# Patient Record
Sex: FEMALE | Race: WHITE | NOT HISPANIC OR LATINO | Employment: OTHER | URBAN - METROPOLITAN AREA
[De-identification: names, ages, dates, MRNs, and addresses within clinical notes are randomized per-mention and may not be internally consistent; named-entity substitution may affect disease eponyms.]

---

## 2017-02-15 ENCOUNTER — OFFICE VISIT (OUTPATIENT)
Dept: PHYSICAL MEDICINE AND REHAB | Facility: CLINIC | Age: 52
End: 2017-02-15

## 2017-02-15 VITALS
HEART RATE: 78 BPM | SYSTOLIC BLOOD PRESSURE: 105 MMHG | BODY MASS INDEX: 24.65 KG/M2 | DIASTOLIC BLOOD PRESSURE: 76 MMHG | HEIGHT: 66 IN | WEIGHT: 153.4 LBS

## 2017-02-15 DIAGNOSIS — G24.1 DYSTONIA, TORSION, FRAGMENTS OF: Primary | ICD-10-CM

## 2017-02-15 ASSESSMENT — PAIN SCALES - GENERAL: PAINLEVEL: NO PAIN (0)

## 2017-02-15 NOTE — LETTER
"2/15/2017       RE: Deepali Jara  [address] [facility] [address]     Dear Colleague,    Thank you for referring your patient, Deepali Jara, to the [facility] PHYSICAL MEDICINE AND REHABILITATION at [facility]. Please see a copy of my visit note below.    BOTULINUM TOXIN PROCEDURE NOTE    Chief Complaint   Patient presents with     RECHECK     UMP RETURN BOTOX       /76 (BP Location: Left arm, Patient Position: Chair, Cuff Size: Adult Regular)  Pulse 78  Ht 1.676 m (5' 6\")  Wt 69.6 kg (153 lb 6.4 oz)  BMI 24.76 kg/m2      Current Outpatient Prescriptions:      OnabotulinumtoxinA (BOTOX IJ), Inject 100 Units into the muscle Lot # /[id number]  Exp: 8/2019, Disp: , Rfl:      CLONAZEPAM PO, Take 0.5 mg by mouth, Disp: , Rfl:      OnabotulinumtoxinA (BOTOX IJ), Inject 50 Units as directed once Lot#[id number]  Ex: June 2019, Disp: , Rfl:      botulinum toxin type A (BOTOX) 100 UNITS injection, Inject 300 Units into the muscle every 3 months, Disp: 300 Units, Rfl: 3     carbidopa-levodopa (SINEMET)  MG per tablet, Increase Sinemet 25/100 mg -- take 1.5 tablets 3 x per day for 1 week & if tolerated increase to 2 tablets 3 times a day. (Patient taking differently: Increase Sinemet 25/100 mg -- take 1 tablets 3 x per day for 1 week), Disp: 180 tablet, Rfl: 11     No Known Allergies     PHYSICAL EXAM:    SHOULDER PATTERN:   Right - Tremor:   Observed  Right - Flexion:   Present  Right - Adduction:   Observed  ELBOW AND FOREARM PATTERN:   Right - abduction:   Observed  WRIST AND HAND PATTERN:   Right - Wrist Extension:   Observed  Right - Finger Extension:   Observed  Right - Thumb Extension:   Observed  Right - Finger Abduction:   Observed    HPI:    Patient reports the following new medical problems since last visit: none    We reviewed the recommended safety guidelines for  Botox from any vaccine injection, such as the seasonal flu vaccine, by a " minimum of 10-14 days with Deepali Jara. She acknowledged understanding.    RESPONSE TO PREVIOUS TREATMENT:    Deepali Jara received 100 units of Botox on 16.    Problems following the previous series of neurotoxin injections included:  No problems reported    BENEFITS BY PATIENT REPORT:    Pain Improvement: Yes.  Percent Improvement: 100 %    Duration of Benefit:  6 weeks and followed by a rapid reduction in benefit.    Dystonia, hand cramping, and fatigue improvement: Yes.  Percent Improvement: 50 %   Duration of Benefit:  6 weeks and followed by a rapid reduction in benefit.      BOTULINUM NEUROTOXIN INJECTION PROCEDURES:    VERIFICATION OF PATIENT IDENTIFICATION AND PROCEDURE     Initials   Patient Name [facility]   Patient  [facility]   Procedure Verified by: [facility]     Prior to the start of the procedure and with procedural staff participation, I verbally confirmed the patient s identity using two indicators, relevant allergies, that the procedure was appropriate and matched the consent or emergent situation, and that the correct equipment/implants were available. Immediately prior to starting the procedure I conducted the Time Out with the procedural staff and re-confirmed the patient s name, procedure, and site/side. (The Joint Commission universal protocol was followed.)  Yes    Sedation (Moderate or Deep): None      Above assessments performed by:  Resident/Fellow         Evita Walton DO          The attending provider was present for the entire procedure documented below.      Oskar Lan MD      INDICATION/S FOR PROCEDURE/S:  Deepali Jara is a 51 year old patient with dystonia affecting the  right upper extremity secondary to a diagnosis of focal dystonia with associated  pain, tremor and loss of volitional motor control.     Her baseline symptoms have been recalcitrant to oral medications and conservative therapy.  She is here today for an injection of Botox.      GOAL OF  PROCEDURE:  The goal of this procedure is to improve volitional motor control, decrease pain, and enhance functional independence associated with dystonic movements.    TOTAL DOSE ADMINISTERED:  Dose Administered:  100 units Botox    Diluent Used:  Preservative Free Normal Saline  Total Volume of Diluent Used: 1 ml  Lot # /C3 with Expiration Date:  09/2019  NDC #: Botox 100u ([address]-38)    Medication guide was offered to patient and was declined.    CONSENT:  The risks, benefits, and treatment options were discussed with Deepali Jara and she agreed to proceed.      Written consent was obtained by [facility].     EQUIPMENT USED:  Needle-25mm stimulating/recording  EMG/NCS Machine    SKIN PREPARATION:  Skin preparation was performed using an alcohol wipe.    GUIDANCE DESCRIPTION:  Electro-myographic guidance was necessary throughout the procedure to accurately identify all areas of dystonic muscles while avoiding injection of non-dystonic muscles, neighboring nerves and nearby vascular structures.     AREA/MUSCLE INJECTED:  100 units of Botox  1. Right upper limb     Right Pect Major - 20 units of Botox at 1 site      Right Biceps- 25 units of Botox at 1 site      Right Brach rad - 10 units of Botox at 1 site      Right Flex Carp Uln - 7.5 units of Botox at 1 site      Right Carp Uln - 7.5 units of Botox at 1 site      Right Abductor Poll - 5 units of Botox at 1 site      Right Abductor Digiti Min - 5 units of Botox at 1 site     Right Dorsal Interossei - 15 units of Botox at 4 sites     Right Ext Poll Long - 5 units of Botox at 1 site    RESPONSE TO PROCEDURE:  Deepali Jara tolerated the procedure well and there were no immediate complications.  She was allowed to recover for an appropriate period of time and was discharged home in stable condition.    FOLLOW UP:  Deepali Jara was asked to follow up by phone in 7-14 days with Bettie Rhodes PT, Care Coordinator, to report her  response to this series of injections.  Based on the patient's previous response to this therapy, Deepaliryan Jara was rescheduled for the next series of injections in 7-9 weeks.    PLAN (Medication Changes, Therapy Orders, Work or Disability Issues, etc.):     Again, thank you for allowing me to participate in the care of your patient.      Sincerely,    Oskar Lan MD

## 2017-02-15 NOTE — PROGRESS NOTES
"BOTULINUM TOXIN PROCEDURE NOTE    Chief Complaint   Patient presents with     RECHECK     UMP RETURN BOTOX       /76 (BP Location: Left arm, Patient Position: Chair, Cuff Size: Adult Regular)  Pulse 78  Ht 1.676 m (5' 6\")  Wt 69.6 kg (153 lb 6.4 oz)  BMI 24.76 kg/m2      Current Outpatient Prescriptions:      OnabotulinumtoxinA (BOTOX IJ), Inject 100 Units into the muscle Lot # /C3  Exp: 8/2019, Disp: , Rfl:      CLONAZEPAM PO, Take 0.5 mg by mouth, Disp: , Rfl:      OnabotulinumtoxinA (BOTOX IJ), Inject 50 Units as directed once Lot#[id number]  Ex: June 2019, Disp: , Rfl:      botulinum toxin type A (BOTOX) 100 UNITS injection, Inject 300 Units into the muscle every 3 months, Disp: 300 Units, Rfl: 3     carbidopa-levodopa (SINEMET)  MG per tablet, Increase Sinemet 25/100 mg -- take 1.5 tablets 3 x per day for 1 week & if tolerated increase to 2 tablets 3 times a day. (Patient taking differently: Increase Sinemet 25/100 mg -- take 1 tablets 3 x per day for 1 week), Disp: 180 tablet, Rfl: 11     No Known Allergies     PHYSICAL EXAM:    SHOULDER PATTERN:   Right - Tremor:   Observed  Right - Flexion:   Present  Right - Adduction:   Observed  ELBOW AND FOREARM PATTERN:   Right - abduction:   Observed  WRIST AND HAND PATTERN:   Right - Wrist Extension:   Observed  Right - Finger Extension:   Observed  Right - Thumb Extension:   Observed  Right - Finger Abduction:   Observed    HPI:    Patient reports the following new medical problems since last visit: none    We reviewed the recommended safety guidelines for  Botox from any vaccine injection, such as the seasonal flu vaccine, by a minimum of 10-14 days with Deepali Jara. She acknowledged understanding.    RESPONSE TO PREVIOUS TREATMENT:    Deepali MAKENNA Oliverhuma Jara received 100 units of Botox on 12/21/16.    Problems following the previous series of neurotoxin injections included:  No problems reported    BENEFITS BY PATIENT " REPORT:    Pain Improvement: Yes.  Percent Improvement: 100 %    Duration of Benefit:  6 weeks and followed by a rapid reduction in benefit.    Dystonia, hand cramping, and fatigue improvement: Yes.  Percent Improvement: 50 %   Duration of Benefit:  6 weeks and followed by a rapid reduction in benefit.      BOTULINUM NEUROTOXIN INJECTION PROCEDURES:    VERIFICATION OF PATIENT IDENTIFICATION AND PROCEDURE     Initials   Patient Name [facility]   Patient  [facility]   Procedure Verified by: [facility]     Prior to the start of the procedure and with procedural staff participation, I verbally confirmed the patient s identity using two indicators, relevant allergies, that the procedure was appropriate and matched the consent or emergent situation, and that the correct equipment/implants were available. Immediately prior to starting the procedure I conducted the Time Out with the procedural staff and re-confirmed the patient s name, procedure, and site/side. (The Joint Commission universal protocol was followed.)  Yes    Sedation (Moderate or Deep): None      Above assessments performed by:  Resident/Fellow         Evita Walton DO          The attending provider was present for the entire procedure documented below.      Oskar Lan MD      INDICATION/S FOR PROCEDURE/S:  Deepali Jara is a 51 year old patient with dystonia affecting the  right upper extremity secondary to a diagnosis of focal dystonia with associated  pain, tremor and loss of volitional motor control.     Her baseline symptoms have been recalcitrant to oral medications and conservative therapy.  She is here today for an injection of Botox.      GOAL OF PROCEDURE:  The goal of this procedure is to improve volitional motor control, decrease pain, and enhance functional independence associated with dystonic movements.    TOTAL DOSE ADMINISTERED:  Dose Administered:  100 units Botox    Diluent Used:  Preservative Free Normal Saline  Total Volume of Diluent Used:  1 ml  Lot # /C3 with Expiration Date:  09/2019  NDC #: Botox 100u ([address]-39)    Medication guide was offered to patient and was declined.    CONSENT:  The risks, benefits, and treatment options were discussed with Deepali Jara and she agreed to proceed.      Written consent was obtained by [facility].     EQUIPMENT USED:  Needle-25mm stimulating/recording  EMG/NCS Machine    SKIN PREPARATION:  Skin preparation was performed using an alcohol wipe.    GUIDANCE DESCRIPTION:  Electro-myographic guidance was necessary throughout the procedure to accurately identify all areas of dystonic muscles while avoiding injection of non-dystonic muscles, neighboring nerves and nearby vascular structures.     AREA/MUSCLE INJECTED:  100 units of Botox  1. Right upper limb     Right Pect Major - 20 units of Botox at 1 site      Right Biceps- 25 units of Botox at 1 site      Right Brach rad - 10 units of Botox at 1 site      Right Flex Carp Uln - 7.5 units of Botox at 1 site      Right Carp Uln - 7.5 units of Botox at 1 site      Right Abductor Poll - 5 units of Botox at 1 site      Right Abductor Digiti Min - 5 units of Botox at 1 site     Right Dorsal Interossei - 15 units of Botox at 4 sites     Right Ext Poll Long - 5 units of Botox at 1 site    RESPONSE TO PROCEDURE:  Deepali Jara tolerated the procedure well and there were no immediate complications.  She was allowed to recover for an appropriate period of time and was discharged home in stable condition.    FOLLOW UP:  Deepali Jara was asked to follow up by phone in 7-14 days with Bettie Rhodes PT, Care Coordinator, to report her response to this series of injections.  Based on the patient's previous response to this therapy, Deepali Jara was rescheduled for the next series of injections in 7-9 weeks.    PLAN (Medication Changes, Therapy Orders, Work or Disability Issues, etc.):

## 2017-02-15 NOTE — MR AVS SNAPSHOT
After Visit Summary   2/15/2017    Deepali Jara    MRN: [id number]           Patient Information     Date Of Birth          1965        Visit Information        Provider Department      2/15/2017 2:20 PM Oskar Lan MD [facility] Physical Medicine and Rehabilitation         Follow-ups after your visit        Follow-up notes from your care team     Return in about 8 weeks (around 4/12/2017), or Stalin-dystonia.      Your next 10 appointments already scheduled     Apr 04, 2017  1:20 PM CDT   (Arrive by 1:05 PM)   Return Botox with Oskar Lan MD   [facility] Physical Medicine and Rehabilitation ([facility])    [address] [place] [id number] [phone]            Jun 07, 2017  1:00 PM CDT   (Arrive by 12:45 PM)   Return Botox with Oskar Lan MD   [facility] Physical Medicine and Rehabilitation ([facility])    [address] [place] [id number] [phone]              Who to contact     Please call your clinic at [phone] to:    Ask questions about your health    Make or cancel appointments    Discuss your medicines    Learn about your test results    Speak to your doctor   If you have compliments or concerns about an experience at your clinic, or if you wish to file a complaint, please contact [facility] Patient Relations at [phone] or email us at [email or web address]         Additional Information About Your Visit        MyChart Information     [employer] is an electronic gateway that provides easy, online access to your medical records. With [employer], you can request a clinic appointment, read your test results, renew a prescription or communicate with your care team.     To sign up for [employer] visit the website at [email or web address]   You will be asked to enter the access code listed below, as well as some  "personal information. Please follow the directions to create your username and password.     Your access code is: [email or web address]  Expires: 2017  6:30 AM     Your access code will  in 90 days. If you need help or a new code, please contact your [facility] or call [phone] for assistance.        Care EveryWhere ID     This is your Care EveryWhere ID. This could be used by other organizations to access your [facility] medical records  [id number]        Your Vitals Were     Pulse Height BMI (Body Mass Index)             78 1.676 m (5' 6\") 24.76 kg/m2          Blood Pressure from Last 3 Encounters:   02/15/17 105/76   16 116/61   10/12/16 127/65    Weight from Last 3 Encounters:   02/15/17 69.6 kg (153 lb 6.4 oz)   16 70.1 kg (154 lb 8 oz)   06/15/15 64.9 kg (143 lb)              Today, you had the following     No orders found for display         Today's Medication Changes          These changes are accurate as of: 2/15/17  2:50 PM.  If you have any questions, ask your nurse or doctor.               These medicines have changed or have updated prescriptions.        Dose/Directions    carbidopa-levodopa  MG per tablet   Commonly known as:  SINEMET   This may have changed:  additional instructions   Used for:  Tremor        Increase Sinemet 25/100 mg -- take 1.5 tablets 3 x per day for 1 week & if tolerated increase to 2 tablets 3 times a day.   Quantity:  180 tablet   Refills:  11                Primary Care Provider Office Phone # Fax #    Ester Barnes [phone]       [address] [address]        Thank you!     Thank you for choosing [facility] PHYSICAL MEDICINE AND REHABILITATION  for your care. Our goal is always to provide you with excellent care. Hearing back from our patients is one way we can continue to improve our services. Please take a few minutes to complete the written survey that you may receive in " the mail after your visit with us. Thank you!             Your Updated Medication List - Protect others around you: Learn how to safely use, store and throw away your medicines at www.disposemymeds.org.          This list is accurate as of: 2/15/17  2:50 PM.  Always use your most recent med list.                   Brand Name Dispense Instructions for use    * BOTOX IJ      Inject 50 Units as directed once Lot#[id number]  Ex: June 2019       * botulinum toxin type A 100 UNITS injection    BOTOX    300 Units    Inject 300 Units into the muscle every 3 months       * BOTOX IJ      Inject 100 Units into the muscle Lot # /C3  Exp: 8/2019       * BOTOX IJ      Inject 100 Units into the muscle once Lot# /C3, Exp 09/2019       carbidopa-levodopa  MG per tablet    SINEMET    180 tablet    Increase Sinemet 25/100 mg -- take 1.5 tablets 3 x per day for 1 week & if tolerated increase to 2 tablets 3 times a day.       CLONAZEPAM PO      Take 0.5 mg by mouth       * Notice:  This list has 4 medication(s) that are the same as other medications prescribed for you. Read the directions carefully, and ask your doctor or other care provider to review them with you.

## 2017-04-04 ENCOUNTER — OFFICE VISIT (OUTPATIENT)
Dept: PHYSICAL MEDICINE AND REHAB | Facility: CLINIC | Age: 52
End: 2017-04-04

## 2017-04-04 VITALS
HEART RATE: 73 BPM | WEIGHT: 147.7 LBS | BODY MASS INDEX: 23.74 KG/M2 | HEIGHT: 66 IN | TEMPERATURE: 98.3 F | SYSTOLIC BLOOD PRESSURE: 118 MMHG | DIASTOLIC BLOOD PRESSURE: 67 MMHG

## 2017-04-04 DIAGNOSIS — G24.1 DYSTONIA, TORSION, FRAGMENTS OF: Primary | ICD-10-CM

## 2017-04-04 ASSESSMENT — PAIN SCALES - GENERAL: PAINLEVEL: NO PAIN (0)

## 2017-04-04 NOTE — PROGRESS NOTES
"BOTULINUM TOXIN PROCEDURE NOTE    Chief Complaint   Patient presents with     Dystonia     UMP RETURN BOTOX Torsion Dystonia       /67 (BP Location: Left arm, Patient Position: Chair, Cuff Size: Adult Regular)  Pulse 73  Temp 98.3  F (36.8  C)  Ht 1.676 m (5' 6\")  Wt 67 kg (147 lb 11.2 oz)  BMI 23.84 kg/m2      Current Outpatient Prescriptions:      OnabotulinumtoxinA (BOTOX IJ), Inject 100 Units into the muscle once Lot# /C3, Exp 09/2019, Disp: , Rfl:      OnabotulinumtoxinA (BOTOX IJ), Inject 100 Units into the muscle Lot # /C3  Exp: 8/2019, Disp: , Rfl:      CLONAZEPAM PO, Take 0.5 mg by mouth, Disp: , Rfl:      OnabotulinumtoxinA (BOTOX IJ), Inject 50 Units as directed once Lot#[id number]  Ex: June 2019, Disp: , Rfl:      botulinum toxin type A (BOTOX) 100 UNITS injection, Inject 300 Units into the muscle every 3 months, Disp: 300 Units, Rfl: 3     carbidopa-levodopa (SINEMET)  MG per tablet, Increase Sinemet 25/100 mg -- take 1.5 tablets 3 x per day for 1 week & if tolerated increase to 2 tablets 3 times a day. (Patient taking differently: Increase Sinemet 25/100 mg -- take 1 tablets 3 x per day for 1 week), Disp: 180 tablet, Rfl: 11     No Known Allergies     PHYSICAL EXAM:  SHOULDER PATTERN: Right - Tremor: Observed Difficulty to maintain supination  Right - Flexion: Present  Right - Adduction: Observed  ELBOW AND FOREARM PATTERN: Right - abduction: Observed  WRIST AND HAND PATTERN: Right - Wrist Extension: Observed  Right - Finger Extension: Observed  Right - Thumb Extension: Observed  Right - Finger Abduction: Observed      HPI:    Patient denies new medical diagnoses, illnesses, hospitalizations, emergency room visits, and injuries since the previous injection with botulinum neurotoxin.    We reviewed the recommended safety guidelines for  Botox from any vaccine injection, such as the seasonal flu vaccine, by a minimum of 10-14 days with Deepali Jara. She " acknowledged understanding.    RESPONSE TO PREVIOUS TREATMENT:    Deepali Jara received 100 units of Botox on 2/15/2017.    Problems following the previous series of neurotoxin injections included:  No problems reported    BENEFITS BY PATIENT REPORT:    Pain Improvement: Yes.  Percent Improvement: 100 %    Duration of Benefit:  6 weeks and followed by a rapid reduction in benefit.    Dystonia Improvement: Yes.  Percent Improvement: 50 %    Duration of Benefit:  few weeks and followed by a rapid reduction in benefit.  Reports no less shaking but better control with little finger after injections.  Difficulty to maintain right arm supination.    BOTULINUM NEUROTOXIN INJECTION PROCEDURES:    VERIFICATION OF PATIENT IDENTIFICATION AND PROCEDURE     Initials   Patient Name lmd   Patient  lmd   Procedure Verified by: lmd     Prior to the start of the procedure and with procedural staff participation, I verbally confirmed the patient s identity using two indicators, relevant allergies, that the procedure was appropriate and matched the consent or emergent situation, and that the correct equipment/implants were available. Immediately prior to starting the procedure I conducted the Time Out with the procedural staff and re-confirmed the patient s name, procedure, and site/side. (The Joint Commission universal protocol was followed.)  Yes    Sedation (Moderate or Deep): None      Above assessments performed by:  Melani Mahan RN Care Coordinator    Oskar Lan MD      INDICATION/S FOR PROCEDURE/S:  Deepali Jara is a 51 year old patient with dystonia affecting the right upper extremity secondary to a diagnosis of focal dystonia with associated pain, tremor and loss of volitional motor control.      Her baseline symptoms have been recalcitrant to oral medications and conservative therapy. She is here today for an injection of Botox.      GOAL OF PROCEDURE:  The goal of this procedure is to  improve volitional motor control, decrease pain, and enhance functional independence associated with dystonic movements.    TOTAL DOSE ADMINISTERED:  Dose Administered:  125 units Botox    Diluent Used:  Preservative Free Normal Saline  Total Volume of Diluent Used:  1.0 ml  Lot # /C3 with Expiration Date:  9/2019  NDC #: Botox 100u ([address]-91)    Medication guide was offered to patient and was declined.    CONSENT:  The risks, benefits, and treatment options were discussed with Deepali Jara and she agreed to proceed.      Written consent was obtained by lmd.     EQUIPMENT USED:  Needle-25mm stimulating/recording  EMG/NCS Machine     SKIN PREPARATION:  Skin preparation was performed using an alcohol wipe.     GUIDANCE DESCRIPTION:  Electro-myographic guidance was necessary throughout the procedure to accurately identify all areas of dystonic muscles while avoiding injection of non-dystonic muscles, neighboring nerves and nearby vascular structures.     AREA/MUSCLE INJECTED:  125 Units    1. Upper Extremity:  125 units Botox = Total Dose, 1:1 Dilution     Right Pect Major - 30 units of Botox at 1 site      Right Biceps- 25 units of Botox at 1 site      Right Brach rad - 15 units of Botox at 1 site      Right Flex Carp Uln - 15 units of Botox at 1 site      Right Carp Uln - 15 units of Botox at 1 site    Right Pro Teres - 10 units of Botox at 1 site      Right Abductor Poll - 5 units of Botox at 1 site      Right Abductor Digiti Min - 5 units of Botox at 1 site      Right Ext Poll Long - 5 units of Botox at 1 site    RESPONSE TO PROCEDURE:  Deepali Jara tolerated the procedure well and there were no immediate complications.  She was allowed to recover for an appropriate period of time and was discharged home in stable condition.    FOLLOW UP:  Deepali Jara was asked to follow up by phone in 7-14 days with Bettie Rhodes PT, Care Coordinator or Melani Rowley RN, Care  Coordinator, to report her response to this series of injections.  Based on the patient's previous response to this therapy, Deepali Jara was rescheduled for the next series of injections in 9 weeks.    PLAN (Medication Changes, Therapy Orders, Work or Disability Issues, etc.): Will monitor response to today's injections and report.

## 2017-04-04 NOTE — MR AVS SNAPSHOT
After Visit Summary   4/4/2017    Deepali Jara    MRN: [id number]           Patient Information     Date Of Birth          1965        Visit Information        Provider Department      4/4/2017 1:20 PM Oskar Lan MD [facility] Physical Medicine and Rehabilitation         Follow-ups after your visit        Follow-up notes from your care team     Return in about 9 weeks (around 6/6/2017) for Dystonia.      Your next 10 appointments already scheduled     Jun 07, 2017  1:00 PM CDT   (Arrive by 12:45 PM)   Return Botox with Oskar Lan MD   [facility] Physical Medicine and Rehabilitation ([facility])    [address] [place] [address] [phone]            Aug 07, 2017  3:40 PM CDT   (Arrive by 3:25 PM)   Return Botox with Oskar Lan MD   [facility] Physical Medicine and Rehabilitation ([facility])    [address] [place] [address] [phone]              Who to contact     Please call your clinic at [phone] to:    Ask questions about your health    Make or cancel appointments    Discuss your medicines    Learn about your test results    Speak to your doctor   If you have compliments or concerns about an experience at your clinic, or if you wish to file a complaint, please contact [facility] Patient Relations at [phone] or email us at [email or web address]         Additional Information About Your Visit        MyChart Information     Shopogoliq is an electronic gateway that provides easy, online access to your medical records. With Shopogoliq, you can request a clinic appointment, read your test results, renew a prescription or communicate with your care team.     To sign up for Sapheneiat visit the website at [email or web address]   You will be asked to enter the access code listed below, as well as some  "personal information. Please follow the directions to create your username and password.     Your access code is: [email or web address]  Expires: 2017  7:30 AM     Your access code will  in 90 days. If you need help or a new code, please contact your [facility] or call [phone] for assistance.        Care EveryWhere ID     This is your Care EveryWhere ID. This could be used by other organizations to access your [place] medical records  [id number]        Your Vitals Were     Pulse Temperature Height BMI (Body Mass Index)          73 98.3  F (36.8  C) 1.676 m (5' 6\") 23.84 kg/m2         Blood Pressure from Last 3 Encounters:   17 118/67   02/15/17 105/76   16 116/61    Weight from Last 3 Encounters:   17 67 kg (147 lb 11.2 oz)   02/15/17 69.6 kg (153 lb 6.4 oz)   16 70.1 kg (154 lb 8 oz)              Today, you had the following     No orders found for display         Today's Medication Changes          These changes are accurate as of: 17  2:09 PM.  If you have any questions, ask your nurse or doctor.               These medicines have changed or have updated prescriptions.        Dose/Directions    carbidopa-levodopa  MG per tablet   Commonly known as:  SINEMET   This may have changed:  additional instructions   Used for:  Tremor        Increase Sinemet 25/100 mg -- take 1.5 tablets 3 x per day for 1 week & if tolerated increase to 2 tablets 3 times a day.   Quantity:  180 tablet   Refills:  11                Primary Care Provider Office Phone # Fax #    Ester Barnes [phone]       [address] [address]        Thank you!     Thank you for choosing [facility] PHYSICAL MEDICINE AND REHABILITATION  for your care. Our goal is always to provide you with excellent care. Hearing back from our patients is one way we can continue to improve our services. Please take a few minutes to complete the written " survey that you may receive in the mail after your visit with us. Thank you!             Your Updated Medication List - Protect others around you: Learn how to safely use, store and throw away your medicines at www.disposemymeds.org.          This list is accurate as of: 4/4/17  2:09 PM.  Always use your most recent med list.                   Brand Name Dispense Instructions for use    * BOTOX IJ      Inject 50 Units as directed once Lot#[id number]  Ex: June 2019       * botulinum toxin type A 100 UNITS injection    BOTOX    300 Units    Inject 300 Units into the muscle every 3 months       * BOTOX IJ      Inject 100 Units into the muscle Lot # /C3  Exp: 8/2019       * BOTOX IJ      Inject 100 Units into the muscle once Lot# /C3, Exp 09/2019       * BOTOX IJ      Inject 100 Units as directed Lot # /C3  Exp: 9/2019       carbidopa-levodopa  MG per tablet    SINEMET    180 tablet    Increase Sinemet 25/100 mg -- take 1.5 tablets 3 x per day for 1 week & if tolerated increase to 2 tablets 3 times a day.       CLONAZEPAM PO      Take 0.5 mg by mouth       * Notice:  This list has 5 medication(s) that are the same as other medications prescribed for you. Read the directions carefully, and ask your doctor or other care provider to review them with you.

## 2017-04-04 NOTE — LETTER
"4/4/2017       RE: Deepali Jara  [address] [facility] [address]     Dear Colleague,    Thank you for referring your patient, Deepali Jara, to the [facility] PHYSICAL MEDICINE AND REHABILITATION at [facility]. Please see a copy of my visit note below.    BOTULINUM TOXIN PROCEDURE NOTE    Chief Complaint   Patient presents with     Dystonia     UMP RETURN BOTOX Torsion Dystonia       /67 (BP Location: Left arm, Patient Position: Chair, Cuff Size: Adult Regular)  Pulse 73  Temp 98.3  F (36.8  C)  Ht 1.676 m (5' 6\")  Wt 67 kg (147 lb 11.2 oz)  BMI 23.84 kg/m2      Current Outpatient Prescriptions:      OnabotulinumtoxinA (BOTOX IJ), Inject 100 Units into the muscle once Lot# /C3, Exp 09/2019, Disp: , Rfl:      OnabotulinumtoxinA (BOTOX IJ), Inject 100 Units into the muscle Lot # /C3  Exp: 8/2019, Disp: , Rfl:      CLONAZEPAM PO, Take 0.5 mg by mouth, Disp: , Rfl:      OnabotulinumtoxinA (BOTOX IJ), Inject 50 Units as directed once Lot#[id number]  Ex: June 2019, Disp: , Rfl:      botulinum toxin type A (BOTOX) 100 UNITS injection, Inject 300 Units into the muscle every 3 months, Disp: 300 Units, Rfl: 3     carbidopa-levodopa (SINEMET)  MG per tablet, Increase Sinemet 25/100 mg -- take 1.5 tablets 3 x per day for 1 week & if tolerated increase to 2 tablets 3 times a day. (Patient taking differently: Increase Sinemet 25/100 mg -- take 1 tablets 3 x per day for 1 week), Disp: 180 tablet, Rfl: 11     No Known Allergies     PHYSICAL EXAM:  SHOULDER PATTERN: Right - Tremor: Observed Difficulty to maintain supination  Right - Flexion: Present  Right - Adduction: Observed  ELBOW AND FOREARM PATTERN: Right - abduction: Observed  WRIST AND HAND PATTERN: Right - Wrist Extension: Observed  Right - Finger Extension: Observed  Right - Thumb Extension: Observed  Right - Finger Abduction: Observed      HPI:    Patient denies new medical diagnoses, " illnesses, hospitalizations, emergency room visits, and injuries since the previous injection with botulinum neurotoxin.    We reviewed the recommended safety guidelines for  Botox from any vaccine injection, such as the seasonal flu vaccine, by a minimum of 10-14 days with Deepali Jara. She acknowledged understanding.    RESPONSE TO PREVIOUS TREATMENT:    Deepali Jara received 100 units of Botox on 2/15/2017.    Problems following the previous series of neurotoxin injections included:  No problems reported    BENEFITS BY PATIENT REPORT:    Pain Improvement: Yes.  Percent Improvement: 100 %    Duration of Benefit:  6 weeks and followed by a rapid reduction in benefit.    Dystonia Improvement: Yes.  Percent Improvement: 50 %    Duration of Benefit:  few weeks and followed by a rapid reduction in benefit.  Reports no less shaking but better control with little finger after injections.  Difficulty to maintain right arm supination.    BOTULINUM NEUROTOXIN INJECTION PROCEDURES:    VERIFICATION OF PATIENT IDENTIFICATION AND PROCEDURE     Initials   Patient Name lmd   Patient  lmd   Procedure Verified by: lmd     Prior to the start of the procedure and with procedural staff participation, I verbally confirmed the patient s identity using two indicators, relevant allergies, that the procedure was appropriate and matched the consent or emergent situation, and that the correct equipment/implants were available. Immediately prior to starting the procedure I conducted the Time Out with the procedural staff and re-confirmed the patient s name, procedure, and site/side. (The Joint Commission universal protocol was followed.)  Yes    Sedation (Moderate or Deep): None      Above assessments performed by:  Melani Mahan RN Care Coordinator    Oskar Lan MD      INDICATION/S FOR PROCEDURE/S:  Deepali Jara is a 51 year old patient with dystonia affecting the right upper extremity  secondary to a diagnosis of focal dystonia with associated pain, tremor and loss of volitional motor control.      Her baseline symptoms have been recalcitrant to oral medications and conservative therapy. She is here today for an injection of Botox.      GOAL OF PROCEDURE:  The goal of this procedure is to improve volitional motor control, decrease pain, and enhance functional independence associated with dystonic movements.    TOTAL DOSE ADMINISTERED:  Dose Administered:  125 units Botox    Diluent Used:  Preservative Free Normal Saline  Total Volume of Diluent Used:  1.0 ml  Lot # /C3 with Expiration Date:  9/2019  NDC #: Botox 100u ([address]-31)    Medication guide was offered to patient and was declined.    CONSENT:  The risks, benefits, and treatment options were discussed with Deepali Jara and she agreed to proceed.      Written consent was obtained by lmd.     EQUIPMENT USED:  Needle-25mm stimulating/recording  EMG/NCS Machine     SKIN PREPARATION:  Skin preparation was performed using an alcohol wipe.     GUIDANCE DESCRIPTION:  Electro-myographic guidance was necessary throughout the procedure to accurately identify all areas of dystonic muscles while avoiding injection of non-dystonic muscles, neighboring nerves and nearby vascular structures.     AREA/MUSCLE INJECTED:  125 Units    1. Upper Extremity:  125 units Botox = Total Dose, 1:1 Dilution     Right Pect Major - 30 units of Botox at 1 site      Right Biceps- 25 units of Botox at 1 site      Right Brach rad - 15 units of Botox at 1 site      Right Flex Carp Uln - 15 units of Botox at 1 site      Right Carp Uln - 15 units of Botox at 1 site    Right Pro Teres - 10 units of Botox at 1 site      Right Abductor Poll - 5 units of Botox at 1 site      Right Abductor Digiti Min - 5 units of Botox at 1 site      Right Ext Poll Long - 5 units of Botox at 1 site    RESPONSE TO PROCEDURE:  Deepali Jara tolerated the procedure well  and there were no immediate complications.  She was allowed to recover for an appropriate period of time and was discharged home in stable condition.    FOLLOW UP:  Deepali Jara was asked to follow up by phone in 7-14 days with Bettie Rhodes PT, Care Coordinator or Melani Rowley RN, Care Coordinator, to report her response to this series of injections.  Based on the patient's previous response to this therapy, Deepali Jara was rescheduled for the next series of injections in 9 weeks.    PLAN (Medication Changes, Therapy Orders, Work or Disability Issues, etc.): Will monitor response to today's injections and report.          Again, thank you for allowing me to participate in the care of your patient.      Sincerely,    Oskar Lan MD

## 2017-06-07 ENCOUNTER — OFFICE VISIT (OUTPATIENT)
Dept: PHYSICAL MEDICINE AND REHAB | Facility: CLINIC | Age: 52
End: 2017-06-07

## 2017-06-07 VITALS
WEIGHT: 151.7 LBS | DIASTOLIC BLOOD PRESSURE: 71 MMHG | SYSTOLIC BLOOD PRESSURE: 128 MMHG | HEIGHT: 66 IN | HEART RATE: 65 BPM | TEMPERATURE: 98.4 F | BODY MASS INDEX: 24.38 KG/M2

## 2017-06-07 DIAGNOSIS — G24.1 DYSTONIA, TORSION, FRAGMENTS OF: Primary | ICD-10-CM

## 2017-06-07 ASSESSMENT — PAIN SCALES - GENERAL: PAINLEVEL: MILD PAIN (3)

## 2017-06-07 NOTE — NURSING NOTE
Chief Complaint   Patient presents with     RECHECK     [facility] RETURN - BOTOX     Alise Aguilera MA

## 2017-06-07 NOTE — MR AVS SNAPSHOT
After Visit Summary   6/7/2017    Deepali Jara    MRN: [id number]           Patient Information     Date Of Birth          1965        Visit Information        Provider Department      6/7/2017 1:00 PM Oskar Lan MD [facility] Physical Medicine and Rehabilitation         Follow-ups after your visit        Follow-up notes from your care team     Return in about 9 weeks (around 8/9/2017) for Stalin-Dystonia.      Your next 10 appointments already scheduled     Aug 07, 2017  3:40 PM CDT   (Arrive by 3:25 PM)   Return Botox with Oskar Lan MD   [facility] Physical Medicine and Rehabilitation ([facility])    [address] [place] [address] [phone]            Oct 11, 2017  3:40 PM CDT   (Arrive by 3:25 PM)   Return Botox with Oskar Lan MD   [facility] Physical Medicine and Rehabilitation ([facility])    [address] [place] [address] [phone]              Who to contact     Please call your clinic at [phone] to:    Ask questions about your health    Make or cancel appointments    Discuss your medicines    Learn about your test results    Speak to your doctor   If you have compliments or concerns about an experience at your clinic, or if you wish to file a complaint, please contact [facility] Patient Relations at [phone] or email us at [email or web address]         Additional Information About Your Visit        MyChart Information     MojoPages is an electronic gateway that provides easy, online access to your medical records. With MojoPages, you can request a clinic appointment, read your test results, renew a prescription or communicate with your care team.     To sign up for SHARKMARXt visit the website at [email or web address]   You will be asked to enter the access code listed below, as well as some  "personal information. Please follow the directions to create your username and password.     Your access code is: [email or web address]  Expires: 2017  6:30 AM     Your access code will  in 90 days. If you need help or a new code, please contact your [facility] or call [phone] for assistance.        Care EveryWhere ID     This is your Care EveryWhere ID. This could be used by other organizations to access your [place] medical records  [id number]        Your Vitals Were     Pulse Temperature Height BMI (Body Mass Index)          65 98.4  F (36.9  C) (Oral) 1.676 m (5' 6\") 24.49 kg/m2         Blood Pressure from Last 3 Encounters:   17 128/71   17 118/67   02/15/17 105/76    Weight from Last 3 Encounters:   17 68.8 kg (151 lb 11.2 oz)   17 67 kg (147 lb 11.2 oz)   02/15/17 69.6 kg (153 lb 6.4 oz)              Today, you had the following     No orders found for display         Today's Medication Changes          These changes are accurate as of: 17  1:46 PM.  If you have any questions, ask your nurse or doctor.               These medicines have changed or have updated prescriptions.        Dose/Directions    carbidopa-levodopa  MG per tablet   Commonly known as:  SINEMET   This may have changed:  additional instructions   Used for:  Tremor        Increase Sinemet 25/100 mg -- take 1.5 tablets 3 x per day for 1 week & if tolerated increase to 2 tablets 3 times a day.   Quantity:  180 tablet   Refills:  11                Primary Care Provider Office Phone # Fax #    Ester Barnes [phone]       [address] [address]        Thank you!     Thank you for choosing [facility] PHYSICAL MEDICINE AND REHABILITATION  for your care. Our goal is always to provide you with excellent care. Hearing back from our patients is one way we can continue to improve our services. Please take a few minutes to complete " the written survey that you may receive in the mail after your visit with us. Thank you!             Your Updated Medication List - Protect others around you: Learn how to safely use, store and throw away your medicines at www.disposemymeds.org.          This list is accurate as of: 6/7/17  1:46 PM.  Always use your most recent med list.                   Brand Name Dispense Instructions for use    * BOTOX IJ      Inject 50 Units as directed once Lot#[id number]  Ex: June 2019       * botulinum toxin type A 100 UNITS injection    BOTOX    300 Units    Inject 300 Units into the muscle every 3 months       * BOTOX IJ      Inject 100 Units into the muscle Lot # /C3  Exp: 8/2019       * BOTOX IJ      Inject 100 Units into the muscle once Lot# /C3, Exp 09/2019       * BOTOX IJ      Inject 125 Units as directed Lot # /C3  Exp: 9/2019       carbidopa-levodopa  MG per tablet    SINEMET    180 tablet    Increase Sinemet 25/100 mg -- take 1.5 tablets 3 x per day for 1 week & if tolerated increase to 2 tablets 3 times a day.       CLONAZEPAM PO      Take 0.5 mg by mouth       * Notice:  This list has 5 medication(s) that are the same as other medications prescribed for you. Read the directions carefully, and ask your doctor or other care provider to review them with you.

## 2017-06-07 NOTE — LETTER
"6/7/2017       RE: Deepali Jara  [address] [facility] [address]     Dear Colleague,    Thank you for referring your patient, Deepali Jara, to the [facility] PHYSICAL MEDICINE AND REHABILITATION at [facility]. Please see a copy of my visit note below.    BOTULINUM TOXIN PROCEDURE NOTE    Chief Complaint   Patient presents with     RECHECK     UMP RETURN - BOTOX       /71 (BP Location: Left arm, Patient Position: Sitting, Cuff Size: Adult Regular)  Pulse 65  Temp 98.4  F (36.9  C) (Oral)  Ht 1.676 m (5' 6\")  Wt 68.8 kg (151 lb 11.2 oz)  BMI 24.49 kg/m2      Current Outpatient Prescriptions:      OnabotulinumtoxinA (BOTOX IJ), Inject 125 Units as directed Lot # /C3  Exp: 9/2019, Disp: , Rfl:      OnabotulinumtoxinA (BOTOX IJ), Inject 100 Units into the muscle once Lot# /C3, Exp 09/2019, Disp: , Rfl:      OnabotulinumtoxinA (BOTOX IJ), Inject 100 Units into the muscle Lot # /C3  Exp: 8/2019, Disp: , Rfl:      CLONAZEPAM PO, Take 0.5 mg by mouth, Disp: , Rfl:      OnabotulinumtoxinA (BOTOX IJ), Inject 50 Units as directed once Lot#[id number]  Ex: June 2019, Disp: , Rfl:      botulinum toxin type A (BOTOX) 100 UNITS injection, Inject 300 Units into the muscle every 3 months, Disp: 300 Units, Rfl: 3     carbidopa-levodopa (SINEMET)  MG per tablet, Increase Sinemet 25/100 mg -- take 1.5 tablets 3 x per day for 1 week & if tolerated increase to 2 tablets 3 times a day. (Patient taking differently: Increase Sinemet 25/100 mg -- take 1 tablets 3 x per day for 1 week), Disp: 180 tablet, Rfl: 11     No Known Allergies     PHYSICAL EXAM:  SHOULDER PATTERN: Right - Tremor: Observed Difficulty to maintain supination  Right - Flexion: Present  Right - Adduction: Observed  ELBOW AND FOREARM PATTERN: Right - abduction: Observed  WRIST AND HAND PATTERN: Right - Wrist Extension: Observed  Right - Finger Extension: Observed  Right - Thumb " Extension: Observed  Right - Finger Abduction: Observed      HPI:  Patient denies new medical diagnoses, illnesses, hospitalizations, emergency room visits, and injuries since the previous injection with botulinum neurotoxin.    We reviewed the recommended safety guidelines for  Botox from any vaccine injection, such as the seasonal flu vaccine, by a minimum of 10-14 days with Deepali Jara. She acknowledged understanding.     Pt has intense fatigue due to jerking movements that leads to a constant achy feeling.     RESPONSE TO PREVIOUS TREATMENT:    Deepali Jara received 100 units of Botox on 17.    Problems following the previous series of neurotoxin injections included:  Soreness and/or Pain:  Rated as 'Moderate' severity.  Duration: 2 weeks then gradual improvement.     BENEFITS BY PATIENT REPORT:    Dystonia Improvement: Yes.  Percent Improvement: 50%    Duration of Benefit:  few weeks and followed by a rapid reduction in benefit.    Reports no less shaking but better control with little finger after injections.  Difficulty to maintain right arm supination.    BOTULINUM NEUROTOXIN INJECTION PROCEDURES:    VERIFICATION OF PATIENT IDENTIFICATION AND PROCEDURE     Initials   Patient Name [facility]   Patient  [facility]   Procedure Verified by: [facility]     Prior to the start of the procedure and with procedural staff participation, I verbally confirmed the patient s identity using two indicators, relevant allergies, that the procedure was appropriate and matched the consent or emergent situation, and that the correct equipment/implants were available. Immediately prior to starting the procedure I conducted the Time Out with the procedural staff and re-confirmed the patient s name, procedure, and site/side. (The Joint Commission universal protocol was followed.)  Yes    Sedation (Moderate or Deep): None      Above assessments performed by:  Resident/Fellow         Evita Walton DO          The  attending provider was present for the entire procedure documented below.      Oskar Lan MD      INDICATION/S FOR PROCEDURE/S:  Deepali Jara is a 51 year old patient with dystonia affecting the right upper extremity secondary to a diagnosis of focal dystonia with associated pain, tremor and loss of volitional motor control.      Her baseline symptoms have been recalcitrant to oral medications and conservative therapy. She is here today for an injection of Botox.      GOAL OF PROCEDURE:  The goal of this procedure is to improve volitional motor control, decrease pain, and enhance functional independence associated with dystonic movements.    TOTAL DOSE ADMINISTERED:  Dose Administered:  25 units Botox    Diluent Used:  Preservative Free Normal Saline  Total Volume of Diluent Used:  .50 ml  Lot # /C3 with Expiration Date:  01/2020  NDC #: Botox 100u ([address]-20)    Medication guide was offered to patient and was declined.    CONSENT:  The risks, benefits, and treatment options were discussed with Deepali Jara and she agreed to proceed.      Written consent was obtained by [facility].     EQUIPMENT USED:  Needle-25mm stimulating/recording  EMG/NCS Machine     SKIN PREPARATION:  Skin preparation was performed using an alcohol wipe.     GUIDANCE DESCRIPTION:  Electro-myographic guidance was necessary throughout the procedure to accurately identify all areas of dystonic muscles while avoiding injection of non-dystonic muscles, neighboring nerves and nearby vascular structures.     AREA/MUSCLE INJECTED:  25 Units    1. Upper Extremity:  25 units Botox = Total Dose, 2:1 Dilution      Right Wrist Extensor Complex - 17.5 units of Botox at 1 site      Right Abductor Digiti Min - 7.5 units of Botox at 1 site      RESPONSE TO PROCEDURE:  Deepali Jara tolerated the procedure well and there were no immediate complications.  She was allowed to recover for an appropriate period of time and was  discharged home in stable condition.    FOLLOW UP:  Deepali Jara was asked to follow up by phone in 7-14 days with Bettie Rhodes PT, Care Coordinator or Melani Rowley RN, Care Coordinator, to report her response to this series of injections.  Based on the patient's previous response to this therapy, Deepali Jara was rescheduled for the next series of injections in 9 weeks.     PLAN (Medication Changes, Therapy Orders, Work or Disability Issues, etc.):   -Will monitor response to today's injections and report  -Will discuss patient with Dr Verma in [facility] given poor response to our Botox injections so far.    There were 25 units of Botox as unavoidable waste for this patient.    Again, thank you for allowing me to participate in the care of your patient.      Sincerely,    Oskar Lan MD

## 2017-06-07 NOTE — PROGRESS NOTES
"BOTULINUM TOXIN PROCEDURE NOTE    Chief Complaint   Patient presents with     RECHECK     [facility] RETURN - BOTOX       /71 (BP Location: Left arm, Patient Position: Sitting, Cuff Size: Adult Regular)  Pulse 65  Temp 98.4  F (36.9  C) (Oral)  Ht 1.676 m (5' 6\")  Wt 68.8 kg (151 lb 11.2 oz)  BMI 24.49 kg/m2      Current Outpatient Prescriptions:      OnabotulinumtoxinA (BOTOX IJ), Inject 125 Units as directed Lot # /C3  Exp: 9/2019, Disp: , Rfl:      OnabotulinumtoxinA (BOTOX IJ), Inject 100 Units into the muscle once Lot# /C3, Exp 09/2019, Disp: , Rfl:      OnabotulinumtoxinA (BOTOX IJ), Inject 100 Units into the muscle Lot # /C3  Exp: 8/2019, Disp: , Rfl:      CLONAZEPAM PO, Take 0.5 mg by mouth, Disp: , Rfl:      OnabotulinumtoxinA (BOTOX IJ), Inject 50 Units as directed once Lot#[id number]  Ex: June 2019, Disp: , Rfl:      botulinum toxin type A (BOTOX) 100 UNITS injection, Inject 300 Units into the muscle every 3 months, Disp: 300 Units, Rfl: 3     carbidopa-levodopa (SINEMET)  MG per tablet, Increase Sinemet 25/100 mg -- take 1.5 tablets 3 x per day for 1 week & if tolerated increase to 2 tablets 3 times a day. (Patient taking differently: Increase Sinemet 25/100 mg -- take 1 tablets 3 x per day for 1 week), Disp: 180 tablet, Rfl: 11     No Known Allergies     PHYSICAL EXAM:  SHOULDER PATTERN: Right - Tremor: Observed Difficulty to maintain supination  Right - Flexion: Present  Right - Adduction: Observed  ELBOW AND FOREARM PATTERN: Right - abduction: Observed  WRIST AND HAND PATTERN: Right - Wrist Extension: Observed  Right - Finger Extension: Observed  Right - Thumb Extension: Observed  Right - Finger Abduction: Observed      HPI:  Patient denies new medical diagnoses, illnesses, hospitalizations, emergency room visits, and injuries since the previous injection with botulinum neurotoxin.    We reviewed the recommended safety guidelines for  Botox from any vaccine injection, " such as the seasonal flu vaccine, by a minimum of 10-14 days with Deepali Jara. She acknowledged understanding.     Pt has intense fatigue due to jerking movements that leads to a constant achy feeling.     RESPONSE TO PREVIOUS TREATMENT:    Deepali Jara received 100 units of Botox on 17.    Problems following the previous series of neurotoxin injections included:  Soreness and/or Pain:  Rated as 'Moderate' severity.  Duration: 2 weeks then gradual improvement.     BENEFITS BY PATIENT REPORT:    Dystonia Improvement: Yes.  Percent Improvement: 50%    Duration of Benefit:  few weeks and followed by a rapid reduction in benefit.    Reports no less shaking but better control with little finger after injections.  Difficulty to maintain right arm supination.    BOTULINUM NEUROTOXIN INJECTION PROCEDURES:    VERIFICATION OF PATIENT IDENTIFICATION AND PROCEDURE     Initials   Patient Name [facility]   Patient  [facility]   Procedure Verified by: [facility]     Prior to the start of the procedure and with procedural staff participation, I verbally confirmed the patient s identity using two indicators, relevant allergies, that the procedure was appropriate and matched the consent or emergent situation, and that the correct equipment/implants were available. Immediately prior to starting the procedure I conducted the Time Out with the procedural staff and re-confirmed the patient s name, procedure, and site/side. (The Joint Commission universal protocol was followed.)  Yes    Sedation (Moderate or Deep): None      Above assessments performed by:  Resident/Fellow         Evita Walton DO          The attending provider was present for the entire procedure documented below.      Oskar Lan MD      INDICATION/S FOR PROCEDURE/S:  Deepali Jara is a 51 year old patient with dystonia affecting the right upper extremity secondary to a diagnosis of focal dystonia with associated pain, tremor and loss of  volitional motor control.      Her baseline symptoms have been recalcitrant to oral medications and conservative therapy. She is here today for an injection of Botox.      GOAL OF PROCEDURE:  The goal of this procedure is to improve volitional motor control, decrease pain, and enhance functional independence associated with dystonic movements.    TOTAL DOSE ADMINISTERED:  Dose Administered:  25 units Botox    Diluent Used:  Preservative Free Normal Saline  Total Volume of Diluent Used:  .50 ml  Lot # /C3 with Expiration Date:  01/2020  NDC #: Botox 100u ([address]-60)    Medication guide was offered to patient and was declined.    CONSENT:  The risks, benefits, and treatment options were discussed with Deepali Jara and she agreed to proceed.      Written consent was obtained by [facility].     EQUIPMENT USED:  Needle-25mm stimulating/recording  EMG/NCS Machine     SKIN PREPARATION:  Skin preparation was performed using an alcohol wipe.     GUIDANCE DESCRIPTION:  Electro-myographic guidance was necessary throughout the procedure to accurately identify all areas of dystonic muscles while avoiding injection of non-dystonic muscles, neighboring nerves and nearby vascular structures.     AREA/MUSCLE INJECTED:  25 Units    1. Upper Extremity:  25 units Botox = Total Dose, 2:1 Dilution      Right Wrist Extensor Complex - 17.5 units of Botox at 1 site      Right Abductor Digiti Min - 7.5 units of Botox at 1 site      RESPONSE TO PROCEDURE:  Deepali Jara tolerated the procedure well and there were no immediate complications.  She was allowed to recover for an appropriate period of time and was discharged home in stable condition.    FOLLOW UP:  Deepali Jara was asked to follow up by phone in 7-14 days with Bettie Rhodes PT, Care Coordinator or Melani Rowley RN, Care Coordinator, to report her response to this series of injections.  Based on the patient's previous response to this  therapy, Deepali Oliverhuma Jara was rescheduled for the next series of injections in 9 weeks.     PLAN (Medication Changes, Therapy Orders, Work or Disability Issues, etc.):   -Will monitor response to today's injections and report  -Will discuss patient with Dr Verma in [facility] given poor response to our Botox injections so far.    There were 25 units of Botox as unavoidable waste for this patient.

## 2017-06-16 ENCOUNTER — PRE VISIT (OUTPATIENT)
Dept: NEUROLOGY | Facility: CLINIC | Age: 52
End: 2017-06-16

## 2017-06-16 NOTE — TELEPHONE ENCOUNTER
1.  Date/reason for appt:6/22/17, Botox  2.  Referring provider: STEPHAN MAYS  3.  Call to patient (Yes / No - short description): No, referred   4.  [facility]   5.  Other:

## 2017-06-22 ENCOUNTER — OFFICE VISIT (OUTPATIENT)
Dept: NEUROLOGY | Facility: CLINIC | Age: 52
End: 2017-06-22

## 2017-06-22 VITALS — HEIGHT: 66 IN | SYSTOLIC BLOOD PRESSURE: 147 MMHG | HEART RATE: 80 BPM | DIASTOLIC BLOOD PRESSURE: 78 MMHG

## 2017-06-22 DIAGNOSIS — G24.8 ACQUIRED TORSION DYSTONIA: ICD-10-CM

## 2017-06-22 DIAGNOSIS — G25.3 MYOCLONUS: Primary | ICD-10-CM

## 2017-06-22 DIAGNOSIS — G31.85 CORTICOBASAL SYNDROME (H): ICD-10-CM

## 2017-06-22 RX ORDER — LEVETIRACETAM 250 MG/1
TABLET ORAL
Qty: 130 TABLET | Refills: 3 | Status: SHIPPED | OUTPATIENT
Start: 2017-06-22 | End: 2017-08-02 | Stop reason: DRUGHIGH

## 2017-06-22 ASSESSMENT — PAIN SCALES - GENERAL: PAINLEVEL: NO PAIN (0)

## 2017-06-22 NOTE — LETTER
2017       RE: Deepali Jara  [address]  [place] [facility] [address]     Dear Colleague,    Thank you for referring your patient, Deepali Jara, to the [facility] NEUROLOGY at [facility]. Please see a copy of my visit note below.    [facility]  Initial Evaluation and Botulinum Clinic Note    Patient: Deepali Jara  MRN:   [id number]  :  1965  Date of Visit:  17      Dear Dr. Lan, Thank you for your referral of Deepali Jara for consultation for a second opinion for botulinum toxin injections for evaluation and treatment of acquired torsion dystonia and involuntary movements.    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 She initially developed aching pain in her right hand about 5 years ago. Her  had noticed that she was typing and using her phone more slowly with the right hand. She had an episode where she crushed a cup of coffee when she was holding it.     As of 1-2 years ago, she stopped being able to feed herself or write. She does try to do weights with that hand. She has jerky movements frequently in the right hand and is worst in the AM. These jerking movements in fact may be more disabling that the dystonia. She takes 3 tab Sinemet 25/100mg at 7am, 2 tab at noon, and 2 tab at 6 pm Sinemet has been helpful for reduce the jerking to some degree, but the effect is not immediate (it is noticed only after she has skipped the medications for a couple of days).    The arm can ache but is not painful per se. No left sided symptoms.     She reports her right leg has also been involved more recently and it will ache and get fatigued.     She was seen at  the [facility] by Dr. Paula in Jan 2015 and was seen at the [facility] in August 2015 by Dr. Fernando Forrester. Workup there was significant for KARINA scan with decreased uptake in the left basal ganglia (09/09/15) and PET scan with hypometabolism most prominent in left parietal and occipital lobes (10/02/2015).     She currently follows with Dr. Braden.     For her right arm dystonia she has been treated with botulinum toxin injections by Dr. Lan, but he has not been satisfied by how much benefit they have been able to achieve. She does feel that she has some reduction in the aching and help with her right hand posturing, but with her last set of injections she has continued to have issues with right index finger involuntary extension.      She has been received up to 125 units of Botox as follows:  Upper Extremity:  125 units Botox = Total Dose, 1:1 Dilution      Right Pect Major - 30 units of Botox at 1 site      Right Biceps- 25 units of Botox at 1 site      Right Brach rad - 15 units of Botox at 1 site      Right Flex Carp Uln - 15 units of Botox at 1 site      Right Carp Uln - 15 units of Botox at 1 site     Right Pro Teres - 10 units of Botox at 1 site      Right Abductor Poll - 5 units of Botox at 1 site      Right Abductor Digiti Min - 5 units of Botox at 1 site       Right Ext Poll Long - 5 units of Botox at 1 site     She most recently received 25 units of Botox on 6/07 (7.5 units in the wrist extensor complex and 7.5 units in the right abductor digiti minimi)       She reports the botox has not done anything to reduce the jerky movements. Her 5th digit extending has improved but not much improvement in the wrist flexion. She has started to notice increased stiffness in her right forearm and her 2nd digit wants to extend now.     Past Surgical History:   Procedure Laterality Date     APPENDECTOMY       Past Medical History:   Diagnosis Date     Action induced myoclonus 12/1/2015     Corticobasal syndrome (H)  12/1/2015     CTS (carpal tunnel syndrome) 1/12/2015     Disturbance of skin sensation 1/12/2015     Family history of breast cancer 1/12/2015     Family history of colon cancer 1/12/2015     Family history of Parkinson's disease 12/21/2014    Paternal aunt     History of EMG 12/21/2014    A right upper extremity EMG and nerve conduction study was done on 06/18/2014. It demonstrated some mild changes of right carpal tunnel syndrome but was otherwise normal.       History of MRI of brain and brain stem 12/21/2014    A brain MRI scan done on 06/27/2014 revealed no abnormalities.      History of MRI of cervical spine 12/21/2014    A cervical MRI done on 06/10/2014 revealed some mild to moderate degenerative changes but no significant central canal or foraminal stenosis, and no abnormalities of the spinal cord were noted.       Movement disorder 12/21/2014    I saw your patient, Deepali Contreras on 12/15/2014. She is a 49-year-old right-handed female here for evaluation of right upper extremity dysfunction and right hand tremor.  She has noted this problem for the last couple of years. She reports she is losing dexterity in her right hand. She has appreciated a tremor of the right hand with actions such as writing or postural maintenance. She has n     Current Outpatient Prescriptions   Medication Sig Dispense Refill     OnabotulinumtoxinA (BOTOX IJ) Inject 25 Units as directed once Lot#: /C3  Exp: 01/2020       OnabotulinumtoxinA (BOTOX IJ) Inject 125 Units as directed Lot # /C3  Exp: 9/2019       OnabotulinumtoxinA (BOTOX IJ) Inject 100 Units into the muscle once Lot# /C3, Exp 09/2019       OnabotulinumtoxinA (BOTOX IJ) Inject 100 Units into the muscle Lot # /C3  Exp: 8/2019       CLONAZEPAM PO Take 0.5 mg by mouth       OnabotulinumtoxinA (BOTOX IJ) Inject 50 Units as directed once Lot#[id number]   Ex: June 2019       botulinum toxin type A (BOTOX) 100 UNITS injection Inject 300 Units into the  muscle every 3 months 300 Units 3     carbidopa-levodopa (SINEMET)  MG per tablet Increase Sinemet 25/100 mg -- take 1.5 tablets 3 x per day for 1 week & if tolerated increase to 2 tablets 3 times a day. (Patient taking differently: Increase Sinemet 25/100 mg -- take 1 tablets 3 x per day for 1 week) 180 tablet 11      No Known Allergies  Social History     Social History     Marital status:      Spouse name: N/A     Number of children: N/A     Years of education: N/A     Occupational History     Not on file.     Social History Main Topics     Smoking status: Never Smoker     Smokeless tobacco: Never Used     Alcohol use Yes      Comment: social alc     Drug use: Not on file     Sexual activity: Not on file     Other Topics Concern     Not on file     Social History Narrative        Izaiah Jara    Lives in [facility]      She does not smoke.  She has a couple of beers to drink on the weekend but otherwise is not a heavy user of alcohol    3 kids: son peña 21; 94, 96 and 98    2 sons    1 daughter    2 are in college and daughter is a sophomore.            FAMILY HISTORY:  Notable for a paternal aunt who is .  She had Parkinson's disease.  Otherwise, there is no other family history of neurologic problems.  There is no family history of dystonia.          90% Tu[place]    Some norweigian                .       Family History   Problem Relation Age of Onset     Breast Cancer Mother      Cancer - colorectal Mother      Dementia Father      Neurologic Disorder Paternal Aunt      Parkinson's Disease     CEREBROVASCULAR DISEASE Brother      stroke at age 25 possibly from pfos     14 Review of systems  are negative except for   Patient Active Problem List   Diagnosis     Tremor     History of EMG     History of MRI of cervical spine     History of MRI of brain and brain stem     Movement disorder     Family history of Parkinson's disease     CTS (carpal tunnel syndrome)      "Family history of breast cancer     Family history of colon cancer     Disturbance of skin sensation     Corticobasal syndrome (HCC)     Abnormal PET scan of head     Abnormal brain scan     Action induced myoclonus       Neurological Examination    0 lbs 0 oz  Blood pressure 147/78, pulse 80, height 1.676 m (5' 6\")., There is no height or weight on file to calculate BMI.    General examination: no apparent distress   Carotid: No bruits.   CV: Heart regular rate and rhythm  Pulm: clear to ascultation bilaterally     Neuro exam:   Mental status:  Alert, oriented x3.  Speech fluent with normal naming, repetition, comprehension.     Cranial nerves:  Pupils are equal, round, reactive to light. Extraocular movements intact.  Facial sensation intact, facial strength intact.  Hearing intact to finger rub bi. Palate moves symmetrically.  Tongue midline.  Sternocleidomastoid and trapezius strength intact.    She has moderate rigidity in the right arm with posturing of the right hand, holding her 3rd-5th digits in a moderately flexed position. Her index finger is held in extension.    Tremor/involuntary movements: She has moderate but only intermittent myoclonus of the right arm at rest. With posture and action she has prominent myoclonus involving the right arm and noted on EMG prior to injections.      Procedure:  Botulinum toxin injection for treatment of acquired torsion dystonia (focal right limb secondary to possible corticobasilar degeneration.   After the patient's identity was verified and appropriate records were reviewed , the risks and benefits of botulinum toxin injection for the patient's condition were reviewed with the patient. Any questions were answered and the patient opted to proceed with injections. Informed signed consent was obtained.    Botox / Generic = OnabotulinumtoxinA / CPT Code =   NDC: Botox 100u  [address]-67  Lot: [id number]  Exp Date: Jan 2020    Botox (OnabotulinumtoxinA) was " reconstituted in a 1:1 dilution with normal saline (0.9%) and the following injections were performed:    1. Right biceps - 40 units divided in 2 injections  2. Right extensor indicis - 9 units divided in 2 injections  3. R deltoid - 50 units divided in 5 injections  4. Right flexor digitorum superficialis - 12 units divided in 2 injections  5. Right pronator teres - 20 units    Units Injected: 131 units  Unavoidable waste: 69 units  Total Units billed: 200 units    EMG guidance was necessary given the complex pattern of muscle involvement in order to clearly verify muscles with active dystonic spasms.    The patient tolerated the injections without difficulty.      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 She also has significant disability related to prominent right arm postural and action myoclonus. Sinemet has provided some although limited help for the myoclonus.    Today we repeated botulinum toxin injections for right arm dystonia with an adjusted injection pattern and dose.  In addition, we discussed treatment options for her right arm myoclonus, and after discussion opted for a trial of levetiracetam for myoclonus, as detailed in the plan below.    Plan(as provided to the patient):    1. Today we did repeat injections focusing on the arm to help ease the aching, the index finger extension, and the finger flexion.    2. To help your jerking, I recommend a trial of a medication levitiracetam as follows:    Week 1:     Start levetiracetam 250mg 1 pill each morning and 1 pill each evening    Week 2:     Increase to levetiracetam 250 mg 1 pill each morning and 2 pills each evening    Week 3:    Increase to 2 pills  each morning and 2 pills each evening    Week 4:     If you still have jerking and have not had side effects, you can continue to increase to     levetiracetam 250mg 2 pills each morning and 3 pills each evening.    Week 5:    If you still have jerking and have not had side effects, you can continue to increase to     levetiracetam 250mg 3 pills each morning and 3 pills each evening.    Stop at the lowest effective dose.    Possible side effects include irritability, sleepiness.    3. Continue Sinemet at current dose since this has been helpful for myoclonus to some degree.    4. Follow-up in 4-6 weeks for a brief visit to assess the results of these injections (on a late Wed afternoon or early evening on a Wednesday    Time spent with patient: Greater than 50% of this 110 minute visit was spent in counseling and coordination of care related to diagnosis, including CBD, dystonia, myoclonus, potential treatment options, including potential medication side effects.    Prolonged Services: face-to-face 110 min.  Start time: 11:25AM. Stop time: 1:15PM.    Tiffany Hopkins MD    CC: Angeline Devlin

## 2017-06-22 NOTE — MR AVS SNAPSHOT
After Visit Summary   6/22/2017    Deepali Jara    MRN: [id number]           Patient Information     Date Of Birth          1965        Visit Information        Provider Department      6/22/2017 11:00 AM Tiffany Hopkins MD [facility] Neurology        Today's Diagnoses     Myoclonus    -  1      Care Instructions    1. Today we did repeat injections focusing on the arm to help ease the aching, the index finger extension, and the finger flexion.    2. To help your jerking, I recommend a trial of a medication levitiracetam as follows:    Week 1:     Start levetiracetam 250mg 1 pill each morning and 1 pill each evening    Week 2:     Increase to levetiracetam 250 mg 1 pill each morning and 2 pills each evening    Week 3:    Increase to 2 pills each morning and 2 pills each evening    Week 4:     If you still have jerking and have not had side effects, you can continue to increase to     levetiracetam 250mg 2 pills each morning and 3 pills each evening.    Week 5:    If you still have jerking and have not had side effects, you can continue to increase to     levetiracetam 250mg 3 pills each morning and 3 pills each evening.    Stop at the lowest effective dose.    Possible side effects include irritability, sleepiness.    3. Follow-up in 4-6 weeks for a brief visit to assess the results of these injections (on a late Wed afternoon or early evening on a Wednesday              Follow-ups after your visit        Follow-up notes from your care team     Return in about 4 weeks (around 7/20/2017), or RV 4-6 weeks on Wed late afternoon/evening.      Your next 10 appointments already scheduled     Aug 02, 2017  6:00 PM CDT   (Arrive by 5:45 PM)   Return Movement Disorder with Tiffany Hopkins MD   [facility] Neurology ([facility])    [address] [place] [id number] [phone]            Aug 07, 2017  3:40 PM CDT   (Arrive by  3:25 PM)   Return Botox with Oskar Lan MD   [facility] ([facility])    [address] [place] [id number]   [phone]            Aug 17, 2017  9:00 AM CDT   (Arrive by 8:45 AM)   Return Botox with Tiffany Hopkins MD   [facility] ([facility])    [address] [place] [id number]   [phone]            Oct 11, 2017  3:40 PM CDT   (Arrive by 3:25 PM)   Return Botox with Oskar Lan MD   [facility] ([facility])    [address] [place] [id number]   [phone]              Who to contact     Please call your clinic at [phone] to:    Ask questions about your health    Make or cancel appointments    Discuss your medicines    Learn about your test results    Speak to your doctor   If you have compliments or concerns about an experience at your clinic, or if you wish to file a complaint, please contact [facility] Patient Relations at [phone] or email us at [email or web address]         Additional Information About Your Visit        [employer] Information     [employer] is an electronic gateway that provides easy, online access to your medical records. With [employer], you can request a clinic appointment, read your test results, renew a prescription or communicate with your care team.     To sign up for [employer] visit the website at [email or web address]   You will be asked to enter the access code listed below, as well as some personal information. Please follow the directions to create your username and password.     Your access code is: [email or web address]  Expires: 2017  6:30 AM     Your access code will  in 90 days. If you need help or a new code, please contact your [facility] or  "call [phone] for assistance.        Care EveryWhere ID     This is your Care EveryWhere ID. This could be used by other organizations to access your [facility] medical records  [id number]        Your Vitals Were     Pulse Height                80 1.676 m (5' 6\")           Blood Pressure from Last 3 Encounters:   06/22/17 147/78   06/07/17 128/71   04/04/17 118/67    Weight from Last 3 Encounters:   06/07/17 68.8 kg (151 lb 11.2 oz)   04/04/17 67 kg (147 lb 11.2 oz)   02/15/17 69.6 kg (153 lb 6.4 oz)              Today, you had the following     No orders found for display         Today's Medication Changes          These changes are accurate as of: 6/22/17  1:22 PM.  If you have any questions, ask your nurse or doctor.               Start taking these medicines.        Dose/Directions    levETIRAcetam 250 MG tablet   Commonly known as:  KEPPRA   Used for:  Myoclonus   Started by:  Tiffany Hopkins MD        Start 1 tab po bid and increase by 1 pill as directed per week to maximum dose 3 tabs po bid   Quantity:  130 tablet   Refills:  3         These medicines have changed or have updated prescriptions.        Dose/Directions    carbidopa-levodopa  MG per tablet   Commonly known as:  SINEMET   This may have changed:  additional instructions   Used for:  Tremor        Increase Sinemet 25/100 mg -- take 1.5 tablets 3 x per day for 1 week & if tolerated increase to 2 tablets 3 times a day.   Quantity:  180 tablet   Refills:  11            Where to get your medicines      These medications were sent to [id number] - [place], [place] - [address] [place] [id number]     Phone:  [phone]     levETIRAcetam 250 MG tablet                Primary Care Provider Office Phone # Fax #    Ester Molly [phone]       [address] [address]        Equal Access to Services     MANDEEP PIERCE AH: Ladi Fitzgerald, " xinda kristiemarquiseha, silkeybta kaaaron pina, senia rosasaan ah. So [facility] [phone].    ATENCIÓN: Si lidia gomes, tiene a schmitt disposición servicios gratuitos de asistencia lingüística. Mita al [phone].    We comply with applicable federal civil rights laws and [place] laws. We do not discriminate on the basis of race, color, national origin, age, disability sex, sexual orientation or gender identity.            Thank you!     Thank you for choosing [facility]  for your care. Our goal is always to provide you with excellent care. Hearing back from our patients is one way we can continue to improve our services. Please take a few minutes to complete the written survey that you may receive in the mail after your visit with us. Thank you!             Your Updated Medication List - Protect others around you: Learn how to safely use, store and throw away your medicines at www.disposemymeds.org.          This list is accurate as of: 6/22/17  1:22 PM.  Always use your most recent med list.                   Brand Name Dispense Instructions for use Diagnosis    * BOTOX IJ      Inject 50 Units as directed once Lot#[id number]  Ex: June 2019        * botulinum toxin type A 100 UNITS injection    BOTOX    300 Units    Inject 300 Units into the muscle every 3 months    Idiopathic torsion dystonia       * BOTOX IJ      Inject 100 Units into the muscle Lot # /C3  Exp: 8/2019        * BOTOX IJ      Inject 100 Units into the muscle once Lot# /C3, Exp 09/2019        * BOTOX IJ      Inject 125 Units as directed Lot # /C3  Exp: 9/2019        * BOTOX IJ      Inject 25 Units as directed once Lot#: /C3 Exp: 01/2020        carbidopa-levodopa  MG per tablet    SINEMET    180 tablet    Increase Sinemet 25/100 mg -- take 1.5 tablets 3 x per day for 1 week & if tolerated increase to 2 tablets 3 times a day.    Tremor       CLONAZEPAM PO      Take 0.5 mg by mouth         levETIRAcetam 250 MG tablet    KEPPRA    130 tablet    Start 1 tab po bid and increase by 1 pill as directed per week to maximum dose 3 tabs po bid    Myoclonus       * Notice:  This list has 6 medication(s) that are the same as other medications prescribed for you. Read the directions carefully, and ask your doctor or other care provider to review them with you.

## 2017-06-22 NOTE — PROGRESS NOTES
Movement Disorders Clinic  Initial Evaluation and Botulinum Clinic Note    Patient: Deepali Jara  MRN:   [id number]  :  1965  Date of Visit:  17      Dear Dr. Lan, Thank you for your referral of Deepali Jara for consultation for a second opinion for botulinum toxin injections for evaluation and treatment of acquired torsion dystonia and involuntary movements.    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 She initially developed aching pain in her right hand about 5 years ago. Her  had noticed that she was typing and using her phone more slowly with the right hand. She had an episode where she crushed a cup of coffee when she was holding it.     As of 1-2 years ago, she stopped being able to feed herself or write. She does try to do weights with that hand. She has jerky movements frequently in the right hand and is worst in the AM. These jerking movements in fact may be more disabling that the dystonia. She takes 3 tab Sinemet 25/100mg at 7am, 2 tab at noon, and 2 tab at 6 pm Sinemet has been helpful for reduce the jerking to some degree, but the effect is not immediate (it is noticed only after she has skipped the medications for a couple of days).    The arm can ache but is not painful per se. No left sided symptoms.     She reports her right leg has also been involved more recently and it will ache and get fatigued.     She was seen at the [facility] by Dr. Paula in 2015 and was seen at the [facility] in 2015 by Dr. Fernando Forrester. Workup there was significant for KARINA scan with decreased uptake in the left basal ganglia (09/09/15) and PET scan with hypometabolism most prominent in left parietal and occipital lobes  (10/02/2015).     She currently follows with Dr. Braden.     For her right arm dystonia she has been treated with botulinum toxin injections by Dr. Lan, but he has not been satisfied by how much benefit they have been able to achieve. She does feel that she has some reduction in the aching and help with her right hand posturing, but with her last set of injections she has continued to have issues with right index finger involuntary extension.      She has been received up to 125 units of Botox as follows:  Upper Extremity:  125 units Botox = Total Dose, 1:1 Dilution      Right Pect Major - 30 units of Botox at 1 site      Right Biceps- 25 units of Botox at 1 site      Right Brach rad - 15 units of Botox at 1 site      Right Flex Carp Uln - 15 units of Botox at 1 site      Right Carp Uln - 15 units of Botox at 1 site     Right Pro Teres - 10 units of Botox at 1 site      Right Abductor Poll - 5 units of Botox at 1 site      Right Abductor Digiti Min - 5 units of Botox at 1 site       Right Ext Poll Long - 5 units of Botox at 1 site     She most recently received 25 units of Botox on 6/07 (7.5 units in the wrist extensor complex and 7.5 units in the right abductor digiti minimi)       She reports the botox has not done anything to reduce the jerky movements. Her 5th digit extending has improved but not much improvement in the wrist flexion. She has started to notice increased stiffness in her right forearm and her 2nd digit wants to extend now.     Past Surgical History:   Procedure Laterality Date     APPENDECTOMY       Past Medical History:   Diagnosis Date     Action induced myoclonus 12/1/2015     Corticobasal syndrome (H) 12/1/2015     CTS (carpal tunnel syndrome) 1/12/2015     Disturbance of skin sensation 1/12/2015     Family history of breast cancer 1/12/2015     Family history of colon cancer 1/12/2015     Family history of Parkinson's disease 12/21/2014    Paternal aunt     History of EMG 12/21/2014    A  right upper extremity EMG and nerve conduction study was done on 06/18/2014. It demonstrated some mild changes of right carpal tunnel syndrome but was otherwise normal.       History of MRI of brain and brain stem 12/21/2014    A brain MRI scan done on 06/27/2014 revealed no abnormalities.      History of MRI of cervical spine 12/21/2014    A cervical MRI done on 06/10/2014 revealed some mild to moderate degenerative changes but no significant central canal or foraminal stenosis, and no abnormalities of the spinal cord were noted.       Movement disorder 12/21/2014    I saw your patient, Deepali Contreras on 12/15/2014. She is a 49-year-old right-handed female here for evaluation of right upper extremity dysfunction and right hand tremor.  She has noted this problem for the last couple of years. She reports she is losing dexterity in her right hand. She has appreciated a tremor of the right hand with actions such as writing or postural maintenance. She has n     Current Outpatient Prescriptions   Medication Sig Dispense Refill     OnabotulinumtoxinA (BOTOX IJ) Inject 25 Units as directed once Lot#: /C3  Exp: 01/2020       OnabotulinumtoxinA (BOTOX IJ) Inject 125 Units as directed Lot # /C3  Exp: 9/2019       OnabotulinumtoxinA (BOTOX IJ) Inject 100 Units into the muscle once Lot# /C3, Exp 09/2019       OnabotulinumtoxinA (BOTOX IJ) Inject 100 Units into the muscle Lot # /C3  Exp: 8/2019       CLONAZEPAM PO Take 0.5 mg by mouth       OnabotulinumtoxinA (BOTOX IJ) Inject 50 Units as directed once Lot#[id number]   Ex: June 2019       botulinum toxin type A (BOTOX) 100 UNITS injection Inject 300 Units into the muscle every 3 months 300 Units 3     carbidopa-levodopa (SINEMET)  MG per tablet Increase Sinemet 25/100 mg -- take 1.5 tablets 3 x per day for 1 week & if tolerated increase to 2 tablets 3 times a day. (Patient taking differently: Increase Sinemet 25/100 mg -- take 1 tablets 3 x per day  for 1 week) 180 tablet 11      No Known Allergies  Social History     Social History     Marital status:      Spouse name: N/A     Number of children: N/A     Years of education: N/A     Occupational History     Not on file.     Social History Main Topics     Smoking status: Never Smoker     Smokeless tobacco: Never Used     Alcohol use Yes      Comment: social alc     Drug use: Not on file     Sexual activity: Not on file     Other Topics Concern     Not on file     Social History Narrative        Izaiah Jara    Lives in [facility]      She does not smoke.  She has a couple of beers to drink on the weekend but otherwise is not a heavy user of alcohol    3 kids: son peña 21; 94, 96 and 98    2 sons    1 daughter    2 are in college and daughter is a sophomore.            FAMILY HISTORY:  Notable for a paternal aunt who is .  She had Parkinson's disease.  Otherwise, there is no other family history of neurologic problems.  There is no family history of dystonia.          90% Irish    Some norweigian                .       Family History   Problem Relation Age of Onset     Breast Cancer Mother      Cancer - colorectal Mother      Dementia Father      Neurologic Disorder Paternal Aunt      Parkinson's Disease     CEREBROVASCULAR DISEASE Brother      stroke at age 25 possibly from pfos     14 Review of systems  are negative except for   Patient Active Problem List   Diagnosis     Tremor     History of EMG     History of MRI of cervical spine     History of MRI of brain and brain stem     Movement disorder     Family history of Parkinson's disease     CTS (carpal tunnel syndrome)     Family history of breast cancer     Family history of colon cancer     Disturbance of skin sensation     Corticobasal syndrome (HCC)     Abnormal PET scan of head     Abnormal brain scan     Action induced myoclonus       Neurological Examination    0 lbs 0 oz  Blood pressure 147/78, pulse 80, height  "1.676 m (5' 6\")., There is no height or weight on file to calculate BMI.    General examination: no apparent distress   Carotid: No bruits.   CV: Heart regular rate and rhythm  Pulm: clear to ascultation bilaterally     Neuro exam:   Mental status:  Alert, oriented x3.  Speech fluent with normal naming, repetition, comprehension.     Cranial nerves:  Pupils are equal, round, reactive to light. Extraocular movements intact.  Facial sensation intact, facial strength intact.  Hearing intact to finger rub bi. Palate moves symmetrically.  Tongue midline.  Sternocleidomastoid and trapezius strength intact.    She has moderate rigidity in the right arm with posturing of the right hand, holding her 3rd-5th digits in a moderately flexed position. Her index finger is held in extension.    Tremor/involuntary movements: She has moderate but only intermittent myoclonus of the right arm at rest. With posture and action she has prominent myoclonus involving the right arm and noted on EMG prior to injections.      Procedure:  Botulinum toxin injection for treatment of acquired torsion dystonia (focal right limb secondary to possible corticobasilar degeneration.   After the patient's identity was verified and appropriate records were reviewed , the risks and benefits of botulinum toxin injection for the patient's condition were reviewed with the patient. Any questions were answered and the patient opted to proceed with injections. Informed signed consent was obtained.    Botox / Generic = OnabotulinumtoxinA / CPT Code =   NDC: Botox 100u  [address]-96  Lot: [id number]  Exp Date: Jan 2020    Botox (OnabotulinumtoxinA) was reconstituted in a 1:1 dilution with normal saline (0.9%) and the following injections were performed:    1. Right biceps - 40 units divided in 2 injections  2. Right extensor indicis - 9 units divided in 2 injections  3. R deltoid - 50 units divided in 5 injections  4. Right flexor digitorum superficialis - 12 " units divided in 2 injections  5. Right pronator teres - 20 units    Units Injected: 131 units  Unavoidable waste: 69 units  Total Units billed: 200 units    EMG guidance was necessary given the complex pattern of muscle involvement in order to clearly verify muscles with active dystonic spasms.    The patient tolerated the injections without difficulty.      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 She also has significant disability related to prominent right arm postural and action myoclonus. Sinemet has provided some although limited help for the myoclonus.    Today we repeated botulinum toxin injections for right arm dystonia with an adjusted injection pattern and dose.  In addition, we discussed treatment options for her right arm myoclonus, and after discussion opted for a trial of levetiracetam for myoclonus, as detailed in the plan below.    Plan(as provided to the patient):    1. Today we did repeat injections focusing on the arm to help ease the aching, the index finger extension, and the finger flexion.    2. To help your jerking, I recommend a trial of a medication levitiracetam as follows:    Week 1:     Start levetiracetam 250mg 1 pill each morning and 1 pill each evening    Week 2:     Increase to levetiracetam 250 mg 1 pill each morning and 2 pills each evening    Week 3:    Increase to 2 pills each morning and 2 pills each evening    Week 4:     If you still have jerking and have not had side effects, you can continue to increase to     levetiracetam 250mg 2 pills each morning and 3 pills each evening.    Week 5:    If you still have jerking and have not had side effects, you can continue to increase  to     levetiracetam 250mg 3 pills each morning and 3 pills each evening.    Stop at the lowest effective dose.    Possible side effects include irritability, sleepiness.    3. Continue Sinemet at current dose since this has been helpful for myoclonus to some degree.    4. Follow-up in 4-6 weeks for a brief visit to assess the results of these injections (on a late Wed afternoon or early evening on a Wednesday    Time spent with patient: Greater than 50% of this 110 minute visit was spent in counseling and coordination of care related to diagnosis, including CBD, dystonia, myoclonus, potential treatment options, including potential medication side effects.    Prolonged Services: face-to-face 110 min.  Start time: 11:25AM. Stop time: 1:15PM.    Tiffany Hopkins MD    CC: Angeline Devlin

## 2017-06-22 NOTE — PATIENT INSTRUCTIONS
1. Today we did repeat injections focusing on the arm to help ease the aching, the index finger extension, and the finger flexion.    2. To help your jerking, I recommend a trial of a medication levitiracetam as follows:    Week 1:     Start levetiracetam 250mg 1 pill each morning and 1 pill each evening    Week 2:     Increase to levetiracetam 250 mg 1 pill each morning and 2 pills each evening    Week 3:    Increase to 2 pills each morning and 2 pills each evening    Week 4:     If you still have jerking and have not had side effects, you can continue to increase to     levetiracetam 250mg 2 pills each morning and 3 pills each evening.    Week 5:    If you still have jerking and have not had side effects, you can continue to increase to     levetiracetam 250mg 3 pills each morning and 3 pills each evening.    Stop at the lowest effective dose.    Possible side effects include irritability, sleepiness.    3. Follow-up in 4-6 weeks for a brief visit to assess the results of these injections (on a late Wed afternoon or early evening on a Wednesday

## 2017-07-28 ENCOUNTER — TRANSFERRED RECORDS (OUTPATIENT)
Dept: HEALTH INFORMATION MANAGEMENT | Facility: CLINIC | Age: 52
End: 2017-07-28

## 2017-08-02 ENCOUNTER — OFFICE VISIT (OUTPATIENT)
Dept: NEUROLOGY | Facility: CLINIC | Age: 52
End: 2017-08-02

## 2017-08-02 VITALS
BODY MASS INDEX: 24.81 KG/M2 | RESPIRATION RATE: 20 BRPM | HEIGHT: 66 IN | TEMPERATURE: 98.2 F | OXYGEN SATURATION: 97 % | DIASTOLIC BLOOD PRESSURE: 72 MMHG | HEART RATE: 79 BPM | WEIGHT: 154.4 LBS | SYSTOLIC BLOOD PRESSURE: 122 MMHG

## 2017-08-02 DIAGNOSIS — G24.1 DYSTONIA, TORSION, FRAGMENTS OF: Primary | ICD-10-CM

## 2017-08-02 DIAGNOSIS — G25.3 MYOCLONUS: ICD-10-CM

## 2017-08-02 DIAGNOSIS — G31.85 CORTICOBASAL SYNDROME (H): ICD-10-CM

## 2017-08-02 RX ORDER — CARBIDOPA AND LEVODOPA 25; 100 MG/1; MG/1
3 TABLET ORAL 3 TIMES DAILY PRN
Refills: 3 | COMMUNITY
Start: 2017-06-06 | End: 2017-12-19

## 2017-08-02 RX ORDER — CHOLECALCIFEROL (VITAMIN D3) 50 MCG
2000 TABLET ORAL EVERY MORNING
COMMUNITY

## 2017-08-02 RX ORDER — CLONAZEPAM 0.5 MG/1
0.5 TABLET ORAL DAILY PRN
COMMUNITY
Start: 2017-06-19 | End: 2018-03-23

## 2017-08-02 RX ORDER — TIZANIDINE 2 MG/1
TABLET ORAL
Qty: 90 TABLET | Refills: 3 | Status: SHIPPED | OUTPATIENT
Start: 2017-08-02 | End: 2018-07-26

## 2017-08-02 RX ORDER — LEVETIRACETAM 250 MG/1
750 TABLET ORAL 2 TIMES DAILY
COMMUNITY
End: 2017-08-17

## 2017-08-02 RX ORDER — CARBIDOPA AND LEVODOPA 50; 200 MG/1; MG/1
1 TABLET, EXTENDED RELEASE ORAL 2 TIMES DAILY
Qty: 30 TABLET | Refills: 6 | Status: SHIPPED | OUTPATIENT
Start: 2017-08-02 | End: 2017-08-17

## 2017-08-02 ASSESSMENT — PAIN SCALES - GENERAL: PAINLEVEL: MODERATE PAIN (4)

## 2017-08-02 NOTE — MR AVS SNAPSHOT
After Visit Summary   8/2/2017    Deepali Jara    MRN: [id number]           Patient Information     Date Of Birth          1965        Visit Information        Provider Department      8/2/2017 6:00 PM Tiffany Hopkins MD [facility]        Today's Diagnoses     Dystonia, torsion, fragments of    -  1      Care Instructions    We would recommend weaning off the Keppra as follows:  Week 1: take 2 tablets twice a day  Week 2: take 1 tablet twice a day  Week 3: you can stop this medication    Please start taking Sinemet 50/200mg CR (controlled release) one tablet at bedtime.     Once you are off the Keppra, please call and we can talk about starting another medication, zonisamide.     For the muscle relaxant, tizanidine, please take 1 tablet up to three times a day as needed. We would recommend starting this medication on a weekend as it may make you tired.          Follow-ups after your visit        Your next 10 appointments already scheduled     Oct 04, 2017  5:00 PM CDT   Return Botox with Tiffany Hopkins MD   [facility] ([facility])    [address] [place] [id number] [phone]              Who to contact     Please call your clinic at [phone] to:    Ask questions about your health    Make or cancel appointments    Discuss your medicines    Learn about your test results    Speak to your doctor   If you have compliments or concerns about an experience at your clinic, or if you wish to file a complaint, please contact [facility] Patient Relations at [phone] or email us at [email or web address]         Additional Information About Your Visit        MyChart Information     [employer] is an electronic gateway that provides easy, online access to your medical records. With [employer], you can request a clinic appointment, read your test results, renew a  "prescription or communicate with your care team.     To sign up for [email or web address] visit the website at [email or web address]   You will be asked to enter the access code listed below, as well as some personal information. Please follow the directions to create your username and password.     Your access code is: [email or web address]  Expires: 2017  6:30 AM     Your access code will  in 90 days. If you need help or a new code, please contact your [facility] or call [phone] for assistance.        Care EveryWhere ID     This is your Care EveryWhere ID. This could be used by other organizations to access your [place] medical records  [id number]        Your Vitals Were     Pulse Temperature Respirations Height Pulse Oximetry Breastfeeding?    79 98.2  F (36.8  C) 20 1.676 m (5' 6\") 97% No    BMI (Body Mass Index)                   24.92 kg/m2            Blood Pressure from Last 3 Encounters:   17 122/72   17 147/78   17 128/71    Weight from Last 3 Encounters:   17 70 kg (154 lb 6.4 oz)   17 68.8 kg (151 lb 11.2 oz)   17 67 kg (147 lb 11.2 oz)              Today, you had the following     No orders found for display         Today's Medication Changes          These changes are accurate as of: 17  7:33 PM.  If you have any questions, ask your nurse or doctor.               Start taking these medicines.        Dose/Directions    tiZANidine 2 MG tablet   Commonly known as:  ZANAFLEX   Used for:  Dystonia, torsion, fragments of   Started by:  Tiffany Hopkins MD        Please take 1 tablet up to three times a day as needed. We would recommend starting this medication on a weekend.   Quantity:  90 tablet   Refills:  3         These medicines have changed or have updated prescriptions.        Dose/Directions    BOTOX IJ   This may have changed:  Another medication with the same name was removed. Continue taking this medication, and " follow the directions you see here.   Changed by:  Oskar Lan MD        Dose:  25 Units   Inject 25 Units as directed once Lot#: /C3 Exp: 01/2020   Refills:  0       * carbidopa-levodopa  MG per tablet   Commonly known as:  SINEMET   This may have changed:    - Another medication with the same name was added. Make sure you understand how and when to take each.  - Another medication with the same name was removed. Continue taking this medication, and follow the directions you see here.   Changed by:  Tiffany Hopkins MD        Dose:  3 tablet   Take 3 tablets by mouth 3 times daily as needed PATIENT IS TAKING 3 TABS IN THE MORNING, 2 TABS AT NOON AND 2 TABS AT NIGHT.   Refills:  3       * carbidopa-levodopa  MG per CR tablet   Commonly known as:  SINEMET CR   This may have changed:  You were already taking a medication with the same name, and this prescription was added. Make sure you understand how and when to take each.   Used for:  Dystonia, torsion, fragments of   Replaces:  carbidopa-levodopa  MG per tablet   Changed by:  Tiffany Hopkins MD        Dose:  1 tablet   Take 1 tablet by mouth 2 times daily   Quantity:  30 tablet   Refills:  6       clonazePAM 0.5 MG tablet   Commonly known as:  klonoPIN   This may have changed:  Another medication with the same name was removed. Continue taking this medication, and follow the directions you see here.   Changed by:  Tiffany Hopkins MD        Dose:  0.5 tablet   Take 0.5 tablets by mouth daily as needed   Refills:  0       levETIRAcetam 250 MG tablet   Commonly known as:  KEPPRA   This may have changed:  Another medication with the same name was removed. Continue taking this medication, and follow the directions you see here.   Changed by:  Tiffany Hopkins MD        Dose:  750 mg   Take 750 mg by mouth 2 times daily   Refills:  0       * Notice:  This list has 2 medication(s) that are the same  as other medications prescribed for you. Read the directions carefully, and ask your doctor or other care provider to review them with you.      Stop taking these medicines if you haven't already. Please contact your care team if you have questions.     carbidopa-levodopa  MG per tablet   Commonly known as:  SINEMET   Replaced by:  carbidopa-levodopa  MG per CR tablet   You also have another medication with the same name that you need to continue taking as instructed.   Stopped by:  Tiffany Hopkins MD                Where to get your medicines      These medications were sent to [id number] - [place], [place] - [address] [place] [id number]     Phone:  [phone]     carbidopa-levodopa  MG per CR tablet    tiZANidine 2 MG tablet                Primary Care Provider Office Phone # Fax #    Ester Barnes [phone]       [address] [address]        Equal Access to Services     GUY [facility] Hospital[facility]KENNA : Hadii aad ku hadasho Soomaali, waaxda luqadaha, qaybta kaalmada adeegyada, waxay idiin haylgn kimmy darden . So [facility] [phone].    ATENCIÓN: Si habla español, tiene a schmitt disposición servicios gratuitos de asistencia lingüística. [facility] [phone].    We comply with applicable federal civil rights laws and [place] laws. We do not discriminate on the basis of race, color, national origin, age, disability sex, sexual orientation or gender identity.            Thank you!     Thank you for choosing [facility]  for your care. Our goal is always to provide you with excellent care. Hearing back from our patients is one way we can continue to improve our services. Please take a few minutes to complete the written survey that you may receive in the mail after your visit with us. Thank you!             Your Updated Medication List - Protect others around you: Learn how to safely use, store and throw  away your medicines at www.disposemymeds.org.          This list is accurate as of: 8/2/17  7:33 PM.  Always use your most recent med list.                   Brand Name Dispense Instructions for use Diagnosis    BOTOX IJ      Inject 25 Units as directed once Lot#: /C3 Exp: 01/2020        * carbidopa-levodopa  MG per tablet    SINEMET     Take 3 tablets by mouth 3 times daily as needed PATIENT IS TAKING 3 TABS IN THE MORNING, 2 TABS AT NOON AND 2 TABS AT NIGHT.        * carbidopa-levodopa  MG per CR tablet    SINEMET CR    30 tablet    Take 1 tablet by mouth 2 times daily    Dystonia, torsion, fragments of       clonazePAM 0.5 MG tablet    klonoPIN     Take 0.5 tablets by mouth daily as needed        levETIRAcetam 250 MG tablet    KEPPRA     Take 750 mg by mouth 2 times daily        tiZANidine 2 MG tablet    ZANAFLEX    90 tablet    Please take 1 tablet up to three times a day as needed. We would recommend starting this medication on a weekend.    Dystonia, torsion, fragments of       vitamin D 2000 UNITS tablet      Take 2,000 Units by mouth daily        * Notice:  This list has 2 medication(s) that are the same as other medications prescribed for you. Read the directions carefully, and ask your doctor or other care provider to review them with you.

## 2017-08-02 NOTE — LETTER
8/2/2017       RE: Deepali Jara  [address]  [place] [facility] [address]     Dear Colleague,    Thank you for referring your patient, Deepali Jara, to the [facility] at [facility]. Please see a copy of my visit note below.    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 She was last seen in clinic on June 22nd at which time she had Botox injections, 125 units total.    She was also started on Keppra to help with the myoclonic jerks in her RUE. She reports no clear improvement in her right arm symptoms since the Botox injections. She will notice cramping in her arm worse in the mornings. She has also noticed increased tightness in the right elbow. No weakness or side effects from the Botox including associated weakness. She is not sure she has ever had much improvement with Botox.      She reports the Keppra has not improved the jerky movements despite being on 750mg BID twice a week now for two weeks.     Other than that mentioned above, the remainder of 12 systems reviewed were negative.      Current Outpatient Prescriptions   Medication Sig Dispense Refill     levETIRAcetam (KEPPRA) 250 MG tablet Take 750 mg by mouth 2 times daily       clonazePAM (KLONOPIN) 0.5 MG tablet Take 0.5 tablets by mouth daily as needed       Cholecalciferol (VITAMIN D) 2000 UNITS tablet Take 2,000 Units by mouth daily       carbidopa-levodopa (SINEMET)  MG per tablet Take 3 tablets by mouth 3 times daily as needed PATIENT IS TAKING 3 TABS IN THE MORNING, 2 TABS AT NOON AND 2 TABS AT NIGHT.  3     carbidopa-levodopa (SINEMET CR)  MG per CR tablet Take 1 tablet by mouth 2 times daily 30 tablet 6     tiZANidine (ZANAFLEX) 2 MG tablet Please take 1 tablet up to three times a day  as needed. We would recommend starting this medication on a weekend. 90 tablet 3     OnabotulinumtoxinA (BOTOX IJ) Inject 25 Units as directed once Lot#: /C3  Exp: 01/2020       [DISCONTINUED] levETIRAcetam (KEPPRA) 250 MG tablet Start 1 tab po bid and increase by 1 pill as directed per week to maximum dose 3 tabs po bid 130 tablet 3     [DISCONTINUED] CLONAZEPAM PO Take 0.5 mg by mouth       [DISCONTINUED] carbidopa-levodopa (SINEMET)  MG per tablet Increase Sinemet 25/100 mg -- take 1.5 tablets 3 x per day for 1 week & if tolerated increase to 2 tablets 3 times a day. 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 It demonstrated some mild changes of right carpal tunnel syndrome but was otherwise normal.       History of MRI of brain and brain stem 12/21/2014    A brain MRI scan done on 06/27/2014 revealed no abnormalities.      History of MRI of cervical spine 12/21/2014    A cervical MRI done on 06/10/2014 revealed some mild to moderate degenerative changes but no significant central canal or foraminal stenosis, and no abnormalities of the spinal cord were noted.       Movement disorder 12/21/2014    I saw your patient, Deepali Contreras on 12/15/2014. She is a 49-year-old right-handed female here for evaluation of right upper extremity dysfunction and right hand tremor.  She has noted this problem for the last couple of years. She reports she is losing dexterity in  "her right hand. She has appreciated a tremor of the right hand with actions such as writing or postural maintenance. She has n     Past Surgical History:   Procedure Laterality Date     APPENDECTOMY       Family History   Problem Relation Age of Onset     Breast Cancer Mother      Cancer - colorectal Mother      Dementia Father      Neurologic Disorder Paternal Aunt      Parkinson's Disease     CEREBROVASCULAR DISEASE Brother      stroke at age 25 possibly from pfos     Social History     Social History     Marital status:      Spouse name: N/A     Number of children: N/A     Years of education: N/A     Occupational History     Not on file.     Social History Main Topics     Smoking status: Never Smoker     Smokeless tobacco: Never Used     Alcohol use 1.2 - 1.8 oz/week     2 - 3 Cans of beer per week      Comment: WEEKLY     Drug use: No     Sexual activity: Yes     Partners: Male     Other Topics Concern     Not on file     Social History Narrative        Izaiah Jara    Lives in [facility]      She does not smoke.  She has a couple of beers to drink on the weekend but otherwise is not a heavy user of alcohol    3 kids: son peña 21; 94, 96 and 98    2 sons    1 daughter    2 are in college and daughter is a sophomore.            FAMILY HISTORY:  Notable for a paternal aunt who is .  She had Parkinson's disease.  Otherwise, there is no other family history of neurologic problems.  There is no family history of dystonia.          90% Slovak    Some norweigian                .       Examination   154 lbs 6.4 oz  Blood pressure 122/72, pulse 79, temperature 98.2  F (36.8  C), resp. rate 20, height 1.676 m (5' 6\"), weight 70 kg (154 lb 6.4 oz), SpO2 97 %, not currently breastfeeding., Body mass index is 24.92 kg/(m^2).    Neuro exam:   Mental status:  Alert, answers questions and follows commands appropriately except with apraxia in RUE with difficulty following commands in that " extremity   Motor: intermittent myoclonic jerks in RUE, worse distally    Posturing of right hand with fingers flexed expect with extension of index finger   Difficulty with finger tapping and hand grasps on right, unable to supinate/pronate right arm   Coordination: FTN intact on left, difficulty completing on right  Gait: normal stance, decreased dorsiflexion of right foot, dystonic posturing of right arm noted also with walking    No images are attached to the encounter.    Data Reviewed:   Component      Latest Ref Rng & Units 12/15/2014 1/12/2015   Bilirubin Direct      0.0 - 0.2 mg/dL 0.2    Bilirubin Total      0.2 - 1.3 mg/dL 0.6    Albumin      3.4 - 5.0 g/dL 3.9    Protein Total      6.8 - 8.8 g/dL 7.2    Alkaline Phosphatase      40 - 150 U/L 52    ALT      0 - 50 U/L 14    AST      0 - 45 U/L 10    TSH      0.40 - 4.00 mU/L 2.44    Ceruloplasmin      23 - 53 mg/dL 25    Ins Growth Factor 1      156 - 217 ng/ml  176   Apolipo A-1        211 (H)   Uric Acid      2.6 - 6.0 mg/dL  3.4       Assessment/plan:   52 year old woman with RUE dystonia and myoclonus. The unilateral onset of dystonia and myoclonus is concerning for corticobasal syndrome although without associated cognitive symptoms. Alternatively, she could have an atypical form of segmental dystonia. She is also starting to have symptoms on the right leg although without di dystonia noted on exam in this extremity. We did discuss that we can continue to trial medication options however these will likely only have limited benefit particularly given she has had minimal response to Botox.     DBS may be better able to treat her dystonia, particularly using GPi as a target, however it is difficult to say exactly how much benefit she would have. We did briefly discuss the DBS workup/surgery and answered their questions.     -continue Sinemet 25/100mg 3 tab qAM, 2 tab midday, 2 tab qPM  -we will start Sinemet CR 50/200mg qhs to see if this helps  relieve the AM tightness and discomfort  -we will wean off Keppra and once this is off, she should contact our team for guidance on starting zonisamide  -tizanidine 2mg TID PRN   -we will continue to uptitrate the Botox injections in the future to see if this improves her symptoms  -she will consider whether or not she wants to pursue DBS evaluation  -we did videotape her with plan to discuss her case at DBS consensus     The case and recommendations were discussed with Dr. Hopkins, who has spoken with and examined the patient and helped to formulate the impression and recommendations.    Lizbeth Verma MD  Movement disorders fellow     Neurology Attending Attestation:     I, Tiffany Hopkins, personally saw this patient with our Movement Disorders Fellow and agree with the fellow's findings and plan of care as documented in the movement disorder fellow's note, with my personal summary below. I personally performed salient aspects of the history and neurological examination.     I personally reviewed the vital signs, medications, and labs. I personally viewed the imaging, and agree with the interpretation documented by the fellow.    Time spent with patient: Greater than 50% of this 50 minute visit was personally spent in face-to-face counseling and coordination of care related to the above issues.   Tiffany Hopkins MD    of Neurology

## 2017-08-02 NOTE — PROGRESS NOTES
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 She was last seen in clinic on June 22nd at which time she had Botox injections, 125 units total.    She was also started on Keppra to help with the myoclonic jerks in her RUE. She reports no clear improvement in her right arm symptoms since the Botox injections. She will notice cramping in her arm worse in the mornings. She has also noticed increased tightness in the right elbow. No weakness or side effects from the Botox including associated weakness. She is not sure she has ever had much improvement with Botox.      She reports the Keppra has not improved the jerky movements despite being on 750mg BID twice a week now for two weeks.     Other than that mentioned above, the remainder of 12 systems reviewed were negative.      Current Outpatient Prescriptions   Medication Sig Dispense Refill     levETIRAcetam (KEPPRA) 250 MG tablet Take 750 mg by mouth 2 times daily       clonazePAM (KLONOPIN) 0.5 MG tablet Take 0.5 tablets by mouth daily as needed       Cholecalciferol (VITAMIN D) 2000 UNITS tablet Take 2,000 Units by mouth daily       carbidopa-levodopa (SINEMET)  MG per tablet Take 3 tablets by mouth 3 times daily as needed PATIENT IS TAKING 3 TABS IN THE MORNING, 2 TABS AT NOON AND 2 TABS AT NIGHT.  3     carbidopa-levodopa (SINEMET CR)  MG per CR tablet Take 1 tablet by mouth 2 times daily 30 tablet 6     tiZANidine (ZANAFLEX) 2 MG tablet Please take 1 tablet up to three times a day as needed. We would recommend starting this medication on a weekend. 90 tablet 3     OnabotulinumtoxinA (BOTOX IJ) Inject 25 Units as directed once Lot#: /C3  Exp: 01/2020       [DISCONTINUED] levETIRAcetam (KEPPRA) 250 MG tablet Start 1 tab po bid and increase by 1 pill as directed per  week to maximum dose 3 tabs po bid 130 tablet 3     [DISCONTINUED] CLONAZEPAM PO Take 0.5 mg by mouth       [DISCONTINUED] carbidopa-levodopa (SINEMET)  MG per tablet Increase Sinemet 25/100 mg -- take 1.5 tablets 3 x per day for 1 week & if tolerated increase to 2 tablets 3 times a day. 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 It demonstrated some mild changes of right carpal tunnel syndrome but was otherwise normal.       History of MRI of brain and brain stem 12/21/2014    A brain MRI scan done on 06/27/2014 revealed no abnormalities.      History of MRI of cervical spine 12/21/2014    A cervical MRI done on 06/10/2014 revealed some mild to moderate degenerative changes but no significant central canal or foraminal stenosis, and no abnormalities of the spinal cord were noted.       Movement disorder 12/21/2014    I saw your patient, Deepali Contreras on 12/15/2014. She is a 49-year-old right-handed female here for evaluation of right upper extremity dysfunction and right hand tremor.  She has noted this problem for the last couple of years. She reports she is losing dexterity in her right hand. She has appreciated a tremor of the right hand with actions such as writing or postural maintenance. She has n     Past Surgical History:   Procedure Laterality Date     APPENDECTOMY       Family History   Problem Relation Age of Onset     Breast Cancer Mother      Cancer -  "colorectal Mother      Dementia Father      Neurologic Disorder Paternal Aunt      Parkinson's Disease     CEREBROVASCULAR DISEASE Brother      stroke at age 25 possibly from pfos     Social History     Social History     Marital status:      Spouse name: N/A     Number of children: N/A     Years of education: N/A     Occupational History     Not on file.     Social History Main Topics     Smoking status: Never Smoker     Smokeless tobacco: Never Used     Alcohol use 1.2 - 1.8 oz/week     2 - 3 Cans of beer per week      Comment: WEEKLY     Drug use: No     Sexual activity: Yes     Partners: Male     Other Topics Concern     Not on file     Social History Narrative        Izaiah Jara    Lives in [facility]      She does not smoke.  She has a couple of beers to drink on the weekend but otherwise is not a heavy user of alcohol    3 kids: son peña 21; 94, 96 and 98    2 sons    1 daughter    2 are in college and daughter is a sophomore.            FAMILY HISTORY:  Notable for a paternal aunt who is .  She had Parkinson's disease.  Otherwise, there is no other family history of neurologic problems.  There is no family history of dystonia.          90% Colombian    Some norweigian                .       Examination   154 lbs 6.4 oz  Blood pressure 122/72, pulse 79, temperature 98.2  F (36.8  C), resp. rate 20, height 1.676 m (5' 6\"), weight 70 kg (154 lb 6.4 oz), SpO2 97 %, not currently breastfeeding., Body mass index is 24.92 kg/(m^2).    Neuro exam:   Mental status:  Alert, answers questions and follows commands appropriately except with apraxia in RUE with difficulty following commands in that extremity   Motor: intermittent myoclonic jerks in RUE, worse distally    Posturing of right hand with fingers flexed expect with extension of index finger   Difficulty with finger tapping and hand grasps on right, unable to supinate/pronate right arm   Coordination: FTN intact on left, " difficulty completing on right  Gait: normal stance, decreased dorsiflexion of right foot, dystonic posturing of right arm noted also with walking    No images are attached to the encounter.    Data Reviewed:   Component      Latest Ref Rng & Units 12/15/2014 1/12/2015   Bilirubin Direct      0.0 - 0.2 mg/dL 0.2    Bilirubin Total      0.2 - 1.3 mg/dL 0.6    Albumin      3.4 - 5.0 g/dL 3.9    Protein Total      6.8 - 8.8 g/dL 7.2    Alkaline Phosphatase      40 - 150 U/L 52    ALT      0 - 50 U/L 14    AST      0 - 45 U/L 10    TSH      0.40 - 4.00 mU/L 2.44    Ceruloplasmin      23 - 53 mg/dL 25    Ins Growth Factor 1      156 - 217 ng/ml  176   Apolipo A-1        211 (H)   Uric Acid      2.6 - 6.0 mg/dL  3.4       Assessment/plan:   52 year old woman with RUE dystonia and myoclonus. The unilateral onset of dystonia and myoclonus is concerning for corticobasal syndrome although without associated cognitive symptoms. Alternatively, she could have an atypical form of segmental dystonia. She is also starting to have symptoms on the right leg although without di dystonia noted on exam in this extremity. We did discuss that we can continue to trial medication options however these will likely only have limited benefit particularly given she has had minimal response to Botox.     DBS may be better able to treat her dystonia, particularly using GPi as a target, however it is difficult to say exactly how much benefit she would have. We did briefly discuss the DBS workup/surgery and answered their questions.     -continue Sinemet 25/100mg 3 tab qAM, 2 tab midday, 2 tab qPM  -we will start Sinemet CR 50/200mg qhs to see if this helps relieve the AM tightness and discomfort  -we will wean off Keppra and once this is off, she should contact our team for guidance on starting zonisamide  -tizanidine 2mg TID PRN   -we will continue to uptitrate the Botox injections in the future to see if this improves her symptoms  -she will  consider whether or not she wants to pursue DBS evaluation  -we did videotape her with plan to discuss her case at DBS consensus     The case and recommendations were discussed with Dr. Hopkins, who has spoken with and examined the patient and helped to formulate the impression and recommendations.    Lizbeth Verma MD  Movement disorders fellow     Neurology Attending Attestation:     I, Tiffany Hopkins, personally saw this patient with our Movement Disorders Fellow and agree with the fellow's findings and plan of care as documented in the movement disorder fellow's note, with my personal summary below. I personally performed salient aspects of the history and neurological examination.     I personally reviewed the vital signs, medications, and labs. I personally viewed the imaging, and agree with the interpretation documented by the fellow.    Time spent with patient: Greater than 50% of this 50 minute visit was personally spent in face-to-face counseling and coordination of care related to the above issues.       Tiffany Hopkins MD    of Neurology

## 2017-08-02 NOTE — NURSING NOTE
Chief Complaint   Patient presents with     RECHECK     [facility]- MOVEMENT DISORDER (TREMOR RIGHT HAND) F/U     Vaughn An, RACQUEL

## 2017-08-03 NOTE — PATIENT INSTRUCTIONS
We would recommend weaning off the Keppra as follows:  Week 1: take 2 tablets twice a day  Week 2: take 1 tablet twice a day  Week 3: you can stop this medication    Please start taking Sinemet 50/200mg CR (controlled release) one tablet at bedtime.     Once you are off the Keppra, please call and we can talk about starting another medication, zonisamide.     For the muscle relaxant, tizanidine, please take 1 tablet up to three times a day as needed. We would recommend starting this medication on a weekend as it may make you tired.

## 2017-08-18 ENCOUNTER — TELEPHONE (OUTPATIENT)
Dept: NEUROLOGY | Facility: CLINIC | Age: 52
End: 2017-08-18

## 2017-08-18 NOTE — TELEPHONE ENCOUNTER
I attempted to call Ms. Abdias Jara back however, the call went to voicemail. I left a message to call back the clinic at her earlier convenience.

## 2017-09-07 ENCOUNTER — OFFICE VISIT (OUTPATIENT)
Dept: NEUROLOGY | Facility: CLINIC | Age: 52
End: 2017-09-07

## 2017-09-07 VITALS — HEART RATE: 79 BPM | DIASTOLIC BLOOD PRESSURE: 66 MMHG | HEIGHT: 66 IN | SYSTOLIC BLOOD PRESSURE: 119 MMHG

## 2017-09-07 DIAGNOSIS — G24.8 ACQUIRED TORSION DYSTONIA: ICD-10-CM

## 2017-09-07 DIAGNOSIS — G25.3 MYOCLONUS: ICD-10-CM

## 2017-09-07 DIAGNOSIS — G24.1 DYSTONIA, TORSION, FRAGMENTS OF: Primary | ICD-10-CM

## 2017-09-07 RX ORDER — ZONISAMIDE 50 MG/1
100 CAPSULE ORAL DAILY
Qty: 45 CAPSULE | Refills: 3 | COMMUNITY
Start: 2017-09-07 | End: 2018-09-13

## 2017-09-07 ASSESSMENT — ENCOUNTER SYMPTOMS
ALTERED TEMPERATURE REGULATION: 0
LOSS OF CONSCIOUSNESS: 0
NECK MASS: 0
LEG PAIN: 0
WEIGHT GAIN: 0
TINGLING: 0
NIGHT SWEATS: 1
WEIGHT LOSS: 0
TREMORS: 0
HYPERTENSION: 0
INCREASED ENERGY: 0
HALLUCINATIONS: 0
SORE THROAT: 0
TROUBLE SWALLOWING: 0
PARALYSIS: 0
SYNCOPE: 0
HYPOTENSION: 0
FATIGUE: 0
POLYPHAGIA: 0
SPEECH CHANGE: 0
LEG SWELLING: 0
CHILLS: 0
SMELL DISTURBANCE: 0
ORTHOPNEA: 0
SLEEP DISTURBANCES DUE TO BREATHING: 0
FEVER: 0
EXERCISE INTOLERANCE: 0
NUMBNESS: 0
WEAKNESS: 0
PALPITATIONS: 0
DECREASED APPETITE: 0
SINUS CONGESTION: 0
HEADACHES: 0
DIZZINESS: 0
HOARSE VOICE: 0
SINUS PAIN: 0
POLYDIPSIA: 0
LIGHT-HEADEDNESS: 1
DISTURBANCES IN COORDINATION: 0
CLAUDICATION: 0
TASTE DISTURBANCE: 0
MEMORY LOSS: 0
TACHYCARDIA: 0
SEIZURES: 0

## 2017-09-07 ASSESSMENT — PAIN SCALES - GENERAL: PAINLEVEL: NO PAIN (0)

## 2017-09-07 NOTE — LETTER
9/7/2017       RE: Deepali Jara  [address]  [place] [facility] [address]     Dear Colleague,    Thank you for referring your patient, Deepali Jara, to the [facility] NEUROLOGY at [facility]. Please see a copy of my visit note below.    Movement Disorders Botulinum Toxin Clinic Note    Chief Complaint: Botox follow-up for dystonia, medication management of myoclonus    History of Present Illness:  Deepali Jara is a 52 year old female who presents to clinic for botulinum toxin injections for treatment of right arm dystonia secondary to possible CBD.      Response to Last Injection: Did not initially notice an effect, however now that it has started to wear off more, realizing she is having more cramping pain and flexion of the elbow and now realizes in retrospect it was effective.    Onset of effect:  Unclear, as above    Benefit from last injections:  Less pain, less flexion posturing of elbow    Wearing off: She noticed wearing off in past month    Side effects: She reports no overweakness or dysphagia    Additional interval history: Feels like the right leg is stiffening more, having more sensation of imbalance. Some lightheadedness when standing for prolonged periods. Not clear if zonisamide has helped. Sinemet CR has helped her sleep -- jerking not waking her up anymore.    Review of Systems:  Other than that mentioned above, the remainder of 12 systems reviewed were negative.    Current Outpatient Prescriptions   Medication Sig Dispense Refill     zonisamide (ZONEGRAN) 50 MG capsule Take 2 capsules (100 mg) by mouth daily 45 capsule 3     carbidopa-levodopa (SINEMET CR)  MG per CR tablet Take 1 tablet by mouth At Bedtime 30 tablet 6     clonazePAM (KLONOPIN) 0.5 MG tablet Take 0.5 tablets by mouth daily as needed       Cholecalciferol (VITAMIN D) 2000 UNITS tablet Take 2,000 Units by mouth daily       carbidopa-levodopa (SINEMET)  MG  per tablet Take 3 tablets by mouth 3 times daily as needed PATIENT IS TAKING 3 TABS IN THE MORNING, 2 TABS AT NOON AND 2 TABS AT NIGHT.  3     tiZANidine (ZANAFLEX) 2 MG tablet Please take 1 tablet up to three times a day as needed. We would recommend starting this medication on a weekend. 90 tablet 3     OnabotulinumtoxinA (BOTOX IJ) Inject 25 Units as directed once Lot#: /C3  Exp: 01/2020         Allergies: She has No Known Allergies.    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 It demonstrated some mild changes of right carpal tunnel syndrome but was otherwise normal.       History of MRI of brain and brain stem 12/21/2014    A brain MRI scan done on 06/27/2014 revealed no abnormalities.      History of MRI of cervical spine 12/21/2014    A cervical MRI done on 06/10/2014 revealed some mild to moderate degenerative changes but no significant central canal or foraminal stenosis, and no abnormalities of the spinal cord were noted.       Movement disorder 12/21/2014    I saw your patient, Deepali Contreras on 12/15/2014. She is a 49-year-old right-handed female here for evaluation of right upper extremity dysfunction and right hand tremor.  She has noted this problem for the last couple of years. She reports she is losing dexterity in her right hand. She has appreciated a tremor of the right hand with actions such as writing or postural maintenance. She has n       Past Surgical History:   Procedure Laterality Date     APPENDECTOMY         Social History     Social History     Marital  "status:      Spouse name: N/A     Number of children: N/A     Years of education: N/A     Occupational History     Not on file.     Social History Main Topics     Smoking status: Never Smoker     Smokeless tobacco: Never Used     Alcohol use 1.2 - 1.8 oz/week     2 - 3 Cans of beer per week      Comment: WEEKLY     Drug use: No     Sexual activity: Yes     Partners: Male     Other Topics Concern     Not on file     Social History Narrative        Izaiah Jara    Lives in [facility]      She does not smoke.  She has a couple of beers to drink on the weekend but otherwise is not a heavy user of alcohol    3 kids: son peña 21; 94, 96 and 98    2 sons    1 daughter    2 are in college and daughter is a sophomore.            FAMILY HISTORY:  Notable for a paternal aunt who is .  She had Parkinson's disease.  Otherwise, there is no other family history of neurologic problems.  There is no family history of dystonia.          90% Jordanian    Some norweigian                .         Family History   Problem Relation Age of Onset     Breast Cancer Mother      Cancer - colorectal Mother      Dementia Father      Neurologic Disorder Paternal Aunt      Parkinson's Disease     CEREBROVASCULAR DISEASE Brother      stroke at age 25 possibly from pfos       Physical Examination:  Vital Signs:   height is 1.676 m (5' 6\"). Her blood pressure is 119/66 and her pulse is 79.    She is alert and oriented and has fluent speech without dysarthria and is able to provide an interval medical history  Extraocular movements are full. She has normal smooth pursuits and saccades. Her face is symmetric with equal activation.   Myoclonus in RUE, increased with activation. Prominent right arm dystonia. Posturing of right hand with fingers flexed expect with extension of index finger   Gait: normal stance, decreased dorsiflexion of right foot, dystonic posturing of right arm noted also with walking    BOTULINUM " NEUROTOXIN INJECTION PROCEDURES:    VERIFICATION OF PATIENT IDENTIFICATION AND PROCEDURE     Initials   Patient Name EMJ   Patient  EMJ   Procedure Verified by: EMJ     Prior to the start of the procedure and with procedural staff participation, I verbally confirmed the patient s identity using two indicators, relevant allergies, that the procedure was appropriate and matched the consent or emergent situation, and that the correct equipment/implants were available. Immediately prior to starting the procedure I conducted the Time Out with the procedural staff and re-confirmed the patient s name, procedure, and site/side. (The Joint Commission universal protocol was followed.)  Yes    Sedation (Moderate or Deep): None      Above assessments performed by:  Resident/Fellow         Joey Bradford MD          The attending provider was present for the entire procedure documented below.        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     Her baseline symptoms have been recalcitrant to oral medications and conservative therapy.  She is here today for an injection of Botox.      GOAL OF PROCEDURE:  The goal of this procedure is to increase active range of motion, improve volitional motor control, decrease pain  and enhance functional independence associated with dystonic movements.    TOTAL DOSE ADMINISTERED:  Dose Administered:  200 units Botox    Diluent Used:  Preservative Free Normal Saline  Total Volume of Diluent Used:  2 ml  Lot # [id number] with Expiration Date:  2020  NDC #: Botox 100u ([address]-04)    Medication guide was offered to patient and was declined.    CONSENT:  The risks, benefits, and treatment options were discussed with Deepali Jara and she agreed to  proceed.      Written consent was obtained by [facility].     EQUIPMENT USED:  Needle-37mm stimulating/recording  EMG    SKIN PREPARATION:  Skin preparation was performed using an alcohol wipe.    GUIDANCE DESCRIPTION:  Electro-myographic guidance was necessary throughout the procedure to accurately identify all areas of dystonic muscles while avoiding injection of non-dystonic muscles, neighboring nerves and nearby vascular structures.     AREA/MUSCLE INJECTED:          Muscles Injected Units Injected(prior dose) Number of Injections   biceps 75  (40) 2   pronator teres 25(20) 2   deltoid 45 (50) 2   Extensor indicis 10(9) 1   FDS 15(12) 1   brachioradialis 15(0) 1   Extensor carpi radialis 15(0) 1             Total Units Injected: 200    Unavoidable Waste: 0    Total Units Billed 200      The patient tolerated the injections without difficulty.      Assessment:    Deepali Jara is a 52 year old female with right arm dystonia and myoclonus secondary to possible CBD.  Today we did repeat botulinum toxin injections. She has not yet had a response to zonisamide.    Plan  Continue zonisamide titration for myoclonus    Follow-up in 10 weeks' time to consider repeat injections.    Will likely increase total dose to 300U at that time.    Tiffany Hopkins MD    of Neurology

## 2017-09-07 NOTE — MR AVS SNAPSHOT
After Visit Summary   2017    Deepali Jara    MRN: [id number]           Patient Information     Date Of Birth          1965        Visit Information        Provider Department      2017 8:30 AM Tiffany Hopkins MD [facility]        Today's Diagnoses     Dystonia, torsion, fragments of    -  1    Myoclonus        Acquired torsion dystonia           Follow-ups after your visit        Your next 10 appointments already scheduled     Oct 25, 2017  6:00 PM CDT   (Arrive by 5:45 PM)   Return Botox with Tiffany Hopkins MD   [facility] ([facility])    [address] [place] [id number] [phone]              Who to contact     Please call your clinic at [phone] to:    Ask questions about your health    Make or cancel appointments    Discuss your medicines    Learn about your test results    Speak to your doctor   If you have compliments or concerns about an experience at your clinic, or if you wish to file a complaint, please contact [facility] Patient Relations at [phone] or email us at [email or web address]         Additional Information About Your Visit        MyChart Information     GeoSentrichart is an electronic gateway that provides easy, online access to your medical records. With [employer] Toyz, you can request a clinic appointment, read your test results, renew a prescription or communicate with your care team.     To sign up for 2NDNATUREt visit the website at [email or web address]   You will be asked to enter the access code listed below, as well as some personal information. Please follow the directions to create your username and password.     Your access code is: [email or web address]  Expires: 2017  6:30 AM     Your access code will  in 90 days. If you need help or a new code, please contact your [facility] or call  "[phone] for assistance.        Care EveryWhere ID     This is your Care EveryWhere ID. This could be used by other organizations to access your [place] medical records  [id number]        Your Vitals Were     Pulse Height                79 1.676 m (5' 6\")           Blood Pressure from Last 3 Encounters:   09/07/17 119/66   08/02/17 122/72   06/22/17 147/78    Weight from Last 3 Encounters:   08/02/17 70 kg (154 lb 6.4 oz)   06/07/17 68.8 kg (151 lb 11.2 oz)   04/04/17 67 kg (147 lb 11.2 oz)              We Performed the Following     HC CHEMODENERVATION 1 EXTREMITY, 5 OR MORE MUSCLES     NEEDLE EMG GUIDE W CHEMODENERVATION          Today's Medication Changes          These changes are accurate as of: 9/7/17 11:59 PM.  If you have any questions, ask your nurse or doctor.               These medicines have changed or have updated prescriptions.        Dose/Directions    zonisamide 50 MG capsule   Commonly known as:  ZONEGRAN   This may have changed:  how much to take   Used for:  Dystonia, torsion, fragments of   Changed by:  Tiffany Hopkins MD        Dose:  100 mg   Take 2 capsules (100 mg) by mouth daily   Quantity:  45 capsule   Refills:  3                Primary Care Provider Office Phone # Fax #    Ester Barnes [phone]       [address] [address]        Equal Access to Services     [facility] Center[facility] AH: Hadkaty guajardo Soenzo, waaxda luqadaha, qaybta kaalmada yovani, senia darden . So [facility] [phone].    ATENCIÓN: Si habla español, tiene a schmitt disposición servicios gratuitos de asistencia lingüística. Llame al [phone].    We comply with applicable federal civil rights laws and [place] laws. We do not discriminate on the basis of race, color, national origin, age, disability, sex, sexual orientation, or gender identity.            Thank you!     Thank you for choosing [facility]  for your care. Our " goal is always to provide you with excellent care. Hearing back from our patients is one way we can continue to improve our services. Please take a few minutes to complete the written survey that you may receive in the mail after your visit with us. Thank you!             Your Updated Medication List - Protect others around you: Learn how to safely use, store and throw away your medicines at www.disposemymeds.org.          This list is accurate as of: 9/7/17 11:59 PM.  Always use your most recent med list.                   Brand Name Dispense Instructions for use Diagnosis    BOTOX IJ      Inject 25 Units as directed once Lot#: /C3 Exp: 01/2020        * carbidopa-levodopa  MG per tablet    SINEMET     Take 3 tablets by mouth 3 times daily as needed PATIENT IS TAKING 3 TABS IN THE MORNING, 2 TABS AT NOON AND 2 TABS AT NIGHT.        * carbidopa-levodopa  MG per CR tablet    SINEMET CR    30 tablet    Take 1 tablet by mouth At Bedtime    Dystonia, torsion, fragments of       clonazePAM 0.5 MG tablet    klonoPIN     Take 0.5 tablets by mouth daily as needed        tiZANidine 2 MG tablet    ZANAFLEX    90 tablet    Please take 1 tablet up to three times a day as needed. We would recommend starting this medication on a weekend.    Dystonia, torsion, fragments of       vitamin D 2000 UNITS tablet      Take 2,000 Units by mouth daily        zonisamide 50 MG capsule    ZONEGRAN    45 capsule    Take 2 capsules (100 mg) by mouth daily    Dystonia, torsion, fragments of       * Notice:  This list has 2 medication(s) that are the same as other medications prescribed for you. Read the directions carefully, and ask your doctor or other care provider to review them with you.

## 2017-09-08 NOTE — PROGRESS NOTES
Movement Disorders Botulinum Toxin Clinic Note    Chief Complaint: Botox follow-up for dystonia, medication management of myoclonus    History of Present Illness:  Deepali Jara is a 52 year old female who presents to clinic for botulinum toxin injections for treatment of right arm dystonia secondary to possible CBD.      Response to Last Injection: Did not initially notice an effect, however now that it has started to wear off more, realizing she is having more cramping pain and flexion of the elbow and now realizes in retrospect it was effective.    Onset of effect:  Unclear, as above    Benefit from last injections:  Less pain, less flexion posturing of elbow    Wearing off: She noticed wearing off in past month    Side effects: She reports no overweakness or dysphagia    Additional interval history: Feels like the right leg is stiffening more, having more sensation of imbalance. Some lightheadedness when standing for prolonged periods. Not clear if zonisamide has helped. Sinemet CR has helped her sleep -- jerking not waking her up anymore.    Review of Systems:  Other than that mentioned above, the remainder of 12 systems reviewed were negative.    Current Outpatient Prescriptions   Medication Sig Dispense Refill     zonisamide (ZONEGRAN) 50 MG capsule Take 2 capsules (100 mg) by mouth daily 45 capsule 3     carbidopa-levodopa (SINEMET CR)  MG per CR tablet Take 1 tablet by mouth At Bedtime 30 tablet 6     clonazePAM (KLONOPIN) 0.5 MG tablet Take 0.5 tablets by mouth daily as needed       Cholecalciferol (VITAMIN D) 2000 UNITS tablet Take 2,000 Units by mouth daily       carbidopa-levodopa (SINEMET)  MG per tablet Take 3 tablets by mouth 3 times daily as needed PATIENT IS TAKING 3 TABS IN THE MORNING, 2 TABS AT NOON AND 2 TABS AT NIGHT.  3     tiZANidine (ZANAFLEX) 2 MG tablet Please take 1 tablet up to three times a day as needed. We would recommend starting this medication on a weekend. 90  tablet 3     OnabotulinumtoxinA (BOTOX IJ) Inject 25 Units as directed once Lot#: /C3  Exp: 01/2020         Allergies: She has No Known Allergies.    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 It demonstrated some mild changes of right carpal tunnel syndrome but was otherwise normal.       History of MRI of brain and brain stem 12/21/2014    A brain MRI scan done on 06/27/2014 revealed no abnormalities.      History of MRI of cervical spine 12/21/2014    A cervical MRI done on 06/10/2014 revealed some mild to moderate degenerative changes but no significant central canal or foraminal stenosis, and no abnormalities of the spinal cord were noted.       Movement disorder 12/21/2014    I saw your patient, Deepali Contreras on 12/15/2014. She is a 49-year-old right-handed female here for evaluation of right upper extremity dysfunction and right hand tremor.  She has noted this problem for the last couple of years. She reports she is losing dexterity in her right hand. She has appreciated a tremor of the right hand with actions such as writing or postural maintenance. She has n       Past Surgical History:   Procedure Laterality Date     APPENDECTOMY         Social History     Social History     Marital status:      Spouse name: N/A     Number of children: N/A     Years of education: N/A     Occupational History     Not on file.     Social History Main Topics     Smoking status: Never Smoker     Smokeless tobacco: Never Used     Alcohol use 1.2 - 1.8 oz/week     2 - 3 Cans of beer per  "week      Comment: WEEKLY     Drug use: No     Sexual activity: Yes     Partners: Male     Other Topics Concern     Not on file     Social History Narrative        Izaiah Jara    Lives in [place]      She does not smoke.  She has a couple of beers to drink on the weekend but otherwise is not a heavy user of alcohol    3 kids: son peña 21; 94, 96 and 98    2 sons    1 daughter    2 are in college and daughter is a sophomore.            FAMILY HISTORY:  Notable for a paternal aunt who is .  She had Parkinson's disease.  Otherwise, there is no other family history of neurologic problems.  There is no family history of dystonia.          90% Bhutanese    Some norweigian                .         Family History   Problem Relation Age of Onset     Breast Cancer Mother      Cancer - colorectal Mother      Dementia Father      Neurologic Disorder Paternal Aunt      Parkinson's Disease     CEREBROVASCULAR DISEASE Brother      stroke at age 25 possibly from pfos       Physical Examination:  Vital Signs:   height is 1.676 m (5' 6\"). Her blood pressure is 119/66 and her pulse is 79.    She is alert and oriented and has fluent speech without dysarthria and is able to provide an interval medical history  Extraocular movements are full. She has normal smooth pursuits and saccades. Her face is symmetric with equal activation.   Myoclonus in RUE, increased with activation. Prominent right arm dystonia. Posturing of right hand with fingers flexed expect with extension of index finger   Gait: normal stance, decreased dorsiflexion of right foot, dystonic posturing of right arm noted also with walking    BOTULINUM NEUROTOXIN INJECTION PROCEDURES:    VERIFICATION OF PATIENT IDENTIFICATION AND PROCEDURE     Initials   Patient Name EMJ   Patient  EMJ   Procedure Verified by: EMJ     Prior to the start of the procedure and with procedural staff participation, I verbally confirmed the patient s identity " using two indicators, relevant allergies, that the procedure was appropriate and matched the consent or emergent situation, and that the correct equipment/implants were available. Immediately prior to starting the procedure I conducted the Time Out with the procedural staff and re-confirmed the patient s name, procedure, and site/side. (The Joint Commission universal protocol was followed.)  Yes    Sedation (Moderate or Deep): None      Above assessments performed by:  Resident/Fellow         Joey Bradford MD          The attending provider was present for the entire procedure documented below.        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     Her baseline symptoms have been recalcitrant to oral medications and conservative therapy.  She is here today for an injection of Botox.      GOAL OF PROCEDURE:  The goal of this procedure is to increase active range of motion, improve volitional motor control, decrease pain  and enhance functional independence associated with dystonic movements.    TOTAL DOSE ADMINISTERED:  Dose Administered:  200 units Botox    Diluent Used:  Preservative Free Normal Saline  Total Volume of Diluent Used:  2 ml  Lot # [id number] with Expiration Date:  4/2020  NDC #: Botox 100u ([address]-32)    Medication guide was offered to patient and was declined.    CONSENT:  The risks, benefits, and treatment options were discussed with Deepali Jara and she agreed to proceed.      Written consent was obtained by [facility].     EQUIPMENT USED:  Needle-37mm stimulating/recording  EMG    SKIN PREPARATION:  Skin preparation was performed using an alcohol wipe.    GUIDANCE DESCRIPTION:  Electro-myographic guidance was necessary throughout the procedure to accurately  identify all areas of dystonic muscles while avoiding injection of non-dystonic muscles, neighboring nerves and nearby vascular structures.     AREA/MUSCLE INJECTED:          Muscles Injected Units Injected(prior dose) Number of Injections   biceps 75  (40) 2   pronator teres 25(20) 2   deltoid 45 (50) 2   Extensor indicis 10(9) 1   FDS 15(12) 1   brachioradialis 15(0) 1   Extensor carpi radialis 15(0) 1             Total Units Injected: 200    Unavoidable Waste: 0    Total Units Billed 200      The patient tolerated the injections without difficulty.      Assessment:    Deepali Jara is a 52 year old female with right arm dystonia and myoclonus secondary to possible CBD.  Today we did repeat botulinum toxin injections. She has not yet had a response to zonisamide.    Plan  Continue zonisamide titration for myoclonus    Follow-up in 10 weeks' time to consider repeat injections.    Will likely increase total dose to 300U at that time.    Tiffany Hopkins MD    of Neurology

## 2017-09-26 ENCOUNTER — TELEPHONE (OUTPATIENT)
Dept: NEUROLOGY | Facility: CLINIC | Age: 52
End: 2017-09-26

## 2017-09-26 NOTE — TELEPHONE ENCOUNTER
----- Message from Ondina Khan RN sent at 9/25/2017 11:56 AM CDT -----  Regarding: FW: Pt medication concerns  Please follow up. Thank you!  ----- Message -----     From: Evelia Eason     Sent: 9/25/2017  10:49 AM       To: Tiffany Hopkins MD, #  Subject: Pt medication concerns                           Ang Nj,    This patient called me stating she is experiencing light headiness since taking the new medication zonisamide. She is also still experiencing some jerkiness. Pt is wondering what to do? Should she discontinue taking the medication? Please contact patient to discuss.    Evelia Resendez

## 2017-09-29 NOTE — TELEPHONE ENCOUNTER
Called back again, got voicemail.    I left a message saying that if she is having light-headedness, and like when she was in clinic she felt the zonisamide wasn't helping, she could taper off it.    I recommended decreasing to 100mg, waiting a couple of weeks, and then stopping if she felt it wasn't helping.    Joey Bradford MD  Movement Disorders Fellow

## 2017-10-12 ENCOUNTER — OFFICE VISIT (OUTPATIENT)
Dept: NEUROLOGY | Facility: CLINIC | Age: 52
End: 2017-10-12

## 2017-10-12 VITALS
SYSTOLIC BLOOD PRESSURE: 139 MMHG | DIASTOLIC BLOOD PRESSURE: 68 MMHG | TEMPERATURE: 98.2 F | BODY MASS INDEX: 23.82 KG/M2 | HEART RATE: 77 BPM | RESPIRATION RATE: 20 BRPM | OXYGEN SATURATION: 99 % | HEIGHT: 66 IN | WEIGHT: 148.2 LBS

## 2017-10-12 DIAGNOSIS — G24.8 ACQUIRED TORSION DYSTONIA: ICD-10-CM

## 2017-10-12 DIAGNOSIS — G31.85 CORTICOBASAL SYNDROME (H): Primary | ICD-10-CM

## 2017-10-12 DIAGNOSIS — G25.3 MYOCLONUS: ICD-10-CM

## 2017-10-12 RX ORDER — TRIAMCINOLONE ACETONIDE 1 MG/G
1 CREAM TOPICAL 3 TIMES DAILY PRN
COMMUNITY
Start: 2017-09-14 | End: 2019-02-07

## 2017-10-12 ASSESSMENT — PAIN SCALES - GENERAL: PAINLEVEL: NO PAIN (0)

## 2017-10-12 NOTE — LETTER
10/12/2017       RE: Deepali Jara  [address]  [facility] [address]     Dear Colleague,    Thank you for referring your patient, Deepali Jara, to the [facility] NEUROLOGY at [facility]. Please see a copy of my visit note below.    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       Response to Last Injection: She noticed an improvement in cramping and in the range of motion of her right arm. However, she still has tenderness near the inside of her elbow and at her shoulder.     Onset of effect:  Onset takes a few weeks    Benefit from last injections:  Benefit includes increased range of motion, reduced pain, and less cramping    Wearing off: She noticed wearing off this week. There is tightening and resistance when she tries to supinate the right arm. She cannot fully extend her fingers. She has more jerking at the shoulder, which is causing her some discomfort. Sometimes when she is just starting to wake overnight, myoclonic jerks wake her completely.     Side effects: She reports some mild weakness in elbow flexion. 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     Additional interval history: Patient noted lightheadedness with combination of Sinemet and zonisamide, so she stopped this medication. She was frustrated at not being able to contact someone in clinic very easily regarding this symptom. I have signed her up for [employer] to make it easier for her to communicate with providers in movement disorders clinic.     Her lightheadedness nearly  "resolved with discontinuation of zonisamide. She increased her Sinemet immediate release 25/100 mg to two tabs in the morning followed by one tab every four hours. She also takes the extended release 50/200 mg at bedtime. Patient does feel that Sinemet is helpful in reducing RUE jerks, but does not completely alleviate them.       Current Outpatient Prescriptions   Medication Sig Dispense Refill     triamcinolone (KENALOG) 0.1 % cream Apply 1 Application topically 3 times daily as needed       OnabotulinumtoxinA (BOTOX IJ) Inject 200 Units as directed every 3 months Lot# /C3 Exp 04/2020       zonisamide (ZONEGRAN) 50 MG capsule Take 2 capsules (100 mg) by mouth daily 45 capsule 3     carbidopa-levodopa (SINEMET CR)  MG per CR tablet Take 1 tablet by mouth At Bedtime 30 tablet 6     clonazePAM (KLONOPIN) 0.5 MG tablet Take 0.5 tablets by mouth daily as needed       Cholecalciferol (VITAMIN D) 2000 UNITS tablet Take 2,000 Units by mouth daily       carbidopa-levodopa (SINEMET)  MG per tablet Take 3 tablets by mouth 3 times daily as needed PATIENT IS TAKING 3 TABS IN THE MORNING, 2 TABS AT NOON AND 2 TABS AT NIGHT.  3     tiZANidine (ZANAFLEX) 2 MG tablet Please take 1 tablet up to three times a day as needed. We would recommend starting this medication on a weekend. 90 tablet 3     OnabotulinumtoxinA (BOTOX IJ) Inject 25 Units as directed once Lot#: /C3  Exp: 01/2020         Allergies: She has No Known Allergies.    Past medical history:  Reviewed - no new medical problems      Physical Examination:  Vital Signs:   height is 1.676 m (5' 6\") and weight is 67.2 kg (148 lb 3.2 oz). Her temperature is 98.2  F (36.8  C). Her blood pressure is 139/68 and her pulse is 77. Her respiration is 20 and oxygen saturation is 99%.   General: comfortable, in no acute distress    Patient is alert, oriented, attentive. Normal speech and facial expression.     Right arm is generally held in dystonic flexure posture " and internally rotated, more so with action. Frequent myoclonic jerks noted, especially involving the fingers. Her gait is normal.     BOTULINUM NEUROTOXIN INJECTION PROCEDURES:    VERIFICATION OF PATIENT IDENTIFICATION AND PROCEDURE     Initials   Patient Name AS   Patient  AS   Procedure Verified by: AS     Prior to the start of the procedure and with procedural staff participation, I verbally confirmed the patient s identity using two indicators, relevant allergies, that the procedure was appropriate and matched the consent or emergent situation, and that the correct equipment/implants were available. Immediately prior to starting the procedure I conducted the Time Out with the procedural staff and re-confirmed the patient s name, procedure, and site/side. (The Joint Commission universal protocol was followed.)  Yes    Sedation (Moderate or Deep): None      Above assessments performed by:  Resident/Fellow         Dorota Lorenz MD Movement Disorders Fellow          The attending provider was present for the entire procedure documented below.        Dr. Tiffany Hopkins      INDICATION/S FOR PROCEDURE/S:  Deepali Jara is a 52 year old year old patient with dystonia affecting the  right upper extremity secondary to a diagnosis of cortical basal degeneration with associated  pain, spasms, loss of joint motion and myoclonus.     Her baseline symptoms have been recalcitrant to oral medications and conservative therapy.  She is here today for an injection of Botox.      GOAL OF PROCEDURE:  The goal of this procedure is to increase active range of motion, improve volitional motor control, decrease pain  and enhance functional independence associated with dystonic movements and cramping.    TOTAL DOSE ADMINISTERED:  Dose Administered:  200 units Botox    Diluent Used:  Preservative Free Normal Saline  Total Volume of Diluent Used:  2 ml  Lot # /C3 with Expiration Date:  2020  NDC #: Botox 100u  ([address]-54)    Medication guide was offered to patient and was declined.    CONSENT:  The risks, benefits, and treatment options were discussed with patient and she agreed to proceed.      Written consent was obtained by AS.     EQUIPMENT USED:  Needle-37mm stimulating/recording  EMG/NCS Machine    SKIN PREPARATION:  Skin preparation was performed using an alcohol wipe.    GUIDANCE DESCRIPTION:  Electro-myographic guidance was necessary throughout the procedure to accurately identify all areas of dystonic muscles while avoiding injection of non-dystonic muscles, neighboring nerves and nearby vascular structures.     AREA/MUSCLE INJECTED:        Muscles Injected Units Injected  (prior doses) Number of Injections   Right Biceps  50 (75/40) 3 (25 & 10 u short head; 15 u long head)   Right Pronator teres 10 (25/20) 1   Right Deltoid  40 (45/50) 2   Right Extensor indices 10 (10/9) 1   Right flexor digitorum superficialis 10 (15/12) 1   Right Brachioradialis 10 (15/0) 1   Right Extensor carpi radialis 15 (15/0) 1   Right Trapezius 30 (0) 3   Total Units Injected: 175    Unavoidable Waste: 25    Total Units Billed 200      The patient tolerated the injections without difficulty.    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 Patient does not currently have cognitive impairment. She may have some partial response to Sinemet, but we have not been able to control myoclonus with antiepileptics (previous trials Keppra and zonisamide). Today we repeated Botox injections at a short follow up interval due to incomplete response to previous injections. We will likely have to accept some mild RUE weakness as a side effect to achieve adequate control of cramping and discomfort. 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     Plan:  - Repeat Botox injection in 10 weeks  - Continue Sinemet 25/100 q4h and 50/200 CR  HS  - She has been signed up for nuria Lorenz MD  Movement Disorders Fellow    Patient seen and examined with Dr. Hopkins, who was present for the entire procedure above.       Neurology Attending Attestation:     I, Tiffany Hopkins, personally saw this patient with our Movement Disorders Fellow and agree with the fellow's findings and plan of care as documented in the movement disorder fellow's note, with my personal summary below. I personally performed salient aspects of the history and neurological examination.     I personally reviewed the vital signs, medications, and labs. I was present and performed or supervised the entire procedure. Additional time of 20 minutes was spent in face-to-face counseling and coordination of care related to medications for rigidity and myoclonus.    Tiffany Hopkins MD    of Neurology          Again, thank you for allowing me to participate in the care of your patient.      Sincerely,    Tiffany Hopkins MD

## 2017-10-12 NOTE — NURSING NOTE
Chief Complaint   Patient presents with     RECHECK     [facility]- MOVEMENT DISORDER F/U     Vaughn An, RACQUEL

## 2017-10-12 NOTE — PROGRESS NOTES
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       Response to Last Injection: She noticed an improvement in cramping and in the range of motion of her right arm. However, she still has tenderness near the inside of her elbow and at her shoulder.     Onset of effect:  Onset takes a few weeks    Benefit from last injections:  Benefit includes increased range of motion, reduced pain, and less cramping    Wearing off: She noticed wearing off this week. There is tightening and resistance when she tries to supinate the right arm. She cannot fully extend her fingers. She has more jerking at the shoulder, which is causing her some discomfort. Sometimes when she is just starting to wake overnight, myoclonic jerks wake her completely.     Side effects: She reports some mild weakness in elbow flexion. 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     Additional interval history: Patient noted lightheadedness with combination of Sinemet and zonisamide, so she stopped this medication. She was frustrated at not being able to contact someone in clinic very easily regarding this symptom. I have signed her up for [employer] to make it easier for her to communicate with providers in movement disorders clinic.     Her lightheadedness nearly resolved with discontinuation of zonisamide. She increased her Sinemet immediate release 25/100 mg to two tabs in the morning followed by one tab every four hours. She also takes the extended release 50/200 mg at bedtime. Patient does feel that Sinemet is helpful in reducing RUE jerks, but does not  "completely alleviate them.       Current Outpatient Prescriptions   Medication Sig Dispense Refill     triamcinolone (KENALOG) 0.1 % cream Apply 1 Application topically 3 times daily as needed       OnabotulinumtoxinA (BOTOX IJ) Inject 200 Units as directed every 3 months Lot# /C3 Exp 2020       zonisamide (ZONEGRAN) 50 MG capsule Take 2 capsules (100 mg) by mouth daily 45 capsule 3     carbidopa-levodopa (SINEMET CR)  MG per CR tablet Take 1 tablet by mouth At Bedtime 30 tablet 6     clonazePAM (KLONOPIN) 0.5 MG tablet Take 0.5 tablets by mouth daily as needed       Cholecalciferol (VITAMIN D) 2000 UNITS tablet Take 2,000 Units by mouth daily       carbidopa-levodopa (SINEMET)  MG per tablet Take 3 tablets by mouth 3 times daily as needed PATIENT IS TAKING 3 TABS IN THE MORNING, 2 TABS AT NOON AND 2 TABS AT NIGHT.  3     tiZANidine (ZANAFLEX) 2 MG tablet Please take 1 tablet up to three times a day as needed. We would recommend starting this medication on a weekend. 90 tablet 3     OnabotulinumtoxinA (BOTOX IJ) Inject 25 Units as directed once Lot#: /C3  Exp: 2020         Allergies: She has No Known Allergies.    Past medical history:  Reviewed - no new medical problems      Physical Examination:  Vital Signs:   height is 1.676 m (5' 6\") and weight is 67.2 kg (148 lb 3.2 oz). Her temperature is 98.2  F (36.8  C). Her blood pressure is 139/68 and her pulse is 77. Her respiration is 20 and oxygen saturation is 99%.   General: comfortable, in no acute distress    Patient is alert, oriented, attentive. Normal speech and facial expression.     Right arm is generally held in dystonic flexure posture and internally rotated, more so with action. Frequent myoclonic jerks noted, especially involving the fingers. Her gait is normal.     BOTULINUM NEUROTOXIN INJECTION PROCEDURES:    VERIFICATION OF PATIENT IDENTIFICATION AND PROCEDURE     Initials   Patient Name AS   Patient  AS   Procedure " Verified by: AS     Prior to the start of the procedure and with procedural staff participation, I verbally confirmed the patient s identity using two indicators, relevant allergies, that the procedure was appropriate and matched the consent or emergent situation, and that the correct equipment/implants were available. Immediately prior to starting the procedure I conducted the Time Out with the procedural staff and re-confirmed the patient s name, procedure, and site/side. (The Joint Commission universal protocol was followed.)  Yes    Sedation (Moderate or Deep): None      Above assessments performed by:  Resident/Fellow         Dorota Lorenz MD Movement Disorders Fellow          The attending provider was present for the entire procedure documented below.        Dr. Tiffany Hopkins      INDICATION/S FOR PROCEDURE/S:  Deepali Jara is a 52 year old year old patient with dystonia affecting the  right upper extremity secondary to a diagnosis of cortical basal degeneration with associated  pain, spasms, loss of joint motion and myoclonus.     Her baseline symptoms have been recalcitrant to oral medications and conservative therapy.  She is here today for an injection of Botox.      GOAL OF PROCEDURE:  The goal of this procedure is to increase active range of motion, improve volitional motor control, decrease pain  and enhance functional independence associated with dystonic movements and cramping.    TOTAL DOSE ADMINISTERED:  Dose Administered:  200 units Botox    Diluent Used:  Preservative Free Normal Saline  Total Volume of Diluent Used:  2 ml  Lot # /C3 with Expiration Date:  04/2020  NDC #: Botox 100u ([address]-54)    Medication guide was offered to patient and was declined.    CONSENT:  The risks, benefits, and treatment options were discussed with patient and she agreed to proceed.      Written consent was obtained by AS.     EQUIPMENT USED:  Needle-37mm stimulating/recording  EMG/NCS  Machine    SKIN PREPARATION:  Skin preparation was performed using an alcohol wipe.    GUIDANCE DESCRIPTION:  Electro-myographic guidance was necessary throughout the procedure to accurately identify all areas of dystonic muscles while avoiding injection of non-dystonic muscles, neighboring nerves and nearby vascular structures.     AREA/MUSCLE INJECTED:        Muscles Injected Units Injected  (prior doses) Number of Injections   Right Biceps  50 (75/40) 3 (25 & 10 u short head; 15 u long head)   Right Pronator teres 10 (25/20) 1   Right Deltoid  40 (45/50) 2   Right Extensor indices 10 (10/9) 1   Right flexor digitorum superficialis 10 (15/12) 1   Right Brachioradialis 10 (15/0) 1   Right Extensor carpi radialis 15 (15/0) 1   Right Trapezius 30 (0) 3   Total Units Injected: 175    Unavoidable Waste: 25    Total Units Billed 200      The patient tolerated the injections without difficulty.    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 Patient does not currently have cognitive impairment. She may have some partial response to Sinemet, but we have not been able to control myoclonus with antiepileptics (previous trials Keppra and zonisamide). Today we repeated Botox injections at a short follow up interval due to incomplete response to previous injections. We will likely have to accept some mild RUE weakness as a side effect to achieve adequate control of cramping and discomfort. 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     Plan:  - Repeat Botox injection in 10 weeks  - Continue Sinemet 25/100 q4h and 50/200 CR HS  - She has been signed up for nuria Lorenz MD  Movement Disorders Fellow    Patient seen and examined with Dr. Hopkins, who was present for the entire procedure above.       Neurology Attending Attestation:     I, Tiffany Hopkins, personally saw this patient with our Movement  Disorders Fellow and agree with the fellow's findings and plan of care as documented in the movement disorder fellow's note, with my personal summary below. I personally performed salient aspects of the history and neurological examination.     I personally reviewed the vital signs, medications, and labs. I was present and performed or supervised the entire procedure. Additional time of 20 minutes was spent in face-to-face counseling and coordination of care related to medications for rigidity and myoclonus.    Tiffany Hopkins MD    of Neurology

## 2017-10-12 NOTE — MR AVS SNAPSHOT
After Visit Summary   10/12/2017    Deepali Jara    MRN: [id number]           Patient Information     Date Of Birth          1965        Visit Information        Provider Department      10/12/2017 12:00 PM Tiffany Hopkins MD [facility] Neurology        Today's Diagnoses     Corticobasal syndrome (HCC)    -  1    Acquired torsion dystonia        Myoclonus           Follow-ups after your visit        Your next 10 appointments already scheduled     Jan 03, 2018  5:00 PM CST   (Arrive by 4:45 PM)   Return Movement Disorder with Tiffany Hopkins MD   [facility] Neurology ([facility])    [address] [address] [phone]              Who to contact     Please call your clinic at [phone] to:    Ask questions about your health    Make or cancel appointments    Discuss your medicines    Learn about your test results    Speak to your doctor   If you have compliments or concerns about an experience at your clinic, or if you wish to file a complaint, please contact [facility] Patient Relations at [phone] or email us at [email or web address]         Additional Information About Your Visit        MyChart Information     [employer] gives you secure access to your electronic health record. If you see a primary care provider, you can also send messages to your care team and make appointments. If you have questions, please call your primary care clinic.  If you do not have a primary care provider, please call [phone] and they will assist you.      [employer] is an electronic gateway that provides easy, online access to your medical records. With [employer], you can request a clinic appointment, read your test results, renew a prescription or communicate with your care team.     To access your existing account, please contact your [facility] Clinic or call  "[phone] for assistance.        Care EveryWhere ID     This is your Care EveryWhere ID. This could be used by other organizations to access your [place] medical records  [id number]        Your Vitals Were     Pulse Temperature Respirations Height Pulse Oximetry Breastfeeding?    77 98.2  F (36.8  C) 20 1.676 m (5' 6\") 99% No    BMI (Body Mass Index)                   23.92 kg/m2            Blood Pressure from Last 3 Encounters:   10/12/17 139/68   09/07/17 119/66   08/02/17 122/72    Weight from Last 3 Encounters:   10/12/17 67.2 kg (148 lb 3.2 oz)   08/02/17 70 kg (154 lb 6.4 oz)   06/07/17 68.8 kg (151 lb 11.2 oz)              We Performed the Following     HC CHEMODENERVATION 1 EXTREMITY, 5 OR MORE MUSCLES     NEEDLE EMG GUIDE W CHEMODENERVATION        Primary Care Provider Office Phone # Fax #    Ester Barnes [phone]       [address] [address]        Equal Access to Services     : Hadii dean melloo Soenzo, waaxda luqadaha, qaybta kaalmada adecolleen, senia darden . So [facility] [phone].    ATENCIÓN: Si habla español, tiene a schmitt disposición servicios gratuitos de asistencia lingüística. [facility] [phone].    We comply with applicable federal civil rights laws and [place] laws. We do not discriminate on the basis of race, color, national origin, age, disability, sex, sexual orientation, or gender identity.            Thank you!     Thank you for choosing [facility] NEUROLOGY  for your care. Our goal is always to provide you with excellent care. Hearing back from our patients is one way we can continue to improve our services. Please take a few minutes to complete the written survey that you may receive in the mail after your visit with us. Thank you!             Your Updated Medication List - Protect others around you: Learn how to safely use, store and throw away your medicines at www.disposemymeds.org. "          This list is accurate as of: 10/12/17 11:59 PM.  Always use your most recent med list.                   Brand Name Dispense Instructions for use Diagnosis    * BOTOX IJ      Inject 25 Units as directed once Lot#: /C3 Exp: 01/2020        * BOTOX IJ      Inject 200 Units as directed every 3 months Lot# /C3 Exp 04/2020        * carbidopa-levodopa  MG per tablet    SINEMET     Take 3 tablets by mouth 3 times daily as needed PATIENT IS TAKING 3 TABS IN THE MORNING, 2 TABS AT NOON AND 2 TABS AT NIGHT.        * carbidopa-levodopa  MG per CR tablet    SINEMET CR    30 tablet    Take 1 tablet by mouth At Bedtime    Dystonia, torsion, fragments of       clonazePAM 0.5 MG tablet    klonoPIN     Take 0.5 tablets by mouth daily as needed        tiZANidine 2 MG tablet    ZANAFLEX    90 tablet    Please take 1 tablet up to three times a day as needed. We would recommend starting this medication on a weekend.    Dystonia, torsion, fragments of       triamcinolone 0.1 % cream    KENALOG     Apply 1 Application topically 3 times daily as needed        vitamin D 2000 UNITS tablet      Take 2,000 Units by mouth daily        zonisamide 50 MG capsule    ZONEGRAN    45 capsule    Take 2 capsules (100 mg) by mouth daily    Dystonia, torsion, fragments of       * Notice:  This list has 4 medication(s) that are the same as other medications prescribed for you. Read the directions carefully, and ask your doctor or other care provider to review them with you.

## 2017-11-26 ENCOUNTER — HEALTH MAINTENANCE LETTER (OUTPATIENT)
Age: 52
End: 2017-11-26

## 2017-12-19 DIAGNOSIS — G24.1 DYSTONIA, TORSION, FRAGMENTS OF: Primary | ICD-10-CM

## 2017-12-19 RX ORDER — CARBIDOPA AND LEVODOPA 25; 100 MG/1; MG/1
TABLET ORAL
Qty: 540 TABLET | Refills: 3 | Status: SHIPPED | OUTPATIENT
Start: 2017-12-19 | End: 2018-12-17

## 2018-01-03 ENCOUNTER — OFFICE VISIT (OUTPATIENT)
Dept: NEUROLOGY | Facility: CLINIC | Age: 53
End: 2018-01-03
Payer: COMMERCIAL

## 2018-01-03 VITALS
SYSTOLIC BLOOD PRESSURE: 122 MMHG | BODY MASS INDEX: 23.78 KG/M2 | HEART RATE: 80 BPM | HEIGHT: 66 IN | WEIGHT: 148 LBS | DIASTOLIC BLOOD PRESSURE: 72 MMHG

## 2018-01-03 DIAGNOSIS — G25.3 MYOCLONUS: ICD-10-CM

## 2018-01-03 DIAGNOSIS — G24.8 ACQUIRED TORSION DYSTONIA: Primary | ICD-10-CM

## 2018-01-03 DIAGNOSIS — G31.85 CORTICOBASAL DEGENERATION (H): ICD-10-CM

## 2018-01-03 NOTE — LETTER
1/3/2018       RE: Deepali Jara  [address]  [facility] [place] [address]     Dear Colleague,    Thank you for referring your patient, Deepali Jara, to the [facility] NEUROLOGY at [facility]. Please see a copy of my visit note below.    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       Response to Last Injection:       Benefit from last injections:  She had significant reduction in her right arm myoclonus.    Wearing off: She noticed wearing off over the last several weeks with increased myoclonus.    Side effects: She reports weakness in the right arm/shoulder, causing more alien-limb phenomena.    Additional interval history: She tapered off zonisamide and tizanidine. She is only taking Sinemet 2 tabs q4h during the day. She didn't notice any worsening with this change.    She is also more aware of balance difficulty when going down stairs.  Denies cognitive changes.    Review of Systems:  Other than that mentioned above, the remainder of 12 systems reviewed were negative.        Current Outpatient Prescriptions   Medication Sig Dispense Refill     carbidopa-levodopa (SINEMET CR)  MG per CR tablet Take 1 tablet by mouth At Bedtime 30 tablet 6     clonazePAM (KLONOPIN) 0.5 MG tablet Take 0.5 tablets by mouth daily as needed       carbidopa-levodopa (SINEMET)  MG per tablet Take 2 tabs by mouth in the morning and then 1 tab every 4 hours while awake. 540 tablet 3     triamcinolone (KENALOG) 0.1 % cream Apply 1 Application topically 3 times daily as needed       OnabotulinumtoxinA (BOTOX IJ) Inject 200 Units as directed every 3 months  Lot# /C3 Exp 04/2020       zonisamide (ZONEGRAN) 50 MG capsule Take 2 capsules (100 mg) by mouth daily 45 capsule 3     Cholecalciferol (VITAMIN D) 2000 UNITS tablet Take 2,000 Units by mouth daily       tiZANidine (ZANAFLEX) 2 MG tablet Please take 1 tablet up to three times a day as needed. We would recommend starting this medication on a weekend. 90 tablet 3     OnabotulinumtoxinA (BOTOX IJ) Inject 25 Units as directed once Lot#: /C3  Exp: 01/2020         Allergies: She has No Known Allergies.    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 It demonstrated some mild changes of right carpal tunnel syndrome but was otherwise normal.       History of MRI of brain and brain stem 12/21/2014    A brain MRI scan done on 06/27/2014 revealed no abnormalities.      History of MRI of cervical spine 12/21/2014    A cervical MRI done on 06/10/2014 revealed some mild to moderate degenerative changes but no significant central canal or foraminal stenosis, and no abnormalities of the spinal cord were noted.       Movement disorder 12/21/2014    I saw your patient, Deepali Contreras on 12/15/2014. She is a 49-year-old right-handed female here for evaluation of right upper extremity dysfunction and right hand tremor.  She has noted this problem for the last couple of years. She reports she is losing dexterity in her right hand. She has appreciated a tremor of the right hand with actions such as writing or postural maintenance. She has n       Past Surgical History:   Procedure Laterality Date      "APPENDECTOMY         Social History     Social History     Marital status:      Spouse name: N/A     Number of children: N/A     Years of education: N/A     Occupational History     Not on file.     Social History Main Topics     Smoking status: Never Smoker     Smokeless tobacco: Never Used     Alcohol use 1.2 - 1.8 oz/week     2 - 3 Cans of beer per week      Comment: WEEKLY     Drug use: No     Sexual activity: Yes     Partners: Male     Other Topics Concern     Not on file     Social History Narrative        Izaiah Jara    Lives in [facility]      She does not smoke.  She has a couple of beers to drink on the weekend but otherwise is not a heavy user of alcohol    3 kids: son peña 21; 94, 96 and 98    2 sons    1 daughter    2 are in college and daughter is a sophomore.            FAMILY HISTORY:  Notable for a paternal aunt who is .  She had Parkinson's disease.  Otherwise, there is no other family history of neurologic problems.  There is no family history of dystonia.          90% Chinese    Some norweigian                .         Family History   Problem Relation Age of Onset     Breast Cancer Mother      Cancer - colorectal Mother      Dementia Father      Neurologic Disorder Paternal Aunt      Parkinson's Disease     CEREBROVASCULAR DISEASE Brother      stroke at age 25 possibly from pfos       Physical Examination:  Vital Signs:   height is 1.676 m (5' 6\") and weight is 67.1 kg (148 lb). Her blood pressure is 122/72 and her pulse is 80.   She is alert and oriented and has fluent speech without dysarthria and is able to provide an interval medical history  Extraocular movements are full. She has normal smooth pursuits and saccades. Her face is symmetric with equal activation.   Right arm is generally held in dystonic flexure posture and pronated with limitation of supination of the forearm. Frequent myoclonic jerks noted, especially involving the fingers. Right hand " has more flexor posturing than in the past.    BOTULINUM NEUROTOXIN INJECTION PROCEDURES:    VERIFICATION OF PATIENT IDENTIFICATION AND PROCEDURE     Initials   Patient Name LES   Patient  PG   Procedure Verified by: PG     Prior to the start of the procedure and with procedural staff participation, I verbally confirmed the patient s identity using two indicators, relevant allergies, that the procedure was appropriate and matched the consent or emergent situation, and that the correct equipment/implants were available. Immediately prior to starting the procedure I conducted the Time Out with the procedural staff and re-confirmed the patient s name, procedure, and site/side. (The Joint Commission universal protocol was followed.)  Yes    Sedation (Moderate or Deep): None      Above assessments performed by:  Adrienne Skinner RN Care Coordinator          INDICATION/S FOR PROCEDURE/S:  Deepali Jara is a 52 year old year old patient with dystonia affecting the  right upper extremity secondary to a diagnosis of corticobasal degeneration with associated  pain, loss of joint motion, loss of volitional motor control and and myoclonus.     Her baseline symptoms have been recalcitrant to oral medications and conservative therapy.  She is here today for an injection of Botox.      GOAL OF PROCEDURE:  The goal of this procedure is to increase active range of motion, improve volitional motor control and decrease pain  associated with dystonic movements.    TOTAL DOSE ADMINISTERED:  Dose Administered:  100 units Botox    Diluent Used:  Preservative Free Normal Saline  Total Volume of Diluent Used:  1 ml  Lot # [id number] with Expiration Date:  2020  NDC #: Botox 100u ([address]-82)    Medication guide was offered to patient and was declined.    CONSENT:  The risks, benefits, and treatment options were discussed with Deepali Jara and she agreed to proceed.      Written consent was obtained by PG.      EQUIPMENT USED:  Needle-37mm stimulating/recording  EMG/NCS Machine    SKIN PREPARATION:  Skin preparation was performed using an alcohol wipe.    GUIDANCE DESCRIPTION:  Electro-myographic guidance was necessary throughout the procedure to accurately identify all areas of dystonic muscles while avoiding injection of non-dystonic muscles, neighboring nerves and nearby vascular structures.     AREA/MUSCLE INJECTED:          Muscles Injected Units Injected Number of Injections   Right biceps 15 2   Right pronator teres 20 1   Right Flexor digitorum superficialis 20 3   Right pronator quadratus 20 1   Total Units Injected: 75    Unavoidable Waste: 25    Total Units Billed 100      The patient tolerated the injections without difficulty.      Assessment:    Deepali Jara is a 52 year old female with Cortical basal degeneration with RUE myoclonus and dystonia. She had significant reduction in myoclonus with her last injections, but did develop some temporary weakness in the shoulder.  Today we did repeat botulinum toxin injections with reduced dose and did not inject the deltoids. Pronator quadratus injections were added to address developing loss of supination range of motion. Levitiracetam, zonisamide have not helped or were not tolerated for myoclonus. She continues to take Sinemet with some benefit.    Plan    Follow-up in 10 weeks time to consider repeat injections    Time spent with patient: Greater than 50% of this 30 minute visit was spent in counseling and coordination of care related to the above issues.      Again, thank you for allowing me to participate in the care of your patient.      Sincerely,    Tiffany Hopkins MD

## 2018-01-03 NOTE — PROGRESS NOTES
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       Response to Last Injection:       Benefit from last injections:  She had significant reduction in her right arm myoclonus.    Wearing off: She noticed wearing off over the last several weeks with increased myoclonus.    Side effects: She reports weakness in the right arm/shoulder, causing more alien-limb phenomena.    Additional interval history: She tapered off zonisamide and tizanidine. She is only taking Sinemet 2 tabs q4h during the day. She didn't notice any worsening with this change.    She is also more aware of balance difficulty when going down stairs.  Denies cognitive changes.    Review of Systems:  Other than that mentioned above, the remainder of 12 systems reviewed were negative.        Current Outpatient Prescriptions   Medication Sig Dispense Refill     carbidopa-levodopa (SINEMET CR)  MG per CR tablet Take 1 tablet by mouth At Bedtime 30 tablet 6     clonazePAM (KLONOPIN) 0.5 MG tablet Take 0.5 tablets by mouth daily as needed       carbidopa-levodopa (SINEMET)  MG per tablet Take 2 tabs by mouth in the morning and then 1 tab every 4 hours while awake. 540 tablet 3     triamcinolone (KENALOG) 0.1 % cream Apply 1 Application topically 3 times daily as needed       OnabotulinumtoxinA (BOTOX IJ) Inject 200 Units as directed every 3 months Lot# /C3 Exp 04/2020       zonisamide (ZONEGRAN) 50 MG capsule Take 2 capsules (100 mg) by mouth daily 45 capsule 3     Cholecalciferol (VITAMIN D) 2000 UNITS tablet Take 2,000 Units by mouth daily       tiZANidine (ZANAFLEX) 2 MG tablet Please take 1 tablet up to three times a day as  needed. We would recommend starting this medication on a weekend. 90 tablet 3     OnabotulinumtoxinA (BOTOX IJ) Inject 25 Units as directed once Lot#: /C3  Exp: 01/2020         Allergies: She has No Known Allergies.    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 It demonstrated some mild changes of right carpal tunnel syndrome but was otherwise normal.       History of MRI of brain and brain stem 12/21/2014    A brain MRI scan done on 06/27/2014 revealed no abnormalities.      History of MRI of cervical spine 12/21/2014    A cervical MRI done on 06/10/2014 revealed some mild to moderate degenerative changes but no significant central canal or foraminal stenosis, and no abnormalities of the spinal cord were noted.       Movement disorder 12/21/2014    I saw your patient, Deepali Contreras on 12/15/2014. She is a 49-year-old right-handed female here for evaluation of right upper extremity dysfunction and right hand tremor.  She has noted this problem for the last couple of years. She reports she is losing dexterity in her right hand. She has appreciated a tremor of the right hand with actions such as writing or postural maintenance. She has n       Past Surgical History:   Procedure Laterality Date     APPENDECTOMY         Social History     Social History     Marital status:      Spouse name: N/A     Number of children: N/A     Years of education: N/A     Occupational History     Not on file.     Social History Main Topics     Smoking status: Never Smoker     Smokeless tobacco:  "Never Used     Alcohol use 1.2 - 1.8 oz/week     2 - 3 Cans of beer per week      Comment: WEEKLY     Drug use: No     Sexual activity: Yes     Partners: Male     Other Topics Concern     Not on file     Social History Narrative        Izaiah Jara    L[facility] in [facility]      She does not smoke.  She has a couple of beers to drink on the weekend but otherwise is not a heavy user of alcohol    3 kids: son peña 21; 94, 96 and 98    2 sons    1 daughter    2 are in college and daughter is a sophomore.            FAMILY HISTORY:  Notable for a paternal aunt who is .  She had Parkinson's disease.  Otherwise, there is no other family history of neurologic problems.  There is no family history of dystonia.          90% Czech    Some norweigian                .         Family History   Problem Relation Age of Onset     Breast Cancer Mother      Cancer - colorectal Mother      Dementia Father      Neurologic Disorder Paternal Aunt      Parkinson's Disease     CEREBROVASCULAR DISEASE Brother      stroke at age 25 possibly from pfos       Physical Examination:  Vital Signs:   height is 1.676 m (5' 6\") and weight is 67.1 kg (148 lb). Her blood pressure is 122/72 and her pulse is 80.   She is alert and oriented and has fluent speech without dysarthria and is able to provide an interval medical history  Extraocular movements are full. She has normal smooth pursuits and saccades. Her face is symmetric with equal activation.   Right arm is generally held in dystonic flexure posture and pronated with limitation of supination of the forearm. Frequent myoclonic jerks noted, especially involving the fingers. Right hand has more flexor posturing than in the past.    BOTULINUM NEUROTOXIN INJECTION PROCEDURES:    VERIFICATION OF PATIENT IDENTIFICATION AND PROCEDURE     Initials   Patient Name LES   Patient  PG   Procedure Verified by: PG     Prior to the start of the procedure and with procedural staff " participation, I verbally confirmed the patient s identity using two indicators, relevant allergies, that the procedure was appropriate and matched the consent or emergent situation, and that the correct equipment/implants were available. Immediately prior to starting the procedure I conducted the Time Out with the procedural staff and re-confirmed the patient s name, procedure, and site/side. (The Joint Commission universal protocol was followed.)  Yes    Sedation (Moderate or Deep): None      Above assessments performed by:  Adrienne Skinner RN Care Coordinator          INDICATION/S FOR PROCEDURE/S:  Deepali Jara is a 52 year old year old patient with dystonia affecting the  right upper extremity secondary to a diagnosis of corticobasal degeneration with associated  pain, loss of joint motion, loss of volitional motor control and and myoclonus.     Her baseline symptoms have been recalcitrant to oral medications and conservative therapy.  She is here today for an injection of Botox.      GOAL OF PROCEDURE:  The goal of this procedure is to increase active range of motion, improve volitional motor control and decrease pain  associated with dystonic movements.    TOTAL DOSE ADMINISTERED:  Dose Administered:  100 units Botox    Diluent Used:  Preservative Free Normal Saline  Total Volume of Diluent Used:  1 ml  Lot # [id number] with Expiration Date:  08/2020  NDC #: Botox 100u ([address]-73)    Medication guide was offered to patient and was declined.    CONSENT:  The risks, benefits, and treatment options were discussed with Deepali Jara and she agreed to proceed.      Written consent was obtained by PG.     EQUIPMENT USED:  Needle-37mm stimulating/recording  EMG/NCS Machine    SKIN PREPARATION:  Skin preparation was performed using an alcohol wipe.    GUIDANCE DESCRIPTION:  Electro-myographic guidance was necessary throughout the procedure to accurately identify all areas of dystonic muscles while  avoiding injection of non-dystonic muscles, neighboring nerves and nearby vascular structures.     AREA/MUSCLE INJECTED:          Muscles Injected Units Injected Number of Injections   Right biceps 15 2   Right pronator teres 20 1   Right Flexor digitorum superficialis 20 3   Right pronator quadratus 20 1   Total Units Injected: 75    Unavoidable Waste: 25    Total Units Billed 100      The patient tolerated the injections without difficulty.      Assessment:    Deepali Jara is a 52 year old female with Cortical basal degeneration with RUE myoclonus and dystonia. She had significant reduction in myoclonus with her last injections, but did develop some temporary weakness in the shoulder.  Today we did repeat botulinum toxin injections with reduced dose and did not inject the deltoids. Pronator quadratus injections were added to address developing loss of supination range of motion. Levitiracetam, zonisamide have not helped or were not tolerated for myoclonus. She continues to take Sinemet with some benefit.    Plan    Follow-up in 10 weeks time to consider repeat injections    Time spent with patient: Greater than 50% of this 30 minute visit was spent in counseling and coordination of care related to the above issues.    Tiffany Hopkins MD    of Neurology

## 2018-01-03 NOTE — MR AVS SNAPSHOT
After Visit Summary   1/3/2018    Deepali aJra    MRN: [id number]           Patient Information     Date Of Birth          1965        Visit Information        Provider Department      1/3/2018 5:00 PM Tiffany Hopkins MD [facility] Neurology        Today's Diagnoses     Acquired torsion dystonia    -  1    Myoclonus        Corticobasal degeneration           Follow-ups after your visit        Follow-up notes from your care team     Return in about 10 weeks (around 3/14/2018), or botox injection.      Your next 10 appointments already scheduled     Apr 11, 2018  5:00 PM CDT   (Arrive by 4:45 PM)   Return Movement Disorder with Tiffany Hopkins MD   [facility] Neurology ([facility])    [address] [place] [address] [phone]            Jun 27, 2018  5:00 PM CDT   (Arrive by 4:45 PM)   Return Movement Disorder with Tiffany Hopkins MD   [facility] Neurology ([facility])    [address] [place] [address] [phone]              Who to contact     Please call your clinic at [phone] to:    Ask questions about your health    Make or cancel appointments    Discuss your medicines    Learn about your test results    Speak to your doctor   If you have compliments or concerns about an experience at your clinic, or if you wish to file a complaint, please contact [facility] Patient Relations at [phone] or email us at [email or web address]         Additional Information About Your Visit        MyChart Information     [employer] gives you secure access to your electronic health record. If you see a primary care provider, you can also send messages to your care team and make appointments. If you have questions, please call your primary care clinic.  If you do not have a primary care provider, please call [phone] and  "they will assist you.      [employer] is an electronic gateway that provides easy, online access to your medical records. With [employer], you can request a clinic appointment, read your test results, renew a prescription or communicate with your care team.     To access your existing account, please contact your [facility] or call [phone] for assistance.        Care EveryWhere ID     This is your Care EveryWhere ID. This could be used by other organizations to access your [facility] medical records  [id number]        Your Vitals Were     Pulse Height BMI (Body Mass Index)             80 1.676 m (5' 6\") 23.89 kg/m2          Blood Pressure from Last 3 Encounters:   01/03/18 122/72   10/12/17 139/68   09/07/17 119/66    Weight from Last 3 Encounters:   01/03/18 67.1 kg (148 lb)   10/12/17 67.2 kg (148 lb 3.2 oz)   08/02/17 70 kg (154 lb 6.4 oz)              We Performed the Following     HC CHEMODENERVATION ONE EXTREMITY, 1-4 MUSCLE     NEEDLE EMG GUIDE W CHEMODENERVATION        Primary Care Provider Office Phone # Fax #    Ester Barnes [phone]       [place] [address]        Equal Access to Services     MANDEEP PIERCE : Hadii dean melloo Soenzo, waaxda luqadaha, qaybta kaalmada adeegyada, senia strong. So [facility] [phone].    ATENCIÓN: Si habla español, tiene a schmitt disposición servicios gratuitos de asistencia lingüística. Llame al [phone].    We comply with applicable federal civil rights laws and [place] laws. We do not discriminate on the basis of race, color, national origin, age, disability, sex, sexual orientation, or gender identity.            Thank you!     Thank you for choosing [facility] NEUROLOGY  for your care. Our goal is always to provide you with excellent care. Hearing back from our patients is one way we can continue to improve our services. Please take a few minutes to " complete the written survey that you may receive in the mail after your visit with us. Thank you!             Your Updated Medication List - Protect others around you: Learn how to safely use, store and throw away your medicines at www.disposemymeds.org.          This list is accurate as of 1/3/18 11:59 PM.  Always use your most recent med list.                   Brand Name Dispense Instructions for use Diagnosis    * BOTOX IJ      Inject 25 Units as directed once Lot#: /C3 Exp: 01/2020        * BOTOX IJ      Inject 200 Units as directed every 3 months Lot# /C3 Exp 04/2020        * carbidopa-levodopa  MG per CR tablet    SINEMET CR    30 tablet    Take 1 tablet by mouth At Bedtime    Dystonia, torsion, fragments of       * carbidopa-levodopa  MG per tablet    SINEMET    540 tablet    Take 2 tabs by mouth in the morning and then 1 tab every 4 hours while awake.    Dystonia, torsion, fragments of       clonazePAM 0.5 MG tablet    klonoPIN     Take 0.5 tablets by mouth daily as needed        tiZANidine 2 MG tablet    ZANAFLEX    90 tablet    Please take 1 tablet up to three times a day as needed. We would recommend starting this medication on a weekend.    Dystonia, torsion, fragments of       triamcinolone 0.1 % cream    KENALOG     Apply 1 Application topically 3 times daily as needed        vitamin D 2000 UNITS tablet      Take 2,000 Units by mouth daily        zonisamide 50 MG capsule    ZONEGRAN    45 capsule    Take 2 capsules (100 mg) by mouth daily    Dystonia, torsion, fragments of       * Notice:  This list has 4 medication(s) that are the same as other medications prescribed for you. Read the directions carefully, and ask your doctor or other care provider to review them with you.

## 2018-01-29 PROBLEM — G31.85 CORTICOBASAL DEGENERATION (H): Status: ACTIVE | Noted: 2018-01-29

## 2018-02-01 ENCOUNTER — TRANSFERRED RECORDS (OUTPATIENT)
Dept: HEALTH INFORMATION MANAGEMENT | Facility: CLINIC | Age: 53
End: 2018-02-01

## 2018-03-23 DIAGNOSIS — F41.9 ANXIETY: Primary | ICD-10-CM

## 2018-03-23 RX ORDER — CLONAZEPAM 0.5 MG/1
0.25 TABLET ORAL DAILY PRN
Qty: 45 TABLET | Refills: 1 | Status: SHIPPED | OUTPATIENT
Start: 2018-03-23 | End: 2018-06-14

## 2018-04-12 ENCOUNTER — TELEPHONE (OUTPATIENT)
Dept: NEUROLOGY | Facility: CLINIC | Age: 53
End: 2018-04-12

## 2018-04-12 NOTE — TELEPHONE ENCOUNTER
----- Message from Stew Baker sent at 4/12/2018 10:21 AM CDT -----  Regarding: Sooner Appt  Hello,    Patient cx her appt with  on April 11th. She's requesting to be seen ASAP. She haven't had botox since October.

## 2018-04-12 NOTE — TELEPHONE ENCOUNTER
Called patient back to discuss.     She has an appointment scheduled on 6/27. I left voice mail stating I am aware she cancelled her appointment yesterday and that she has another one scheduled for 6/27 and that I would place her on a call cancellation list. This was done.

## 2018-05-02 ENCOUNTER — TELEPHONE (OUTPATIENT)
Dept: NEUROLOGY | Facility: CLINIC | Age: 53
End: 2018-05-02

## 2018-05-02 NOTE — TELEPHONE ENCOUNTER
Health Call Center    Phone Message    May a detailed message be left on voicemail: yes    Reason for Call: Medication Question or concern regarding medication   Prescription Clarification  Name of Medication: Pain relief medication  Prescribing Provider: Lizbeth Verma or Dr Rivas   Pharmacy: Not specified at this time, would like a call back   What on the order needs clarification? Pt missed a call from Rody Verma earlier today to discuss getting pain medication. Please call back pt to discuss. Thanks          Action Taken: Message routed to:  [facility] Neuro

## 2018-05-02 NOTE — TELEPHONE ENCOUNTER
I attempted to call Ms. Abdias Jara back regarding her message. I was unable to reach her so I left her message instructing her to call back the clinic at her earliest convenience.

## 2018-05-03 ENCOUNTER — OFFICE VISIT (OUTPATIENT)
Dept: NEUROLOGY | Facility: CLINIC | Age: 53
End: 2018-05-03
Payer: COMMERCIAL

## 2018-05-03 VITALS
DIASTOLIC BLOOD PRESSURE: 80 MMHG | RESPIRATION RATE: 24 BRPM | WEIGHT: 152.7 LBS | HEART RATE: 84 BPM | SYSTOLIC BLOOD PRESSURE: 120 MMHG | BODY MASS INDEX: 24.54 KG/M2 | TEMPERATURE: 98.3 F | OXYGEN SATURATION: 97 % | HEIGHT: 66 IN

## 2018-05-03 DIAGNOSIS — G24.9 DYSTONIA: Primary | ICD-10-CM

## 2018-05-03 DIAGNOSIS — G24.1 DYSTONIA, TORSION, FRAGMENTS OF: Primary | ICD-10-CM

## 2018-05-03 DIAGNOSIS — G25.3 MYOCLONUS: ICD-10-CM

## 2018-05-03 RX ORDER — BACLOFEN 10 MG/1
TABLET ORAL
Qty: 30 TABLET | Refills: 3 | Status: SHIPPED | OUTPATIENT
Start: 2018-05-03 | End: 2018-05-03

## 2018-05-03 RX ORDER — BACLOFEN 10 MG/1
TABLET ORAL
Qty: 30 TABLET | Refills: 3 | Status: SHIPPED | OUTPATIENT
Start: 2018-05-03 | End: 2018-12-31

## 2018-05-03 ASSESSMENT — PAIN SCALES - GENERAL: PAINLEVEL: MODERATE PAIN (5)

## 2018-05-03 NOTE — MR AVS SNAPSHOT
After Visit Summary   5/3/2018    Deepali Jara    MRN: [id number]           Patient Information     Date Of Birth          1965        Visit Information        Provider Department      5/3/2018 11:30 AM Lizbeth Verma MD [facility] Neurology        Today's Diagnoses     Dystonia, torsion, fragments of    -  1    Myoclonus          Care Instructions    To help with the pain, we will start baclofen 10 mg tablets. You can start by taking 1/2 tab at bedtime and increase the dose by 1/2 tab every few days up to 1 tab three times a day as needed.     We will contact our DBS nurse to help you schedule appointments for our DBS workup. This would include a MRI brain, neuropsych testing, and videotaped assessment.               Follow-ups after your visit        Additional Services     OT Referral       *This therapy referral will be filtered to a centralized scheduling office at [facility] and the patient will receive a call to schedule an appointment at a [facility] location most convenient for them. *     [facility] provides Occupational Therapy evaluation and treatment and many specialty services across the [facility] system.  If requesting a specialty program, please choose from the list below.    If you have not heard from the scheduling office within 2 business days, please call [phone] for all locations, with the exception of [place], please call [phone] and [facility], please call [phone]    Treatment: Evaluation & Treatment  Special Instructions/Modalities: orthosis for right hand, possibly glove typically used with stroke patients?  RUE dystonia     Please be aware that coverage of these services is subject to the terms and limitations of your health insurance plan.  Call member services at your health plan with any benefit or coverage questions.      **Note to Provider:  If you are referring outside of [facility] for the therapy  "appointment, please list the name of the location in the \"special instructions\" above, print the referral and give to the patient to schedule the appointment.                  Follow-up notes from your care team     Return in about 3 months (around 8/3/2018).      Your next 10 appointments already scheduled     Jun 27, 2018  4:30 PM CDT   (Arrive by 4:15 PM)   Return Movement Disorder with Tiffany Hopkins MD   [facility] Neurology ([facility])    [address]  3rd Floor  [facility] [id number] [phone]            Jul 12, 2018 10:00 AM CDT   (Arrive by 9:30 AM)   MR BRAIN W/O & W CONTRAST with [id number]   [facility] ([facility])    [address] [address]   [phone]           Take your medicines as usual, unless your doctor tells you not to. Bring a list of your current medicines to your exam (including vitamins, minerals and over-the-counter drugs).  You may or may not receive intravenous (IV) contrast for this exam pending the discretion of the Radiologist.  You do not need to do anything special to prepare.  The MRI machine uses a strong magnet. Please wear clothes without metal (snaps, zippers). A sweatsuit works well, or we may give you a hospital gown.  Please remove any body piercings and hair extensions before you arrive. You will also remove watches, jewelry, hairpins, wallets, dentures, partial dental plates and hearing aids. You may wear contact lenses, and you may be able to wear your rings. We have a safe place to keep your personal items, but it is safer to leave them at home.  **IMPORTANT** THE INSTRUCTIONS BELOW ARE ONLY FOR THOSE PATIENTS WHO HAVE BEEN PRESCRIBED SEDATION OR GENERAL ANESTHESIA DURING THEIR MRI PROCEDURE:  IF YOUR DOCTOR PRESCRIBED ORAL SEDATION (take medicine to help you relax during your exam):   You must get the medicine from your doctor (oral medication) before you arrive. " Bring the medicine to the exam. Do not take it at home. You ll be told when to take it upon arriving for your exam.   Arrive one hour early. Bring someone who can take you home after the test. Your medicine will make you sleepy. After the exam, you may not drive, take a bus or take a taxi by yourself.  IF YOUR DOCTOR PRESCRIBED IV SEDATION:   Arrive one hour early. Bring someone who can take you home after the test. Your medicine will make you sleepy. After the exam, you may not drive, take a bus or take a taxi by yourself.   No eating 6 hours before your exam. You may have clear liquids up until 4 hours before your exam. (Clear liquids include water, clear tea, black coffee and fruit juice without pulp.)  IF YOUR DOCTOR PRESCRIBED ANESTHESIA (be asleep for your exam):   Arrive 1 1/2 hours early. Bring someone who can take you home after the test. You may not drive, take a bus or take a taxi by yourself.   No eating 8 hours before your exam. You may have clear liquids up until 4 hours before your exam. (Clear liquids include water, clear tea, black coffee and fruit juice without pulp.)   You will spend four to five hours in the recovery room.  Please call the Imaging Department at your exam site with any questions.            Jul 12, 2018 12:00 PM CDT   (Arrive by 11:45 AM)   Neuropsych Eval with Melony Hannon, PhD [facility] Neuropsychology ([facility])    [address] [place] [address]   [phone]            Jul 26, 2018 11:00 AM CDT   (Arrive by 10:45 AM)   Return Botox with Tiffany Hopkins MD   [facility] Neurology ([facility])    [address] [place] [address]   [phone]            Jul 26, 2018 12:00 PM CDT   (Arrive by 11:45 AM)   Deep Brain Stimulation with Efren Triana MD   [facility] Neurosurgery ([facility])    [address] [place] [address]  "[facility] [address] [phone]              Who to contact     Please call your clinic at [phone] to:    Ask questions about your health    Make or cancel appointments    Discuss your medicines    Learn about your test results    Speak to your doctor            Additional Information About Your Visit        MyChart Information     [employer] gives you secure access to your electronic health record. If you see a primary care provider, you can also send messages to your care team and make appointments. If you have questions, please call your primary care clinic.  If you do not have a primary care provider, please call [phone] and they will assist you.      [employer] is an electronic gateway that provides easy, online access to your medical records. With [employer], you can request a clinic appointment, read your test results, renew a prescription or communicate with your care team.     To access your existing account, please contact your [facility] or call [phone] for assistance.        Care EveryWhere ID     This is your Care EveryWhere ID. This could be used by other organizations to access your [facility] medical records  [id number]        Your Vitals Were     Pulse Temperature Respirations Height Pulse Oximetry Breastfeeding?    84 98.3  F (36.8  C) (Oral) 24 1.676 m (5' 6\") 97% No    BMI (Body Mass Index)                   24.65 kg/m2            Blood Pressure from Last 3 Encounters:   05/03/18 120/80   01/03/18 122/72   10/12/17 139/68    Weight from Last 3 Encounters:   05/03/18 69.3 kg (152 lb 11.2 oz)   01/03/18 67.1 kg (148 lb)   10/12/17 67.2 kg (148 lb 3.2 oz)              We Performed the Following     HC CHEMODENERVATION 1 EXTREMITY, 5 OR MORE MUSCLES     NEEDLE EMG GUIDE W CHEMODENERVATION     OT Referral          Today's Medication Changes          These changes are accurate as of 5/3/18 11:59 PM.  If you have any questions, ask your nurse or " doctor.               Start taking these medicines.        Dose/Directions    baclofen 10 MG tablet   Commonly known as:  LIORESAL   Used for:  Dystonia, torsion, fragments of   Started by:  Lizbeth Verma MD        Please take 1/2 tab to 1 tab up to three times a day as needed.   Quantity:  30 tablet   Refills:  3            Where to get your medicines      These medications were sent to [place], [place] - [address]  [address], [facility] [address]    Hours:  TRANSPLANT PHONE NUMBER [phone] Phone:  [phone]     baclofen 10 MG tablet                Primary Care Provider Office Phone # Fax #    Ester Barnes [phone]       [address] [address]        Equal Access to Services     MANDEEP PIERCE : Ladi guajardo Soenzo, waaxda luqadaha, qaybta kaalmada adeegyada, senia strong. So [facility] [phone].    ATENCIÓN: Si habla español, tiene a schmitt disposición servicios gratuitos de asistencia lingüística. Mita al [phone].    We comply with applicable federal civil rights laws and [place] laws. We do not discriminate on the basis of race, color, national origin, age, disability, sex, sexual orientation, or gender identity.            Thank you!     Thank you for choosing [facility] NEUROLOGY  for your care. Our goal is always to provide you with excellent care. Hearing back from our patients is one way we can continue to improve our services. Please take a few minutes to complete the written survey that you may receive in the mail after your visit with us. Thank you!             Your Updated Medication List - Protect others around you: Learn how to safely use, store and throw away your medicines at www.disposemymeds.org.          This list is accurate as of 5/3/18 11:59 PM.  Always use your most recent med list.                   Brand Name Dispense  Instructions for use Diagnosis    baclofen 10 MG tablet    LIORESAL    30 tablet    Please take 1/2 tab to 1 tab up to three times a day as needed.    Dystonia, torsion, fragments of       * botulinum toxin type A 100 units injection    BOTOX     Inject 280 Units into the muscle once Lot # /C3 with Expiration Date:  11/2020        * BOTOX IJ      Inject 25 Units as directed once Lot#: /C3 Exp: 01/2020        * BOTOX IJ      Inject 200 Units as directed every 3 months Lot# /C3 Exp 04/2020        * carbidopa-levodopa  MG per CR tablet    SINEMET CR    30 tablet    Take 1 tablet by mouth At Bedtime    Dystonia, torsion, fragments of       * carbidopa-levodopa  MG per tablet    SINEMET    540 tablet    Take 2 tabs by mouth in the morning and then 1 tab every 4 hours while awake.    Dystonia, torsion, fragments of       clonazePAM 0.5 MG tablet    klonoPIN    45 tablet    Take 0.5 tablets (0.25 mg) by mouth daily as needed    Anxiety       tiZANidine 2 MG tablet    ZANAFLEX    90 tablet    Please take 1 tablet up to three times a day as needed. We would recommend starting this medication on a weekend.    Dystonia, torsion, fragments of       triamcinolone 0.1 % cream    KENALOG     Apply 1 Application topically 3 times daily as needed        vitamin D 2000 units tablet      Take 2,000 Units by mouth daily        zonisamide 50 MG capsule    ZONEGRAN    45 capsule    Take 2 capsules (100 mg) by mouth daily    Dystonia, torsion, fragments of       * Notice:  This list has 5 medication(s) that are the same as other medications prescribed for you. Read the directions carefully, and ask your doctor or other care provider to review them with you.

## 2018-05-03 NOTE — LETTER
"5/3/2018       RE: Deepali Jara  [address]  [facility] [address]     Dear Colleague,    Thank you for referring your patient, Deepali Jara, to the [facility] NEUROLOGY at [facility]. Please see a copy of my visit note below.    [facility]    CC: dystonia, myoclonus     HPI:  Ms. Abdias Moore is a 52 year old woman with RUE focal dystonia and myoclonus presenting for Botox injections. She has not responded previously to zonisamide or Keppra. She has had lightheadedness with tizandine. No clear improvement with clonazepam.     Injections from 01/03/18:   Muscles Injected Units Injected Number of Injections   Right biceps 15 2   Right pronator teres 20 1   Right Flexor digitorum superficialis 20 3   Right pronator quadratus 20 1   Total Units Injected: 75     Unavoidable Waste: 25     Total Units Billed 100          PD Medications                   6am 10am 2pm 6pm 10pm qhs   Sinemet 25/100mg                                                 2 tab 2 tab  2 tab 2 tab 2 tab      clonazepam  0.5mg                   1 tab                                                 Response to Last Injection:   Onset of effect:    Uncertain as no clear benefit noted     Benefit from last injections:    She is not certain if she had any actual improvement    Wearing off:  She started noticing cramping in her hand over the last couple of weeks that she initially could get to relax but now feels the cramping is persistent. She is also noticing her right proximal forearm is always active.      Side effects:  none     Additional interval history: She was due for follow-up in early April but was sick so had to reschedule. She is starting to also notice numbness in the fingers of her right hand.     She gets occasional \"charley horses\" in her calf on the right.     She does not think the right leg turns in. No tripping or falls. She is walking up to the stairs more " slowly.     She has some slowed speech, no other cognitive concerns. She is able to work without issue.     Current Outpatient Prescriptions   Medication Sig Dispense Refill     baclofen (LIORESAL) 10 MG tablet Please take 1/2 tab to 1 tab up to three times a day as needed. 30 tablet 3     carbidopa-levodopa (SINEMET CR)  MG per CR tablet Take 1 tablet by mouth At Bedtime 30 tablet 6     carbidopa-levodopa (SINEMET)  MG per tablet Take 2 tabs by mouth in the morning and then 1 tab every 4 hours while awake. 540 tablet 3     Cholecalciferol (VITAMIN D) 2000 UNITS tablet Take 2,000 Units by mouth daily       clonazePAM (KLONOPIN) 0.5 MG tablet Take 0.5 tablets (0.25 mg) by mouth daily as needed 45 tablet 1     OnabotulinumtoxinA (BOTOX IJ) Inject 25 Units as directed once Lot#: /C3  Exp: 01/2020       OnabotulinumtoxinA (BOTOX IJ) Inject 200 Units as directed every 3 months Lot# /C3 Exp 04/2020       tiZANidine (ZANAFLEX) 2 MG tablet Please take 1 tablet up to three times a day as needed. We would recommend starting this medication on a weekend. 90 tablet 3     triamcinolone (KENALOG) 0.1 % cream Apply 1 Application topically 3 times daily as needed       zonisamide (ZONEGRAN) 50 MG capsule Take 2 capsules (100 mg) by mouth daily 45 capsule 3      No Known Allergies    Patient Active Problem List   Diagnosis     Tremor     History of EMG     History of MRI of cervical spine     History of MRI of brain and brain stem     Movement disorder     Family history of Parkinson's disease     CTS (carpal tunnel syndrome)     Family history of breast cancer     Family history of colon cancer     Disturbance of skin sensation     Corticobasal syndrome (HCC)     Abnormal PET scan of head     Abnormal brain scan     Action induced myoclonus     Acquired torsion dystonia     Myoclonus     Dystonia     Corticobasal degeneration     Dysmetria     Perimenopausal     Recurrent cold sores     Routine general medical  "examination at health care facility     Tinea versicolor     Tremor of both hands     Vitamin D deficiency       Examination   152 lbs 11.2 oz  Blood pressure 120/80, pulse 84, temperature 98.3  F (36.8  C), temperature source Oral, resp. rate 24, height 1.676 m (5' 6\"), weight 69.3 kg (152 lb 11.2 oz), SpO2 97 %, not currently breastfeeding., Body mass index is 24.65 kg/(m^2).    Right upper extremity with dystonic posturing, right arm is held in flexion with slight extension and ulnar deviation of the wrist, fingers are in flexion  Brachial radialis is very prominent  She has myoclonic jerking movements of the fingers and thumb    Neuro exam:   Mental status:  Alert, oriented x3. Verbally fluent, some apraxia noted on left UE with difficulty showing how to flip a coin, unable to mimic commands in the right upper extremity, extinction to double simultaneous tactile stimulation on the right  Cranial nerves:  Extraocular movements intact. Visual fields intact,  Facial sensation intact, facial strength intact.  No dysarthria  Motor: Increased tone in right upper extremity, normal tone and rest of extremities  Slowed toe tapping on the right greater than left  Gait: Slight inversion of right foot with walking    BOTULINUM NEUROTOXIN INJECTION PROCEDURES:    VERIFICATION OF PATIENT IDENTIFICATION AND PROCEDURE     Initials   Patient Name RRF   Patient  RRF   Procedure Verified by: TATIANA     Prior to the start of the procedure and with procedural staff participation, I verbally confirmed the patient s identity using two indicators, relevant allergies, that the procedure was appropriate and matched the consent or emergent situation, and that the correct equipment/implants were available. Immediately prior to starting the procedure I conducted the Time Out with the procedural staff and re-confirmed the patient s name, procedure, and site/side. (The Joint Commission universal protocol was followed.)  Yes    Sedation " (Moderate or Deep): None      Above assessments performed by:  Resident/Fellow         Lizbeth Verma MD          The attending provider was present for the entire procedure documented below.      Bev Hopkins MD      INDICATION/S FOR PROCEDURE/S:  Deepali Jara is a 52 year old year old patient with dystonia affecting the  right upper extremity secondary to a diagnosis of segmental dystonia with associated  pain, tremor, loss of volitional motor control and difficulty with activities of daily living.     Her baseline symptoms have been recalcitrant to oral medications and conservative therapy.  She is here today for an injection of Botox.      GOAL OF PROCEDURE:  The goal of this procedure is to increase active range of motion, improve volitional motor control, decrease pain  and enhance functional independence associated with dystonic movements.    TOTAL DOSE ADMINISTERED:  Dose Administered:  280 units Botox    Diluent Used:  Preservative Free Normal Saline  Total Volume of Diluent Used: 2.8 ml  Lot # /C3 with Expiration Date:  11/2020    CONSENT:  The risks, benefits, and treatment options were discussed with Deepali Jara and she agreed to proceed.      Written consent was obtained by RRF.    EQUIPMENT USED:  Needle-37mm stimulating/recording  EMG/NCS Machine    SKIN PREPARATION:  Skin preparation was performed using an alcohol wipe.    GUIDANCE DESCRIPTION:  Electro-myographic guidance was necessary throughout the procedure to accurately identify all areas of dystonic muscles while avoiding injection of non-dystonic muscles, neighboring nerves and nearby vascular structures.     AREA/MUSCLE INJECTED:      Muscles Injected Units Injected Number of Injections   Right biceps 25, 25 2   Right pronator teres 20 1   Right FDS 20, 10 2   Right deltoid 20, 30 2   Right brachioradialis  20, 20 2   Right extensor carpi radialis  20 1   Right extensor carpi ulnaris 20, 10 2   Right adductor  pollicis  10 1   Right flexor carpi radialis  15 1   Right flexor carpi ulnaris 15 1        Total Units Injected: 280    Unavoidable Waste: 20    Total Units Billed 300      The patient tolerated the injections without difficulty      Assessment/plan:   Ms. Abdias Moore is a 52 year old woman with RUE focal dystonia and myoclonus presenting for Botox injections.  She did have an abnormal KARINA scan, asymmetric hypometabolism in left parietal and occipital lobes which are concerning for CBD.  With the myoclonus and dystonia of the right upper extremity as well as on today's examination, she had some degree of neglect and apraxia, she would now fit diagnostic criteria for probable CBD.  We did discuss with her that she may be a good candidate for left GPi DBS to treat the dystonia, as she has not had much response to Botox or medication.  She was interested in proceeding with DBS workup.    We discussed with her that the increased cramping over the last couple of weeks does suggest that she has is at least some degree of benefit from the Botox, given worsening as the Botox is wearing off.    -We will proceed with DBS workup to include MRI of the brain, neuropsych testing, and videotaped assessment (to be done on July 27th appointment)  -Botox injections as above  -We will try to put her in contact with other patients who have had successful DBS for dystonia  -We will also place an order for EMG, given sensory changes in hand, we are concerned she may be developing carpal tunnel syndrome secondary to the dystonia  -Occupational Therapy referral to see if there are any orthoses that she may benefit from such as gloves  -We will prescribe baclofen 10 mg tablets of which she can start taking half a tablet at bedtime and increase the dose as needed up to a max dose of 1 tablet 3 times a day    Return to clinic in 3 months for repeat Botox injections    .The case and recommendations were discussed with Dr. Hopkins, who has  spoken with and examined the patient and helped to formulate the impression and recommendations.    Lizbeth Verma MD  Movement disorders fellow         Neurology Attending Attestation:     I, Tiffany Hopkins, personally saw this patient with our Movement Disorders Fellow and agree with the fellow's findings and plan of care as documented in the movement disorder fellow's note, with my personal summary below. I personally performed salient aspects of the history and neurological examination.     I personally reviewed the vital signs, medications, and labs. I personally viewed the imaging, and agree with the interpretation documented by the fellow.    I personally performed or supervised all procedures.    Time spent with patient: Greater than 50% of this 50 minute visit was spent in counseling and coordination of care related to the above issues.      Tiffany Hopkins MD    of Neurology            Again, thank you for allowing me to participate in the care of your patient.      Sincerely,    Lizbeth Verma MD

## 2018-05-03 NOTE — NURSING NOTE
Chief Complaint   Patient presents with     RECHECK     [facility]- MOVEMENT DISORDER/ BOTOX F/U     Vaughn An, CMA

## 2018-05-03 NOTE — PROGRESS NOTES
"[facility]    CC: dystonia, myoclonus     HPI:  Ms. Abdias Moore is a 52 year old woman with RUE focal dystonia and myoclonus presenting for Botox injections. She has not responded previously to zonisamide or Keppra. She has had lightheadedness with tizandine. No clear improvement with clonazepam.     Injections from 01/03/18:   Muscles Injected Units Injected Number of Injections   Right biceps 15 2   Right pronator teres 20 1   Right Flexor digitorum superficialis 20 3   Right pronator quadratus 20 1   Total Units Injected: 75     Unavoidable Waste: 25     Total Units Billed 100          PD Medications                   6am 10am 2pm 6pm 10pm qhs   Sinemet 25/100mg                                                 2 tab 2 tab  2 tab 2 tab 2 tab      clonazepam  0.5mg                   1 tab                                                 Response to Last Injection:   Onset of effect:    Uncertain as no clear benefit noted     Benefit from last injections:    She is not certain if she had any actual improvement    Wearing off:  She started noticing cramping in her hand over the last couple of weeks that she initially could get to relax but now feels the cramping is persistent. She is also noticing her right proximal forearm is always active.      Side effects:  none     Additional interval history: She was due for follow-up in early April but was sick so had to reschedule. She is starting to also notice numbness in the fingers of her right hand.     She gets occasional \"charley horses\" in her calf on the right.     She does not think the right leg turns in. No tripping or falls. She is walking up to the stairs more slowly.     She has some slowed speech, no other cognitive concerns. She is able to work without issue.     Current Outpatient Prescriptions   Medication Sig Dispense Refill     baclofen (LIORESAL) 10 MG tablet Please take 1/2 tab to 1 tab up to three times a day as needed. 30 tablet 3     " "carbidopa-levodopa (SINEMET CR)  MG per CR tablet Take 1 tablet by mouth At Bedtime 30 tablet 6     carbidopa-levodopa (SINEMET)  MG per tablet Take 2 tabs by mouth in the morning and then 1 tab every 4 hours while awake. 540 tablet 3     Cholecalciferol (VITAMIN D) 2000 UNITS tablet Take 2,000 Units by mouth daily       clonazePAM (KLONOPIN) 0.5 MG tablet Take 0.5 tablets (0.25 mg) by mouth daily as needed 45 tablet 1     OnabotulinumtoxinA (BOTOX IJ) Inject 25 Units as directed once Lot#: /C3  Exp: 01/2020       OnabotulinumtoxinA (BOTOX IJ) Inject 200 Units as directed every 3 months Lot# /C3 Exp 04/2020       tiZANidine (ZANAFLEX) 2 MG tablet Please take 1 tablet up to three times a day as needed. We would recommend starting this medication on a weekend. 90 tablet 3     triamcinolone (KENALOG) 0.1 % cream Apply 1 Application topically 3 times daily as needed       zonisamide (ZONEGRAN) 50 MG capsule Take 2 capsules (100 mg) by mouth daily 45 capsule 3      No Known Allergies    Patient Active Problem List   Diagnosis     Tremor     History of EMG     History of MRI of cervical spine     History of MRI of brain and brain stem     Movement disorder     Family history of Parkinson's disease     CTS (carpal tunnel syndrome)     Family history of breast cancer     Family history of colon cancer     Disturbance of skin sensation     Corticobasal syndrome (HCC)     Abnormal PET scan of head     Abnormal brain scan     Action induced myoclonus     Acquired torsion dystonia     Myoclonus     Dystonia     Corticobasal degeneration     Dysmetria     Perimenopausal     Recurrent cold sores     Routine general medical examination at health care facility     Tinea versicolor     Tremor of both hands     Vitamin D deficiency       Examination   152 lbs 11.2 oz  Blood pressure 120/80, pulse 84, temperature 98.3  F (36.8  C), temperature source Oral, resp. rate 24, height 1.676 m (5' 6\"), weight 69.3 kg " (152 lb 11.2 oz), SpO2 97 %, not currently breastfeeding., Body mass index is 24.65 kg/(m^2).    Right upper extremity with dystonic posturing, right arm is held in flexion with slight extension and ulnar deviation of the wrist, fingers are in flexion  Brachial radialis is very prominent  She has myoclonic jerking movements of the fingers and thumb    Neuro exam:   Mental status:  Alert, oriented x3. Verbally fluent, some apraxia noted on left UE with difficulty showing how to flip a coin, unable to mimic commands in the right upper extremity, extinction to double simultaneous tactile stimulation on the right  Cranial nerves:  Extraocular movements intact. Visual fields intact,  Facial sensation intact, facial strength intact.  No dysarthria  Motor: Increased tone in right upper extremity, normal tone and rest of extremities  Slowed toe tapping on the right greater than left  Gait: Slight inversion of right foot with walking    BOTULINUM NEUROTOXIN INJECTION PROCEDURES:    VERIFICATION OF PATIENT IDENTIFICATION AND PROCEDURE     Initials   Patient Name RRF   Patient  RRF   Procedure Verified by: RRF     Prior to the start of the procedure and with procedural staff participation, I verbally confirmed the patient s identity using two indicators, relevant allergies, that the procedure was appropriate and matched the consent or emergent situation, and that the correct equipment/implants were available. Immediately prior to starting the procedure I conducted the Time Out with the procedural staff and re-confirmed the patient s name, procedure, and site/side. (The Joint Commission universal protocol was followed.)  Yes    Sedation (Moderate or Deep): None      Above assessments performed by:  Resident/Fellow         Lizbeth Verma MD          The attending provider was present for the entire procedure documented below.      Bev Hopkins MD      INDICATION/S FOR PROCEDURE/S:  Deepali Jara is a 52 year old  year old patient with dystonia affecting the  right upper extremity secondary to a diagnosis of segmental dystonia with associated  pain, tremor, loss of volitional motor control and difficulty with activities of daily living.     Her baseline symptoms have been recalcitrant to oral medications and conservative therapy.  She is here today for an injection of Botox.      GOAL OF PROCEDURE:  The goal of this procedure is to increase active range of motion, improve volitional motor control, decrease pain  and enhance functional independence associated with dystonic movements.    TOTAL DOSE ADMINISTERED:  Dose Administered:  280 units Botox    Diluent Used:  Preservative Free Normal Saline  Total Volume of Diluent Used: 2.8 ml  Lot # /C3 with Expiration Date:  11/2020    CONSENT:  The risks, benefits, and treatment options were discussed with Deepali Jara and she agreed to proceed.      Written consent was obtained by RRF.    EQUIPMENT USED:  Needle-37mm stimulating/recording  EMG/NCS Machine    SKIN PREPARATION:  Skin preparation was performed using an alcohol wipe.    GUIDANCE DESCRIPTION:  Electro-myographic guidance was necessary throughout the procedure to accurately identify all areas of dystonic muscles while avoiding injection of non-dystonic muscles, neighboring nerves and nearby vascular structures.     AREA/MUSCLE INJECTED:      Muscles Injected Units Injected Number of Injections   Right biceps 25, 25 2   Right pronator teres 20 1   Right FDS 20, 10 2   Right deltoid 20, 30 2   Right brachioradialis  20, 20 2   Right extensor carpi radialis  20 1   Right extensor carpi ulnaris 20, 10 2   Right adductor pollicis  10 1   Right flexor carpi radialis  15 1   Right flexor carpi ulnaris 15 1        Total Units Injected: 280    Unavoidable Waste: 20    Total Units Billed 300      The patient tolerated the injections without difficulty      Assessment/plan:   Ms. Abdias Moore is a 52 year old woman  with RUE focal dystonia and myoclonus presenting for Botox injections.  She did have an abnormal KARINA scan, asymmetric hypometabolism in left parietal and occipital lobes which are concerning for CBD.  With the myoclonus and dystonia of the right upper extremity as well as on today's examination, she had some degree of neglect and apraxia, she would now fit diagnostic criteria for probable CBD.  We did discuss with her that she may be a good candidate for left GPi DBS to treat the dystonia, as she has not had much response to Botox or medication.  She was interested in proceeding with DBS workup.    We discussed with her that the increased cramping over the last couple of weeks does suggest that she has is at least some degree of benefit from the Botox, given worsening as the Botox is wearing off.    -We will proceed with DBS workup to include MRI of the brain, neuropsych testing, and videotaped assessment (to be done on July 27th appointment)  -Botox injections as above  -We will try to put her in contact with other patients who have had successful DBS for dystonia  -We will also place an order for EMG, given sensory changes in hand, we are concerned she may be developing carpal tunnel syndrome secondary to the dystonia  -Occupational Therapy referral to see if there are any orthoses that she may benefit from such as gloves  -We will prescribe baclofen 10 mg tablets of which she can start taking half a tablet at bedtime and increase the dose as needed up to a max dose of 1 tablet 3 times a day    Return to clinic in 3 months for repeat Botox injections    .The case and recommendations were discussed with Dr. Hopkins, who has spoken with and examined the patient and helped to formulate the impression and recommendations.    Lizbeth Verma MD  Movement disorders fellow         Neurology Attending Attestation:     ITiffany, personally saw this patient with our Movement Disorders Fellow and agree with the  fellow's findings and plan of care as documented in the movement disorder fellow's note, with my personal summary below. I personally performed salient aspects of the history and neurological examination.     I personally reviewed the vital signs, medications, and labs. I personally viewed the imaging, and agree with the interpretation documented by the fellow.    I personally performed or supervised all procedures.    Time spent with patient: Greater than 50% of this 50 minute visit was spent in counseling and coordination of care related to the above issues.      Tiffany Hopkins MD    of Neurology

## 2018-05-03 NOTE — PATIENT INSTRUCTIONS
To help with the pain, we will start baclofen 10 mg tablets. You can start by taking 1/2 tab at bedtime and increase the dose by 1/2 tab every few days up to 1 tab three times a day as needed.     We will contact our DBS nurse to help you schedule appointments for our DBS workup. This would include a MRI brain, neuropsych testing, and videotaped assessment.

## 2018-05-10 ENCOUNTER — TELEPHONE (OUTPATIENT)
Dept: NEUROSURGERY | Facility: CLINIC | Age: 53
End: 2018-05-10

## 2018-05-10 NOTE — TELEPHONE ENCOUNTER
Called pt to discuss DBS wokup appts, per Dr. Verma. No voicemail on the home phone after letting in ring. Will try back tomorrow on cell number.

## 2018-05-11 ENCOUNTER — TELEPHONE (OUTPATIENT)
Dept: NEUROSURGERY | Facility: CLINIC | Age: 53
End: 2018-05-11

## 2018-05-11 NOTE — TELEPHONE ENCOUNTER
Left  for pt letting her know I called to discuss the deep brain stimulation workup appointments. Left my direct number and will await return call.

## 2018-05-16 ENCOUNTER — TELEPHONE (OUTPATIENT)
Dept: NEUROSURGERY | Facility: CLINIC | Age: 53
End: 2018-05-16

## 2018-05-16 NOTE — TELEPHONE ENCOUNTER
Spoke with pt about proposed dates for DBS workup. Since she is traveling about an hour and half from home to clinic, 8:00 neuropysch evaluation will not be optimal, per pt. I will look for a 12:00 or 12:30 appointment instead.   We will change her existing appointment with Dr. Hopkins on 6/27/18 from 5:00 pm to 4:30, which will allow enough time for dystonia rating scales, per Dr. Verma. Because the 1:00 pm MRI I proposed for that day means a gap between appointments, I will reschedule that to a different day. Pt is returning to clinic on 7/26 to see Dr. Hopkins. We will schedule a consult with Dr. Triana that same day.     I will f/u with pt tomorrow to discuss alternative dates for the balance of appointments. Pt in agreement with plan. No further questions.

## 2018-05-17 ENCOUNTER — TELEPHONE (OUTPATIENT)
Dept: NEUROSURGERY | Facility: CLINIC | Age: 53
End: 2018-05-17

## 2018-05-17 NOTE — TELEPHONE ENCOUNTER
Left VM for patient to discuss new date/time options for workup appts. Left my direct phone number and requested pt call at her earliest convenience. Below are the proposed appt dates/times:    6/27/18 at 4:30 - Dr. Hopkins (rating scales)  7/12/18 at 10:00 - 3T MRI  7/12/18 at 12:00 - neuropsych eval with   7/26/18 either b/f or immediately after her botox appt - neurosurgery consult with Dr. Triana

## 2018-05-22 ENCOUNTER — TELEPHONE (OUTPATIENT)
Dept: NEUROSURGERY | Facility: CLINIC | Age: 53
End: 2018-05-22

## 2018-06-06 ENCOUNTER — TELEPHONE (OUTPATIENT)
Dept: NEUROSURGERY | Facility: CLINIC | Age: 53
End: 2018-06-06

## 2018-06-06 NOTE — TELEPHONE ENCOUNTER
Returned pt's call. She would like to enroll in the September 19 DBS class instead of the August 15 class. I will switch her. Pt indicates another letter is not necessary, as the time and location are the same. She has added the class to her calendar.     Pt confirmed that the correct spelling of her first name is SOFIA, not KAROL, as it is currently spelled in Epic. I will f/u on getting this corrected.    No further questions. Pt has my direct number should an questions/concerns arise.

## 2018-06-14 ENCOUNTER — TELEPHONE (OUTPATIENT)
Dept: NEUROLOGY | Facility: CLINIC | Age: 53
End: 2018-06-14

## 2018-06-14 DIAGNOSIS — F41.9 ANXIETY: ICD-10-CM

## 2018-06-14 RX ORDER — CLONAZEPAM 0.5 MG/1
0.25 TABLET ORAL DAILY PRN
Qty: 45 TABLET | Refills: 1 | Status: SHIPPED | OUTPATIENT
Start: 2018-06-14 | End: 2018-09-13

## 2018-06-14 NOTE — TELEPHONE ENCOUNTER
Faxed prescription for    clonazePAM (KLONOPIN) 0.5 MG tablet 45 tablet 1 6/14/2018  No   Sig - Route: Take 0.5 tablets (0.25 mg) by mouth daily as needed - Oral     To Malgorzata Ford [phone]

## 2018-06-27 ENCOUNTER — TRANSFERRED RECORDS (OUTPATIENT)
Dept: HEALTH INFORMATION MANAGEMENT | Facility: CLINIC | Age: 53
End: 2018-06-27

## 2018-06-27 ENCOUNTER — TELEPHONE (OUTPATIENT)
Dept: NEUROLOGY | Facility: CLINIC | Age: 53
End: 2018-06-27

## 2018-06-27 ENCOUNTER — OFFICE VISIT (OUTPATIENT)
Dept: NEUROLOGY | Facility: CLINIC | Age: 53
End: 2018-06-27
Payer: COMMERCIAL

## 2018-06-27 VITALS
HEART RATE: 68 BPM | SYSTOLIC BLOOD PRESSURE: 144 MMHG | RESPIRATION RATE: 24 BRPM | HEIGHT: 66 IN | BODY MASS INDEX: 24.19 KG/M2 | WEIGHT: 150.5 LBS | OXYGEN SATURATION: 100 % | DIASTOLIC BLOOD PRESSURE: 77 MMHG | TEMPERATURE: 98.3 F

## 2018-06-27 DIAGNOSIS — G31.85 CORTICOBASAL SYNDROME (H): ICD-10-CM

## 2018-06-27 DIAGNOSIS — G24.9 DYSTONIA: Primary | ICD-10-CM

## 2018-06-27 DIAGNOSIS — G25.3 ACTION INDUCED MYOCLONUS: ICD-10-CM

## 2018-06-27 ASSESSMENT — PAIN SCALES - GENERAL: PAINLEVEL: MILD PAIN (3)

## 2018-06-27 NOTE — MR AVS SNAPSHOT
After Visit Summary   6/27/2018    Sandra Jara    MRN: [id number]           Patient Information     Date Of Birth          1965        Visit Information        Provider Department      6/27/2018 4:30 PM Tiffany Hopkins MD [facility]        Today's Diagnoses     Dystonia    -  1    Corticobasal syndrome (HCC)        Action induced myoclonus           Follow-ups after your visit        Your next 10 appointments already scheduled     Jul 26, 2018 12:00 PM CDT   (Arrive by 11:45 AM)   Deep Brain Stimulation with Efren Triana MD   [facility] ([facility])    [address] [place] [id number] [phone]            Sep 13, 2018 11:00 AM CDT   (Arrive by 10:45 AM)   Return Botox with Tiffany Hopkins MD   [facility] ([facility])    [address] [place] [id number] [phone]              Who to contact     Please call your clinic at [phone] to:    Ask questions about your health    Make or cancel appointments    Discuss your medicines    Learn about your test results    Speak to your doctor            Additional Information About Your Visit        [employer] Information     [employer] gives you secure access to your electronic health record. If you see a primary care provider, you can also send messages to your care team and make appointments. If you have questions, please call your primary care clinic.  If you do not have a primary care provider, please call [phone] and they will assist you.      [employer] is an electronic gateway that provides easy, online access to your medical records. With [employer], you can request a clinic appointment, read your test results, renew a prescription or communicate with your care team.     To access your existing account, please contact your [facility] or call  "[phone] for assistance.        Care EveryWhere ID     This is your Care EveryWhere ID. This could be used by other organizations to access your [place] medical records  [id number]        Your Vitals Were     Pulse Temperature Respirations Height Pulse Oximetry Breastfeeding?    68 98.3  F (36.8  C) (Oral) 24 1.676 m (5' 6\") 100% No    BMI (Body Mass Index)                   24.29 kg/m2            Blood Pressure from Last 3 Encounters:   06/27/18 144/77   05/03/18 120/80   01/03/18 122/72    Weight from Last 3 Encounters:   06/27/18 68.3 kg (150 lb 8 oz)   05/03/18 69.3 kg (152 lb 11.2 oz)   01/03/18 67.1 kg (148 lb)              We Performed the Following     HC CHEMODENERVATION 1 EXTREMITY, 5 OR MORE MUSCLES     NEEDLE EMG GUIDE W CHEMODENERVATION        Primary Care Provider Office Phone # Fax #    Ester Barnes [phone]       [address] [address]        Equal Access to Services     GUY [facility] Hospital[facility] : Hadii dean guajardo Soenzo, waaxda luqadaha, qaybta kaalmada yovani, senia darden . So [facility] [phone].    ATENCIÓN: Si habla español, tiene a schmitt disposición servicios gratuitos de asistencia lingüística. [facility] [phone].    We comply with applicable federal civil rights laws and [place] laws. We do not discriminate on the basis of race, color, national origin, age, disability, sex, sexual orientation, or gender identity.            Thank you!     Thank you for choosing [facility]  for your care. Our goal is always to provide you with excellent care. Hearing back from our patients is one way we can continue to improve our services. Please take a few minutes to complete the written survey that you may receive in the mail after your visit with us. Thank you!             Your Updated Medication List - Protect others around you: Learn how to safely use, store and throw away your medicines at www.disposemymeds.org. "          This list is accurate as of 6/27/18 11:59 PM.  Always use your most recent med list.                   Brand Name Dispense Instructions for use Diagnosis    baclofen 10 MG tablet    LIORESAL    30 tablet    Please take 1/2 tab to 1 tab up to three times a day as needed.    Dystonia, torsion, fragments of       * botulinum toxin type A 100 units injection    BOTOX     Inject 280 Units into the muscle once Lot # /C3 with Expiration Date:  11/2020        * BOTOX IJ      Inject 300 Units as directed once [id number] with Expiration Date:  12/2020        * carbidopa-levodopa  MG per CR tablet    SINEMET CR    30 tablet    Take 1 tablet by mouth At Bedtime    Dystonia, torsion, fragments of       * carbidopa-levodopa  MG per tablet    SINEMET    540 tablet    Take 2 tabs by mouth in the morning and then 1 tab every 4 hours while awake.    Dystonia, torsion, fragments of       clonazePAM 0.5 MG tablet    klonoPIN    45 tablet    Take 0.5 tablets (0.25 mg) by mouth daily as needed    Anxiety       tiZANidine 2 MG tablet    ZANAFLEX    90 tablet    Please take 1 tablet up to three times a day as needed. We would recommend starting this medication on a weekend.    Dystonia, torsion, fragments of       triamcinolone 0.1 % cream    KENALOG     Apply 1 Application topically 3 times daily as needed        vitamin D 2000 units tablet      Take 2,000 Units by mouth daily        zonisamide 50 MG capsule    ZONEGRAN    45 capsule    Take 2 capsules (100 mg) by mouth daily    Dystonia, torsion, fragments of       * Notice:  This list has 4 medication(s) that are the same as other medications prescribed for you. Read the directions carefully, and ask your doctor or other care provider to review them with you.

## 2018-06-27 NOTE — TELEPHONE ENCOUNTER
Documentation does not show that botox is approved for today's visit.      Will check with provider.

## 2018-06-27 NOTE — PROGRESS NOTES
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  She has had partial improvement in pain, spasms, and myoclonus with previous injections, but not significant functional improvement. Given the limitation in benefit from medications and injections, we have also discussed possible evaluation for DBS for treatment of dystonia, and she is interested in pursuing this. Today we will do videotaped rating scales in addition to botulinum toxin injections.     She continues to have partial benefit from carbidopa/levodopa.      Response to Last Injection:     Took about two weeks, cramping/pain went away completely, just coming back in last week.  More range of motion in left wrist also.   No over weakness.  Right index finger did not relax as much, otherwise happy with injections.     Her baseline symptoms have been recalcitrant to oral medications and conservative therapy.  She is here today for an injection of Botox and videotaped rating scales.      Additional interval history: She reports continued gradual worsening of her right leg dragging, which is particularly noticeable when she is going on stairs.      Current Outpatient Prescriptions   Medication Sig Dispense Refill     baclofen (LIORESAL) 10 MG tablet Please take 1/2 tab to 1 tab up to three times a day as needed. 30 tablet 3     botulinum toxin type A (BOTOX) 100 units injection Inject 280 Units into the muscle once Lot # /C3 with Expiration Date:  11/2020       carbidopa-levodopa (SINEMET CR)  MG per CR tablet Take 1 tablet by mouth At Bedtime 30 tablet 6     carbidopa-levodopa (SINEMET)  MG per tablet Take 2 tabs by mouth in the morning and then 1 tab every 4 hours while awake. 540 tablet  3     Cholecalciferol (VITAMIN D) 2000 UNITS tablet Take 2,000 Units by mouth daily       clonazePAM (KLONOPIN) 0.5 MG tablet Take 0.5 tablets (0.25 mg) by mouth daily as needed 45 tablet 1     OnabotulinumtoxinA (BOTOX IJ) Inject 300 Units as directed once [id number] with Expiration Date:  12/2020       tiZANidine (ZANAFLEX) 2 MG tablet Please take 1 tablet up to three times a day as needed. We would recommend starting this medication on a weekend. 90 tablet 3     triamcinolone (KENALOG) 0.1 % cream Apply 1 Application topically 3 times daily as needed       zonisamide (ZONEGRAN) 50 MG capsule Take 2 capsules (100 mg) by mouth daily 45 capsule 3       Allergies: She has No Known Allergies.    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 It demonstrated some mild changes of right carpal tunnel syndrome but was otherwise normal.       History of MRI of brain and brain stem 12/21/2014    A brain MRI scan done on 06/27/2014 revealed no abnormalities.      History of MRI of cervical spine 12/21/2014    A cervical MRI done on 06/10/2014 revealed some mild to moderate degenerative changes but no significant central canal or foraminal stenosis, and no abnormalities of the spinal cord were noted.       Movement disorder 12/21/2014    I saw your patient, Deepali Contreras on 12/15/2014. She is a 49-year-old right-handed female here for evaluation of right upper extremity dysfunction and right hand tremor.  She has noted this problem for the last couple of years. She reports she is losing dexterity in her  "right hand. She has appreciated a tremor of the right hand with actions such as writing or postural maintenance. She has n       Past Surgical History:   Procedure Laterality Date     APPENDECTOMY         Social History     Social History     Marital status:      Spouse name: N/A     Number of children: N/A     Years of education: N/A     Occupational History     Not on file.     Social History Main Topics     Smoking status: Never Smoker     Smokeless tobacco: Never Used     Alcohol use 1.2 - 1.8 oz/week      Comment: WEEKLY     Drug use: No     Sexual activity: Yes     Partners: Male     Birth control/ protection: Male Surgical     Other Topics Concern     Parent/Sibling W/ Cabg, Mi Or Angioplasty Before 65f 55m? No     Social History Narrative        Izaiah Jara    Lives in [facility]      She does not smoke.  She has a couple of beers to drink on the weekend but otherwise is not a heavy user of alcohol    3 kids: son peña 21; 94, 96 and 98    2 sons    1 daughter    2 are in college and daughter is a sophomore.            FAMILY HISTORY:  Notable for a paternal aunt who is .  She had Parkinson's disease.  Otherwise, there is no other family history of neurologic problems.  There is no family history of dystonia.          90% Upper sorbian    Some norweigian                .         Family History   Problem Relation Age of Onset     Breast Cancer Mother      Cancer - colorectal Mother      Dementia Father      Neurologic Disorder Paternal Aunt      Parkinson's Disease     Cerebrovascular Disease Brother      stroke at age 25 possibly from pfos       Physical Examination:  Vital Signs:   height is 1.676 m (5' 6\") and weight is 68.3 kg (150 lb 8 oz). Her oral temperature is 98.3  F (36.8  C). Her blood pressure is 144/77 and her pulse is 68. Her respiration is 24 and oxygen saturation is 100%.   She is alert and oriented and has fluent speech without dysarthria and is able to " provide an interval medical history  Right arm with dystonic posturing, with early contracture developing in wrist with limited supination and mild ulnar deviation at the wrist. When she lifts her arm she has quite marked myoclonus.  Decreased arm swing and flexion at the elbow with gait.     BOTULINUM NEUROTOXIN INJECTION PROCEDURES:    VERIFICATION OF PATIENT IDENTIFICATION AND PROCEDURE     Initials   Patient Name EMJ   Patient  EMJ   Procedure Verified by: EMJ     Prior to the start of the procedure and with procedural staff participation, I verbally confirmed the patient s identity using two indicators, relevant allergies, that the procedure was appropriate and matched the consent or emergent situation, and that the correct equipment/implants were available. Immediately prior to starting the procedure I conducted the Time Out with the procedural staff and re-confirmed the patient s name, procedure, and site/side. (The Joint Commission universal protocol was followed.)  Yes    Sedation (Moderate or Deep): None      Above assessments performed by:  Resident/Fellow         Joey Bradford MD           The attending provider was present for the entire procedure documented below.      Tiffany Hopkins MD      INDICATION/S FOR PROCEDURE/S:  Sandra Jara is a 52 year old year old patient with dystonia affecting the  right upper extremity secondary to a diagnosis of corticobasal syndrome with associated  pain.     GOAL OF PROCEDURE:  The goal of this procedure is to increase active range of motion and decrease pain  associated with dystonic movements.    TOTAL DOSE ADMINISTERED:  Dose Administered:  300 units Botox    Diluent Used:  Preservative Free Normal Saline  Total Volume of Diluent Used:  3 ml  Lot # [id number] with Expiration Date:  2020  NDC #: Botox 100u ([address]-72)    Medication guide was offered to patient and was declined.    CONSENT:  The risks, benefits, and treatment options were  discussed with Sandra Jara and she agreed to proceed.      Written consent was obtained by EMJ.     EQUIPMENT USED:  Needle-37mm stimulating/recording  EMG/NCS Machine    SKIN PREPARATION:  Skin preparation was performed using an alcohol wipe.    GUIDANCE DESCRIPTION:  Electro-myographic guidance was necessary throughout the procedure to accurately identify all areas of dystonic muscles while avoiding injection of non-dystonic muscles, neighboring nerves and nearby vascular structures.     AREA/MUSCLE INJECTED:      Muscles Injected (All on RIGHT) Units Injected Number of Injections   biceps 50 2   Pronator teres 20 1   FDS 30 2   deltoid 50 2   brachioradialis 30 1   ECR 20 1   ECU 35 2   Adductor pollicis 10 1   FCU 20 1   FCR 25 1   Extensor indicis 8 1                  Total Units Injected: 298    Unavoidable Waste: 2    Total Units Billed 300      The patient tolerated the injections without difficulty.      Video-taped Rating Scales:  Patient was also videotaped and UPDRS III completed. Myoclonus was reported as tremor for purposes of this scale.    UPDRS Values 6/28/2018   Medication On   Speech 1   Facial Expression 1   Rigidity Neck 1   Rigidity RUE 4   Rigidity LUE 0   Rigidity RLE 0   Rigidity LLE 0   Finger Taps R 4   Finger Taps L 3   Hand Mvt R 4   Hand Mvt L 3   Pron-/Supinate R 4   Pron-/Supinate L 2   Toe Tap R 4   Toe Tap L 3   Leg Agility R 4   Leg Agility L 0   Arise From Chair 0   Gait 0   Gait Freezing 0   Postural Stability 0   Posture 0   Global Spont Mvt 0   Postural Tremor RUE 2   Postural Tremor LUE 0   Kinetic Tremor RUE 2   Kinetic Tremor LUE 0   Rest Tremor RUE 0   Rest Tremor LUE 0   Rest Tremor RLE 0   Rest Tremor LLE 0   Rest Tremor Lip/Jaw 0   Rest Tremor Constancy 0   Total Right 28   Total Left 11   Axial Total 3   Total 42     Assessment:    Sandra Jara is a 53 year old woman with RUE focal dystonia and myoclonus presenting for Botox injections.  She did  have an abnormal KARINA scan, asymmetric hypometabolism in left parietal and occipital lobes which are concerning for CBD.  With the myoclonus and dystonia of the right upper extremity as well as on today's examination, she had some degree of neglect and apraxia, she would now fit diagnostic criteria for probable CBD. She has only had partial response to botulinum toxin injections and SInemet, with improvement in pain and myoclonus but not improvement in function. Furthermore, the injections wear off early so a good proportion of time the pain and spasms are present. Her last set of injections were the most effective that she has had in some time. Today we did repeat botulinum toxin injections.  She is also undergoing DBS evaluation for possible left GPi DBS to treat her marked right arm dystonia. We performed videotaped rating scales today.    Plan  She is scheduled for brain MRI, Neuropsychological testing, and Neurosurgical evaluation. We discussed the need to hold off on botulinum toxin injections for at least 3 months if she does in fact proceed to DBS    Joey Bradford MD  Movement Disorders Fellow      Neurology Attending Attestation:     I, Tiffany Hopkins, personally saw this patient with our Movement Disorders Fellow and agree with the fellow's findings and plan of care as documented in the movement disorder fellow's note, with my personal summary below. I personally performed salient aspects of the history and neurological examination.     I personally reviewed the vital signs, medications, and labs. I personally viewed the imaging, and agree with the interpretation documented by the fellow.    I personally performed or supervised all procedures.    Time spent with patient: Greater than 50% of this 75 minute visit was spent in counseling and coordination of care related to the above issues.    Tiffany Hopkins MD    of Neurology

## 2018-06-27 NOTE — LETTER
6/27/2018       RE: Sandra Jara  [address]  [place] [address]     Dear Colleague,    Thank you for referring your patient, Sandra Jara, to the [facility] NEUROLOGY at [facility]. Please see a copy of my visit note below.    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  She has had partial improvement in pain, spasms, and myoclonus with previous injections, but not significant functional improvement. Given the limitation in benefit from medications and injections, we have also discussed possible evaluation for DBS for treatment of dystonia, and she is interested in pursuing this. Today we will do videotaped rating scales in addition to botulinum toxin injections.     She continues to have partial benefit from carbidopa/levodopa.      Response to Last Injection:     Took about two weeks, cramping/pain went away completely, just coming back in last week.  More range of motion in left wrist also.   No over weakness.  Right index finger did not relax as much, otherwise happy with injections.     Her baseline symptoms have been recalcitrant to oral medications and conservative therapy.  She is here today for an injection of Botox and videotaped rating scales.      Additional interval history: She reports continued gradual worsening of her right leg dragging, which is particularly noticeable when she is going on stairs.      Current Outpatient Prescriptions   Medication Sig Dispense Refill     baclofen (LIORESAL) 10 MG tablet Please take 1/2 tab to 1 tab up to three times a day as needed. 30 tablet 3     botulinum toxin type A (BOTOX) 100 units injection Inject 280 Units into the muscle once  Lot # /[id number] with Expiration Date:  11/2020       carbidopa-levodopa (SINEMET CR)  MG per CR tablet Take 1 tablet by mouth At Bedtime 30 tablet 6     carbidopa-levodopa (SINEMET)  MG per tablet Take 2 tabs by mouth in the morning and then 1 tab every 4 hours while awake. 540 tablet 3     Cholecalciferol (VITAMIN D) 2000 UNITS tablet Take 2,000 Units by mouth daily       clonazePAM (KLONOPIN) 0.5 MG tablet Take 0.5 tablets (0.25 mg) by mouth daily as needed 45 tablet 1     OnabotulinumtoxinA (BOTOX IJ) Inject 300 Units as directed once [id number] with Expiration Date:  12/2020       tiZANidine (ZANAFLEX) 2 MG tablet Please take 1 tablet up to three times a day as needed. We would recommend starting this medication on a weekend. 90 tablet 3     triamcinolone (KENALOG) 0.1 % cream Apply 1 Application topically 3 times daily as needed       zonisamide (ZONEGRAN) 50 MG capsule Take 2 capsules (100 mg) by mouth daily 45 capsule 3       Allergies: She has No Known Allergies.    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 It demonstrated some mild changes of right carpal tunnel syndrome but was otherwise normal.       History of MRI of brain and brain stem 12/21/2014    A brain MRI scan done on 06/27/2014 revealed no abnormalities.      History of MRI of cervical spine 12/21/2014    A cervical MRI done on 06/10/2014 revealed some mild to moderate degenerative changes but no significant central canal or foraminal stenosis, and no abnormalities of the spinal cord were noted.       Movement  disorder 2014    I saw your patient, Deepali Contreras on 12/15/2014. She is a 49-year-old right-handed female here for evaluation of right upper extremity dysfunction and right hand tremor.  She has noted this problem for the last couple of years. She reports she is losing dexterity in her right hand. She has appreciated a tremor of the right hand with actions such as writing or postural maintenance. She has n       Past Surgical History:   Procedure Laterality Date     APPENDECTOMY         Social History     Social History     Marital status:      Spouse name: N/A     Number of children: N/A     Years of education: N/A     Occupational History     Not on file.     Social History Main Topics     Smoking status: Never Smoker     Smokeless tobacco: Never Used     Alcohol use 1.2 - 1.8 oz/week      Comment: WEEKLY     Drug use: No     Sexual activity: Yes     Partners: Male     Birth control/ protection: Male Surgical     Other Topics Concern     Parent/Sibling W/ Cabg, Mi Or Angioplasty Before 65f 55m? No     Social History Narrative        Izaiah Jara    L[facility] in [facility]      She does not smoke.  She has a couple of beers to drink on the weekend but otherwise is not a heavy user of alcohol    3 kids: son peña 21; 94, 96 and 98    2 sons    1 daughter    2 are in college and daughter is a sophomore.            FAMILY HISTORY:  Notable for a paternal aunt who is .  She had Parkinson's disease.  Otherwise, there is no other family history of neurologic problems.  There is no family history of dystonia.          90% Pashto    Some norweigian                .         Family History   Problem Relation Age of Onset     Breast Cancer Mother      Cancer - colorectal Mother      Dementia Father      Neurologic Disorder Paternal Aunt      Parkinson's Disease     Cerebrovascular Disease Brother      stroke at age 25 possibly from pfos       Physical Examination:  Vital Signs:  "  height is 1.676 m (5' 6\") and weight is 68.3 kg (150 lb 8 oz). Her oral temperature is 98.3  F (36.8  C). Her blood pressure is 144/77 and her pulse is 68. Her respiration is 24 and oxygen saturation is 100%.   She is alert and oriented and has fluent speech without dysarthria and is able to provide an interval medical history  Right arm with dystonic posturing, with early contracture developing in wrist with limited supination and mild ulnar deviation at the wrist. When she lifts her arm she has quite marked myoclonus.  Decreased arm swing and flexion at the elbow with gait.     BOTULINUM NEUROTOXIN INJECTION PROCEDURES:    VERIFICATION OF PATIENT IDENTIFICATION AND PROCEDURE     Initials   Patient Name EMJ   Patient  EMJ   Procedure Verified by: EMJ     Prior to the start of the procedure and with procedural staff participation, I verbally confirmed the patient s identity using two indicators, relevant allergies, that the procedure was appropriate and matched the consent or emergent situation, and that the correct equipment/implants were available. Immediately prior to starting the procedure I conducted the Time Out with the procedural staff and re-confirmed the patient s name, procedure, and site/side. (The Joint Commission universal protocol was followed.)  Yes    Sedation (Moderate or Deep): None      Above assessments performed by:  Resident/Fellow         Joey Bradford MD           The attending provider was present for the entire procedure documented below.      Tiffany Hopkins MD      INDICATION/S FOR PROCEDURE/S:  Sandra Jara is a 52 year old year old patient with dystonia affecting the  right upper extremity secondary to a diagnosis of corticobasal syndrome with associated  pain.     GOAL OF PROCEDURE:  The goal of this procedure is to increase active range of motion and decrease pain  associated with dystonic movements.    TOTAL DOSE ADMINISTERED:  Dose Administered:  300 units Botox  "   Diluent Used:  Preservative Free Normal Saline  Total Volume of Diluent Used:  3 ml  Lot # [id number] with Expiration Date:  12/2020  NDC #: Botox 100u ([address]-66)    Medication guide was offered to patient and was declined.    CONSENT:  The risks, benefits, and treatment options were discussed with Sandra Jara and she agreed to proceed.      Written consent was obtained by EMJ.     EQUIPMENT USED:  Needle-37mm stimulating/recording  EMG/NCS Machine    SKIN PREPARATION:  Skin preparation was performed using an alcohol wipe.    GUIDANCE DESCRIPTION:  Electro-myographic guidance was necessary throughout the procedure to accurately identify all areas of dystonic muscles while avoiding injection of non-dystonic muscles, neighboring nerves and nearby vascular structures.     AREA/MUSCLE INJECTED:      Muscles Injected (All on RIGHT) Units Injected Number of Injections   biceps 50 2   Pronator teres 20 1   FDS 30 2   deltoid 50 2   brachioradialis 30 1   ECR 20 1   ECU 35 2   Adductor pollicis 10 1   FCU 20 1   FCR 25 1   Extensor indicis 8 1                  Total Units Injected: 298    Unavoidable Waste: 2    Total Units Billed 300      The patient tolerated the injections without difficulty.      Video-taped Rating Scales:  Patient was also videotaped and UPDRS III completed. Myoclonus was reported as tremor for purposes of this scale.    UPDRS Values 6/28/2018   Medication On   Speech 1   Facial Expression 1   Rigidity Neck 1   Rigidity RUE 4   Rigidity LUE 0   Rigidity RLE 0   Rigidity LLE 0   Finger Taps R 4   Finger Taps L 3   Hand Mvt R 4   Hand Mvt L 3   Pron-/Supinate R 4   Pron-/Supinate L 2   Toe Tap R 4   Toe Tap L 3   Leg Agility R 4   Leg Agility L 0   Arise From Chair 0   Gait 0   Gait Freezing 0   Postural Stability 0   Posture 0   Global Spont Mvt 0   Postural Tremor RUE 2   Postural Tremor LUE 0   Kinetic Tremor RUE 2   Kinetic Tremor LUE 0   Rest Tremor RUE 0   Rest Tremor LUE 0   Rest  Tremor RLE 0   Rest Tremor LLE 0   Rest Tremor Lip/Jaw 0   Rest Tremor Constancy 0   Total Right 28   Total Left 11   Axial Total 3   Total 42     Assessment:    Sandra Jara is a 53 year old woman with RUE focal dystonia and myoclonus presenting for Botox injections.  She did have an abnormal KARINA scan, asymmetric hypometabolism in left parietal and occipital lobes which are concerning for CBD.  With the myoclonus and dystonia of the right upper extremity as well as on today's examination, she had some degree of neglect and apraxia, she would now fit diagnostic criteria for probable CBD. She has only had partial response to botulinum toxin injections and SInemet, with improvement in pain and myoclonus but not improvement in function. Furthermore, the injections wear off early so a good proportion of time the pain and spasms are present. Her last set of injections were the most effective that she has had in some time. Today we did repeat botulinum toxin injections.  She is also undergoing DBS evaluation for possible left GPi DBS to treat her marked right arm dystonia. We performed videotaped rating scales today.    Plan  She is scheduled for brain MRI, Neuropsychological testing, and Neurosurgical evaluation. We discussed the need to hold off on botulinum toxin injections for at least 3 months if she does in fact proceed to DBS    Joey Bradford MD  Movement Disorders Fellow      Neurology Attending Attestation:     I, Tiffany Hopkins, personally saw this patient with our Movement Disorders Fellow and agree with the fellow's findings and plan of care as documented in the movement disorder fellow's note, with my personal summary below. I personally performed salient aspects of the history and neurological examination.     I personally reviewed the vital signs, medications, and labs. I personally viewed the imaging, and agree with the interpretation documented by the fellow.    I personally performed or  supervised all procedures.    Time spent with patient: Greater than 50% of this 75 minute visit was spent in counseling and coordination of care related to the above issues.    Tiffany Hopkins MD    of Neurology

## 2018-06-27 NOTE — NURSING NOTE
Chief Complaint   Patient presents with     RECHECK     [facility]- MOVEMENT DISORDER, 3 MONTHS F/U     Vaughn An, CMA

## 2018-06-28 ASSESSMENT — UNIFIED PARKINSONS DISEASE RATING SCALE (UPDRS)
TOTAL_SCORE_LEFT: 11
RIGIDITY_NECK: SLIGHT: RIGIDITY ONLY DETECTED WITH ACTIVATION MANEUVER.
AXIAL_SCORE: 3
FINGER_TAPPING_RIGHT: SEVERE: CANNOT OR CAN ONLY BARELY PERFORM THE TASK BECAUSE OF SLOWING, INTERRUPTIONS, OR DECREMENTS.
RIGIDITY_RLE: NORMAL
AMPLITUDE_RLE: NORMAL: NO TREMOR.
FACIAL_EXPRESSION: SLIGHT: MINIMAL MASKED FACIES MANIFESTED ONLY BY DECREASED FREQUENCY OF BLINKING.
PRONATION_SUPINATION_LEFT: MILD: ANY OF THE FOLLOWING: A) 3 TO 5 INTERRUPTIONS DURING TAPPING B) MILD SLOWING C) THE AMPLITUDE DECREMENTS MIDWAY IN THE 10-MOVEMENT SEQUENCE
ARISING_CHAIR: NORMAL: ABLE TO ARISE QUICKLY WITHOUT HESITATION.
AMPLITUDE_LIP_JAW: NORMAL: NO TREMOR.
PARKINSONS_MEDS: ON
TOETAPPING_LEFT: MODERATE: ANY OF THE FOLLOWING:  A) MORE THAN 5 INTERRUPTIONS OR AT LEAST ONE LONGER ARREST (FREEZE) IN ONGOING MOVEMENT  B) MODERATE SLOWING C) THE AMPLITUDE DECREMENTS STARTING AFTER THE FIRST MOVEMENT.
LEG_AGILITY_LEFT: NORMAL
CONSTANCY_TREMOR_ATREST: NORMAL: NO TREMOR.
TOTAL_SCORE: 42
RIGIDITY_LLE: NORMAL
LEG_AGILITY_RIGHT: SEVERE: CANNOT OR CAN ONLY BARELY PERFORM THE TASK BECAUSE OF SLOWING, INTERRUPTIONS, OR DECREMENTS.
PRONATION_SUPINATION_RIGHT: SEVERE: CANNOT OR CAN ONLY BARELY PERFORM THE TASK BECAUSE OF SLOWING, INTERRUPTIONS, OR DECREMENTS.
AMPLITUDE_RUE: NORMAL: NO TREMOR.
FINGER_TAPPING_LEFT: MODERATE: ANY OF THE FOLLOWING:  A) MORE THAN 5 INTERRUPTIONS  OR AT LEAST ONE LONGER ARREST (FREEZE) IN ONGOING MOVEMENT  B) MODERATE SLOWING C) THE AMPLITUDE DECREMENTS STARTING AFTER THE FIRST MOVEMENT.
POSTURE: 0 NORMAL, NO PROBLEMS
HANDMOVEMENTS_RIGHT: SEVERE: CANNOT OR CAN ONLY BARELY PERFORM THE TASK BECAUSE OF SLOWING, INTERRUPTIONS, OR DECREMENTS.
RIGIDITY_LUE: NORMAL
FREEZING_GAIT: NORMAL
SPEECH: SLIGHT: LOSS OF MODULATION, DICTION OR VOLUME, BUT STILL ALL WORDS EASY TO UNDERSTAND.
AMPLITUDE_LUE: NORMAL: NO TREMOR.
TOETAPPING_RIGHT: SEVERE: CANNOT OR CAN ONLY BARELY PERFORM THE TASK BECAUSE OF SLOWING, INTERRUPTIONS, OR DECREMENTS.
GAIT: NORMAL
RIGIDITY_RUE: SEVERE: RIGIDITY DETECTED WITHOUT THE ACTIVATION MANEUVER AND FULL RANGE OF MOTION NOT ACHIEVED.
SPONTANEITY_OF_MOVEMENT: 0: NORMAL.  NO PROBLEMS.
POSTURAL_STABILITY: NORMAL:  RECOVERS WITH ONE OR TWO STEPS.
HANDMOVEMENTS_LEFT: MODERATE: ANY OF THE FOLLOWING:  A) MORE THAN 5 INTERRUPTIONS  OR AT LEAST ONE LONGER ARREST (FREEZE) IN ONGOING MOVEMENT  B) MODERATE SLOWING C) THE AMPLITUDE DECREMENTS STARTING AFTER THE FIRST MOVEMENT.
AMPLITUDE_LLE: NORMAL: NO TREMOR.
TOTAL_SCORE: 28

## 2018-06-29 ENCOUNTER — MYC MEDICAL ADVICE (OUTPATIENT)
Dept: NEUROSURGERY | Facility: CLINIC | Age: 53
End: 2018-06-29

## 2018-07-02 ENCOUNTER — TELEPHONE (OUTPATIENT)
Dept: NEUROLOGY | Facility: CLINIC | Age: 53
End: 2018-07-02

## 2018-07-02 NOTE — TELEPHONE ENCOUNTER
Blanca EVANS has agreed to be available for the patient to call and discuss her experience with Deep Brain Stimulation.    Called patient to give her Blanca's name and phone number. Left voice mail with this information and [employer] messaged her.

## 2018-07-12 ENCOUNTER — OFFICE VISIT (OUTPATIENT)
Dept: NEUROPSYCHOLOGY | Facility: CLINIC | Age: 53
End: 2018-07-12
Payer: COMMERCIAL

## 2018-07-12 ENCOUNTER — RADIANT APPOINTMENT (OUTPATIENT)
Dept: MRI IMAGING | Facility: CLINIC | Age: 53
End: 2018-07-12
Attending: PSYCHIATRY & NEUROLOGY
Payer: COMMERCIAL

## 2018-07-12 DIAGNOSIS — G31.85 CORTICOBASAL SYNDROME (H): Primary | ICD-10-CM

## 2018-07-12 DIAGNOSIS — G24.9 DYSTONIA: ICD-10-CM

## 2018-07-12 DIAGNOSIS — F09 MENTAL DISORDER DUE TO GENERAL MEDICAL CONDITION: ICD-10-CM

## 2018-07-12 RX ORDER — GADOBUTROL 604.72 MG/ML
7 INJECTION INTRAVENOUS ONCE
Status: COMPLETED | OUTPATIENT
Start: 2018-07-12 | End: 2018-07-12

## 2018-07-12 RX ADMIN — GADOBUTROL 7 ML: 604.72 INJECTION INTRAVENOUS at 10:43

## 2018-07-12 NOTE — PROGRESS NOTES
The patient was seen for neuropsychological evaluation at the request of Tiffany Hopkins MD for the purposes of diagnostic clarification and treatment planning.  2 hours of face-to-face testing were provided by this writer.  Please see Dr. Melony Hannon's report for a full interpretation of the findings.

## 2018-07-12 NOTE — MR AVS SNAPSHOT
After Visit Summary   7/12/2018    Sandra Jara    MRN: [id number]           Patient Information     Date Of Birth          1965        Visit Information        Provider Department      7/12/2018 12:00 PM Melony Hannon, PhD [facility] Neuropsychology        Today's Diagnoses     Corticobasal syndrome    -  1    Dystonia        Mental disorder due to general medical condition           Follow-ups after your visit        Your next 10 appointments already scheduled     Jul 26, 2018 12:00 PM CDT   (Arrive by 11:45 AM)   Deep Brain Stimulation with Efren Triana MD   [facility] Neurosurgery ([facility])    [address] [place] [id number] [phone]            Sep 13, 2018 11:00 AM CDT   (Arrive by 10:45 AM)   Return Botox with Tiffany Hopkins MD   [facility] Neurology ([facility])    [address] [place] [address] [phone]              Who to contact     Please call your clinic at [phone] to:    Ask questions about your health    Make or cancel appointments    Discuss your medicines    Learn about your test results    Speak to your doctor            Additional Information About Your Visit        [employer] Information     [employer] gives you secure access to your electronic health record. If you see a primary care provider, you can also send messages to your care team and make appointments. If you have questions, please call your primary care clinic.  If you do not have a primary care provider, please call [phone] and they will assist you.      [employer] is an electronic gateway that provides easy, online access to your medical records. With [employer], you can request a clinic appointment, read your test results, renew a prescription or communicate with your care team.     To access your existing account, please contact your [facility] or call [phone] for assistance.        Care EveryWhere ID     This is your Care EveryWhere ID. This could be used by other organizations to access your [place] medical records  [id number]         Blood Pressure from Last 3 Encounters:   06/27/18 144/77   05/03/18 120/80   01/03/18 122/72    Weight from Last 3 Encounters:   06/27/18 68.3 kg (150 lb 8 oz)   05/03/18 69.3 kg (152 lb 11.2 oz)   01/03/18 67.1 kg (148 lb)              We Performed the Following     [id number] TESTING, PER HR/PSYCHOLOGIST     NEUROPSYCH TESTING BY [facility]        Primary Care Provider Office Phone # Fax #    Ester Barnes [phone]       [address] [address]        Equal Access to Services     MANDEEP PIERCE : Hadkaty Fitzgerald, waaxda luqadaha, qaybta kaalmada yovani, senia darden . So [facility] [phone].    ATENCIÓN: Si habla español, tiene a schmitt disposición servicios gratuitos de asistencia lingüística. Mita al [phone].    We comply with applicable federal civil rights laws and [place] laws. We do not discriminate on the basis of race, color, national origin, age, disability, sex, sexual orientation, or gender identity.            Thank you!     Thank you for choosing [facility] NEUROPSYCHOLOGY  for your care. Our goal is always to provide you with excellent care. Hearing back from our patients is one way we can continue to improve our services. Please take a few minutes to complete the written survey that you may receive in the mail after your visit with us. Thank you!             Your Updated Medication List - Protect others around you: Learn how to safely use, store and throw away your medicines at www.disposemymeds.org.          This list is accurate as of 7/12/18 11:59 PM.  Always use your most recent med list.                   Brand Name Dispense Instructions for use Diagnosis    baclofen 10 MG tablet     LIORESAL    30 tablet    Please take 1/2 tab to 1 tab up to three times a day as needed.    Dystonia, torsion, fragments of       * botulinum toxin type A 100 units injection    BOTOX     Inject 280 Units into the muscle once Lot # /[id number] with Expiration Date:  11/2020        * BOTOX IJ      Inject 300 Units as directed once [id number] with Expiration Date:  12/2020        * carbidopa-levodopa  MG per CR tablet    SINEMET CR    30 tablet    Take 1 tablet by mouth At Bedtime    Dystonia, torsion, fragments of       * carbidopa-levodopa  MG per tablet    SINEMET    540 tablet    Take 2 tabs by mouth in the morning and then 1 tab every 4 hours while awake.    Dystonia, torsion, fragments of       clonazePAM 0.5 MG tablet    klonoPIN    45 tablet    Take 0.5 tablets (0.25 mg) by mouth daily as needed    Anxiety       tiZANidine 2 MG tablet    ZANAFLEX    90 tablet    Please take 1 tablet up to three times a day as needed. We would recommend starting this medication on a weekend.    Dystonia, torsion, fragments of       triamcinolone 0.1 % cream    KENALOG     Apply 1 Application topically 3 times daily as needed        vitamin D 2000 units tablet      Take 2,000 Units by mouth daily        zonisamide 50 MG capsule    ZONEGRAN    45 capsule    Take 2 capsules (100 mg) by mouth daily    Dystonia, torsion, fragments of       * Notice:  This list has 4 medication(s) that are the same as other medications prescribed for you. Read the directions carefully, and ask your doctor or other care provider to review them with you.

## 2018-07-24 ENCOUNTER — TELEPHONE (OUTPATIENT)
Dept: NEUROLOGY | Facility: CLINIC | Age: 53
End: 2018-07-24

## 2018-07-24 NOTE — TELEPHONE ENCOUNTER
"                                                      Deep Brain Stimulation Surgery Surgical Candidacy Form    Referring Provider: Dr. Oskar Lan   Patient Information: Lives in [place], [place]   Name: Sandra Jara  YOB: 1965  Age: 53 year old  Height: 5'6\"  Weight: 152 lbs.  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 Year of Onset of Symptoms: 2012.  Age of Diagnosis: ?. Year of Diagnosis: ?.  Handedness: Right.  Side of Onset: Right upper extremity.     Disease Features: Dystonia   RUE:  pain, spasms, loss of joint motion and myoclonus.   The arm can ache but is not painful per se. No left sided symptoms.      She reports her right leg has also been involved more recently and it will ache and get fatigued.      Surgery Goals: Decrease rigidity. and Improve dystonia.   Medications: As of 8/13/18  we have not been able to control myoclonus with antiepileptics (previous trials Keppra and zonisamide).     Current Outpatient Prescriptions   Medication Sig Dispense Refill     baclofen (LIORESAL) 10 MG tablet Please take 1/2 tab to 1 tab up to three times a day as needed. (Patient not taking: Reported on 7/26/2018) 30 tablet 3     botulinum toxin type A (BOTOX) 100 units injection Inject 280 Units into the muscle once Lot # /C3 with Expiration Date:  11/2020       carbidopa-levodopa (SINEMET CR)  MG per CR tablet Take 1 tablet by mouth At Bedtime 30 tablet 6     carbidopa-levodopa (SINEMET)  MG per tablet Take 2 tabs by mouth in the morning and then 1 tab every 4 hours while awake. 540 tablet 3     Cholecalciferol (VITAMIN D) 2000 UNITS tablet Take 2,000 Units by mouth daily       clonazePAM (KLONOPIN) 0.5 MG tablet Take 0.5 tablets (0.25 mg) by mouth daily as needed 45 tablet 1     OnabotulinumtoxinA (BOTOX IJ) Inject 300 Units as directed " once [id number] with Expiration Date:  12/2020       triamcinolone (KENALOG) 0.1 % cream Apply 1 Application topically 3 times daily as needed       zonisamide (ZONEGRAN) 50 MG capsule Take 2 capsules (100 mg) by mouth daily 45 capsule 3      Motor Assessment: 6/27/18  On medication = UPDRS 42     Neuropsychological Evaluation: 7/12/18    Cognitive Issues: Results indicate slowed information and psychomotor processing speed. Word retrieval and verbal fluency reflect relative weaknesses. 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 Learning and memory, executive functioning, complex attentional processing, and visual processing all fell within normal limits. The findings do not reflect dementia at this time.    Psychiatric Issues: She denied any significant psychiatric history. She does not report significant depressive symptomatology, anxiety, or apathy. She has a good understanding of the surgical procedure and the risks involved. She has a good support system in her family.    Depression: No depression.    Cognitive Function: Slowed processing speed, weakness in language    Psychosis: No hallucinations.     MRI Date: 7/12/18    Impression:    Mild cerebral atrophy, greater than expected for age    Seen at the [facility] in August 2015 by Dr. Fernando Forrester. Workup there was significant for KARINA scan with decreased uptake in the left basal ganglia (09/09/15) and PET scan with hypometabolism most prominent in left parietal and occipital lobes (10/02/2015).    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 It demonstrated some mild changes of right carpal tunnel syndrome but was otherwise normal.       History of MRI of brain and brain stem 12/21/2014    A brain MRI scan done on 06/27/2014 revealed no abnormalities.      History of MRI of cervical spine 12/21/2014    A cervical MRI done on 06/10/2014 revealed some mild to moderate degenerative changes but no significant central canal or foraminal stenosis, and no abnormalities of the spinal cord were noted.       Movement disorder 12/21/2014    I saw your patient, Deepali Contreras on 12/15/2014. She is a 49-year-old right-handed female here for evaluation of right upper extremity dysfunction and right hand tremor.  She has noted this problem for the last couple of years. She reports she is losing dexterity in her right hand. She has appreciated a tremor of the right hand with actions such as writing or postural maintenance. She has n     Past Surgical History:   Procedure Laterality Date     APPENDECTOMY         Soc Hx:  reports that she has never smoked. She has never used smokeless tobacco. She reports that she drinks about 1.2 - 1.8 oz of alcohol per week  She reports that she does not use illicit drugs.    Family Hx of Movement Disorders: family history includes Breast Cancer in her mother; Cancer - colorectal in her mother; Cerebrovascular Disease in her brother; Dementia in her father; Neurologic Disorder in her paternal aunt.    Consult Dr. Tiffany Hopkins     Patient discussed at conference on: 8/16/18     DBS Meeting Notes/Further Work-up Needed: -  Dr. Kelechi Lacey:  I am one of the movement disorder fellows working with Dr. Hopkins. I'm sorry I could not reach you on the phone. The DBS consensus committee met yesterday to discuss your case. We determined that you could be a DBS candidate. Dr. Hopkins would like to meet with you in person about what you may or may not expect to improve with DBS. We have  "seen good results for DBS to treat dystonia and myoclonus - which may improve with your diagnosis of cortical basal syndrome. Other symptoms such as alien limb (or \"apraxia\") may not improve. I think it is a good idea for us to talk in detail about these things.     The next step will be to schedule a one hour appointment with Dr. Hopkins to go over DBS, what symptoms may improve, and what symptoms may not improve. Then we can decide if you would like to move forward with the surgery.       9/14/18 Ondina Khan RN  1. Yes LGpi - tremor and dystonia  2. Research - Syd to do striatal and pallidal recording (EMG with recordings)  3. Infinity 5 - unilateral  4. Interest in research?    Called patient to discuss. Left voice mail on her home and cell phones. Sending [employer] message as well.    "

## 2018-07-24 NOTE — PROGRESS NOTES
NAME: Sandra Huertas  MR#: [id number]  YOB: 1965  DATE OF EXAM: 7/12/2018    Neuropsychology Laboratory  [facility]  [address]  [place]  [address] [phone]    NEUROPSYCHOLOGICAL EVALUATION    RELEVANT HISTORY AND REASON FOR REFERRAL    Sandra Jara is a 52 year old, right handed  with 16 years of formal education. Information was obtained via interview with the patient and review of her medical records. 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 She is followed by Neurology and receives Botox injections. 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 She has only had partial response to botulinum toxin injections and Sinemet, with improvement in pain and myoclonus but not in function. She is interested in undergoing possible left GPi DBS to treat her marked right arm dystonia. This neuropsychological evaluation was undertaken at the request of Tiffany Hopkins M.D., as part of presurgical protocol, to assess cognitive functioning and mood, as they pertain to her surgical candidacy, and to establish a neurocognitive baseline.    CLINICAL INTERVIEW FINDINGS    Upon interview, Ms. Abdias Jara stated that over five years ago, she noticed occasional shaking in her right hand. 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 She then went to a neurologist in the [place] [facility], who noticed a slower progression than normal for CBD, and thought that perhaps her underlying diagnosis was dystonia. Now, she understands that she likely has a slowly progressive CBD. Her symptoms are primarily focused in her right arm and hand, so she does not use her right side much at  all. When the symptoms are particularly bad, she has jerking, but not tremor. She stated that carbidopa may help, but the response seems delayed and it is not a drastic improvement. When asked about her goals for surgery, she stated that it would be fabulous to get rid of the jerking, and to get any usability back. The symptoms are starting to go into her right leg, and she would like that to stop for a while. She has a good understanding of the surgical procedure. She feels confident about being awake during the surgery. She listed risks as cognitive decline, and stroke. She has spoken with her  about the procedure, and he is supportive of whatever she decides to do, and they go back and forth about whether she should proceed with surgery. He will be able to assist with her cares as necessary following the procedure.    Ms. Abdias Jara notices subtle difficulty with word finding. She otherwise denied difficulty with cognition, including memory, comprehension, attention or concentration, decision-making, or organization. Since she is not able to use her right side, she relies on her  more for organizational tasks.    Ms. Abdias Jara lives with her , and manages their finances, her driving, and her medications, apparently without difficulty. She is not able to tell right away if she takes the Sinemt late, although she may notice problems later. She never takes an extra dose as it makes her thirsty and gives her cotton mouth. She has not been cooking because of problems with her right hand. Her  assists her with shaving her underarms and sometimes with dressing, and otherwise she handles her personal cares independently.    Ms. Abdias Jara denied any history of psychiatric illness. She has not worked with a psychologist, nor has she been hospitalized for psychiatric care. She described her mood as good and does not believe that she is depressed. She denied feelings of  sadness, hopelessness, helplessness, worthlessness, or guilt. She occasionally feels frustrated. She has not been feeling anxious or irritable. She has not been crying any more than normal. Sleep is fair. She goes to bed at 10:00 p.m., and wakes up at 5:30 a.m., shaking. She takes a quarter or a half a clonazepam, which helps her get to sleep. Sometimes she wakes up because of the shaking at 4:00 a.m., and may not always be able to go back to sleep. She has not been acting out her dreams. She has not been napping. Her appetite has been good and her weight has been stable. She notices that she is little more tired at the end of the day, and that she is not as active in the evenings. Her interest level and motivation are good. She denied visual or auditory hallucinations. She denied suicidal ideation or any history of attempts to commit suicide.    Ms. Abdias Jara drinks one beer a night, which seems to have no effect on her movements. She first drank alcohol around the 16 or 17. She never has six or more alcoholic beverages in one sitting. She denied any history of chemical dependency treatment, and has not been hospitalized for alcohol-related problems. She denied illicit drug use, tobacco use or e-cigs, gambling, or other compulsive behaviors.    Ms. Abdias Jara completed a Bachelor's degree at [employer] in accounting, and has a CPA. She is the  of the company where she has worked for 18 years. Work is generally going well, and she has been receiving good performance reviews. She has been  for 27 years and has three children. She has no grandchildren.    Ms. Abdias Jara denied any history of seizure, stroke, or head injury resulting in loss of consciousness. Her balance has been a little weaker in the last couple of months, and she must be very careful when going downstairs. She has not been falling. She has knots on the right side of her neck that she attributes to her jerking  movements. She denied other aches or pains.    PAST MEDICAL HISTORY: Medical records indicate a history of myoclonus, dystonia, cortical basal degeneration, vitamin D deficiency, carpal tunnel syndrome.    CURRENT MEDICATIONS:  Include baclofen, botulinum toxin type A, carbidopa levodopa, cholecalciferol, clonazepam, tizanidine, triamcinolone, and zonisamide.    FAMILY MEDICAL HISTORY:  Significant for a paternal aunt with Parkinson's disease, a father who had dementia in his 70s, and a paternal uncle and others in his father's family with dementia.    BEHAVIORAL OBSERVATIONS    During the evaluation, Ms. Abdias Jara was pleasant, cooperative, and seemed to understand the instructions. She was alert and oriented to person, place, and time. Jerking movements were noted in her right arm, and she used her left hand to draw. Gait was somewhat stilted. She required some assistance with cleaning her glasses before testing. A slight vocal tremor was noted clinically. Mood was euthymic. Speech was fluent, with articulation, volume, and and rate. Spontaneous conversation was present and appropriate. Internal performance validity measures fell within normal limits. The results are believed to accurately reflect her current level of functioning.    MEASURES ADMINISTERED    The following measures were administered by a trained psychometrist, under the direct supervision of a licensed psychologist.    Subtests of the Wechsler Adult Intelligence Scale-4; Reading subtest of the Wide Range Achievement Test-4; subtests of the Wechsler Memory Scale-Revised; Sheppard Verbal Learning Test-Revised, Form 1; [place] Naming Test; D-KEFS Verbal Fluency, Standard; Test of Sustained Attention and Tracking; Stroop; [place] Card Sorting Test - 64; Pugh Judgement of Line Orientation Form H; Clock Drawing; Jama Depression Inventory-2 (BDI-2), Apathy Scale.     The following measures were administered by a licensed psychologist:     Go-No-Go;  tests of praxis; HAM-D, HAM-A.     RESULTS AND INTERPRETATION    Overall intellectual functioning was estimated to fall in the above average range, above premorbid estimates of low average based on single word reading abilities, but consistent with premorbid estimates based on level of educational and occupational attainment.    Confrontation naming was borderline impaired for her age and level of education, and was notable for slow performance with long latencies. Performance improved with phonemic cues. Verbal abstract reasoning was average. Ability to comprehend and articulate responses to complex social situations was above average. Letter fluency was mildly slowed. Generative naming to category was moderately impaired, and switching fluency was mildly impaired.    Attention span was low average for her age. Divided attention was average on an untimed, auditory sequencing task. performance on a measure sustained attention was borderline impaired in regards to time, but was average in regards to number of errors. Performance on a measure of cognitive flexibility and speed was low average.    Basic visual perception, including matching lines and angles, was above average. Construction of a clock to command was notable for significant difficulty drawing the numbers. It is noted again that she used her left hand for this task, and she was able to set the hands accurately, although she did omit the hands when copying the clock. Nonverbal deductive reasoning was above average.    Novel problem-solving, including the ability to generate strategies and solutions, fell within normal limits for her age and level of education. Ideomotor praxis was assessed in her left hand, and was notable for the use of body part as object even with demonstration. Performance on a go-no-go task of reciprocal motor inhibition was characterized by self-corrections, but otherwise fell within normal limits.    Immediate recall of verbal  narrative material was average for her age, with high average recall following a 30 minute delay. On a multiple trial list learning task, immediate recall was average, with above average recall following a 25 minute delay.    On the BDI-2, a self-report questionnaire, Ms. Abdias Jara endorsed a minimal level of depressive symptomatology, acknowledging only that she sleeps somewhat less than usual, gets tired or fatigued more easily, and has less energy than she used to have. She denied significant apathy on an apathy scale.    IMPRESSIONS AND RECOMMENDATIONS    Current results indicate slowed information and psychomotor processing speed. Word retrieval and verbal fluency reflect a weakness. 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 Learning and memory, executive functioning, complex attentional processing and visual processing fall within normal limits. She denied experiencing significant depressive symptomatology, anxiety, or apathy.    This pattern of performance is suggestive of subcortical system and parietal lobe involvement, with some indication of anterior, dominant hemisphere cerebral involvement as well. Taken together with her history, the findings are consistent with a possible diagnosis of CBD. It is notable that executive dysfunction was not demonstrated, however. The findings do not reflect dementia, and in fact, are quite subtle. She continues to manage her instrumental activities of daily living independently, and also continues to work as a , reportedly without difficulty. She appears to be capable of comprehending medical information and making well reasoned decisions for herself. She has a good understanding of the surgical procedure and the risks involved. She denied any significant psychiatric history, and does not appear to be experiencing significant symptoms of depression, anxiety, or apathy. She has good support system and her family. Overall,  she appears to be good candidate for surgery from a neurocognitive perspective.    In terms of functioning, Ms. Abdias Jara s cognitive inefficiencies are not likely to interfere with her ability to actively participate in treatment, or to manage her instrumental activities of daily living independently. She is likely to find that it takes her longer to complete tasks, so she may find it helpful to provide herself with ample time when working on a task so that she does not become overwhelmed and work less efficiently. Results may serve as a baseline of her neurocognitive functioning. Repeated neuropsychological evaluation in one year may help to determine whether her cognitive inefficiencies are progressive.    Melony Hannon, Ph.D., ABPP  Licensed Psychologist, [place] [id number]  Board Certified in Clinical Neuropsychology    Time spent:  Four hours professional time, including interview, record review, data integration, and report writing (CPT 61639); an additional two hours, including testing administered by a psychometrist and interpreted by a neuropsychologist (CPT 41591). ICD-10 diagnosis: G31.85, G24.9, F06.8.

## 2018-07-26 ENCOUNTER — OFFICE VISIT (OUTPATIENT)
Dept: NEUROSURGERY | Facility: CLINIC | Age: 53
End: 2018-07-26
Payer: COMMERCIAL

## 2018-07-26 VITALS
WEIGHT: 152.3 LBS | BODY MASS INDEX: 24.48 KG/M2 | HEART RATE: 90 BPM | TEMPERATURE: 96.1 F | OXYGEN SATURATION: 99 % | DIASTOLIC BLOOD PRESSURE: 74 MMHG | SYSTOLIC BLOOD PRESSURE: 122 MMHG | HEIGHT: 66 IN | RESPIRATION RATE: 16 BRPM

## 2018-07-26 DIAGNOSIS — G25.3 MYOCLONUS: ICD-10-CM

## 2018-07-26 DIAGNOSIS — G25.9 MOVEMENT DISORDER: ICD-10-CM

## 2018-07-26 DIAGNOSIS — G31.85 CORTICOBASAL DEGENERATION (H): ICD-10-CM

## 2018-07-26 DIAGNOSIS — G24.9 DYSTONIA: Primary | ICD-10-CM

## 2018-07-26 ASSESSMENT — PAIN SCALES - GENERAL: PAINLEVEL: NO PAIN (0)

## 2018-07-26 NOTE — PROGRESS NOTES
HISTORY AND PHYSICAL EXAM    Chief Complaint   Patient presents with     Consult     [facility] NEW, DBS evaluation for dystonia       HISTORY OF PRESENT ILLNESS  Ms. Sandra Jara is a 53 year old right handed  woman who presents for DBS consult.  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  Her symptoms began over 5 years ago with occasional shaking in her right hand and loss of control.  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  She then went to see a neurologist in the [facility] who noticed slower than normal progression for CBD.  It was thought that she may have dystonia.  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 Hopkins.  She does have right upper extremity dystonia and myoclonus.  She is followed by our neurology team, including Dr. Hopkins, for botulism toxin injections.  Her last bolutism injection provided no significant functional improvement so she has been referred for DBS evaluation.  Dr. Hopkins's thoughts are left GPi DBS for right upper extremity dystonia.  Today, she endorses dystonia in her right arm and now going down her right leg.  Cramping and shaking were symptoms that came after loss of control in her right arm/hand.  She wonders about performing activities after surgery, as she does boxing as part of Physical Therapy.  Her goals of DBS surgery is to get rid of the jerking and to get functionality back in her right upper extremity.      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 It demonstrated some mild changes of right carpal tunnel syndrome but was otherwise normal.       History of MRI of brain and brain stem 12/21/2014    A brain MRI scan done on 06/27/2014 revealed no abnormalities.      History of MRI of cervical spine 12/21/2014    A cervical MRI done on 06/10/2014 revealed some mild to moderate degenerative changes but no significant central canal or foraminal stenosis, and no abnormalities of the spinal cord were noted.       Movement disorder 12/21/2014    I saw your patient, Deepali Contreras on 12/15/2014. She is a 49-year-old right-handed female here for evaluation of right upper extremity dysfunction and right hand tremor.  She has noted this problem for the last couple of years. She reports she is losing dexterity in her right hand. She has appreciated a tremor of the right hand with actions such as writing or postural maintenance. She has n       Past Surgical History:   Procedure Laterality Date     APPENDECTOMY         Family History   Problem Relation Age of Onset     Breast Cancer Mother      Cancer - colorectal Mother      Dementia Father      Neurologic Disorder Paternal Aunt      Parkinson's Disease     Cerebrovascular Disease Brother      stroke at age 25 possibly from pfos       Social History     Social History     Marital status:      Spouse name: N/A     Number of children: N/A     Years of education: N/A     Occupational History     Not on file.     Social History Main Topics     Smoking status: Never Smoker     Smokeless tobacco: Never Used     Alcohol use 1.2 - 1.8 oz/week      Comment: WEEKLY     Drug use: No     Sexual activity: Yes     Partners: Male     Birth control/ protection: Male Surgical     Other Topics Concern     Parent/Sibling W/ Cabg, Mi Or Angioplasty Before 65f 55m? No     Social History Narrative        Izaiah Calero  Marek    Lives in [facility]      She does not smoke.  She has a couple of beers to drink on the weekend but otherwise is not a heavy user of alcohol    3 kids: son peña Marcano; 94, 96 and 98    2 sons    1 daughter    2 are in college and daughter is a sophomore.            FAMILY HISTORY:  Notable for a paternal aunt who is .  She had Parkinson's disease.  Otherwise, there is no other family history of neurologic problems.  There is no family history of dystonia.          90% Tajik    Some norweigian                .          No Known Allergies    Current Outpatient Prescriptions   Medication     botulinum toxin type A (BOTOX) 100 units injection     carbidopa-levodopa (SINEMET CR)  MG per CR tablet     carbidopa-levodopa (SINEMET)  MG per tablet     Cholecalciferol (VITAMIN D) 2000 UNITS tablet     clonazePAM (KLONOPIN) 0.5 MG tablet     OnabotulinumtoxinA (BOTOX IJ)     baclofen (LIORESAL) 10 MG tablet     triamcinolone (KENALOG) 0.1 % cream     zonisamide (ZONEGRAN) 50 MG capsule     No current facility-administered medications for this visit.          REVIEW OF SYSTEMS:  General: POSITIVE for chills/sweats/fever, difficulty sleeping, recent fatigue. Negative for headache or weight gain/loss.  Eyes: Negative for blurred vision, crossed eyes, double vision, recent eye infections, vision flashes, or vision halos.  Ears/Nose/Mouth/Throat: Negative for bleeding gums, difficulty swallowing, earache, ear discharge, hearing loss, hoarseness, nosebleeds, tinnitus, or sinus problems.  Respiratory:Negative for chronic cough, coughing blood. POSITIVE for night sweats. Negative for shortness of breath, Tuberculosis, or wheezing.  Cardiovascular: Negative for chest pain, dyspnea at night, heart murmur, palpitations, pacemaker, pacemaker, poor circulation, swollen legs/feet, or varicose veins.  Gastrointestinal: Negative for melena, hematochezia, chronic diarrhea, heartburn, Hepatitis A/B/C,  "increasing constipation, Liver Disease, nausea, or vomiting.   Genitourinary: POSITIVE for urgency. Negative for Urinary retention, genital discharge, urinary incontinence, or UTI.  Neurological: Negative for syncope, headaches, numbness of arms/legs, tingling in hands/arms/legs, memory problems, or seizures.  Psychological: Negative for anxiety, depression, panic attacks, or restlessness.  Skin: POSITIVE for skin rashes. Negative for chronic skin itching, color changes in hand/feet when cold, poor scarring, non-healing ulcers, unusual moles.  Musculoskeletal: Negative for arthritis, joint swelling in hands/wrists/hips/knees/joints, muscle tenderness in arms/legs, or osteoporosis.  Endocrine: Negative for excessive thirst/hunger, intolerance for warm rooms, loss of libido, multiple broken bones, rapid weight gain/loss, galactorrhea, or thyroid issues.  Hematologic/Lymphatic: POSITIVE for easy skin bruising. Negative for significant fatigue, prolonged bleeding, tender glands/lymph nodes.  Allergies: Negative for asthma or hay fever.      PHYSICAL EXAM  /74  Pulse 90  Temp 96.1  F (35.6  C) (Oral)  Resp 16  Ht 1.681 m (5' 6.2\")  Wt 69.1 kg (152 lb 4.8 oz)  SpO2 99%  Breastfeeding? No  BMI 24.43 kg/m2    General: Awake, alert, oriented. Well nourished, well developed, no acute distress.  HEENT: Head normocephalic, atraumatic. No carotid bruit. Neck supple. Good range of motion. No palpable thyroid mass.  Heart: Regular rhythm and rate. No murmurs.  Lungs: Clear to auscultation and percussion bilaterally. No rhonchi, rales or wheeze. No bruise.  Abdomen: Soft, non-tender, non-distended. No hepatosplenomegaly.  Extremity: Warm with no clubbing or cyanosis. No lower extremity edema.    Neurological:  Awake, alert and oriented to date, time, place and person. Speech fluent.   Pupils equal, round, reactive to light.  Extraocular movement intact.  Visual fields are full on confrontation.  Hearing is grossly " normal to finger rub.   Facial sensation intact.  Face symmetric.  Tongue midline.  Uvula elevates equally.    Motor: full strength throughout.  Sensation: Decreased sensation below the elbow on the right side.  Deep tendon reflexes: 2+ throughout. Negative for clonus. Herndon's sign on the right side. No dysmetria.      IMAGING  MRI brain without/with gadolinium 7/12/2018  MRI images were reviewed and reviewed with the patient.  Mild cerebral atrophy.  Ventricles are reasonable size.  No obvious anatomical contraindication for DBS implantation.      ASSESSMENT   Sandra Jara is a 53 year old right handed woman with dystonia and myoclonus in the right upper extremity.  Also slow progression corticobasal degeneration.    Patient presents today for neurosurgery evaluation as part of her DBS workup.  She has corticobulbar degeneration but her concerning symptoms are that of dystonia and myoclonus in the right upper extremity.  She is unable to carry out typical activities with her right upper extremity.    She gets botox injections and they are becoming less therapeutic.  She is followed by Dr. Hopkins who believes the patient is a good candidate for left GPi DBS for her right side symptoms.  Patient's goals for DBS surgery is to get rid of her jerking and restore function in her right upper extremity.    During today's visit, we discussed both phase I and II of DBS surgery.  We discussed that during Phase I, we would place the DBS electrode on the left side under MAC and microelectrode recording.  She would then return one week later for the phase II with placement of the DBS generator/battery over the left chest wall under general anesthesia.  If she is a unilateral candidate or wait and see strategy candidate, then she will undergo another evaluation/discussion prior to proceeding to the right side implantation.  If she is a bilateral candidate in a staged fashion, she would return three weeks later for  the phase I and II combined for the placement of the DBS electrode on the right side under MAC and microelectrode recording followed by connection to the previously implanted generator/battery.     Risks, benefits, alternative therapies were discussed with the patient, including but not limited to infection and bleeding (intracranial), injury to the brain, stroke, death, hardware failure and possible need for more surgeries.  Surgical procedure was discussed in detail.  I also discussed possible use of AMARILIS for neuronavigation.  All questions were answered, and she expressed understanding.  A full history and physical exam was performed during today's visit.  Her case will be discussed at the Movement Disorder Consensus Group Meeting to determine whether she is a good candidate for DBS surgery.    Briefly, following topic was discussed.    PrimeSense  MRI compatible.  Non-rechargeable and rechargeable generator/battery available.  4 contact electrode.  Communication method: have the  or patient controller on the skin directly over the generator/battery.    A[employer]ott  Not yet MRI compatible.  Will become MRI compatible with retro-approval when FDA approves the device for MRI use.  Non-rechargeable generator/battery.  8 contact segmented/directional electrode.  Communication method: Wireless/bluetooth.    [email or web address]  Not MRI compatible.  Working on generator/battery that will be MRI compatible.  If patient gets an implant and needs an MRI in the future, when the device becomes available, patient can have the upgrade.  Rechargeable generator/battery.  8 contact electrode.  Independent current control at each contacts.  Communication method: Wireless.    I did discuss that the implant technique for these devices are relatively the same which was discussed again.  They all have similar risks associated with the surgery.      PLAN:  1.  We will discuss her case at the Movement Disorder Consensus Group  Meeting, and we will contact her regarding her DBS candidacy.       I, Isaak Coppola, am serving as a scribe to document services personally performed by Efren Triana MD, PhD, based upon my observations and the provider's statements to me. All documentation has been reviewed and edited by the aforementioned doctor prior to being entered into the official medical record.    I, Efren Triana, attest that above named individual is acting in scribe capacity, has observed my performance of the services and has documented them in accordance with my direction. The documentation recorded by the scribe accurately reflects the service I personally performed and the decisions made by me. The document was also partially recorded by me and the entire document was edited by me as well.        65 minutes were spent face to face with the patient of which more than 50% of the time was spent counseling and discussing the above issues regarding diagnosis, surgical and device options, and steps for further evaluation.

## 2018-07-26 NOTE — MR AVS SNAPSHOT
After Visit Summary   7/26/2018    Sandra Jara    MRN: [id number]           Patient Information     Date Of Birth          1965        Visit Information        Provider Department      7/26/2018 12:00 PM Efren Triana MD [facility] Neurosurgery        Today's Diagnoses     Dystonia    -  1    Myoclonus        Corticobasal degeneration        Movement disorder           Follow-ups after your visit        Your next 10 appointments already scheduled     Sep 13, 2018 11:00 AM CDT   (Arrive by 10:45 AM)   Return Botox with Tiffany Hopkins MD   [facility] Neurology ([facility])    [address] [place] [id number] [phone]              Who to contact     Please call your clinic at [phone] to:    Ask questions about your health    Make or cancel appointments    Discuss your medicines    Learn about your test results    Speak to your doctor            Additional Information About Your Visit        MyChart Information     [employer] gives you secure access to your electronic health record. If you see a primary care provider, you can also send messages to your care team and make appointments. If you have questions, please call your primary care clinic.  If you do not have a primary care provider, please call [phone] and they will assist you.      [employer] is an electronic gateway that provides easy, online access to your medical records. With [employer], you can request a clinic appointment, read your test results, renew a prescription or communicate with your care team.     To access your existing account, please contact your [facility] or call [phone] for assistance.        Care EveryWhere ID     This is your Care EveryWhere ID. This could be used by other organizations to access your [facility] medical records  [id number]        Your Vitals Were     Pulse Temperature Respirations  "Height Pulse Oximetry Breastfeeding?    90 96.1  F (35.6  C) (Oral) 16 1.681 m (5' 6.2\") 99% No    BMI (Body Mass Index)                   24.43 kg/m2            Blood Pressure from Last 3 Encounters:   07/26/18 122/74   06/27/18 144/77   05/03/18 120/80    Weight from Last 3 Encounters:   07/26/18 69.1 kg (152 lb 4.8 oz)   06/27/18 68.3 kg (150 lb 8 oz)   05/03/18 69.3 kg (152 lb 11.2 oz)              Today, you had the following     No orders found for display         Today's Medication Changes          These changes are accurate as of 7/26/18 11:59 PM.  If you have any questions, ask your nurse or doctor.               Stop taking these medicines if you haven't already. Please contact your care team if you have questions.     tiZANidine 2 MG tablet   Commonly known as:  ZANAFLEX   Stopped by:  Efren Triana MD                    Primary Care Provider Office Phone # Fax #    Ester Barnes [phone]       [place] [address]        Equal Access to Services     [facility]: Hadii dean Fitzgerald, wahaiderda kim, qaybta kaalmada yovani, senia strong. [facility] [phone].    ATENCIÓN: Si habla español, tiene a schmitt disposición servicios gratuitos de asistencia lingüística. [facility] [phone].    We comply with applicable federal civil rights laws and [place] laws. We do not discriminate on the basis of race, color, national origin, age, disability, sex, sexual orientation, or gender identity.            Thank you!     Thank you for choosing [facility]  for your care. Our goal is always to provide you with excellent care. Hearing back from our patients is one way we can continue to improve our services. Please take a few minutes to complete the written survey that you may receive in the mail after your visit with us. Thank you!             Your Updated Medication List - Protect others around you: " Learn how to safely use, store and throw away your medicines at www.disposemymeds.org.          This list is accurate as of 7/26/18 11:59 PM.  Always use your most recent med list.                   Brand Name Dispense Instructions for use Diagnosis    baclofen 10 MG tablet    LIORESAL    30 tablet    Please take 1/2 tab to 1 tab up to three times a day as needed.    Dystonia, torsion, fragments of       * botulinum toxin type A 100 units injection    BOTOX     Inject 280 Units into the muscle once Lot # [id number] with Expiration Date:  11/2020        * BOTOX IJ      Inject 300 Units as directed once [id number] with Expiration Date:  12/2020        * carbidopa-levodopa  MG per CR tablet    SINEMET CR    30 tablet    Take 1 tablet by mouth At Bedtime    Dystonia, torsion, fragments of       * carbidopa-levodopa  MG per tablet    SINEMET    540 tablet    Take 2 tabs by mouth in the morning and then 1 tab every 4 hours while awake.    Dystonia, torsion, fragments of       clonazePAM 0.5 MG tablet    klonoPIN    45 tablet    Take 0.5 tablets (0.25 mg) by mouth daily as needed    Anxiety       triamcinolone 0.1 % cream    KENALOG     Apply 1 Application topically 3 times daily as needed        vitamin D 2000 units tablet      Take 2,000 Units by mouth daily        zonisamide 50 MG capsule    ZONEGRAN    45 capsule    Take 2 capsules (100 mg) by mouth daily    Dystonia, torsion, fragments of       * Notice:  This list has 4 medication(s) that are the same as other medications prescribed for you. Read the directions carefully, and ask your doctor or other care provider to review them with you.

## 2018-07-26 NOTE — LETTER
7/26/2018       RE: Sandra Jara  [address]  [facility] [address]     Dear Colleague,    Thank you for referring your patient, Sandra Jara, to the [facility] NEUROSURGERY at [facility]. Please see a copy of my visit note below.    HISTORY AND PHYSICAL EXAM    Chief Complaint   Patient presents with     Consult     [facility] NEW, DBS evaluation for dystonia       HISTORY OF PRESENT ILLNESS  Ms. Sandra Jara is a 53 year old right handed  woman who presents for DBS consult.  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  Her symptoms began over 5 years ago with occasional shaking in her right hand and loss of control.  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  She then went to see a neurologist in the [facility] who noticed slower than normal progression for CBD.  It was thought that she may have dystonia.  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 Hopkins.  She does have right upper extremity dystonia and myoclonus.  She is followed by our neurology team, including Dr. Hopkins, for botulism toxin injections.  Her last bolutism injection provided no significant functional improvement so she has been referred for DBS evaluation.  Dr. Hopkins's thoughts are left GPi DBS for right upper extremity dystonia.  Today, she endorses dystonia in her right arm and now going down her right leg.  Cramping and shaking were symptoms that came after loss of control in her right arm/hand.  She wonders about performing activities after surgery, as she does boxing as part of Physical Therapy.  Her goals of DBS surgery is to get rid of the jerking and to get functionality back in her right upper extremity.      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 It demonstrated some mild changes of right carpal tunnel syndrome but was otherwise normal.       History of MRI of brain and brain stem 12/21/2014    A brain MRI scan done on 06/27/2014 revealed no abnormalities.      History of MRI of cervical spine 12/21/2014    A cervical MRI done on 06/10/2014 revealed some mild to moderate degenerative changes but no significant central canal or foraminal stenosis, and no abnormalities of the spinal cord were noted.       Movement disorder 12/21/2014    I saw your patient, Deepali Contreras on 12/15/2014. She is a 49-year-old right-handed female here for evaluation of right upper extremity dysfunction and right hand tremor.  She has noted this problem for the last couple of years. She reports she is losing dexterity in her right hand. She has appreciated a tremor of the right hand with actions such as writing or postural maintenance. She has n       Past Surgical History:   Procedure Laterality Date     APPENDECTOMY         Family History   Problem Relation Age of Onset     Breast Cancer Mother      Cancer - colorectal Mother      Dementia Father      Neurologic Disorder Paternal Aunt      Parkinson's Disease     Cerebrovascular Disease Brother      stroke at age 25 possibly from pfos       Social History     Social History     Marital status:      Spouse name: N/A     Number of children: N/A     Years of education: N/A     Occupational History     Not on file.     Social History Main Topics     Smoking status: Never Smoker     Smokeless tobacco: Never Used  "    Alcohol use 1.2 - 1.8 oz/week      Comment: WEEKLY     Drug use: No     Sexual activity: Yes     Partners: Male     Birth control/ protection: Male Surgical     Other Topics Concern     Parent/Sibling W/ Cabg, Mi Or Angioplasty Before 65f 55m? No     Social History Narrative        Izaiah Jara    L[place] in [facility]      She does not smoke.  She has a couple of beers to drink on the weekend but otherwise is not a heavy user of alcohol    3 kids: son peña 21; 94, 96 and 98    2 sons    1 daughter    2 are in college and daughter is a sophomore.            FAMILY HISTORY:  Notable for a paternal aunt who is .  She had Parkinson's disease.  Otherwise, there is no other family history of neurologic problems.  There is no family history of dystonia.          90% Khmer    Some norweigian                .          No Known Allergies    Current Outpatient Prescriptions   Medication     botulinum toxin type A (BOTOX) 100 units injection     carbidopa-levodopa (SINEMET CR)  MG per CR tablet     carbidopa-levodopa (SINEMET)  MG per tablet     Cholecalciferol (VITAMIN D) 2000 UNITS tablet     clonazePAM (KLONOPIN) 0.5 MG tablet     OnabotulinumtoxinA (BOTOX IJ)     baclofen (LIORESAL) 10 MG tablet     triamcinolone (KENALOG) 0.1 % cream     zonisamide (ZONEGRAN) 50 MG capsule     No current facility-administered medications for this visit.      PHYSICAL EXAM  /74  Pulse 90  Temp 96.1  F (35.6  C) (Oral)  Resp 16  Ht 1.681 m (5' 6.2\")  Wt 69.1 kg (152 lb 4.8 oz)  SpO2 99%  Breastfeeding? No  BMI 24.43 kg/m2    General: Awake, alert, oriented. Well nourished, well developed, no acute distress.  HEENT: Head normocephalic, atraumatic. No carotid bruit. Neck supple. Good range of motion. No palpable thyroid mass.  Heart: Regular rhythm and rate. No murmurs.  Lungs: Clear to auscultation and percussion bilaterally. No rhonchi, rales or wheeze. No bruise.  Abdomen: Soft, " non-tender, non-distended. No hepatosplenomegaly.  Extremity: Warm with no clubbing or cyanosis. No lower extremity edema.    Neurological:  Awake, alert and oriented to date, time, place and person. Speech fluent.   Pupils equal, round, reactive to light.  Extraocular movement intact.  Visual fields are full on confrontation.  Hearing is grossly normal to finger rub.   Facial sensation intact.  Face symmetric.  Tongue midline.  Uvula elevates equally.    Motor: full strength throughout.  Sensation: Decreased sensation below the elbow on the right side.  Deep tendon reflexes: 2+ throughout. Negative for clonus. Herndon's sign on the right side. No dysmetria.      IMAGING  MRI brain without/with gadolinium 7/12/2018  MRI images were reviewed and reviewed with the patient.  Mild cerebral atrophy.  Ventricles are reasonable size.  No obvious anatomical contraindication for DBS implantation.      ASSESSMENT   Sandra Jara is a 53 year old right handed woman with dystonia and myoclonus in the right upper extremity.  Also slow progression corticobasal degeneration.    Patient presents today for neurosurgery evaluation as part of her DBS workup.  She has corticobulbar degeneration but her concerning symptoms are that of dystonia and myoclonus in the right upper extremity.  She is unable to carry out typical activities with her right upper extremity.    She gets botox injections and they are becoming less therapeutic.  She is followed by Dr. Hopkins who believes the patient is a good candidate for left GPi DBS for her right side symptoms.  Patient's goals for DBS surgery is to get rid of her jerking and restore function in her right upper extremity.    During today's visit, we discussed both phase I and II of DBS surgery.  We discussed that during Phase I, we would place the DBS electrode on the left side under MAC and microelectrode recording.  She would then return one week later for the phase II with placement  of the DBS generator/battery over the left chest wall under general anesthesia.  If she is a unilateral candidate or wait and see strategy candidate, then she will undergo another evaluation/discussion prior to proceeding to the right side implantation.  If  she is a bilateral candidate in a staged fashion, she would return three weeks later for the phase I and II combined for the placement of the DBS electrode on the right side under MAC and microelectrode recording followed by connection to the previously implanted generator/battery.     Risks, benefits, alternative therapies were discussed with the patient, including but not limited to infection and bleeding (intracranial), injury to the brain, stroke, death, hardware failure and possible need for more surgeries.  Surgical procedure was discussed in detail.  I also discussed possible use of AMARILIS for neuronavigation.  All questions were answered, and she expressed understanding.  A full history and physical exam was performed during today's visit.  Her case will be discussed at the Movement Disorder Consensus Group Meeting to determine whether she is a good candidate for DBS surgery.    Briefly, following topic was discussed.    [employer]  MRI compatible.  Non-rechargeable and rechargeable generator/battery available.  4 contact electrode.  Communication method: have the  or patient controller on the skin directly over the generator/battery.    [employer]  Not yet MRI compatible.  Will become MRI compatible with retro-approval when FDA approves the device for MRI use.  Non-rechargeable generator/battery.  8 contact segmented/directional electrode.  Communication method: Wireless/bluetooth.    [employer]  Not MRI compatible.  Working on generator/battery that will be MRI compatible.  If patient gets an implant and needs an MRI in the future, when the device becomes available, patient can have the upgrade.  Rechargeable generator/battery.  8 contact  electrode.  Independent current control at each contacts.  Communication method: Wireless.    I did discuss that the implant technique for these devices are relatively the same which was discussed again.  They all have similar risks associated with the surgery.      PLAN:  1.  We will discuss her case at the Movement Disorder Consensus Group Meeting, and we will contact her regarding her DBS candidacy.       I, Isaak Abdon, am serving as a scribe to document services personally performed by Efren Triana MD, PhD, based upon my observations and the provider's statements to me. All documentation has been reviewed and edited by the aforementioned doctor prior to being entered into the official medical record.    I, Efren Triana, attest that above named individual is acting in scribe capacity, has observed my performance of the services and has documented them in accordance with my direction. The documentation recorded by the scribe accurately reflects the service I personally performed and the decisions made by me. The document was also partially recorded by me and the entire document was edited by me as well.        65 minutes were spent face to face with the patient of which more than 50% of the time was spent counseling and discussing the above issues regarding diagnosis, surgical and device options, and steps for further evaluation.      Again, thank you for allowing me to participate in the care of your patient.      Sincerely,    Efren Triana MD

## 2018-07-30 NOTE — PROGRESS NOTES
WAIS-IV    Raw              Age Scaled   Similarities   29    11   Comprehension        33    15   Letter Num. Seq.   19      9   Digit Span   23      8   Matrix Reasoning   22    14     WIDE RANGE ACHIEVEMENT TEST - 4    Standard  %tile              Grade      Score  Rank              Equiv.  Reading    87     19          10.2       WECHSLER MEMORY SCALE - REVISED    Raw Score       %ile         Information & Orientation        13   Logical Memory  Immed.   29    74    Logical Memory  30 min.   28    85      DOZIER VERBAL LEARNING TEST - REVISED  Form 1                                              Raw                  T                                        Trial 1               7   Trial 2               9   Trial 3     12   Total 1-3     28       51   Learning  5   Delay     12       61   Percent Retained   100       55   True Positives     12   Discrimination Index     11       51   False Positives  1     BOSTON NAMING TEST  Score (Correct+Stim cue)        53      T    39       # correct Stimulus Cue:             0           # correct Phonemic Cue:            4        D-KEFS VERBAL FLUENCY  Standard    Raw              Age Scaled   Letter Fluency   22     6    Category Fluency        23     4   Switching Fluency    Total Correct     9     5    Switching Acc.   10     8     STROOP                    Raw   +  Carla =     Total        T            Word  53  +   8 =     61    27    Color  48  +   4 =     52    31    C-W  32  +   5 =     37    42      TSAT: Total Time: 132    z: -1.05  Total Errors: 1   z: 0.52    CLOCK DRAWING  Command  1 /3    Copy   1   /3   tremor    WISCONSIN CARD SORTING TEST - 64   # Categories      4             >16    %ile  # persev err.          6         T        47        Conc lev resp.       45        T     45         FMS        0           WITT JUDGMENT OF LINE ORIENTATION  Form H  Raw     26  MAS      86     SANZ DEPRESSION INVENTORY - 2: 3  APATHY SCALE SCORE: 3

## 2018-08-28 ENCOUNTER — MYC MEDICAL ADVICE (OUTPATIENT)
Dept: NEUROLOGY | Facility: CLINIC | Age: 53
End: 2018-08-28

## 2018-08-31 ENCOUNTER — TELEPHONE (OUTPATIENT)
Dept: NEUROLOGY | Facility: CLINIC | Age: 53
End: 2018-08-31

## 2018-08-31 NOTE — TELEPHONE ENCOUNTER
"Dr. Lorenz's [employer] message to patient:    I am one of the movement disorder fellows working with Dr. Hopkins. I'm sorry I could not reach you on the phone. The DBS consensus committee met yesterday to discuss your case. We determined that you could be a DBS candidate. Dr. Hopkins would like to meet with you in person about what you may or may not expect to improve with DBS. We have seen good results for DBS to treat dystonia and myoclonus - which may improve with your diagnosis of cortical basal syndrome. Other symptoms such as alien limb (or \"apraxia\") may not improve. I think it is a good idea for us to talk in detail about these things.     The next step will be to schedule a one hour appointment with Dr. Hopkins to go over DBS, what symptoms may improve, and what symptoms may not improve. Then we can decide if you would like to move forward with the surgery.         "

## 2018-09-13 ENCOUNTER — OFFICE VISIT (OUTPATIENT)
Dept: NEUROLOGY | Facility: CLINIC | Age: 53
End: 2018-09-13
Payer: COMMERCIAL

## 2018-09-13 VITALS
OXYGEN SATURATION: 98 % | DIASTOLIC BLOOD PRESSURE: 85 MMHG | SYSTOLIC BLOOD PRESSURE: 125 MMHG | HEART RATE: 83 BPM | WEIGHT: 151.4 LBS | TEMPERATURE: 98 F | BODY MASS INDEX: 24.29 KG/M2

## 2018-09-13 DIAGNOSIS — R25.1 TREMOR: ICD-10-CM

## 2018-09-13 DIAGNOSIS — G31.85 CORTICOBASAL SYNDROME (H): ICD-10-CM

## 2018-09-13 DIAGNOSIS — G24.1 DYSTONIA, TORSION, FRAGMENTS OF: Primary | ICD-10-CM

## 2018-09-13 PROBLEM — E78.5 HYPERLIPIDEMIA: Status: ACTIVE | Noted: 2018-08-02

## 2018-09-13 RX ORDER — CLONAZEPAM 0.5 MG/1
0.25 TABLET ORAL AT BEDTIME
Qty: 15 TABLET | Refills: 5 | Status: SHIPPED | OUTPATIENT
Start: 2018-09-13 | End: 2019-03-08

## 2018-09-13 RX ORDER — CARBIDOPA AND LEVODOPA 50; 200 MG/1; MG/1
1 TABLET, EXTENDED RELEASE ORAL AT BEDTIME
Qty: 30 TABLET | Refills: 6 | Status: SHIPPED | OUTPATIENT
Start: 2018-09-13 | End: 2019-03-20

## 2018-09-13 RX ORDER — CLONAZEPAM 0.5 MG/1
0.25 TABLET ORAL AT BEDTIME
Qty: 15 TABLET | Refills: 3 | COMMUNITY
Start: 2018-09-13 | End: 2018-09-13

## 2018-09-13 NOTE — LETTER
9/13/2018       RE: Sandra Jara  [address]  [place] [address]     Dear Colleague,    Thank you for referring your patient, Sandra Jara, to the [facility] NEUROLOGY at [facility]. Please see a copy of my visit note below.    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    Response to Last Injection:   She reports she had good effect for the arm except that the fingers were flexing more this time. It felt like there was no relief on the finger flexion, whereas in the past she had benefit from this.    Onset of effect:  Couple of weeks    Benefit from last injections:  As above    Wearing off: She noticed wearing off for the past week. She takes baclofen when it's wearing-off    Side effects: None    Additional interval history:   Her right knee feels tighter when she is going down stairs especially.  Reports the jerking is worst in morning. That's why she takes three Sinemet at that time.    Current Outpatient Prescriptions   Medication Sig Dispense Refill     baclofen (LIORESAL) 10 MG tablet Please take 1/2 tab to 1 tab up to three times a day as needed. 30 tablet 3     botulinum toxin type A (BOTOX) 100 units injection Inject 280 Units into the muscle once Lot # /C3 with Expiration Date:  11/2020       carbidopa-levodopa (SINEMET CR)  MG per CR tablet Take 1 tablet by mouth At Bedtime 30 tablet 6     Cholecalciferol (VITAMIN D) 2000 UNITS tablet Take 2,000 Units by mouth daily       clonazePAM (KLONOPIN) 0.5 MG tablet Take 0.5 tablets (0.25 mg) by mouth At Bedtime 15 tablet 5     OnabotulinumtoxinA (BOTOX IJ) Inject 300 Units as directed once [id number] with Expiration Date:   12/2020       triamcinolone (KENALOG) 0.1 % cream Apply 1 Application topically 3 times daily as needed       carbidopa-levodopa (SINEMET)  MG per tablet Take 2 tabs by mouth in the morning and then 1 tab every 4 hours while awake. 540 tablet 3     [DISCONTINUED] carbidopa-levodopa (SINEMET CR)  MG per CR tablet Take 1 tablet by mouth At Bedtime 30 tablet 6     [DISCONTINUED] clonazePAM (KLONOPIN) 0.5 MG tablet Take 0.5 tablets (0.25 mg) by mouth At Bedtime 15 tablet 3     [DISCONTINUED] clonazePAM (KLONOPIN) 0.5 MG tablet Take 0.5 tablets (0.25 mg) by mouth daily as needed 45 tablet 1     PD Medications                   530 1000 200 600 10   C/L 25/100                                 3 2 2 2 2    clonazepam 0.5mg                            1           Baclofen 10mg, 1/2 tab daily PRN when Botox wearing-off      Allergies: She has No Known Allergies.    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 It demonstrated some mild changes of right carpal tunnel syndrome but was otherwise normal.       History of MRI of brain and brain stem 12/21/2014    A brain MRI scan done on 06/27/2014 revealed no abnormalities.      History of MRI of cervical spine 12/21/2014    A cervical MRI done on 06/10/2014 revealed some mild to moderate degenerative changes but no significant central canal or foraminal stenosis, and no abnormalities of the spinal cord were noted.       Movement disorder 12/21/2014    I saw your patient, Deepali Contreras on 12/15/2014. She is a 49-year-old right-handed female  here for evaluation of right upper extremity dysfunction and right hand tremor.  She has noted this problem for the last couple of years. She reports she is losing dexterity in her right hand. She has appreciated a tremor of the right hand with actions such as writing or postural maintenance. She has n       Past Surgical History:   Procedure Laterality Date     APPENDECTOMY         Social History     Social History     Marital status:      Spouse name: N/A     Number of children: N/A     Years of education: N/A     Occupational History     Not on file.     Social History Main Topics     Smoking status: Never Smoker     Smokeless tobacco: Never Used     Alcohol use 1.2 - 1.8 oz/week      Comment: WEEKLY     Drug use: No     Sexual activity: Yes     Partners: Male     Birth control/ protection: Male Surgical     Other Topics Concern     Parent/Sibling W/ Cabg, Mi Or Angioplasty Before 65f 55m? No     Social History Narrative        Izaiah Jara    Lives in [facility]      She does not smoke.  She has a couple of beers to drink on the weekend but otherwise is not a heavy user of alcohol    3 kids: son peña 21; 94, 96 and 98    2 sons    1 daughter    2 are in college and daughter is a sophomore.            FAMILY HISTORY:  Notable for a paternal aunt who is .  She had Parkinson's disease.  Otherwise, there is no other family history of neurologic problems.  There is no family history of dystonia.          90% Welsh    Some norweigian                .         Family History   Problem Relation Age of Onset     Breast Cancer Mother      Cancer - colorectal Mother      Dementia Father      Neurologic Disorder Paternal Aunt      Parkinson's Disease     Cerebrovascular Disease Brother      stroke at age 25 possibly from pfos       Physical Examination:  Vital Signs:   weight is 68.7 kg (151 lb 6.4 oz). Her oral temperature is 98  F (36.7  C). Her blood pressure is 125/85 and her  pulse is 83. Her oxygen saturation is 98%.   Mild flexion of right elbow. Tends to hold right arm slightly abducted, especially when walking.  Fingers are held in flexed position, mostly at MCP joint. Wrist is extended. Intermittent myoclonic jerks worse with action. Thumb slightly adducted and opposed.      BOTULINUM NEUROTOXIN INJECTION PROCEDURES:    VERIFICATION OF PATIENT IDENTIFICATION AND PROCEDURE     Initials   Patient Name EMJ   Patient  EMJ   Procedure Verified by: EMJ     Prior to the start of the procedure and with procedural staff participation, I verbally confirmed the patient s identity using two indicators, relevant allergies, that the procedure was appropriate and matched the consent or emergent situation, and that the correct equipment/implants were available. Immediately prior to starting the procedure I conducted the Time Out with the procedural staff and re-confirmed the patient s name, procedure, and site/side. (The Joint Commission universal protocol was followed.)  Yes    Sedation (Moderate or Deep): None      Above assessments performed by:  Resident/Fellow         Joey Bradford MD          The attending provider was present for the entire procedure documented below.      Tiffany Hopkins MD      INDICATION/S FOR PROCEDURE/S:  Sandra Jara is a 53 year old year old patient with dystonia affecting the  right upper extremity secondary to a diagnosis of corticobasal syndrome with associated  pain, spasms and loss of volitional motor control.     Her baseline symptoms have been recalcitrant to oral medications and conservative therapy.  She is here today for an injection of Botox.      GOAL OF PROCEDURE:  The goal of this procedure is to increase active range of motion, improve volitional motor control and decrease pain  associated with dystonic movements.    TOTAL DOSE ADMINISTERED:  Dose Administered:  280 units Botox    Diluent Used:  Preservative Free Normal Saline  Total  Volume of Diluent Used:  3 ml  Lot # [id number] with Expiration Date:  3/2021  NDC #: Botox 100u ([address]-91)    Medication guide was offered to patient and was declined.    CONSENT:  The risks, benefits, and treatment options were discussed with Sandra Jara and she agreed to proceed.      Written consent was obtained by EMJ.     EQUIPMENT USED:  Needle-37mm stimulating/recording    SKIN PREPARATION:  Skin preparation was performed using an alcohol wipe.    GUIDANCE DESCRIPTION:  Electro-myographic guidance was necessary throughout the procedure to accurately identify all areas of dystonic muscles while avoiding injection of non-dystonic muscles, neighboring nerves and nearby vascular structures.     AREA/MUSCLE INJECTED:      Muscles Injected (All on RIGHT) Units Injected (prior dose) Number of Injections   Biceps 60 (50) 2   Pronator teres 20 (20) 1   FDS 60 (30) 5   deltoid 50 (50) 2   brachioradialis 30 (30) 1   ECR 30 (20) 1   ECU 30 (35) 2   Adductor pollicis 0 (10) 0   FCU 0 (20) 0   FCR 0 (25) 0   Extensor indicis 0 (8) 0                           Total Units Injected: 280     Unavoidable Waste: 20     Total Units Billed 300        The patient tolerated the injections without difficulty.      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  Today we did repeat botulinum toxin injections. Increased dose of the FDS due to poor effect for finger flexion from last treatment. It is possible this was also due to injecting the finger extensors, so care was taken to avoid that muscle at this visit.    Additionally, Sinemet CR was added due to prominence of early morning symptoms.    Finally, we discussed DBS surgery for her right arm dystonia and myoclonus. We discussed there is no data for efficacy with her condition, however that DBS can be effective for dystonia and myoclonus in other conditions. We discussed that response would be uncertain. She  reported she wanted to go ahead with the surgery    Plan  Follow-up in 3 months' time to consider repeat injections   Added Sinemet CR at bedtime  She wanted to discuss her neuropsychology results, message was sent to Dr. Hannon  Will go ahead with scheduling DBS surgery    Joey Bradford MD  Movement Disorders Fellow    Neurology Attending Attestation:     I, Tiffany Hopkins, personally saw this patient with our Movement Disorders Fellow and agree with the fellow's findings and plan of care as documented in the movement disorder fellow's note. I personally performed salient aspects of the history and neurological examination.     I personally reviewed the vital signs, medications, and labs. I personally viewed the imaging, and agree with the interpretation documented by the fellow.    I personally performed or supervised all procedures.    Time spent with patient: Greater than 50% of this 50 minute visit was spent in counseling and coordination of care related to the above issues.      Tiffany Hopkins MD    of Neurology

## 2018-09-13 NOTE — MR AVS SNAPSHOT
After Visit Summary   9/13/2018    Sandra Jara    MRN: [id number]           Patient Information     Date Of Birth          1965        Visit Information        Provider Department      9/13/2018 11:00 AM Tiffany Hopkins MD [facility] Neurology        Today's Diagnoses     Corticobasal syndrome (HCC)    -  1    Tremor        Dystonia, torsion, fragments of          Care Instructions    1. We will get you on the DBS surgery schedule.    2. In the meantime, to hep your morning jerking, we will restart carbidopa/levodopa 50/200 1 tab at 10PM. Also reduce your 10PM dose of carbidopa/levodopa 25/100 to 1 tab.      PD Medications                   530 1000 200 600 10   Carbidopa/levodopa 25/100                                 3 2 2 2 1    clonazepam 0.5mg                            1       Carbidopa/levodopa CR 50/200                           1                 Follow-ups after your visit        Your next 10 appointments already scheduled     Dec 20, 2018 12:00 PM CST   (Arrive by 11:45 AM)   Return Botox with Tiffany Hopkins MD   [facility] Neurology ([facility])    [address] [place] [id number] [phone]              Who to contact     Please call your clinic at [phone] to:    Ask questions about your health    Make or cancel appointments    Discuss your medicines    Learn about your test results    Speak to your doctor            Additional Information About Your Visit        MyChart Information     ONEPLEt gives you secure access to your electronic health record. If you see a primary care provider, you can also send messages to your care team and make appointments. If you have questions, please call your primary care clinic.  If you do not have a primary care provider, please call [phone] and they will assist you.      [employer]ia is an electronic gateway that provides easy, online access to your medical  records. With mBlox, you can request a clinic appointment, read your test results, renew a prescription or communicate with your care team.     To access your existing account, please contact your [facility] or call [phone] for assistance.        Care EveryWhere ID     This is your Care EveryWhere ID. This could be used by other organizations to access your [facility] medical records  [id number]        Your Vitals Were     Pulse Temperature Pulse Oximetry Breastfeeding? BMI (Body Mass Index)       83 98  F (36.7  C) (Oral) 98% No 24.29 kg/m2        Blood Pressure from Last 3 Encounters:   09/13/18 125/85   07/26/18 122/74   06/27/18 144/77    Weight from Last 3 Encounters:   09/13/18 68.7 kg (151 lb 6.4 oz)   07/26/18 69.1 kg (152 lb 4.8 oz)   06/27/18 68.3 kg (150 lb 8 oz)              Today, you had the following     No orders found for display         Today's Medication Changes          These changes are accurate as of 9/13/18  1:36 PM.  If you have any questions, ask your nurse or doctor.               Start taking these medicines.        Dose/Directions    clonazePAM 0.5 MG tablet   Commonly known as:  klonoPIN   Started by:  Tiffany Hopkins MD        Dose:  0.25 mg   Take 0.5 tablets (0.25 mg) by mouth At Bedtime   Quantity:  15 tablet   Refills:  5         Stop taking these medicines if you haven't already. Please contact your care team if you have questions.     zonisamide 50 MG capsule   Commonly known as:  ZONEGRAN   Stopped by:  Tiffany Hopkins MD                Where to get your medicines      These medications were sent to [id number] - [place], [place] - [address] [place] [address]     Phone:  [phone]     carbidopa-levodopa  MG per CR tablet         Some of these will need a paper prescription and others can be bought over the counter.  Ask your nurse if you have questions.     Bring a paper  prescription for each of these medications     clonazePAM 0.5 MG tablet                Primary Care Provider Office Phone # Fax #    Ester Barnes [phone]       [address] [address]        Equal Access to Services     MANDEEP PIERCE : Ladi dietirch funmilayoomlly Soenzo, wahaiderda luqadaha, qaybta kaalmada adecolleen, senia baljeetin hayaasoy oneal derekvin strong. So [facility] [phone].    ATENCIÓN: Si habla español, tiene a schmitt disposición servicios gratuitos de asistencia lingüística. Llame al [phone].    We comply with applicable federal civil rights laws and [place] laws. We do not discriminate on the basis of race, color, national origin, age, disability, sex, sexual orientation, or gender identity.            Thank you!     Thank you for choosing [facility] NEUROLOGY  for your care. Our goal is always to provide you with excellent care. Hearing back from our patients is one way we can continue to improve our services. Please take a few minutes to complete the written survey that you may receive in the mail after your visit with us. Thank you!             Your Updated Medication List - Protect others around you: Learn how to safely use, store and throw away your medicines at www.disposemymeds.org.          This list is accurate as of 9/13/18  1:36 PM.  Always use your most recent med list.                   Brand Name Dispense Instructions for use Diagnosis    baclofen 10 MG tablet    LIORESAL    30 tablet    Please take 1/2 tab to 1 tab up to three times a day as needed.    Dystonia, torsion, fragments of       * botulinum toxin type A 100 units injection    BOTOX     Inject 280 Units into the muscle once Lot # /C3 with Expiration Date:  11/2020        * BOTOX IJ      Inject 300 Units as directed once [id number] with Expiration Date:  12/2020        * carbidopa-levodopa  MG per tablet    SINEMET    540 tablet    Take 2 tabs by mouth in the morning and then 1 tab  every 4 hours while awake.    Dystonia, torsion, fragments of       * carbidopa-levodopa  MG per CR tablet    SINEMET CR    30 tablet    Take 1 tablet by mouth At Bedtime    Dystonia, torsion, fragments of       clonazePAM 0.5 MG tablet    klonoPIN    15 tablet    Take 0.5 tablets (0.25 mg) by mouth At Bedtime        triamcinolone 0.1 % cream    KENALOG     Apply 1 Application topically 3 times daily as needed        vitamin D 2000 units tablet      Take 2,000 Units by mouth daily        * Notice:  This list has 4 medication(s) that are the same as other medications prescribed for you. Read the directions carefully, and ask your doctor or other care provider to review them with you.

## 2018-09-13 NOTE — PATIENT INSTRUCTIONS
1. We will get you on the DBS surgery schedule.    2. In the meantime, to hep your morning jerking, we will restart carbidopa/levodopa 50/200 1 tab at 10PM. Also reduce your 10PM dose of carbidopa/levodopa 25/100 to 1 tab.      PD Medications                   530 1000 200 600 10   Carbidopa/levodopa 25/100                                 3 2 2 2 1    clonazepam 0.5mg                            1       Carbidopa/levodopa CR 50/200                           1

## 2018-09-13 NOTE — PROGRESS NOTES
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    Response to Last Injection:   She reports she had good effect for the arm except that the fingers were flexing more this time. It felt like there was no relief on the finger flexion, whereas in the past she had benefit from this.    Onset of effect:  Couple of weeks    Benefit from last injections:  As above    Wearing off: She noticed wearing off for the past week. She takes baclofen when it's wearing-off    Side effects: None    Additional interval history:   Her right knee feels tighter when she is going down stairs especially.  Reports the jerking is worst in morning. That's why she takes three Sinemet at that time.    Current Outpatient Prescriptions   Medication Sig Dispense Refill     baclofen (LIORESAL) 10 MG tablet Please take 1/2 tab to 1 tab up to three times a day as needed. 30 tablet 3     botulinum toxin type A (BOTOX) 100 units injection Inject 280 Units into the muscle once Lot # /C3 with Expiration Date:  11/2020       carbidopa-levodopa (SINEMET CR)  MG per CR tablet Take 1 tablet by mouth At Bedtime 30 tablet 6     Cholecalciferol (VITAMIN D) 2000 UNITS tablet Take 2,000 Units by mouth daily       clonazePAM (KLONOPIN) 0.5 MG tablet Take 0.5 tablets (0.25 mg) by mouth At Bedtime 15 tablet 5     OnabotulinumtoxinA (BOTOX IJ) Inject 300 Units as directed once [id number] with Expiration Date:  12/2020       triamcinolone (KENALOG) 0.1 % cream Apply 1 Application topically 3 times daily as needed       carbidopa-levodopa (SINEMET)  MG per tablet Take 2 tabs by mouth in the morning and then 1 tab every 4 hours while awake. 540 tablet 3     [DISCONTINUED] carbidopa-levodopa (SINEMET  CR)  MG per CR tablet Take 1 tablet by mouth At Bedtime 30 tablet 6     [DISCONTINUED] clonazePAM (KLONOPIN) 0.5 MG tablet Take 0.5 tablets (0.25 mg) by mouth At Bedtime 15 tablet 3     [DISCONTINUED] clonazePAM (KLONOPIN) 0.5 MG tablet Take 0.5 tablets (0.25 mg) by mouth daily as needed 45 tablet 1     PD Medications                   530 1000 200 600 10   C/L 25/100                                 3 2 2 2 2    clonazepam 0.5mg                            1           Baclofen 10mg, 1/2 tab daily PRN when Botox wearing-off      Allergies: She has No Known Allergies.    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 It demonstrated some mild changes of right carpal tunnel syndrome but was otherwise normal.       History of MRI of brain and brain stem 12/21/2014    A brain MRI scan done on 06/27/2014 revealed no abnormalities.      History of MRI of cervical spine 12/21/2014    A cervical MRI done on 06/10/2014 revealed some mild to moderate degenerative changes but no significant central canal or foraminal stenosis, and no abnormalities of the spinal cord were noted.       Movement disorder 12/21/2014    I saw your patient, Deepali Contreras on 12/15/2014. She is a 49-year-old right-handed female here for evaluation of right upper extremity dysfunction and right hand tremor.  She has noted this problem for the last couple of years. She reports she is losing dexterity in her right hand. She has appreciated a tremor of the right hand with actions such as writing or postural maintenance. She  has n       Past Surgical History:   Procedure Laterality Date     APPENDECTOMY         Social History     Social History     Marital status:      Spouse name: N/A     Number of children: N/A     Years of education: N/A     Occupational History     Not on file.     Social History Main Topics     Smoking status: Never Smoker     Smokeless tobacco: Never Used     Alcohol use 1.2 - 1.8 oz/week      Comment: WEEKLY     Drug use: No     Sexual activity: Yes     Partners: Male     Birth control/ protection: Male Surgical     Other Topics Concern     Parent/Sibling W/ Cabg, Mi Or Angioplasty Before 65f 55m? No     Social History Narrative        Izaiah Jara    Lives in [facility]      She does not smoke.  She has a couple of beers to drink on the weekend but otherwise is not a heavy user of alcohol    3 kids: son peña 21; 94, 96 and 98    2 sons    1 daughter    2 are in college and daughter is a sophomore.            FAMILY HISTORY:  Notable for a paternal aunt who is .  She had Parkinson's disease.  Otherwise, there is no other family history of neurologic problems.  There is no family history of dystonia.          90% Bahamian    Some norweigian                .         Family History   Problem Relation Age of Onset     Breast Cancer Mother      Cancer - colorectal Mother      Dementia Father      Neurologic Disorder Paternal Aunt      Parkinson's Disease     Cerebrovascular Disease Brother      stroke at age 25 possibly from pfos       Physical Examination:  Vital Signs:   weight is 68.7 kg (151 lb 6.4 oz). Her oral temperature is 98  F (36.7  C). Her blood pressure is 125/85 and her pulse is 83. Her oxygen saturation is 98%.   Mild flexion of right elbow. Tends to hold right arm slightly abducted, especially when walking.  Fingers are held in flexed position, mostly at MCP joint. Wrist is extended. Intermittent myoclonic jerks worse with action. Thumb slightly adducted and  opposed.      BOTULINUM NEUROTOXIN INJECTION PROCEDURES:    VERIFICATION OF PATIENT IDENTIFICATION AND PROCEDURE     Initials   Patient Name EMJ   Patient  EMJ   Procedure Verified by: EMJ     Prior to the start of the procedure and with procedural staff participation, I verbally confirmed the patient s identity using two indicators, relevant allergies, that the procedure was appropriate and matched the consent or emergent situation, and that the correct equipment/implants were available. Immediately prior to starting the procedure I conducted the Time Out with the procedural staff and re-confirmed the patient s name, procedure, and site/side. (The Joint Commission universal protocol was followed.)  Yes    Sedation (Moderate or Deep): None      Above assessments performed by:  Resident/Fellow         Joey Bradford MD          The attending provider was present for the entire procedure documented below.      Tiffany Hopkins MD      INDICATION/S FOR PROCEDURE/S:  Sandra Jara is a 53 year old year old patient with dystonia affecting the  right upper extremity secondary to a diagnosis of corticobasal syndrome with associated  pain, spasms and loss of volitional motor control.     Her baseline symptoms have been recalcitrant to oral medications and conservative therapy.  She is here today for an injection of Botox.      GOAL OF PROCEDURE:  The goal of this procedure is to increase active range of motion, improve volitional motor control and decrease pain  associated with dystonic movements.    TOTAL DOSE ADMINISTERED:  Dose Administered:  280 units Botox    Diluent Used:  Preservative Free Normal Saline  Total Volume of Diluent Used:  3 ml  Lot # [id number] with Expiration Date:  3/2021  NDC #: Botox 100u ([address]-13)    Medication guide was offered to patient and was declined.    CONSENT:  The risks, benefits, and treatment options were discussed with Sandra Jara and she agreed to  proceed.      Written consent was obtained by [facility].     EQUIPMENT USED:  Needle-37mm stimulating/recording    SKIN PREPARATION:  Skin preparation was performed using an alcohol wipe.    GUIDANCE DESCRIPTION:  Electro-myographic guidance was necessary throughout the procedure to accurately identify all areas of dystonic muscles while avoiding injection of non-dystonic muscles, neighboring nerves and nearby vascular structures.     AREA/MUSCLE INJECTED:      Muscles Injected (All on RIGHT) Units Injected (prior dose) Number of Injections   Biceps 60 (50) 2   Pronator teres 20 (20) 1   FDS 60 (30) 5   deltoid 50 (50) 2   brachioradialis 30 (30) 1   ECR 30 (20) 1   ECU 30 (35) 2   Adductor pollicis 0 (10) 0   FCU 0 (20) 0   FCR 0 (25) 0   Extensor indicis 0 (8) 0                           Total Units Injected: 280     Unavoidable Waste: 20     Total Units Billed 300        The patient tolerated the injections without difficulty.      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  Today we did repeat botulinum toxin injections. Increased dose of the FDS due to poor effect for finger flexion from last treatment. It is possible this was also due to injecting the finger extensors, so care was taken to avoid that muscle at this visit.    Additionally, Sinemet CR was added due to prominence of early morning symptoms.    Finally, we discussed DBS surgery for her right arm dystonia and myoclonus. We discussed there is no data for efficacy with her condition, however that DBS can be effective for dystonia and myoclonus in other conditions. We discussed that response would be uncertain. She reported she wanted to go ahead with the surgery    Plan  Follow-up in 3 months' time to consider repeat injections   Added Sinemet CR at bedtime  She wanted to discuss her neuropsychology results, message was sent to Dr. Hannon  Will go ahead with scheduling DBS surgery    Joey Bradford  MD  Movement Disorders Fellow    Neurology Attending Attestation:     I, Tiffany Hopkins, personally saw this patient with our Movement Disorders Fellow and agree with the fellow's findings and plan of care as documented in the movement disorder fellow's note. I personally performed salient aspects of the history and neurological examination.     I personally reviewed the vital signs, medications, and labs. I personally viewed the imaging, and agree with the interpretation documented by the fellow.    I personally performed or supervised all procedures.    Time spent with patient: Greater than 50% of this 50 minute visit was spent in counseling and coordination of care related to the above issues.      Tiffany Hopkins MD    of Neurology

## 2018-09-14 ENCOUNTER — TELEPHONE (OUTPATIENT)
Dept: NEUROLOGY | Facility: CLINIC | Age: 53
End: 2018-09-14

## 2018-09-14 NOTE — TELEPHONE ENCOUNTER
From 9/13/18 Deep Brain Stimulation Consensus:    1. Yes LGpi - tremor and dystonia  2. Research - Syd to do striatal and pallidal recording (EMG with recordings)  3. Infinity 5 - unilateral  4. Interest in research?    Called patient to discuss. Left voice mail on her home and cell phones. Sending [facility] message as well.

## 2018-10-08 ENCOUNTER — TELEPHONE (OUTPATIENT)
Dept: NEUROPSYCHOLOGY | Facility: CLINIC | Age: 53
End: 2018-10-08

## 2018-10-08 NOTE — TELEPHONE ENCOUNTER
Reviewed the results of the 7/12/2018 neuropsychological evaluation over the telephone with the patient and her . They noted that she had a prior neuropsychological evaluation in [place] about two years ago. From what they remembered, the results sounded similar. We will attempt to obtain that evaluation in order to make comparisons. Their questions were answered.

## 2018-10-16 DIAGNOSIS — G24.9 DYSTONIA: ICD-10-CM

## 2018-10-16 DIAGNOSIS — R25.1 TREMOR: Primary | ICD-10-CM

## 2018-10-30 ENCOUNTER — HOSPITAL ENCOUNTER (INPATIENT)
Facility: CLINIC | Age: 53
Setting detail: SURGERY ADMIT
End: 2018-10-30
Attending: NEUROLOGICAL SURGERY | Admitting: NEUROLOGICAL SURGERY
Payer: COMMERCIAL

## 2018-10-31 DIAGNOSIS — G24.9 DYSTONIA: Primary | ICD-10-CM

## 2018-11-20 ENCOUNTER — TELEPHONE (OUTPATIENT)
Dept: NEUROLOGY | Facility: CLINIC | Age: 53
End: 2018-11-20

## 2018-11-20 DIAGNOSIS — G24.1 DYSTONIA, TORSION, FRAGMENTS OF: Primary | ICD-10-CM

## 2018-11-20 NOTE — TELEPHONE ENCOUNTER
[facility] Call Center    Phone Message    May a detailed message be left on voicemail: yes    Reason for Call: Other: Gina from [facility] calling because they have still not received PT orders for pt and pt is due to come to see them today 11/20, please fax PT orders to [phone]     Action Taken: Message routed to:  [facility] ([facility] Neurology Clinic

## 2018-11-20 NOTE — TELEPHONE ENCOUNTER
Faxed orders for physical therapy evaluate and treat to [facility] per patient's request. That fax went to:    [phone]

## 2018-11-27 ENCOUNTER — TRANSFERRED RECORDS (OUTPATIENT)
Dept: HEALTH INFORMATION MANAGEMENT | Facility: CLINIC | Age: 53
End: 2018-11-27

## 2018-12-05 ENCOUNTER — PATIENT OUTREACH (OUTPATIENT)
Dept: CARE COORDINATION | Facility: CLINIC | Age: 53
End: 2018-12-05

## 2018-12-10 ENCOUNTER — OFFICE VISIT (OUTPATIENT)
Dept: NEUROLOGY | Facility: CLINIC | Age: 53
End: 2018-12-10
Payer: COMMERCIAL

## 2018-12-10 VITALS
BODY MASS INDEX: 24.29 KG/M2 | HEART RATE: 69 BPM | HEIGHT: 66 IN | SYSTOLIC BLOOD PRESSURE: 123 MMHG | DIASTOLIC BLOOD PRESSURE: 73 MMHG

## 2018-12-10 DIAGNOSIS — G24.9 DYSTONIA: Primary | ICD-10-CM

## 2018-12-10 ASSESSMENT — PAIN SCALES - GENERAL: PAINLEVEL: NO PAIN (0)

## 2018-12-10 NOTE — NURSING NOTE
Chief Complaint   Patient presents with     Consult     [facility] BOTOX INJECTION     Sushma Monique MA

## 2018-12-10 NOTE — LETTER
12/10/2018       RE: Sandra Jara  [address]  [place] [facility] [place] [address]     Dear Colleague,    Thank you for referring your patient, Sandra Jara, to the [facility] NEUROLOGY at [facility]. Please see a copy of my visit note below.    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 Patient was seen in Dr. Hopkins's absence.     Response to Last Injection:   She had last injection on 9/13/2018.  She reports she had good effect for the arm except that the wrist was flexing more. The fingers also stayed flexed     Onset of effect:  Couple of weeks     Benefit from last injections:  As above     Wearing off: She noticed wearing off  around the end of November. She takes baclofen when it's wearing-off     Side effects: None     Additional interval history:   Her right knee feels tighter when she is going down stairs especially.  Reports the jerking is worst in morning. That's why she takes three Sinemet at that time.     Current Outpatient Prescriptions          Current Outpatient Prescriptions   Medication Sig Dispense Refill     baclofen (LIORESAL) 10 MG tablet Please take 1/2 tab to 1 tab up to three times a day as needed. 30 tablet 3     botulinum toxin type A (BOTOX) 100 units injection Inject 280 Units into the muscle once Lot # /C3 with Expiration Date:  11/2020         carbidopa-levodopa (SINEMET CR)  MG per CR tablet Take 1 tablet by mouth At Bedtime 30 tablet 6     Cholecalciferol (VITAMIN D) 2000 UNITS tablet Take 2,000 Units by mouth daily         clonazePAM (KLONOPIN) 0.5 MG tablet Take 0.5 tablets (0.25 mg) by mouth At Bedtime 15 tablet 5     OnabotulinumtoxinA (BOTOX IJ)  Inject 300 Units as directed once [id number] with Expiration Date:  12/2020         triamcinolone (KENALOG) 0.1 % cream Apply 1 Application topically 3 times daily as needed         carbidopa-levodopa (SINEMET)  MG per tablet Take 2 tabs by mouth in the morning and then 1 tab every 4 hours while awake. 540 tablet 3     [DISCONTINUED] carbidopa-levodopa (SINEMET CR)  MG per CR tablet Take 1 tablet by mouth At Bedtime 30 tablet 6     [DISCONTINUED] clonazePAM (KLONOPIN) 0.5 MG tablet Take 0.5 tablets (0.25 mg) by mouth At Bedtime 15 tablet 3     [DISCONTINUED] clonazePAM (KLONOPIN) 0.5 MG tablet Take 0.5 tablets (0.25 mg) by mouth daily as needed 45 tablet 1         PD Medications                   530 1000 200 600 10   C/L 25/100                                 3 2 2 2 2    clonazepam 0.5mg                                1                 Baclofen 10mg, 1/2 tab daily PRN when Botox wearing-off        Allergies: She has No Known Allergies.     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 It demonstrated some mild changes of right carpal tunnel syndrome but was otherwise normal.       History of MRI of brain and brain stem 12/21/2014     A brain MRI scan done on 06/27/2014 revealed no abnormalities.      History of MRI of cervical spine 12/21/2014     A cervical MRI done on 06/10/2014 revealed some mild to moderate degenerative changes but no significant central canal or foraminal stenosis, and no abnormalities of the spinal cord were noted.       Movement  disorder 2014     I saw your patient, Deepali Contreras on 12/15/2014. She is a 49-year-old right-handed female here for evaluation of right upper extremity dysfunction and right hand tremor.  She has noted this problem for the last couple of years. She reports she is losing dexterity in her right hand. She has appreciated a tremor of the right hand with actions such as writing or postural maintenance. She has n            Past Surgical History         Past Surgical History:   Procedure Laterality Date     APPENDECTOMY                Social History   Social History            Social History     Marital status:        Spouse name: N/A     Number of children: N/A     Years of education: N/A          Occupational History     Not on file.              Social History Main Topics     Smoking status: Never Smoker     Smokeless tobacco: Never Used     Alcohol use 1.2 - 1.8 oz/week          Comment: WEEKLY     Drug use: No     Sexual activity: Yes       Partners: Male       Birth control/ protection: Male Surgical           Other Topics Concern     Parent/Sibling W/ Cabg, Mi Or Angioplasty Before 65f 55m? No          Social History Narrative          Izaiah Jara     Lives in [facility]       She does not smoke.  She has a couple of beers to drink on the weekend but otherwise is not a heavy user of alcohol     3 kids: son peña 21; 94, 96 and 98     2 sons     1 daughter     2 are in college and daughter is a sophomore.                 FAMILY HISTORY:  Notable for a paternal aunt who is .  She had Parkinson's disease.  Otherwise, there is no other family history of neurologic problems.  There is no family history of dystonia.             90% Nicaraguan     Some norweigian                       .              Family History          Family History   Problem Relation Age of Onset     Breast Cancer Mother       Cancer - colorectal Mother       Dementia Father       Neurologic  Disorder Paternal Aunt         Parkinson's Disease     Cerebrovascular Disease Brother         stroke at age 25 possibly from pfos            Physical Examination:  Vital Signs:   weight is 68.7 kg (151 lb 6.4 oz).  Her blood pressure is 123/73 and her pulse is 83. Her oxygen saturation is 98%.   Mild flexion of right elbow. Tends to hold right arm slightly abducted, especially when walking.  Fingers are held in flexed position, mostly at MCP joint. Wrist is extended. Intermittent myoclonic jerks worse with action. Thumb slightly adducted and opposed.        BOTULINUM NEUROTOXIN INJECTION PROCEDURES:     VERIFICATION OF PATIENT IDENTIFICATION AND PROCEDURE       Initials   Patient Name JYM   Patient  JYM   Procedure Verified by: STEVE      Prior to the start of the procedure and with procedural staff participation, I verbally confirmed the patient s identity using two indicators, relevant allergies, that the procedure was appropriate and matched the consent or emergent situation, and that the correct equipment/implants were available. Immediately prior to starting the procedure I conducted the Time Out with the procedural staff and re-confirmed the patient s name, procedure, and site/side. (The Joint Commission universal protocol was followed.)  Yes     Sedation (Moderate or Deep): None        Above assessments performed by:  Martínez Hedrick MD        INDICATION/S FOR PROCEDURE/S:  Sandra Jara is a 53 year old year old patient with dystonia affecting the  right upper extremity secondary to a diagnosis of corticobasal syndrome with associated  pain, spasms and loss of volitional motor control.      Her baseline symptoms have been recalcitrant to oral medications and conservative therapy.  She is here today for an injection of Botox.       GOAL OF PROCEDURE:  The goal of this procedure is to increase active range of motion, improve volitional motor control and decrease pain  associated with dystonic  movements.     TOTAL DOSE ADMINISTERED:  Dose Administered:  280 units Botox    Diluent Used:  Preservative Free Normal Saline  Total Volume of Diluent Used:  3 ml  Lot # [id number] with Expiration Date:   6/2021  NDC #: Botox 100u ([address]-57)     Medication guide was offered to patient and was declined.     CONSENT:  The risks, benefits, and treatment options were discussed with Sandra Jara and she agreed to proceed.      Written consent was obtained by EMJ.      EQUIPMENT USED:  Needle-37mm stimulating/recording     SKIN PREPARATION:  Skin preparation was performed using an alcohol wipe.     GUIDANCE DESCRIPTION:  Electro-myographic guidance was necessary throughout the procedure to accurately identify all areas of dystonic muscles while avoiding injection of non-dystonic muscles, neighboring nerves and nearby vascular structures.      AREA/MUSCLE INJECTED:       Muscles Injected (All on RIGHT) Units Injected (prior dose) Number of Injections   Biceps 60 (60,0) 2   Pronator teres 20 ( 20,20) 1   FDS 60 (60,30) 5   deltoid 50 ( 50,0) 2   brachioradialis 30 ( 30,30) 1   ECR 40 (30,20) 1   ECU 35 (35,35) 2   Adductor pollicis 0 (0,10) 0   FCU 30 (0,20) 1   FCR 30 (0,5) 1   Extensor indicis 0 (0,8) 0                           Total Units Injected: 355     Unavoidable Waste: 45     Total Units Billed 400        The patient tolerated the injections without difficulty.        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  Today we did repeat botulinum toxin injections.  She complained of wrist tightness and spasms.  Doses were increased to the wrist extensors and flexors   Additionally, Sinemet CR was added due to prominence of early morning symptoms.     DBS surgery is planned for 2/19     Plan  Follow-up in 3 months' time to consider repeat injections   Added Sinemet CR at bedtime  DBS in 2/19     Martínez Hedrick MD

## 2018-12-10 NOTE — PROGRESS NOTES
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 Patient was seen in Dr. Hopkins's absence.     Response to Last Injection:   She had last injection on 9/13/2018.  She reports she had good effect for the arm except that the wrist was flexing more. The fingers also stayed flexed     Onset of effect:  Couple of weeks     Benefit from last injections:  As above     Wearing off: She noticed wearing off around the end of November. She takes baclofen when it's wearing-off     Side effects: None     Additional interval history:   Her right knee feels tighter when she is going down stairs especially.  Reports the jerking is worst in morning. That's why she takes three Sinemet at that time.     Current Outpatient Prescriptions          Current Outpatient Prescriptions   Medication Sig Dispense Refill     baclofen (LIORESAL) 10 MG tablet Please take 1/2 tab to 1 tab up to three times a day as needed. 30 tablet 3     botulinum toxin type A (BOTOX) 100 units injection Inject 280 Units into the muscle once Lot # /C3 with Expiration Date:  11/2020         carbidopa-levodopa (SINEMET CR)  MG per CR tablet Take 1 tablet by mouth At Bedtime 30 tablet 6     Cholecalciferol (VITAMIN D) 2000 UNITS tablet Take 2,000 Units by mouth daily         clonazePAM (KLONOPIN) 0.5 MG tablet Take 0.5 tablets (0.25 mg) by mouth At Bedtime 15 tablet 5     OnabotulinumtoxinA (BOTOX IJ) Inject 300 Units as directed once [id number] with Expiration Date:  12/2020         triamcinolone (KENALOG) 0.1 % cream Apply 1 Application topically 3 times daily as needed         carbidopa-levodopa (SINEMET)  MG per tablet Take 2 tabs by mouth in the morning and then 1 tab every 4 hours while  awake. 540 tablet 3     [DISCONTINUED] carbidopa-levodopa (SINEMET CR)  MG per CR tablet Take 1 tablet by mouth At Bedtime 30 tablet 6     [DISCONTINUED] clonazePAM (KLONOPIN) 0.5 MG tablet Take 0.5 tablets (0.25 mg) by mouth At Bedtime 15 tablet 3     [DISCONTINUED] clonazePAM (KLONOPIN) 0.5 MG tablet Take 0.5 tablets (0.25 mg) by mouth daily as needed 45 tablet 1         PD Medications                   530 1000 200 600 10   C/L 25/100                                 3 2 2 2 2    clonazepam 0.5mg                                1                 Baclofen 10mg, 1/2 tab daily PRN when Botox wearing-off        Allergies: She has No Known Allergies.     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 It demonstrated some mild changes of right carpal tunnel syndrome but was otherwise normal.       History of MRI of brain and brain stem 12/21/2014     A brain MRI scan done on 06/27/2014 revealed no abnormalities.      History of MRI of cervical spine 12/21/2014     A cervical MRI done on 06/10/2014 revealed some mild to moderate degenerative changes but no significant central canal or foraminal stenosis, and no abnormalities of the spinal cord were noted.       Movement disorder 12/21/2014     I saw your patient, Deepali Contreras on 12/15/2014. She is a 49-year-old right-handed female here for evaluation of right upper extremity dysfunction and right hand tremor.  She has noted this problem for the last couple of years. She reports she is losing dexterity in her  right hand. She has appreciated a tremor of the right hand with actions such as writing or postural maintenance. She has n            Past Surgical History   Past Surgical History:   Procedure Laterality Date     APPENDECTOMY                Social History   Social History            Social History     Marital status:        Spouse name: N/A     Number of children: N/A     Years of education: N/A          Occupational History     Not on file.              Social History Main Topics     Smoking status: Never Smoker     Smokeless tobacco: Never Used     Alcohol use 1.2 - 1.8 oz/week          Comment: WEEKLY     Drug use: No     Sexual activity: Yes       Partners: Male       Birth control/ protection: Male Surgical           Other Topics Concern     Parent/Sibling W/ Cabg, Mi Or Angioplasty Before 65f 55m? No          Social History Narrative          Izaiah Jara     Lives in [facility]       She does not smoke.  She has a couple of beers to drink on the weekend but otherwise is not a heavy user of alcohol     3 kids: son peña 21; 94, 96 and 98     2 sons     1 daughter     2 are in college and daughter is a sophomore.                 FAMILY HISTORY:  Notable for a paternal aunt who is .  She had Parkinson's disease.  Otherwise, there is no other family history of neurologic problems.  There is no family history of dystonia.             90% Congolese     Some norweigian                       .              Family History          Family History   Problem Relation Age of Onset     Breast Cancer Mother       Cancer - colorectal Mother       Dementia Father       Neurologic Disorder Paternal Aunt         Parkinson's Disease     Cerebrovascular Disease Brother         stroke at age 25 possibly from pfos            Physical Examination:  Vital Signs:   weight is 68.7 kg (151 lb 6.4 oz).  Her blood pressure is 123/73 and her pulse is 83. Her oxygen saturation is 98%.   Mild flexion of  right elbow. Tends to hold right arm slightly abducted, especially when walking.  Fingers are held in flexed position, mostly at MCP joint. Wrist is extended. Intermittent myoclonic jerks worse with action. Thumb slightly adducted and opposed.        BOTULINUM NEUROTOXIN INJECTION PROCEDURES:     VERIFICATION OF PATIENT IDENTIFICATION AND PROCEDURE       Initials   Patient Name JMARA   Patient  JYM   Procedure Verified by: STEVE      Prior to the start of the procedure and with procedural staff participation, I verbally confirmed the patient s identity using two indicators, relevant allergies, that the procedure was appropriate and matched the consent or emergent situation, and that the correct equipment/implants were available. Immediately prior to starting the procedure I conducted the Time Out with the procedural staff and re-confirmed the patient s name, procedure, and site/side. (The Joint Commission universal protocol was followed.)  Yes     Sedation (Moderate or Deep): None        Above assessments performed by:  Martínez Hedrick MD        INDICATION/S FOR PROCEDURE/S:  Sandra Jara is a 53 year old year old patient with dystonia affecting the  right upper extremity secondary to a diagnosis of corticobasal syndrome with associated  pain, spasms and loss of volitional motor control.      Her baseline symptoms have been recalcitrant to oral medications and conservative therapy.  She is here today for an injection of Botox.       GOAL OF PROCEDURE:  The goal of this procedure is to increase active range of motion, improve volitional motor control and decrease pain  associated with dystonic movements.     TOTAL DOSE ADMINISTERED:  Dose Administered:  280 units Botox    Diluent Used:  Preservative Free Normal Saline  Total Volume of Diluent Used:  3 ml  Lot # [id number] with Expiration Date:  2021  NDC #: Botox 100u ([address]-79)     Medication guide was offered to patient and was  declined.     CONSENT:  The risks, benefits, and treatment options were discussed with Sandra Jara and she agreed to proceed.      Written consent was obtained by EMJ.      EQUIPMENT USED:  Needle-37mm stimulating/recording     SKIN PREPARATION:  Skin preparation was performed using an alcohol wipe.     GUIDANCE DESCRIPTION:  Electro-myographic guidance was necessary throughout the procedure to accurately identify all areas of dystonic muscles while avoiding injection of non-dystonic muscles, neighboring nerves and nearby vascular structures.      AREA/MUSCLE INJECTED:       Muscles Injected (All on RIGHT) Units Injected (prior dose) Number of Injections   Biceps 60 (60,0) 2   Pronator teres 20 (20,20) 1   FDS 60 (60,30) 5   deltoid 50 (50,0) 2   brachioradialis 30 (30,30) 1   ECR 40 (30,20) 1   ECU 35 (35,35) 2   Adductor pollicis 0 (0,10) 0   FCU 30 (0,20) 1   FCR 30 (0,5) 1   Extensor indicis 0 (0,8) 0                           Total Units Injected: 355     Unavoidable Waste: 45     Total Units Billed 400        The patient tolerated the injections without difficulty.        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  Today we did repeat botulinum toxin injections. She complained of wrist tightness and spasms.  Doses were increased to the wrist extensors and flexors   Additionally, Sinemet CR was added due to prominence of early morning symptoms.     DBS surgery is planned for 2/19     Plan  Follow-up in 3 months' time to consider repeat injections   Added Sinemet CR at bedtime  DBS in 2/19     Martínez Hedrick MD

## 2018-12-17 DIAGNOSIS — G24.1 DYSTONIA, TORSION, FRAGMENTS OF: ICD-10-CM

## 2018-12-18 RX ORDER — CARBIDOPA AND LEVODOPA 25; 100 MG/1; MG/1
TABLET ORAL
Qty: 540 TABLET | Refills: 3 | Status: SHIPPED | OUTPATIENT
Start: 2018-12-18 | End: 2019-01-23

## 2018-12-20 ENCOUNTER — MYC MEDICAL ADVICE (OUTPATIENT)
Dept: NEUROLOGY | Facility: CLINIC | Age: 53
End: 2018-12-20

## 2018-12-20 ENCOUNTER — TRANSFERRED RECORDS (OUTPATIENT)
Dept: HEALTH INFORMATION MANAGEMENT | Facility: CLINIC | Age: 53
End: 2018-12-20

## 2018-12-26 NOTE — TELEPHONE ENCOUNTER
Patient returned my call about possible participation in the Neural Recordings in the OR study for her upcoming DBS surgery. Email and mailed consents, and will plan to follow up in one week, and possibly consent at PAC on 2/7.    Rosalee Guo RN, MPH  Research Nurse, Movement Disorders

## 2018-12-31 DIAGNOSIS — G24.1 DYSTONIA, TORSION, FRAGMENTS OF: ICD-10-CM

## 2019-01-02 RX ORDER — BACLOFEN 10 MG/1
TABLET ORAL
Qty: 30 TABLET | Refills: 3 | Status: SHIPPED | OUTPATIENT
Start: 2019-01-02 | End: 2019-04-18

## 2019-01-22 ENCOUNTER — TRANSFERRED RECORDS (OUTPATIENT)
Dept: HEALTH INFORMATION MANAGEMENT | Facility: CLINIC | Age: 54
End: 2019-01-22

## 2019-01-23 ENCOUNTER — DOCUMENTATION ONLY (OUTPATIENT)
Dept: NEUROLOGY | Facility: CLINIC | Age: 54
End: 2019-01-23

## 2019-01-23 DIAGNOSIS — G24.1 DYSTONIA, TORSION, FRAGMENTS OF: ICD-10-CM

## 2019-01-23 RX ORDER — CARBIDOPA AND LEVODOPA 25; 100 MG/1; MG/1
TABLET ORAL
Qty: 990 TABLET | Refills: 3 | Status: SHIPPED | OUTPATIENT
Start: 2019-01-23 | End: 2020-03-17

## 2019-01-23 NOTE — PROGRESS NOTES
Received forms from [facility] for continuation of care, forms were placed in Dr Paula folder waiting for signature 1/23/19      Received forms back completed and signed by provider, forms were fax [phone] 1/24/19    Estefani Guevara [facility]

## 2019-01-24 ENCOUNTER — DOCUMENTATION ONLY (OUTPATIENT)
Dept: NEUROSURGERY | Facility: CLINIC | Age: 54
End: 2019-01-24

## 2019-01-24 DIAGNOSIS — Z01.818 PREOPERATIVE EVALUATION TO RULE OUT SURGICAL CONTRAINDICATION: Primary | ICD-10-CM

## 2019-02-07 ENCOUNTER — OFFICE VISIT (OUTPATIENT)
Dept: SURGERY | Facility: CLINIC | Age: 54
End: 2019-02-07
Payer: COMMERCIAL

## 2019-02-07 ENCOUNTER — TELEPHONE (OUTPATIENT)
Dept: NEUROLOGY | Facility: CLINIC | Age: 54
End: 2019-02-07

## 2019-02-07 ENCOUNTER — ANESTHESIA EVENT (OUTPATIENT)
Dept: SURGERY | Facility: CLINIC | Age: 54
DRG: 027 | End: 2019-02-07
Payer: COMMERCIAL

## 2019-02-07 VITALS
BODY MASS INDEX: 24.59 KG/M2 | OXYGEN SATURATION: 96 % | DIASTOLIC BLOOD PRESSURE: 84 MMHG | RESPIRATION RATE: 16 BRPM | HEART RATE: 89 BPM | HEIGHT: 66 IN | TEMPERATURE: 98.2 F | WEIGHT: 153 LBS | SYSTOLIC BLOOD PRESSURE: 149 MMHG

## 2019-02-07 DIAGNOSIS — G24.8 ACQUIRED TORSION DYSTONIA: Primary | ICD-10-CM

## 2019-02-07 DIAGNOSIS — Z01.818 PREOPERATIVE EVALUATION TO RULE OUT SURGICAL CONTRAINDICATION: ICD-10-CM

## 2019-02-07 DIAGNOSIS — Z01.818 PRE-OP EVALUATION: Primary | ICD-10-CM

## 2019-02-07 DIAGNOSIS — G31.85 CORTICOBASAL DEGENERATION (H): ICD-10-CM

## 2019-02-07 LAB
ANION GAP SERPL CALCULATED.3IONS-SCNC: 7 MMOL/L (ref 3–14)
APTT PPP: 27 SEC (ref 22–37)
BUN SERPL-MCNC: 15 MG/DL (ref 7–30)
CALCIUM SERPL-MCNC: 9.2 MG/DL (ref 8.5–10.1)
CHLORIDE SERPL-SCNC: 105 MMOL/L (ref 94–109)
CO2 SERPL-SCNC: 28 MMOL/L (ref 20–32)
CREAT SERPL-MCNC: 0.7 MG/DL (ref 0.52–1.04)
ERYTHROCYTE [DISTWIDTH] IN BLOOD BY AUTOMATED COUNT: 12.3 % (ref 10–15)
GFR SERPL CREATININE-BSD FRML MDRD: >90 ML/MIN/{1.73_M2}
GLUCOSE SERPL-MCNC: 92 MG/DL (ref 70–99)
HCT VFR BLD AUTO: 44 % (ref 35–47)
HGB BLD-MCNC: 15.1 G/DL (ref 11.7–15.7)
INR PPP: 1.01 (ref 0.86–1.14)
MCH RBC QN AUTO: 30.8 PG (ref 26.5–33)
MCHC RBC AUTO-ENTMCNC: 34.3 G/DL (ref 31.5–36.5)
MCV RBC AUTO: 90 FL (ref 78–100)
PLATELET # BLD AUTO: 256 10E9/L (ref 150–450)
POTASSIUM SERPL-SCNC: 4.4 MMOL/L (ref 3.4–5.3)
RBC # BLD AUTO: 4.9 10E12/L (ref 3.8–5.2)
SODIUM SERPL-SCNC: 140 MMOL/L (ref 133–144)
WBC # BLD AUTO: 6 10E9/L (ref 4–11)

## 2019-02-07 RX ORDER — TRAMADOL HYDROCHLORIDE 50 MG/1
TABLET ORAL
Qty: 45 TABLET | Refills: 0 | Status: SHIPPED | OUTPATIENT
Start: 2019-02-07 | End: 2019-03-08

## 2019-02-07 RX ORDER — ACYCLOVIR 200 MG/1
CAPSULE ORAL PRN
Refills: 1 | COMMUNITY
Start: 2019-01-30 | End: 2020-08-13

## 2019-02-07 ASSESSMENT — LIFESTYLE VARIABLES: TOBACCO_USE: 0

## 2019-02-07 ASSESSMENT — MIFFLIN-ST. JEOR: SCORE: 1313.93

## 2019-02-07 NOTE — TELEPHONE ENCOUNTER
Situation:    Patient was seen in the PAC today for her presurgical (dbs) visit. Nurse Marti Bradford contacted me because the patient is in obvious severe pain rating it a 8/10. She is cradling her right upper arm and says that the baclofen and carbidopa/levodopa is not helping at all. She wants to know what she can do because she cannot have botox due to the upcoming Deep Brain Stimulation surgery.    Surgery is scheduled 2/22 [facility].

## 2019-02-07 NOTE — H&P
Pre-Operative H & P     CC:  Preoperative exam to assess for increased cardiopulmonary risk while undergoing surgery and anesthesia.    Date of Encounter: 2/7/2019  Primary Care Physician:  Ester Barnes  Sandra Jara is a 53 year old female who presents for pre-operative H & P in preparation for Stealth Assisted Left Side Deep Brain Stimulator Placement, Phase I, Placement Of Left Side Deep Brain Stimulator Electrode, Target Left Globus Pallidus Internus With Microelectrode Recording on 2/22/2019 and Left Deep Brain Stimulator Placement, Phase II, Placement Of Deep Brain Stimulator Generator/Battery Over The Left Chest Wall on 3/1/2019 by Dr. Triana at [facility].     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  She started to have right upper extremity tremor and dysfunction around 2012.  She was then diagnosed with corticobasal degenderation in 2015 at the [facility].  Her symptoms have gradually worsened over the years.  She now has more dexterity issues with the RUE and she has gotten symptoms in the right lower extremity also.  She now follows with Dr. Hopkins here in neurology.  She is currently on sinemet, klonopin, baclofen and q6 week botox injections for attempted management, but she continues to have symptoms.  The botox injections do help some with pain and contracture, but they haven't seemed to improve her actual RUE function.  She was referred to Dr. Triana for consideration for DBS due to her symptoms.  The above listed procedures have been recommended.      History is obtained from the patient, the spouse and the medical record.     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 It demonstrated some mild changes of right carpal tunnel syndrome but was otherwise normal.       History of MRI of brain and brain stem 12/21/2014    A brain MRI scan done on 06/27/2014 revealed no abnormalities.      History of MRI of cervical spine 12/21/2014    A cervical MRI done on 06/10/2014 revealed some mild to moderate degenerative changes but no significant central canal or foraminal stenosis, and no abnormalities of the spinal cord were noted.       Movement disorder 12/21/2014    I saw your patient, Deepali Contreras on 12/15/2014. She is a 49-year-old right-handed female here for evaluation of right upper extremity dysfunction and right hand tremor.  She has noted this problem for the last couple of years. She reports she is losing dexterity in her right hand. She has appreciated a tremor of the right hand with actions such as writing or postural maintenance. She has n       Past Surgical History  Past Surgical History:   Procedure Laterality Date     APPENDECTOMY         Hx of Blood transfusions/reactions: none     Hx of abnormal bleeding or anti-platelet use: none    Menstrual history: No LMP recorded. Patient is postmenopausal.:     Steroid use in the last year: none    Personal or FH with difficulty with Anesthesia:  none    Prior to Admission Medications  Current Outpatient Medications   Medication Sig Dispense Refill     baclofen (LIORESAL) 10 MG tablet Please take 1/2 tab to 1 tab up to three times a day as needed. (Patient taking differently: Take 10 mg by mouth 3 times daily Please take 1/2 tab to 1 tab up to three times a day as needed.) 30 tablet 3     botulinum toxin type A (BOTOX) 100 units injection Inject  280 Units into the muscle once Lot # /C3 with Expiration Date:  11/2020       carbidopa-levodopa (SINEMET CR)  MG per CR tablet Take 1 tablet by mouth At Bedtime 30 tablet 6     carbidopa-levodopa (SINEMET)  MG tablet Take 3 tabs at 530AM, and 2 tabs at 10AM, 2PM, 6PM, and 10PM 990 tablet 3     Cholecalciferol (VITAMIN D) 2000 UNITS tablet Take 2,000 Units by mouth every morning        clonazePAM (KLONOPIN) 0.5 MG tablet Take 0.5 tablets (0.25 mg) by mouth At Bedtime 15 tablet 5     OnabotulinumtoxinA (BOTOX IJ) Inject 300 Units as directed once [id number] with Expiration Date:  12/2020       acyclovir (ZOVIRAX) 200 MG capsule as needed  1       Allergies  No Known Allergies    Social History  Social History     Socioeconomic History     Marital status:      Spouse name: Not on file     Number of children: 3     Years of education: Not on file     Highest education level: Not on file   Social Needs     Financial resource strain: Not on file     Food insecurity - worry: Not on file     Food insecurity - inability: Not on file     Transportation needs - medical: Not on file     Transportation needs - non-medical: Not on file   Occupational History     Not on file   Tobacco Use     Smoking status: Never Smoker     Smokeless tobacco: Never Used   Substance and Sexual Activity     Alcohol use: Yes     Alcohol/week: 1.2 - 1.8 oz     Comment: WEEKLY     Drug use: No     Sexual activity: Yes     Partners: Male     Birth control/protection: Male Surgical   Other Topics Concern     Parent/sibling w/ CABG, MI or angioplasty before 65F 55M? No   Social History Narrative        Izaiah Jara    Lives in [facility]      She does not smoke.  She has a couple of beers to drink on the weekend but otherwise is not a heavy user of alcohol    3 kids: son peña 21; 94, 96 and 98    2 sons    1 daughter    2 are in college and daughter is a sophomore.            FAMILY HISTORY:  Notable for a  "paternal aunt who is .  She had Parkinson's disease.  Otherwise, there is no other family history of neurologic problems.  There is no family history of dystonia.          90% Luxembourgish    Some norweigian                .         Family History  Family History   Problem Relation Age of Onset     Breast Cancer Mother      Cancer - colorectal Mother      Macular Degeneration Mother      Dementia Father      Deep Vein Thrombosis Father      No Known Problems Brother      No Known Problems Sister      No Known Problems Sister      No Known Problems Sister      Neurologic Disorder Paternal Aunt         Parkinson's Disease     Cerebrovascular Disease Brother         stroke at age 25 possibly from pfo     Dementia Paternal Grandmother                  ROS/MED HX  The complete review of systems is negative other than noted in the HPI or here.   ENT/Pulmonary:     (+)DANY risk factors snores loudly, observed stopped breathing , . .   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 atypical headaches    Cardiovascular:     (+) ----. : . . . :. . No previous cardiac testing       METS/Exercise Tolerance:  >4 METS   Hematologic:  - neg hematologic  ROS       Musculoskeletal:   (+) , , other musculoskeletal- RUE cramping, pain       GI/Hepatic:  - neg GI/hepatic ROS       Renal/Genitourinary:  - ROS Renal section negative       Endo:  - neg endo ROS       Psychiatric:  - neg psychiatric ROS       Infectious Disease:  - neg infectious disease ROS       Malignancy:      - no malignancy   Other:    (+) No chance of pregnancy H/O Chronic Pain,               Temp: 98.2  F (36.8  C) Temp src: Oral BP: 149/84 Pulse: 89   Resp: 16 SpO2: 96 %         153 lbs 0 oz  5' 6.2\"   Body mass index is 24.55 kg/m .       Physical Exam  Constitutional: Awake, alert, cooperative and appears stated age.  Appears to be in pain/uncomfortable.  Eyes: Pupils equal, round and reactive to " light, extra ocular muscles intact, sclera clear, conjunctiva normal.  HENT: Normocephalic, oral pharynx with moist mucus membranes, good dentition. No goiter appreciated.   Respiratory: Clear to auscultation bilaterally, no crackles or wheezing.  Cardiovascular: Regular rate and rhythm, normal S1 and S2, and no murmur noted.  Carotids +2, no bruits. No edema. Palpable pulses to radial  DP and PT arteries.   GI: Normal bowel sounds, soft, non-distended, non-tender, no masses palpated, no hepatosplenomegaly.    Lymph/Hematologic: No cervical lymphadenopathy and no supraclavicular lymphadenopathy.  Genitourinary:  deferred  Skin: Warm and dry.    Musculoskeletal: Limited ROM of neck. There is no redness, warmth, or swelling of the exposed joints. Gross motor strength is normal.    Neurologic: Awake, alert, oriented to name, place and time. Cranial nerves II-XII are grossly intact. Gait is impaired.  Right arm contracture and almost continuous tremor.     Neuropsychiatric: Calm, cooperative. Normal affect.     Labs: (personally reviewed)   Component      Latest Ref Rng & Units 2/7/2019   Sodium      133 - 144 mmol/L 140   Potassium      3.4 - 5.3 mmol/L 4.4   Chloride      94 - 109 mmol/L 105   Carbon Dioxide      20 - 32 mmol/L 28   Anion Gap      3 - 14 mmol/L 7   Glucose      70 - 99 mg/dL 92   Urea Nitrogen      7 - 30 mg/dL 15   Creatinine      0.52 - 1.04 mg/dL 0.70   GFR Estimate      >60 mL/min/1.73:m2 >90   GFR Estimate If Black      >60 mL/min/1.73:m2 >90   Calcium      8.5 - 10.1 mg/dL 9.2   WBC      4.0 - 11.0 10e9/L 6.0   RBC Count      3.8 - 5.2 10e12/L 4.90   Hemoglobin      11.7 - 15.7 g/dL 15.1   Hematocrit      35.0 - 47.0 % 44.0   MCV      78 - 100 fl 90   MCH      26.5 - 33.0 pg 30.8   MCHC      31.5 - 36.5 g/dL 34.3   RDW      10.0 - 15.0 % 12.3   Platelet Count      150 - 450 10e9/L 256   PTT      22 - 37 sec 27   INR      0.86 - 1.14 1.01         Outside records reviewed from: Care  Everywhere        ASSESSMENT and PLAN  Deepali Grijalva is a 53 year old female scheduled for Stealth Assisted Left Side Deep Brain Stimulator Placement, Phase I, Placement Of Left Side Deep Brain Stimulator Electrode, Target Left Globus Pallidus Internus With Microelectrode Recording on 2/22/2019 and Left Deep Brain Stimulator Placement, Phase II, Placement Of Deep Brain Stimulator Generator/Battery Over The Left Chest Wall on 3/1/2019 by Dr. 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  PAC referral for risk assessment and optimization for anesthesia with comorbid conditions of: atypical headaches    Pre-operative considerations:  1.  Cardiac:  Functional status- METS >4.  She does [employer], works with a  once weekly, walks on a treadmill at 2.5mph for 20 minutes regularly and goes to physical therapy once weekly.  She has no known cardiac conditions.   Intermediate risk surgery with 0.4% risk of major adverse cardiac event.   2.  Pulm:  Airway feasible.  DANY risk: intermediate.    3.  GI:  Risk of PONV score = 3.  If > 2, anti-emetic intervention recommended.  4. Musculoskeletal:  Limited neck ROM  5. Pain:  She currently reports 8/10 RUE pain due to being overdue for a botox injection.  She is unable to have her botox injection now as she was instructed to have no further injections prior to surgery.  She is requesting something to help the pain.  Spoke with MITESH Mccurdy RN from neurosurgery who recommended that I speak with Ondina Khan RN from neurology.  Discussed with Ondina Khan RN.  She noted the information and said she would discuss with Dr. Hopkins and then call the patient to discuss.  Patient verbalized her understanding.      VTE risk = 1.8% due to family history of father having multiple DVTs with unknown cause.        Patient is optimized and is acceptable candidate for the proposed procedure.  No further diagnostic evaluation is  needed.                   Marti Bradford DNP, RN, APRN  Preoperative Assessment Center  [facility]  [facility]  Phone: [phone]  Fax: [phone]

## 2019-02-07 NOTE — TELEPHONE ENCOUNTER
Spoke to patient.     carbidopa-levodopa (SINEMET)  MG tablet 990 tablet 3 1/23/2019  No   Sig: Take 3 tabs at 530AM, and 2 tabs at 10AM, 2PM, 6PM, and 10PM     Verified she is taking the above medication as ordered.    baclofen (LIORESAL) 10 MG tablet 30 tablet 3 1/2/2019  No   Sig: Please take 1/2 tab to 1 tab up to three times a day as needed.     Verified that she started this full dose (1 tab three times per day) on Monday.    She is currently in room 4.75 down hallway . I hand delivered it to the patient with instruction to call us if she is still experiencing high levels of pain.

## 2019-02-07 NOTE — PATIENT INSTRUCTIONS
Preparing for Your Surgery      Name:  Sandra Jara   MRN:  [id number]   :  1965   Today's Date:  2019     Arriving for surgery:  Surgery date:  19  Arrival time:  7AM  Please come to:       [facility] [address]  [address] [place]  [address]    -   parking is available in front of the hospital from 5:15 am to 8:00 pm    -  Stop at the Information Desk in the lobby    -   Inform the information person that you are here for surgery. An escort to 3c will be provided. If you would not like an escort, please proceed to 3C on the 3rd floor. [phone]     What can I eat or drink?  -  You may have solid food or milk products until 8 hours prior to your surgery. 19, 1AM  -  You may have water, apple juice or 7up/Sprite until 2 hours prior to your surgery. 19, 7AM    Which medicines can I take?  Stop Aspirin, vitamins and supplements one week prior to surgery.  Hold Ibuprofen for 24 hours and/or Naproxen for 48 hours prior to surgery.   -  Do NOT take these medications in the morning, the day of surgery:  Hold all medications the morning of surgery.    How do I prepare myself?  -  Take two showers: one the night before surgery; and one the morning of surgery.         Use Scrubcare or Hibiclens to wash from neck down.  You may use your own shampoo and conditioner. No other hair products.   -  Do NOT use lotion, powder, deodorant, or antiperspirant the day of your surgery.  -  Do NOT wear any makeup, fingernail polish or jewelry.  - Do not bring your own medications to the hospital, except for inhalers and eye drops.  -  Bring your ID and insurance card.    Questions or Concerns:  -If you have questions or concerns regarding the day of surgery, please call [phone].     -For questions after surgery please call your surgeons office.           AFTER YOUR SURGERY  Breathing exercises   Breathing exercises help you recover faster. Take  deep breaths and let the air out slowly. This will:     Help you wake up after surgery.    Help prevent complications like pneumonia.  Preventing complications will help you go home sooner.   We may give you a breathing device (incentive spirometer) to encourage you to breathe deeply.   Nausea and vomiting   You may feel sick to your stomach after surgery; if so, let your nurse know.    Pain control:  After surgery, you may have pain. Our goal is to help you manage your pain. Pain medicine will help you feel comfortable enough to do activities that will help you heal.  These activities may include breathing exercises, walking and physical therapy.   To help your health care team treat your pain we will ask: 1) If you have pain  2) where it is located 3) describe your pain in your words  Methods of pain control include medications given by mouth, vein or by nerve block for some surgeries.  Sequential Compression Device (SCD) or Pneumo Boots:  You may need to wear SCD S on your legs or feet. These are wraps connected to a machine that pumps in air and releases it. The repeated pumping helps prevent blood clots from forming.

## 2019-02-07 NOTE — ANESTHESIA PREPROCEDURE EVALUATION
Anesthesia Pre-Procedure Evaluation    Patient: Sandra Jara   MRN:     [id number] Gender:   female   Age:    53 year old :      1965        Preoperative Diagnosis: Tremor   Procedure(s):  Stealth Assisted Left Side Deep Brain Stimulator Placement, Phase I, Placement Of Left Side Deep Brain Stimulator Electrode, Target Left Globus Pallidus Internus With Microelectrode Recording     Past Medical History:   Diagnosis Date     Action induced myoclonus 2015     Corticobasal syndrome 2015     CTS (carpal tunnel syndrome) 2015     Disturbance of skin sensation 2015     Family history of breast cancer 2015     Family history of colon cancer 2015     Family history of Parkinson's disease 2014    Paternal aunt     History of EMG 2014    A right upper extremity EMG and nerve conduction study was done on 2014. It demonstrated some mild changes of right carpal tunnel syndrome but was otherwise normal.       History of MRI of brain and brain stem 2014    A brain MRI scan done on 2014 revealed no abnormalities.      History of MRI of cervical spine 2014    A cervical MRI done on 06/10/2014 revealed some mild to moderate degenerative changes but no significant central canal or foraminal stenosis, and no abnormalities of the spinal cord were noted.       Movement disorder 2014    I saw your patient, Deepali Contreras on 12/15/2014. She is a 49-year-old right-handed female here for evaluation of right upper extremity dysfunction and right hand tremor.  She has noted this problem for the last couple of years. She reports she is losing dexterity in her right hand. She has appreciated a tremor of the right hand with actions such as writing or postural maintenance. She has n      Past Surgical History:   Procedure Laterality Date     APPENDECTOMY            Anesthesia Evaluation     . Pt has had prior anesthetic. Type: General    No history of  anesthetic complications          ROS/MED HX    ENT/Pulmonary:     (+)DANY risk factors snores loudly, observed stopped breathing , . .   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 atypical headaches    Cardiovascular:     (+) ----. : . . . :. . No previous cardiac testing       METS/Exercise Tolerance:  >4 METS   Hematologic:  - neg hematologic  ROS       Musculoskeletal:   (+) , , other musculoskeletal- RUE cramping, pain       GI/Hepatic:  - neg GI/hepatic ROS       Renal/Genitourinary:  - ROS Renal section negative       Endo:  - neg endo ROS       Psychiatric:  - neg psychiatric ROS       Infectious Disease:  - neg infectious disease ROS       Malignancy:      - no malignancy   Other:    (+) No chance of pregnancy H/O Chronic Pain,                       PHYSICAL EXAM:   Mental Status/Neuro: A/A/O   Airway: Facies: Feasible  Mallampati: II  Mouth/Opening: Full  TM distance: > 6 cm  Neck ROM: Limited   Respiratory: Auscultation: CTAB     Resp. Rate: Normal     Resp. Effort: Normal      CV: Rhythm: Regular  Rate: Age appropriate  Heart: Normal Sounds   Comments:      Dental: Normal                Lab Results   Component Value Date    WBC 6.0 02/07/2019     Lab Results   Component Value Date    RBC 4.90 02/07/2019     Lab Results   Component Value Date    HGB 15.1 02/07/2019     Lab Results   Component Value Date    HCT 44.0 02/07/2019     No components found for: MCT  Lab Results   Component Value Date    MCV 90 02/07/2019     Lab Results   Component Value Date    MCH 30.8 02/07/2019     Lab Results   Component Value Date    MCHC 34.3 02/07/2019     Lab Results   Component Value Date    RDW 12.3 02/07/2019     Lab Results   Component Value Date     02/07/2019       Last Comprehensive Metabolic Panel:  Sodium   Date Value Ref Range Status   02/07/2019 140 133 - 144 mmol/L Final     Potassium   Date Value Ref Range Status   02/07/2019 4.4 3.4 -  "5.3 mmol/L Final     Chloride   Date Value Ref Range Status   02/07/2019 105 94 - 109 mmol/L Final     Carbon Dioxide   Date Value Ref Range Status   02/07/2019 28 20 - 32 mmol/L Final     Anion Gap   Date Value Ref Range Status   02/07/2019 7 3 - 14 mmol/L Final     Glucose   Date Value Ref Range Status   02/07/2019 92 70 - 99 mg/dL Final     Urea Nitrogen   Date Value Ref Range Status   02/07/2019 15 7 - 30 mg/dL Final     Creatinine   Date Value Ref Range Status   02/07/2019 0.70 0.52 - 1.04 mg/dL Final     GFR Estimate   Date Value Ref Range Status   02/07/2019 >90 >60 mL/min/[1.73_m2] Final     Comment:     Non  GFR Calc  Starting 12/18/2018, serum creatinine based estimated GFR (eGFR) will be   calculated using the Chronic Kidney Disease Epidemiology Collaboration   (CKD-EPI) equation.       Calcium   Date Value Ref Range Status   02/07/2019 9.2 8.5 - 10.1 mg/dL Final     INR   Date Value Ref Range Status   02/07/2019 1.01 0.86 - 1.14 Final          Preop Vitals  BP Readings from Last 3 Encounters:   12/10/18 123/73   09/13/18 125/85   07/26/18 122/74    Pulse Readings from Last 3 Encounters:   12/10/18 69   09/13/18 83   07/26/18 90      Resp Readings from Last 3 Encounters:   07/26/18 16   06/27/18 24   05/03/18 24    SpO2 Readings from Last 3 Encounters:   09/13/18 98%   07/26/18 99%   06/27/18 100%      Temp Readings from Last 1 Encounters:   09/13/18 98  F (36.7  C) (Oral)    Ht Readings from Last 1 Encounters:   12/10/18 1.681 m (5' 6.2\")      Wt Readings from Last 1 Encounters:   09/13/18 68.7 kg (151 lb 6.4 oz)    Estimated body mass index is 24.29 kg/m  as calculated from the following:    Height as of 12/10/18: 1.681 m (5' 6.2\").    Weight as of 9/13/18: 68.7 kg (151 lb 6.4 oz).     LDA:            Assessment:   ASA SCORE: 2    NPO Status: > 6 hours since completed Solid Foods; > 2 hours since completed Clear Liquids   Documentation: H&P complete   Proceeding: Proceed without further " delay  Tobacco Use:  NO Active use of Tobacco/UNKNOWN Tobacco use status     Plan:   Anes. Type:  MAC   Pre-Induction: Dexmedetomidine   Induction:  Not applicable   Airway: Native Airway   Access/Monitoring: PIV; 2nd PIV   Maintenance: Dexmedetomidine; Propofol   Emergence: N/a   Logistics: Same Day Surgery     PONV Management:  Adult Risk Factors: Female, Non-Smoker  Prevention: Ondansetron     CONSENT: Direct conversation   Plan and risks discussed with: Patient                     PAC Discussion and Assessment    ASA Classification: 2  Case is suitable for: [place]  Anesthetic techniques and relevant risks discussed: GA and MAC with GA as backup  Invasive monitoring and risk discussed:   Types:   Possibility and Risk of blood transfusion discussed:   NPO instructions given:   Additional anesthetic preparation and risks discussed:   Needs early admission to pre-op area:   Other:     PAC Resident/NP Anesthesia Assessment:  Deepali Grijalva is a 53 year old female scheduled for Stealth Assisted Left Side Deep Brain Stimulator Placement, Phase I, Placement Of Left Side Deep Brain Stimulator Electrode, Target Left Globus Pallidus Internus With Microelectrode Recording on 2/22/2019 and Left Deep Brain Stimulator Placement, Phase II, Placement Of Deep Brain Stimulator Generator/Battery Over The Left Chest Wall on 3/1/2019 by Dr. 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  PAC referral for risk assessment and optimization for anesthesia with comorbid conditions of: atypical headaches    Pre-operative considerations:  1.  Cardiac:  Functional status- METS >4.  She does [employer], works with a  once weekly, walks on a treadmill at 2.5mph for 20 minutes regularly and goes to physical therapy once weekly.  She has no known cardiac conditions.   Intermediate risk surgery with 0.4% risk of major adverse cardiac event.   2.  Pulm:  Airway feasible.  DANY  risk: intermediate.    3.  GI:  Risk of PONV score = 3.  If > 2, anti-emetic intervention recommended.  4. Musculoskeletal:  Limited neck ROM  5. Pain:  She currently reports 8/10 RUE pain due to being overdue for a botox injection.  She is unable to have her botox injection now as she was instructed to have no further injections prior to surgery.  She is requesting something to help the pain.  Spoke with MITESH Mccurdy RN from neurosurgery who recommended that I speak with Ondina Khan RN from neurology.  Discussed with Ondina Khan RN.  She noted the information and said she would discuss with Dr. Hopkins and then call the patient to discuss.  Patient verbalized her understanding.      VTE risk = 1.8% due to family history of father having multiple DVTs with unknown cause.        Patient is optimized and is acceptable candidate for the proposed procedure.  No further diagnostic evaluation is needed.         **For further details of assessment, testing, and physical exam please see H and P completed on same date.          Marti Bradford DNP, RN, APRN      Reviewed and Signed by PAC Mid-Level Provider/Resident  Mid-Level Provider/Resident: Marti Bradford DNP, RN, APRN  Date: 2/7/2019  Time: 1231    Attending Anesthesiologist Anesthesia Assessment:  53 year old for stealth DBS in management of dystonia right side. She is active, no cardiac or pulmonary disease.    Patient/case discussed with ALESSANDRO/resident; agree with above assessment. No need to see patient. Patient is appropriate for the planned procedure without further work-up or medical management.      Reviewed and Signed by PAC Anesthesiologist  Anesthesiologist: robert  Date: 2/7/2019  Time:   Pass/Fail: Pass  Disposition:     PAC Pharmacist Assessment:        Pharmacist:   Date:   Time:        SAIRA Bergman CNP         History and physical reviewed; no interval change.     Patient seen, examined, reviewed. See above for details from PAC. Note  updated.    Tomy Renteria  Staff Anesthesiologist  8:46 AM February 22, 2019

## 2019-02-19 ENCOUNTER — PATIENT OUTREACH (OUTPATIENT)
Dept: NEUROSURGERY | Facility: CLINIC | Age: 54
End: 2019-02-19

## 2019-02-19 NOTE — PROGRESS NOTES
Left VM message for pt. We have not discussed the information in the preop packet which was sent at the end of January. I wanted to reach out to see if pt had any questions prior to surgery. Much of the information in the packet was reviewed at pt's PAC appointment, but would still like to connect with pt. Requested that she call me on my direct line.

## 2019-02-22 ENCOUNTER — HOSPITAL ENCOUNTER (OUTPATIENT)
Dept: CT IMAGING | Facility: CLINIC | Age: 54
DRG: 027 | End: 2019-02-22
Attending: NEUROLOGICAL SURGERY | Admitting: NEUROLOGICAL SURGERY
Payer: COMMERCIAL

## 2019-02-22 ENCOUNTER — HOSPITAL ENCOUNTER (INPATIENT)
Facility: CLINIC | Age: 54
LOS: 1 days | Discharge: HOME | DRG: 027 | End: 2019-02-23
Attending: NEUROLOGICAL SURGERY | Admitting: NEUROLOGICAL SURGERY
Payer: COMMERCIAL

## 2019-02-22 ENCOUNTER — APPOINTMENT (OUTPATIENT)
Dept: GENERAL RADIOLOGY | Facility: CLINIC | Age: 54
DRG: 027 | End: 2019-02-22
Attending: STUDENT IN AN ORGANIZED HEALTH CARE EDUCATION/TRAINING PROGRAM
Payer: COMMERCIAL

## 2019-02-22 ENCOUNTER — APPOINTMENT (OUTPATIENT)
Dept: CT IMAGING | Facility: CLINIC | Age: 54
DRG: 027 | End: 2019-02-22
Attending: STUDENT IN AN ORGANIZED HEALTH CARE EDUCATION/TRAINING PROGRAM
Payer: COMMERCIAL

## 2019-02-22 ENCOUNTER — APPOINTMENT (OUTPATIENT)
Dept: GENERAL RADIOLOGY | Facility: CLINIC | Age: 54
DRG: 027 | End: 2019-02-22
Attending: NEUROLOGICAL SURGERY
Payer: COMMERCIAL

## 2019-02-22 ENCOUNTER — ANESTHESIA (OUTPATIENT)
Dept: SURGERY | Facility: CLINIC | Age: 54
DRG: 027 | End: 2019-02-22
Payer: COMMERCIAL

## 2019-02-22 DIAGNOSIS — G24.9 DYSTONIA: ICD-10-CM

## 2019-02-22 DIAGNOSIS — Z96.89 S/P DEEP BRAIN STIMULATOR PLACEMENT: Primary | ICD-10-CM

## 2019-02-22 LAB
GLUCOSE BLDC GLUCOMTR-MCNC: 93 MG/DL (ref 70–99)
MRSA DNA SPEC QL NAA+PROBE: NEGATIVE
SPECIMEN SOURCE: NORMAL

## 2019-02-22 PROCEDURE — 00000146 ZZHCL STATISTIC GLUCOSE BY METER IP

## 2019-02-22 PROCEDURE — 25000132 ZZH RX MED GY IP 250 OP 250 PS 637: Performed by: STUDENT IN AN ORGANIZED HEALTH CARE EDUCATION/TRAINING PROGRAM

## 2019-02-22 PROCEDURE — 25000128 H RX IP 250 OP 636: Performed by: NEUROLOGICAL SURGERY

## 2019-02-22 PROCEDURE — 70450 CT HEAD/BRAIN W/O DYE: CPT

## 2019-02-22 PROCEDURE — 87640 STAPH A DNA AMP PROBE: CPT | Performed by: NEUROLOGICAL SURGERY

## 2019-02-22 PROCEDURE — 8E09XBG COMPUTER ASSISTED PROCEDURE OF HEAD AND NECK REGION, WITH COMPUTERIZED TOMOGRAPHY: ICD-10-PCS | Performed by: NEUROLOGICAL SURGERY

## 2019-02-22 PROCEDURE — 25000128 H RX IP 250 OP 636: Performed by: ANESTHESIOLOGY

## 2019-02-22 PROCEDURE — 25000128 H RX IP 250 OP 636: Performed by: STUDENT IN AN ORGANIZED HEALTH CARE EDUCATION/TRAINING PROGRAM

## 2019-02-22 PROCEDURE — 40000277 XR SURGERY CARM FLUORO LESS THAN 5 MIN W STILLS: Mod: TC

## 2019-02-22 PROCEDURE — 25000132 ZZH RX MED GY IP 250 OP 250 PS 637: Performed by: NEUROLOGICAL SURGERY

## 2019-02-22 PROCEDURE — 25800030 ZZH RX IP 258 OP 636: Performed by: STUDENT IN AN ORGANIZED HEALTH CARE EDUCATION/TRAINING PROGRAM

## 2019-02-22 PROCEDURE — 37000009 ZZH ANESTHESIA TECHNICAL FEE, EACH ADDTL 15 MIN: Performed by: NEUROLOGICAL SURGERY

## 2019-02-22 PROCEDURE — 87641 MR-STAPH DNA AMP PROBE: CPT | Performed by: NEUROLOGICAL SURGERY

## 2019-02-22 PROCEDURE — 25000125 ZZHC RX 250: Performed by: NEUROLOGICAL SURGERY

## 2019-02-22 PROCEDURE — 36000074 ZZH SURGERY LEVEL 6 1ST 30 MIN - UMMC: Performed by: NEUROLOGICAL SURGERY

## 2019-02-22 PROCEDURE — 27810169 ZZH OR IMPLANT GENERAL: Performed by: NEUROLOGICAL SURGERY

## 2019-02-22 PROCEDURE — 70450 CT HEAD/BRAIN W/O DYE: CPT | Mod: 77

## 2019-02-22 PROCEDURE — 20000004 ZZH R&B ICU UMMC

## 2019-02-22 PROCEDURE — 71000014 ZZH RECOVERY PHASE 1 LEVEL 2 FIRST HR: Performed by: NEUROLOGICAL SURGERY

## 2019-02-22 PROCEDURE — 27210995 ZZH RX 272: Performed by: NEUROLOGICAL SURGERY

## 2019-02-22 PROCEDURE — 36000076 ZZH SURGERY LEVEL 6 EA 15 ADDTL MIN - UMMC: Performed by: NEUROLOGICAL SURGERY

## 2019-02-22 PROCEDURE — 40000170 ZZH STATISTIC PRE-PROCEDURE ASSESSMENT II: Performed by: NEUROLOGICAL SURGERY

## 2019-02-22 PROCEDURE — 00H03MZ INSERTION OF NEUROSTIMULATOR LEAD INTO BRAIN, PERCUTANEOUS APPROACH: ICD-10-PCS | Performed by: NEUROLOGICAL SURGERY

## 2019-02-22 PROCEDURE — 27211024 ZZHC OR SUPPLY OTHER OPNP: Performed by: NEUROLOGICAL SURGERY

## 2019-02-22 PROCEDURE — 27210794 ZZH OR GENERAL SUPPLY STERILE: Performed by: NEUROLOGICAL SURGERY

## 2019-02-22 PROCEDURE — 37000008 ZZH ANESTHESIA TECHNICAL FEE, 1ST 30 MIN: Performed by: NEUROLOGICAL SURGERY

## 2019-02-22 PROCEDURE — 70250 X-RAY EXAM OF SKULL: CPT

## 2019-02-22 DEVICE — 8CH DIRECTIONAL LEAD, 40 CM, 0.5
Type: IMPLANTABLE DEVICE | Site: BRAIN | Status: FUNCTIONAL
Brand: ST. JUDE MEDICAL INFINITY™

## 2019-02-22 DEVICE — IMP BUR HOLE COVER GUARDIAN 6010ANS: Type: IMPLANTABLE DEVICE | Site: BRAIN | Status: FUNCTIONAL

## 2019-02-22 RX ORDER — POLYETHYLENE GLYCOL 3350 17 G/17G
17 POWDER, FOR SOLUTION ORAL 3 TIMES DAILY
Status: DISCONTINUED | OUTPATIENT
Start: 2019-02-22 | End: 2019-02-23 | Stop reason: HOSPADM

## 2019-02-22 RX ORDER — ONDANSETRON 2 MG/ML
4 INJECTION INTRAMUSCULAR; INTRAVENOUS EVERY 30 MIN PRN
Status: DISCONTINUED | OUTPATIENT
Start: 2019-02-22 | End: 2019-02-22 | Stop reason: HOSPADM

## 2019-02-22 RX ORDER — CEFAZOLIN SODIUM 1 G/3ML
1 INJECTION, POWDER, FOR SOLUTION INTRAMUSCULAR; INTRAVENOUS SEE ADMIN INSTRUCTIONS
Status: DISCONTINUED | OUTPATIENT
Start: 2019-02-22 | End: 2019-02-22 | Stop reason: HOSPADM

## 2019-02-22 RX ORDER — ONDANSETRON 2 MG/ML
4-8 INJECTION INTRAMUSCULAR; INTRAVENOUS EVERY 6 HOURS PRN
Status: DISCONTINUED | OUTPATIENT
Start: 2019-02-22 | End: 2019-02-23 | Stop reason: HOSPADM

## 2019-02-22 RX ORDER — FENTANYL CITRATE 50 UG/ML
25-50 INJECTION, SOLUTION INTRAMUSCULAR; INTRAVENOUS
Status: DISCONTINUED | OUTPATIENT
Start: 2019-02-22 | End: 2019-02-22 | Stop reason: HOSPADM

## 2019-02-22 RX ORDER — PROPOFOL 10 MG/ML
INJECTION, EMULSION INTRAVENOUS PRN
Status: DISCONTINUED | OUTPATIENT
Start: 2019-02-22 | End: 2019-02-22

## 2019-02-22 RX ORDER — NALOXONE HYDROCHLORIDE 0.4 MG/ML
.1-.4 INJECTION, SOLUTION INTRAMUSCULAR; INTRAVENOUS; SUBCUTANEOUS
Status: DISCONTINUED | OUTPATIENT
Start: 2019-02-22 | End: 2019-02-23 | Stop reason: HOSPADM

## 2019-02-22 RX ORDER — CEFAZOLIN SODIUM 2 G/100ML
2 INJECTION, SOLUTION INTRAVENOUS
Status: COMPLETED | OUTPATIENT
Start: 2019-02-22 | End: 2019-02-22

## 2019-02-22 RX ORDER — CEFAZOLIN SODIUM 1 G/3ML
1 INJECTION, POWDER, FOR SOLUTION INTRAMUSCULAR; INTRAVENOUS EVERY 8 HOURS
Status: COMPLETED | OUTPATIENT
Start: 2019-02-22 | End: 2019-02-23

## 2019-02-22 RX ORDER — LABETALOL HYDROCHLORIDE 5 MG/ML
10-40 INJECTION, SOLUTION INTRAVENOUS EVERY 10 MIN PRN
Status: DISCONTINUED | OUTPATIENT
Start: 2019-02-22 | End: 2019-02-23 | Stop reason: HOSPADM

## 2019-02-22 RX ORDER — LIDOCAINE 40 MG/G
CREAM TOPICAL
Status: DISCONTINUED | OUTPATIENT
Start: 2019-02-22 | End: 2019-02-23 | Stop reason: HOSPADM

## 2019-02-22 RX ORDER — CARBIDOPA AND LEVODOPA 25; 100 MG/1; MG/1
2-3 TABLET ORAL
Status: DISCONTINUED | OUTPATIENT
Start: 2019-02-22 | End: 2019-02-22

## 2019-02-22 RX ORDER — CARBIDOPA AND LEVODOPA 25; 100 MG/1; MG/1
2 TABLET ORAL 4 TIMES DAILY
Status: DISCONTINUED | OUTPATIENT
Start: 2019-02-22 | End: 2019-02-23 | Stop reason: HOSPADM

## 2019-02-22 RX ORDER — SODIUM CHLORIDE 9 MG/ML
INJECTION, SOLUTION INTRAVENOUS CONTINUOUS
Status: ACTIVE | OUTPATIENT
Start: 2019-02-22 | End: 2019-02-23

## 2019-02-22 RX ORDER — ONDANSETRON 4 MG/1
4 TABLET, ORALLY DISINTEGRATING ORAL EVERY 30 MIN PRN
Status: DISCONTINUED | OUTPATIENT
Start: 2019-02-22 | End: 2019-02-22 | Stop reason: HOSPADM

## 2019-02-22 RX ORDER — PROPOFOL 10 MG/ML
INJECTION, EMULSION INTRAVENOUS CONTINUOUS PRN
Status: DISCONTINUED | OUTPATIENT
Start: 2019-02-22 | End: 2019-02-22

## 2019-02-22 RX ORDER — ACETAMINOPHEN 325 MG/1
975 TABLET ORAL ONCE
Status: COMPLETED | OUTPATIENT
Start: 2019-02-22 | End: 2019-02-22

## 2019-02-22 RX ORDER — LIDOCAINE 40 MG/G
CREAM TOPICAL
Status: DISCONTINUED | OUTPATIENT
Start: 2019-02-22 | End: 2019-02-22 | Stop reason: HOSPADM

## 2019-02-22 RX ORDER — SODIUM CHLORIDE, SODIUM LACTATE, POTASSIUM CHLORIDE, CALCIUM CHLORIDE 600; 310; 30; 20 MG/100ML; MG/100ML; MG/100ML; MG/100ML
INJECTION, SOLUTION INTRAVENOUS CONTINUOUS
Status: DISCONTINUED | OUTPATIENT
Start: 2019-02-22 | End: 2019-02-22 | Stop reason: HOSPADM

## 2019-02-22 RX ORDER — DEXMEDETOMIDINE HYDROCHLORIDE 4 UG/ML
0.2-1.2 INJECTION, SOLUTION INTRAVENOUS CONTINUOUS
Status: DISCONTINUED | OUTPATIENT
Start: 2019-02-22 | End: 2019-02-22 | Stop reason: HOSPADM

## 2019-02-22 RX ORDER — MAGNESIUM HYDROXIDE 1200 MG/15ML
LIQUID ORAL PRN
Status: DISCONTINUED | OUTPATIENT
Start: 2019-02-22 | End: 2019-02-22 | Stop reason: HOSPADM

## 2019-02-22 RX ORDER — BACLOFEN 10 MG/1
10 TABLET ORAL 3 TIMES DAILY
Status: DISCONTINUED | OUTPATIENT
Start: 2019-02-22 | End: 2019-02-23 | Stop reason: HOSPADM

## 2019-02-22 RX ORDER — SODIUM CHLORIDE, SODIUM LACTATE, POTASSIUM CHLORIDE, CALCIUM CHLORIDE 600; 310; 30; 20 MG/100ML; MG/100ML; MG/100ML; MG/100ML
INJECTION, SOLUTION INTRAVENOUS CONTINUOUS PRN
Status: DISCONTINUED | OUTPATIENT
Start: 2019-02-22 | End: 2019-02-22

## 2019-02-22 RX ORDER — LIDOCAINE HYDROCHLORIDE AND EPINEPHRINE 10; 10 MG/ML; UG/ML
INJECTION, SOLUTION INFILTRATION; PERINEURAL PRN
Status: DISCONTINUED | OUTPATIENT
Start: 2019-02-22 | End: 2019-02-22 | Stop reason: HOSPADM

## 2019-02-22 RX ORDER — AMOXICILLIN 250 MG
2 CAPSULE ORAL 2 TIMES DAILY
Status: DISCONTINUED | OUTPATIENT
Start: 2019-02-22 | End: 2019-02-23 | Stop reason: HOSPADM

## 2019-02-22 RX ORDER — HYDRALAZINE HYDROCHLORIDE 20 MG/ML
10-20 INJECTION INTRAMUSCULAR; INTRAVENOUS EVERY 30 MIN PRN
Status: DISCONTINUED | OUTPATIENT
Start: 2019-02-22 | End: 2019-02-23 | Stop reason: HOSPADM

## 2019-02-22 RX ORDER — CARBIDOPA AND LEVODOPA 25; 100 MG/1; MG/1
3 TABLET ORAL DAILY
Status: DISCONTINUED | OUTPATIENT
Start: 2019-02-23 | End: 2019-02-23 | Stop reason: HOSPADM

## 2019-02-22 RX ORDER — NALOXONE HYDROCHLORIDE 0.4 MG/ML
.1-.4 INJECTION, SOLUTION INTRAMUSCULAR; INTRAVENOUS; SUBCUTANEOUS
Status: DISCONTINUED | OUTPATIENT
Start: 2019-02-22 | End: 2019-02-22

## 2019-02-22 RX ORDER — CARBIDOPA AND LEVODOPA 50; 200 MG/1; MG/1
1 TABLET, EXTENDED RELEASE ORAL AT BEDTIME
Status: DISCONTINUED | OUTPATIENT
Start: 2019-02-22 | End: 2019-02-23 | Stop reason: HOSPADM

## 2019-02-22 RX ORDER — HYDROMORPHONE HYDROCHLORIDE 1 MG/ML
.3-.5 INJECTION, SOLUTION INTRAMUSCULAR; INTRAVENOUS; SUBCUTANEOUS EVERY 5 MIN PRN
Status: DISCONTINUED | OUTPATIENT
Start: 2019-02-22 | End: 2019-02-22 | Stop reason: HOSPADM

## 2019-02-22 RX ORDER — OXYCODONE HYDROCHLORIDE 5 MG/1
5-10 TABLET ORAL
Status: DISCONTINUED | OUTPATIENT
Start: 2019-02-22 | End: 2019-02-23 | Stop reason: HOSPADM

## 2019-02-22 RX ORDER — ONDANSETRON 4 MG/1
4-8 TABLET, ORALLY DISINTEGRATING ORAL EVERY 6 HOURS PRN
Status: DISCONTINUED | OUTPATIENT
Start: 2019-02-22 | End: 2019-02-23 | Stop reason: HOSPADM

## 2019-02-22 RX ORDER — ACETAMINOPHEN 325 MG/1
650 TABLET ORAL EVERY 4 HOURS PRN
Status: DISCONTINUED | OUTPATIENT
Start: 2019-02-22 | End: 2019-02-23 | Stop reason: HOSPADM

## 2019-02-22 RX ADMIN — SENNOSIDES AND DOCUSATE SODIUM 2 TABLET: 8.6; 5 TABLET ORAL at 21:36

## 2019-02-22 RX ADMIN — PHENYLEPHRINE HYDROCHLORIDE 200 MCG: 10 INJECTION, SOLUTION INTRAMUSCULAR; INTRAVENOUS; SUBCUTANEOUS at 10:38

## 2019-02-22 RX ADMIN — CEFAZOLIN SODIUM 1 G: 2 INJECTION, SOLUTION INTRAVENOUS at 12:15

## 2019-02-22 RX ADMIN — PROPOFOL 20 MG: 10 INJECTION, EMULSION INTRAVENOUS at 08:56

## 2019-02-22 RX ADMIN — CARBIDOPA AND LEVODOPA 2 TABLET: 25; 100 TABLET ORAL at 21:37

## 2019-02-22 RX ADMIN — CEFAZOLIN SODIUM 2 G: 2 INJECTION, SOLUTION INTRAVENOUS at 10:26

## 2019-02-22 RX ADMIN — PROPOFOL 10 MG: 10 INJECTION, EMULSION INTRAVENOUS at 09:45

## 2019-02-22 RX ADMIN — OXYCODONE HYDROCHLORIDE 5 MG: 5 TABLET ORAL at 21:01

## 2019-02-22 RX ADMIN — SODIUM CHLORIDE, POTASSIUM CHLORIDE, SODIUM LACTATE AND CALCIUM CHLORIDE: 600; 310; 30; 20 INJECTION, SOLUTION INTRAVENOUS at 08:52

## 2019-02-22 RX ADMIN — Medication 0.3 MG: at 16:15

## 2019-02-22 RX ADMIN — PROPOFOL 30 MG: 10 INJECTION, EMULSION INTRAVENOUS at 09:00

## 2019-02-22 RX ADMIN — PROPOFOL 20 MG: 10 INJECTION, EMULSION INTRAVENOUS at 14:52

## 2019-02-22 RX ADMIN — PROPOFOL 20 MG: 10 INJECTION, EMULSION INTRAVENOUS at 09:12

## 2019-02-22 RX ADMIN — POLYETHYLENE GLYCOL 3350 17 G: 17 POWDER, FOR SOLUTION ORAL at 21:36

## 2019-02-22 RX ADMIN — PROPOFOL 100 MCG/KG/MIN: 10 INJECTION, EMULSION INTRAVENOUS at 08:56

## 2019-02-22 RX ADMIN — ACETAMINOPHEN 650 MG: 325 TABLET, FILM COATED ORAL at 22:48

## 2019-02-22 RX ADMIN — PHENYLEPHRINE HYDROCHLORIDE 50 MCG: 10 INJECTION, SOLUTION INTRAMUSCULAR; INTRAVENOUS; SUBCUTANEOUS at 10:22

## 2019-02-22 RX ADMIN — OXYCODONE HYDROCHLORIDE 5 MG: 5 TABLET ORAL at 19:03

## 2019-02-22 RX ADMIN — PROPOFOL 20 MG: 10 INJECTION, EMULSION INTRAVENOUS at 09:13

## 2019-02-22 RX ADMIN — CEFAZOLIN SODIUM 1 G: 2 INJECTION, SOLUTION INTRAVENOUS at 14:09

## 2019-02-22 RX ADMIN — PROPOFOL 10 MG: 10 INJECTION, EMULSION INTRAVENOUS at 09:05

## 2019-02-22 RX ADMIN — CEFAZOLIN 1 G: 1 INJECTION, POWDER, FOR SOLUTION INTRAMUSCULAR; INTRAVENOUS at 21:43

## 2019-02-22 RX ADMIN — SODIUM CHLORIDE: 9 INJECTION, SOLUTION INTRAVENOUS at 17:08

## 2019-02-22 RX ADMIN — CARBIDOPA AND LEVODOPA 1 TABLET: 50; 200 TABLET, EXTENDED RELEASE ORAL at 21:37

## 2019-02-22 RX ADMIN — DEXMEDETOMIDINE HYDROCHLORIDE 0.7 MCG/KG/HR: 4 INJECTION, SOLUTION INTRAVENOUS at 09:00

## 2019-02-22 RX ADMIN — Medication 0.3 MG: at 17:46

## 2019-02-22 RX ADMIN — ACETAMINOPHEN 975 MG: 325 TABLET, FILM COATED ORAL at 08:24

## 2019-02-22 RX ADMIN — PROPOFOL 30 MG: 10 INJECTION, EMULSION INTRAVENOUS at 09:30

## 2019-02-22 RX ADMIN — SODIUM CHLORIDE, POTASSIUM CHLORIDE, SODIUM LACTATE AND CALCIUM CHLORIDE: 600; 310; 30; 20 INJECTION, SOLUTION INTRAVENOUS at 12:52

## 2019-02-22 ASSESSMENT — ACTIVITIES OF DAILY LIVING (ADL)
COGNITION: 0 - NO COGNITION ISSUES REPORTED
TRANSFERRING: 0-->INDEPENDENT
RETIRED_COMMUNICATION: 0-->UNDERSTANDS/COMMUNICATES WITHOUT DIFFICULTY
BATHING: 0-->INDEPENDENT
TOILETING: 0-->INDEPENDENT
RETIRED_EATING: 0-->INDEPENDENT
ADLS_ACUITY_SCORE: 19
AMBULATION: 0-->INDEPENDENT
DRESS: 2-->ASSISTIVE PERSON
SWALLOWING: 0-->SWALLOWS FOODS/LIQUIDS WITHOUT DIFFICULTY
FALL_HISTORY_WITHIN_LAST_SIX_MONTHS: NO

## 2019-02-22 ASSESSMENT — MIFFLIN-ST. JEOR: SCORE: 1305.75

## 2019-02-22 ASSESSMENT — COLUMBIA-SUICIDE SEVERITY RATING SCALE - C-SSRS
2. HAVE YOU ACTUALLY HAD ANY THOUGHTS OF KILLING YOURSELF IN THE PAST MONTH?: NO
1. IN THE PAST MONTH, HAVE YOU WISHED YOU WERE DEAD OR WISHED YOU COULD GO TO SLEEP AND NOT WAKE UP?: NO

## 2019-02-22 NOTE — OR NURSING
Dr Triana in talking with pt and family.  signing consent as pt is unable to print name. Dr Triana and this RN standing at bedside during consent.

## 2019-02-22 NOTE — BRIEF OP NOTE
[facility] Brief Operative Note    Pre-operative diagnosis: Dystonic Tremor   Post-operative diagnosis Dystonic Tremor   Procedure: Procedure(s):  Stealth Assisted Left Side Deep Brain Stimulator Placement, Phase I, Placement Of Left Side Deep Brain Stimulator Electrode, Target Left Globus Pallidus Internus With Microelectrode Recording   Surgeon(s): Surgeon(s) and Role:     * Efren Triana MD - Primary     * Sal Ramos MD - Resident - Assisting   Anesthesia:  Estimated blood loss: MAC  10 mL    Specimens: * No specimens in log *   Findings:  Complications:  Implants: Final electrode location, center Wesley Gun, 1 mm above target.  None.  Abbott Guardian gely hole cover, REF [id number], LOT# [id number]  Weathers DBS electrode, Infinity 0.5, REF [id number], S/N [id number]

## 2019-02-22 NOTE — OR NURSING
Dr. Dennison of neurosurgery notified that patients head CT and skull xrays are complete. Per Dr. Dennison patient can proceed to 4A when bed is ready.

## 2019-02-22 NOTE — ANESTHESIA POSTPROCEDURE EVALUATION
Anesthesia POST Procedure Evaluation    Patient: Sandra Jara   MRN:     [id number] Gender:   female   Age:    53 year old :      1965        Preoperative Diagnosis: Tremor   Procedure(s):  Stealth Assisted Left Side Deep Brain Stimulator Placement, Phase I, Placement Of Left Side Deep Brain Stimulator Electrode, Target Left Globus Pallidus Internus With Microelectrode Recording   Postop Comments: No value filed.       Anesthesia Type:  MAC    Reportable Event: NO     PAIN: Uncomplicated   Sign Out status: Comfortable, Well controlled pain     PONV: No PONV   Sign Out status:  No Nausea or Vomiting     Neuro/Psych: Uneventful perioperative course   Sign Out Status: Preoperative baseline; Age appropriate mentation     Airway/Resp.: Uneventful perioperative course   Sign Out Status: Non labored breathing, age appropriate RR; Resp. Status within EXPECTED Parameters     CV: Uneventful perioperative course   Sign Out status: Appropriate BP and perfusion indices; Appropriate HR/Rhythm     Disposition:   Sign Out in:  PACU  Disposition:  Floor  Recovery Course: Uneventful  Follow-Up: Not required           Last Anesthesia Record Vitals:  CRNA VITALS  2019 1507 - 2019 1607      2019             NIBP:  122/77    Pulse:  81    SpO2:  100 %    EKG:  Sinus rhythm          Last PACU/Preop Vitals:  Vitals:    19 1615 19 1630 19 1645   BP: 117/70 108/61 110/63   Pulse:  74    Resp: 16     Temp:  36.5  C (97.7  F)    SpO2: 98% 97% 97%         Electronically Signed By: Juan Martinez MD, 2019, 4:52 PM

## 2019-02-23 VITALS
DIASTOLIC BLOOD PRESSURE: 67 MMHG | HEART RATE: 89 BPM | HEIGHT: 66 IN | RESPIRATION RATE: 14 BRPM | WEIGHT: 156.31 LBS | BODY MASS INDEX: 25.12 KG/M2 | OXYGEN SATURATION: 99 % | SYSTOLIC BLOOD PRESSURE: 109 MMHG | TEMPERATURE: 98.2 F

## 2019-02-23 LAB
ANION GAP SERPL CALCULATED.3IONS-SCNC: 11 MMOL/L (ref 3–14)
BUN SERPL-MCNC: 8 MG/DL (ref 7–30)
CALCIUM SERPL-MCNC: 7.9 MG/DL (ref 8.5–10.1)
CHLORIDE SERPL-SCNC: 109 MMOL/L (ref 94–109)
CO2 SERPL-SCNC: 24 MMOL/L (ref 20–32)
CREAT SERPL-MCNC: 0.58 MG/DL (ref 0.52–1.04)
ERYTHROCYTE [DISTWIDTH] IN BLOOD BY AUTOMATED COUNT: 12.4 % (ref 10–15)
GFR SERPL CREATININE-BSD FRML MDRD: >90 ML/MIN/{1.73_M2}
GLUCOSE SERPL-MCNC: 91 MG/DL (ref 70–99)
HCT VFR BLD AUTO: 36 % (ref 35–47)
HGB BLD-MCNC: 12.4 G/DL (ref 11.7–15.7)
MAGNESIUM SERPL-MCNC: 1.9 MG/DL (ref 1.6–2.3)
MCH RBC QN AUTO: 31.8 PG (ref 26.5–33)
MCHC RBC AUTO-ENTMCNC: 34.4 G/DL (ref 31.5–36.5)
MCV RBC AUTO: 92 FL (ref 78–100)
PHOSPHATE SERPL-MCNC: 3.4 MG/DL (ref 2.5–4.5)
PLATELET # BLD AUTO: 183 10E9/L (ref 150–450)
POTASSIUM SERPL-SCNC: 3.6 MMOL/L (ref 3.4–5.3)
RBC # BLD AUTO: 3.9 10E12/L (ref 3.8–5.2)
SODIUM SERPL-SCNC: 143 MMOL/L (ref 133–144)
WBC # BLD AUTO: 6.5 10E9/L (ref 4–11)

## 2019-02-23 PROCEDURE — 85027 COMPLETE CBC AUTOMATED: CPT | Performed by: STUDENT IN AN ORGANIZED HEALTH CARE EDUCATION/TRAINING PROGRAM

## 2019-02-23 PROCEDURE — 25000132 ZZH RX MED GY IP 250 OP 250 PS 637: Performed by: STUDENT IN AN ORGANIZED HEALTH CARE EDUCATION/TRAINING PROGRAM

## 2019-02-23 PROCEDURE — 25000128 H RX IP 250 OP 636: Performed by: STUDENT IN AN ORGANIZED HEALTH CARE EDUCATION/TRAINING PROGRAM

## 2019-02-23 PROCEDURE — 80048 BASIC METABOLIC PNL TOTAL CA: CPT | Performed by: STUDENT IN AN ORGANIZED HEALTH CARE EDUCATION/TRAINING PROGRAM

## 2019-02-23 PROCEDURE — 25000132 ZZH RX MED GY IP 250 OP 250 PS 637: Performed by: NEUROLOGICAL SURGERY

## 2019-02-23 PROCEDURE — 25000128 H RX IP 250 OP 636: Performed by: NEUROLOGICAL SURGERY

## 2019-02-23 PROCEDURE — 36415 COLL VENOUS BLD VENIPUNCTURE: CPT | Performed by: STUDENT IN AN ORGANIZED HEALTH CARE EDUCATION/TRAINING PROGRAM

## 2019-02-23 PROCEDURE — 25800030 ZZH RX IP 258 OP 636: Performed by: STUDENT IN AN ORGANIZED HEALTH CARE EDUCATION/TRAINING PROGRAM

## 2019-02-23 PROCEDURE — 83735 ASSAY OF MAGNESIUM: CPT | Performed by: STUDENT IN AN ORGANIZED HEALTH CARE EDUCATION/TRAINING PROGRAM

## 2019-02-23 PROCEDURE — 84100 ASSAY OF PHOSPHORUS: CPT | Performed by: STUDENT IN AN ORGANIZED HEALTH CARE EDUCATION/TRAINING PROGRAM

## 2019-02-23 PROCEDURE — 90682 RIV4 VACC RECOMBINANT DNA IM: CPT | Performed by: NEUROLOGICAL SURGERY

## 2019-02-23 PROCEDURE — 25000131 ZZH RX MED GY IP 250 OP 636 PS 637: Performed by: STUDENT IN AN ORGANIZED HEALTH CARE EDUCATION/TRAINING PROGRAM

## 2019-02-23 RX ORDER — ONDANSETRON 4 MG/1
4-8 TABLET, FILM COATED ORAL EVERY 8 HOURS PRN
Qty: 30 TABLET | Refills: 0 | Status: SHIPPED | OUTPATIENT
Start: 2019-02-23 | End: 2019-03-14

## 2019-02-23 RX ORDER — OXYCODONE HYDROCHLORIDE 5 MG/1
5-10 TABLET ORAL EVERY 4 HOURS PRN
Qty: 30 TABLET | Refills: 0 | Status: SHIPPED | OUTPATIENT
Start: 2019-02-23 | End: 2019-03-14

## 2019-02-23 RX ADMIN — BACLOFEN 10 MG: 10 TABLET ORAL at 14:12

## 2019-02-23 RX ADMIN — CEFAZOLIN 1 G: 1 INJECTION, POWDER, FOR SOLUTION INTRAMUSCULAR; INTRAVENOUS at 05:26

## 2019-02-23 RX ADMIN — ONDANSETRON 4 MG: 4 TABLET, ORALLY DISINTEGRATING ORAL at 13:57

## 2019-02-23 RX ADMIN — POLYETHYLENE GLYCOL 3350 17 G: 17 POWDER, FOR SOLUTION ORAL at 08:52

## 2019-02-23 RX ADMIN — BACLOFEN 10 MG: 10 TABLET ORAL at 08:52

## 2019-02-23 RX ADMIN — ACETAMINOPHEN 650 MG: 325 TABLET, FILM COATED ORAL at 06:53

## 2019-02-23 RX ADMIN — INFLUENZA A VIRUS A/MICHIGAN/45/2015 (H1N1) RECOMBINANT HEMAGGLUTININ ANTIGEN, INFLUENZA A VIRUS A/SINGAPORE/INFIMH-16-0019/2016 (H3N2) RECOMBINANT HEMAGGLUTININ ANTIGEN, INFLUENZA B VIRUS B/MARYLAND/15/2016 RECOMBINANT HEMAGGLUTININ ANTIGEN, AND INFLUENZA B VIRUS B/PHUKET/3073/2013 RECOMBINANT HEMAGGLUTININ ANTIGEN 0.5 ML: 45; 45; 45; 45 INJECTION INTRAMUSCULAR at 12:40

## 2019-02-23 RX ADMIN — SENNOSIDES AND DOCUSATE SODIUM 2 TABLET: 8.6; 5 TABLET ORAL at 08:52

## 2019-02-23 RX ADMIN — CARBIDOPA AND LEVODOPA 3 TABLET: 25; 100 TABLET ORAL at 05:27

## 2019-02-23 RX ADMIN — OXYCODONE HYDROCHLORIDE 10 MG: 5 TABLET ORAL at 00:55

## 2019-02-23 RX ADMIN — CARBIDOPA AND LEVODOPA 2 TABLET: 25; 100 TABLET ORAL at 10:28

## 2019-02-23 RX ADMIN — SODIUM CHLORIDE: 9 INJECTION, SOLUTION INTRAVENOUS at 05:28

## 2019-02-23 RX ADMIN — CEFAZOLIN 1 G: 1 INJECTION, POWDER, FOR SOLUTION INTRAMUSCULAR; INTRAVENOUS at 12:37

## 2019-02-23 RX ADMIN — ACETAMINOPHEN 650 MG: 325 TABLET, FILM COATED ORAL at 13:57

## 2019-02-23 RX ADMIN — CARBIDOPA AND LEVODOPA 2 TABLET: 25; 100 TABLET ORAL at 14:13

## 2019-02-23 ASSESSMENT — ACTIVITIES OF DAILY LIVING (ADL)
ADLS_ACUITY_SCORE: 15

## 2019-02-23 ASSESSMENT — MIFFLIN-ST. JEOR: SCORE: 1325.75

## 2019-02-23 NOTE — DISCHARGE SUMMARY
[facility] Discharge Summary and Instructions    Sandra Jara MRN# [id number]   Age: 53 year old YOB: 1965     Date of Admission:  2/22/2019  Date of Discharge::  2/23/2019  Admitting Physician:  Efren Triana MD  Discharge Physician:  Efren Triana MD          Admission Diagnoses:   S/P deep brain stimulator placement [Z96.89]          Discharge Diagnosis:   S/P deep brain stimulator placement [Z96.89]          Procedures:   2/22/2019: Stealth Assisted Left Side Deep Brain Stimulator Placement, Phase I, Placement Of Left Side Deep Brain Stimulator Electrode, Target Left Globus Pallidus Internus With Microelectrode Recording           Brief History of Illness:   Ms. Sandra Jara is a 53 year old right handed woman who presented for DBS consult.  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  Her symptoms began over 5 years ago with occasional shaking in her right hand and loss of control.  She was initial diagnosis in 2015 at [facility] with corticobasal degeneration (CBD).  She then went to see a neurologist in the [facility] who noticed slower than normal progression for CBD.  It was thought that she may have dystonia.  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 Hopkins.  She does have right upper extremity dystonia and myoclonus.  She is followed by our neurology team, including Dr. Hopkins, for botulism toxin injections.  Her last bolutism injection provided no significant functional improvement so she was referred for DBS evaluation.           Hospital Course:   Patient underwent above-mentioned procedure on 2/22/2019. The operation was uncomplicated and she was admitted to the surgical ICU for routine post operative cares.  On post operative day 1, she was ambulating, voiding without a czaares, eating a regular  "diet, pain was well controlled, and she had finished her post-operative antibiotics. She was discharged home.    Exam on discharge:  /60   Pulse 89   Temp 98.1  F (36.7  C)   Resp 15   Ht 1.676 m (5' 6\")   Wt 70.9 kg (156 lb 4.9 oz)   SpO2 97%   BMI 25.23 kg/m    General: Awake;  Alert, In No Acute Distress  Mental status: Oriented x 3  Cranial Nerves: Cranial Nerves II-XII Intact Bilaterally  Mild flexion of right elbow. Fingers held in flexed position, mostly at MCP joint. Wrist is extended. Thumb slightly adducted and opposed. Otherwise 5/5 strength in upper and lower extremities.  Pronator Drift: Absent  Sensory: Intact to Light Touch  INCISION: clean, dry         Discharge Medications:     Current Discharge Medication List      START taking these medications    Details   ondansetron (ZOFRAN) 4 MG tablet Take 1-2 tablets (4-8 mg) by mouth every 8 hours as needed for nausea or vomiting  Qty: 30 tablet, Refills: 0    Associated Diagnoses: S/P deep brain stimulator placement      oxyCODONE (ROXICODONE) 5 MG tablet Take 1-2 tablets (5-10 mg) by mouth every 4 hours as needed for moderate to severe pain  Qty: 30 tablet, Refills: 0    Associated Diagnoses: S/P deep brain stimulator placement         CONTINUE these medications which have NOT CHANGED    Details   baclofen (LIORESAL) 10 MG tablet Please take 1/2 tab to 1 tab up to three times a day as needed.  Qty: 30 tablet, Refills: 3    Associated Diagnoses: Dystonia, torsion, fragments of      carbidopa-levodopa (SINEMET CR)  MG per CR tablet Take 1 tablet by mouth At Bedtime  Qty: 30 tablet, Refills: 6    Associated Diagnoses: Dystonia, torsion, fragments of      carbidopa-levodopa (SINEMET)  MG tablet Take 3 tabs at 530AM, and 2 tabs at 10AM, 2PM, 6PM, and 10PM  Qty: 990 tablet, Refills: 3    Associated Diagnoses: Dystonia, torsion, fragments of      Cholecalciferol (VITAMIN D) 2000 UNITS tablet Take 2,000 Units by mouth every morning     "   clonazePAM (KLONOPIN) 0.5 MG tablet Take 0.5 tablets (0.25 mg) by mouth At Bedtime  Qty: 15 tablet, Refills: 5      traMADol (ULTRAM) 50 MG tablet Take 1/2 to 1 tab by mouth every 6 hours as needed, up to 3 tabs per day.  Qty: 45 tablet, Refills: 0    Associated Diagnoses: Acquired torsion dystonia      acyclovir (ZOVIRAX) 200 MG capsule as needed  Refills: 1      !! botulinum toxin type A (BOTOX) 100 units injection Inject 280 Units into the muscle once Lot # /[id number] with Expiration Date:  11/2020      !! OnabotulinumtoxinA (BOTOX IJ) Inject 300 Units as directed once [id number] with Expiration Date:  12/2020       !! - Potential duplicate medications found. Please discuss with provider.                Discharge Instructions and Follow-Up:   Discharge diet:  Regular   Discharge activity: Do not do any bending, twisting, strenuous exercise, or heavy lifting (greater than 10 pounds) for 2-4 weeks. Be careful and ask for assistance when walking or going up and down stairs. Avoid any activities that could result in trauma to the surgical wound. Do not drive while using narcotics or other sedating medications, such as sleep aids, muscle relaxants, etc.   Discharge follow-up: You are scheduled for DBS Phase II on 3/1/2019. No follow up appointment in Neurosurgery clinic is needed between these procedures.   Wound care: You should keep the wound undressed and open to air. You are allowed to take showers and get the wound wet starting on post-operative day #3 (2/25/2019) but you may not scrub or soak the wound or keep it submerged under water. If you do happen to get the wound wet, be sure to pat dry it rather than scrubbing it with a towel.     Please call if you have:  1. increased pain, redness, drainage, swelling at your incision  2. fevers > 101.5 F degrees  3. with any questions or concerns.  You may reach the Neurosurgery clinic at [phone] during regular work hours. ER at [phone].    and ask for  the Neurosurgery Resident on call at [phone], during off hours or weekends.         Discharge Disposition:   Discharged to home

## 2019-02-23 NOTE — PLAN OF CARE
NEURO:no acute changes in exam; orientedX4; speech somewhat slow but no aphasia; BLUE tremor at baseline RUE tremor worse than LUE & R hand more spastic/ somewhat contracted;   R side upper & lower extrem slightly weaker than L;   pupils equal/reactive; sensation intact  CV:SR/ST 90s-100s; no ectopy; goal SBP <140 & pt maintaining w/o use of prns; afebrile  RESP:RA; lung sounds clear  GI:regular diet; swallows w/o issues; no BM; on scheduled bowel meds  :voiding spontaneously & adequate amounts at a time     ISSUES/EVENTS:  -mild HA @ times w/ relief from prns    Please see MAR/flowsheets for further interventions/assessments     PLAN:Continue to monitor pt & notify MD of any acute changes; likely discontinue after 3rd dose of ancef    Audrey Hunter RN on 2/23/2019 at 5:40 AM

## 2019-02-23 NOTE — DISCHARGE SUMMARY
D: POD#1 stealth assisted left side deep brain stimulator placement, phase 1.    Neuro:  AOx4, PERRLA, flat affect, slow to respond, speech is slow but clear and fluent. Facial symmetrical. RUE is contracted, intermittent tremors with activities affecting right side. No pronator drift. Denies numbness and tingling. Left side strength is 5/5. Unsteady gait, baseline.     CV: SR 70s-90s, RRR, no ectopy noted, BP within parameters, no intervention required. Warm to touch. Cap refill < 3 sec.    Pulm: LS are clear throughout, SpO2 >95%. RA. Moderate cough.    GI/: Regular diet. Nausea x3, emesis x2. Voids spontaneously. Bowel sounds are active, no BM on this shift.     Discharge instructions given to patient and spouse. No questions from their end.     Plan: Pt discharged home with spouse, Tyrone.

## 2019-02-24 ENCOUNTER — PATIENT OUTREACH (OUTPATIENT)
Dept: CARE COORDINATION | Facility: CLINIC | Age: 54
End: 2019-02-24

## 2019-02-25 ENCOUNTER — PATIENT OUTREACH (OUTPATIENT)
Dept: NEUROSURGERY | Facility: CLINIC | Age: 54
End: 2019-02-25

## 2019-02-25 NOTE — PROGRESS NOTES
NEUROSURGERY    PROGRESS NOTE    SUBJECTIVE:  No acute events overnight.  Patient has some discomfort at the incision site and the connector burial site.      PHYSICAL EXAM  Temp 98.1, /68, HR 82, RR 14, O2 96% on room air.    On exam,  Awake, alert, oriented X 3. Not in any acute distress.  Incisions: left frontal incision clean/dry and intact.  Pin sites clean/dry and intact.    Neurological  Awake, alert and oriented to date, time, place and person. Speech fluent.   Pupils equal, round, reactive to light.  Extraocular movement intact.  Hearing is grossly normal to finger rub.   Facial sensation intact.  Face symmetric.  Tongue midline.  Uvula elevates equally.    Motor: full strength.  Right side dystonia.  Sensation: decreased sensation below the elbow on the right side.      IMAGING  CT head without contrast 2/22/2019  Expected pneumocephalus.  No hemorrhage seen.    X-ray skull 2/22/2019  Expected orientation of the electrode.      ASSESSMENT  53 year old female with dystonia, myoclonus and tremors in the right upper extremity.  S/p left side deep brain stimulator placement, phase I, placement of left side deep brain stimulator electrode, target left globus pallidus internus, with microelectrode recording, POD#1    Patient is doing well.  She is neurologically stable and at her baseline.  CT head shows no complicating features.    Mobilize.  Discharge planning - discharge home today.      PLAN  Discharge to home.

## 2019-02-25 NOTE — PROGRESS NOTES
"[facility]: Post-Discharge Note  SITUATION                                                      Admission:    Admission Date: 02/22/19   Reason for Admission: Stealth Assisted Left Side Deep Brain Stimulator Placement, Phase I, Placement Of Left Side Deep Brain Stimulator Electrode, Target Left Globus Pallidus Internus With Microelectrode Recording  Discharge:   Discharge Date: 02/23/19  Discharge Diagnosis: Dystonic tremor  Discharge Service: Neurosurgery  Discharge Plan: Routine post op follow up    BACKGROUND                                                      Neurosurgery Discharge Coordination Note     Attending physician: Dr. Triana  Discharge to: Home     Current status: Patient states she  feels very good since surgery. Pt denies pain. Denies redness, swelling, increased tenderness, drainage, incision opening or elevated temp. Reports Incision CDI without signs of infection.  Denies current bowel or bladder issues. Pt states her \"legs feel a little weak\" initially when she moves from a seated to standing position. This sensation seems to dissipate once she is up and moving about. Pt states she experienced this in the hospital and it is about the same (no worse, no better).     Discharge instructions and medications reviewed with patient.  Follow up appointments/imaging/tests needed: DBS battery placement surgery scheduled on 3/1/19 at 7:30, arrive on unit 3C at 5:30  RN triage/on call number given: [phone]/ [phone]        ASSESSMENT      Discharge Assessment  Patient reports symptoms are: Improved  Does the patient have all of their medications?: Yes  Does patient know what their new medications are for?: Yes  Does patient have a follow-up appointment scheduled?: Yes  Does patient have any other questions or concerns?: No    Post-op  Did the patient have surgery or a procedure: Yes  Incision: closed;healing  Drainage: No  Bleeding: none  Fever: No  Chills: No  Redness: " No  Warmth: No  Swelling: No  Incision site pain: No  Closure: suture;dissolving  Eating & Drinking: eating and drinking without complaints/concerns  PO Intake: regular diet  Bowel Function: normal  Urinary Status: voiding without complaint/concerns        PLAN                                                      Outpatient Plan:  3/1/19 surgery; routine post op follow up    Future Appointments   Date Time Provider Department Center   3/14/2019 10:00 AM Claudia Asif PA-C [facility]ending [facility] to [facility]   3/21/2019  9:00 AM Tiffany Hopkins MD [facility]   4/18/2019 11:00 AM Tiffany Hopkins MD [facility]           LUIS BOWLES RN

## 2019-02-25 NOTE — PROGRESS NOTES
[facility]: Post-Discharge Note  SITUATION                                                      Admission:    Admission Date: 02/22/19   Reason for Admission: S/P deep brain stimulator placement  Discharge:   Discharge Date: 02/23/19  Discharge Diagnosis: S/P deep brain stimulator placement  Discharge Service: Neurosurgery    BACKGROUND                                                      Ms. Sandra Jara is a 53 year old right handed woman who presented for DBS consult.  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  Her symptoms began over 5 years ago with occasional shaking in her right hand and loss of control.  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  She then went to see a neurologist in the [facility] who noticed slower than normal progression for CBD.  It was thought that she may have dystonia.  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 Hopkins.  She does have right upper extremity dystonia and myoclonus.  She is followed by our neurology team, including Dr. Hopkins, for botulism toxin injections.  Her last bolutism injection provided no significant functional improvement so she was referred for DBS evaluation.    ASSESSMENT      Discharge Assessment  Patient reports symptoms are: Unchanged  Does the patient have all of their medications?: Yes  Does patient know what their new medications are for?: Yes  Does patient have a follow-up appointment scheduled?: Yes  Does patient have any other questions or concerns?: No    Post-op  Did the patient have surgery or a procedure: Yes  Fever: No  Chills: No  Eating & Drinking: eating and drinking without complaints/concerns  PO Intake: regular diet  Bowel Function: normal  Urinary Status: voiding without complaint/concerns    PLAN                                                       Outpatient Plan:      You are scheduled for DBS Phase II on 3/1/2019. No follow up appointment in Neurosurgery clinic is needed between these procedures.    Future Appointments   Date Time Provider Department Center   3/14/2019 10:00 AM Claudia Asif PA-C [facility]   3/21/2019  9:00 AM Tiffany Hopkins MD [facility]   4/18/2019 11:00 AM Tiffany Hopkins MD [facility]           Yelitza Arteaga, [facility]

## 2019-02-27 DIAGNOSIS — G24.8 ACQUIRED TORSION DYSTONIA: Primary | ICD-10-CM

## 2019-02-28 ENCOUNTER — DOCUMENTATION ONLY (OUTPATIENT)
Dept: OTHER | Facility: CLINIC | Age: 54
End: 2019-02-28

## 2019-02-28 NOTE — OP NOTE
PATIENT NAME: SANDRA SEAMAN  YOB: 1965  MRN:   [id number]  ACCOUNT:  [id number]      DATE OF PROCEDURE:  02/22/2019    PREOPERATIVE DIAGNOSIS:    1.  Dystonic tremor  2.  Corticobasal degeneration  3.  Right upper extremity dystonia and myoclonus    POSTOPERATIVE DIAGNOSIS:    1.  Dystonic tremor  2.  Corticobasal degeneration  3.  Right upper extremity dystonia and myoclonus    PROCEDURES PERFORMED:   1.  Placement of CRW stereotactic headframe.   2.  Stereotactic neuronavigation planning and CT of head.   3.  Stereotactic neuronavigation using the Fixmo system for surgical planning, targeting and approach. The target is left globus pallidus internus (GPi).  4.  Left side deep brain stimulator placement, phase I, placement of left side deep brain stimulator electrode, target is left globus pallidus internus (GPi).   5.  Use of intraoperative microelectrode recording.   6.  Use of intraoperative fluoroscopy.  7.  Temporary/intraoperative placement of one subdural strip electrode over the left side motor cortex for intraoperative ECoG recording, per research protocol.    ATTENDING SURGEON:  Efren Triana MD, PhD     RESIDENT SURGEON:  Sal Ramos MD    ANESTHESIA:  Monitored anesthesia care and local anesthetic.     ESTIMATED BLOOD LOSS:  10 mL.     COMPLICATIONS:  None.    FINDINGS:  Final electrode location, center Wesley Gun position, 1.0 mm above target.    IMPLANTS:    1.  Weathers DBS electrode, Infinity 0.5, REF 6172, S/N [id number].  2.  Abbott Guardian gely hole cover, REF 6010, Lot# [id number].    INDICATIONS FOR PROCEDURE:  Ms. Sandra Jara is a 53 year old right handed  woman who presents for DBS consult.  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  Her symptoms began over 5 years ago with occasional shaking in her right hand and loss of control.  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  She then went to see  a neurologist in the [facility] who noticed slower than normal progression for CBD.  It was thought that she may have dystonia.  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 Hopkins.  She does have right upper extremity dystonia and myoclonus.  She is followed by our neurology team, including Dr. Hopkins, for botulism toxin injections.  Her last bolutism injection provided no significant functional improvement so she has been referred for DBS evaluation.  Dr. Hopkins's thoughts are left GPi DBS for right upper extremity dystonia.  The patient endorses dystonia in her right arm and now going down her right leg.  Cramping and shaking were symptoms that came after loss of control in her right arm/hand.  She wonders about performing activities after surgery, as she does boxing as part of Physical Therapy.  Her goals of DBS surgery is to get rid of the jerking and to get functionality back in her right upper extremity.  Her case at the Movement Disorder Consensus Group Meeting, and she was considered to be a good DBS candidate.  Recommendation was left GPi DBS, unilateral, Abbott system.  We discussed both phase I and II of DBS surgery.  We discussed that during phase I, we would place the DBS electrode on the left side under MAC and microelectrode recording.  She would then return one week later for the phase II with placement of the DBS generator/battery over the left chest wall.  Risks, benefits, alternative therapies were discussed with the patient, including but not limited to infection and bleeding.  Surgical procedure was discussed in detail.  All questions were answered, and she expressed understanding.  She has also agreed to participate in clinical research for which subdural strip electrode would be placed temporarily in the ipsilateral motor cortex for intraoperative ECoG  recording.  Consent was obtained for the research protocol as well.    DESCRIPTION OF PROCEDURE:      CRW head frame placement and stereotactic neuronavigation.      Informed consent was obtained.  The patient was brought to the operating room and kept on the gurney in a sitting position.  She was identified and a brief timeout was performed for the placement of the CRW head frame.  She was given sedation to make her comfortable.  The intended pin sites, two in the front and two in the back of the head, were cleaned and were injected with local anesthetic, 1% lidocaine with epinephrine.  A total of 24 mL were used during this portion of the surgical case.  The CRW stereotactic head frame was placed onto her head with 4 pins for fixation.  Care was taken so that the fiducial box wound fit over the head and the frame.  Once the head frame was on, the fiducial box was easily placed without interference from her forehead.  Lira catheter was also placed.  The patient tolerated the procedure well and her sedation was lightened and she was awakened.      After the CRW stereotactic head frame was placed, she was taken directly to the CT scanner, at which time a CT scan of the head stereotactic protocol was obtained.  Subsequently, the patient was taken back up to the operating room where she was placed supine on the operative bed with the CRW head frame affixed to the bed as well.  Patient was in a slight sitting up position with the neck in a neutral position and she was made comfortable.  The bed was positioned so that it would be a beach chair reclining position.  All pressure points were well padded.  Care was taken to make sure that the neck was in neutral position and that the Aguilar head culver device had room for manipulation in case more flexion or extension is needed throughout the case.     At this time, attention was turned to the neuro navigation imaging that was obtained.  The Stealth neuronavigation device  was loaded with the CT head that had just been obtained.  The device also had an MRI of the head, stereotactic protocol, that was obtained prior to surgery.  CT of the head was merged with the previously obtained MRI of the brain.  The merge demonstrated good coherence/registration.  Then, using this merged image, neuronavigation was used to stereotactically target the left globus pallidus internus.  The technique used was the AC-PC line localization technique to target the GPi using stereotactic coordinates.  We utilized the T1, T2 and SWI sequence of the MRI to identify the GPi and target it.  We also had 7 Gina MRI available as a reference to confirm our targeting.  Dr. Hopkins also planned and adjusted the target and trajectory.  The X, Y and Z as well as the ring and arc angles for the CRW head frame were obtained for the left side.  The entry into the skull, as well as the trajectory of the electrode were checked with a probe's eye view to avoid any sulci, ventricle or vascular structures.     The stereotactic coordinates for the left side was then transferred to the CR stereotactic targeting apparatus as well as the phantom base.  The coordinates were confirmed and double checked for accuracy.  Accuracy was also confirmed using phantom base.  The coordinates were X = -20.4, Y = -0.3, Z = +3.2, ring angle = 69.2 degrees anterior, and arc angle = 1.5 degrees to the left.     At this time, attention was turned back to the patient.  Using a hair clipper, her hair over the left frontal area was clipped.  A semicircular C-shaped incision was planned on the left side and this was marked.  Then, the surgical area was prepped and draped in routine surgical fashion.  We also prepped the area posterior to this as well as area towards the left parietal area.  The patient was also made comfortable.     Timeout was then performed confirming the patient's identity, procedure to be performed, side and site of surgery  identified, imaging corresponding with the patient and administration of preoperative prophylactic antibiotic.    Left-sided electrode placement with microelectrode recording: Target left GPi.     The CRW targeting apparatus was attached to the head frame on top of the sterile drapes.  The entry point was marked on the scalp on the left side.  A C-shaped incision was made with a skin knife, after the area was injected with local anesthetic, 1% Lidocaine with epinephrine and 1/4% Marcaine plain, 50:50 mix.  The area posterior to the incision was also injected as a pocket would be created.  Area posterior lateral, towards the left parietal area was also injected as the electrode connector will be tunneled here later.  Total of 23 mL of the above mentioned local anesthetic was used.  The incision was then further carried down to the pericranium.  Hemostasis was obtained using monopolar and bipolar cautery.  Thin layer of pericranial tissue was left behind on the scalp and the skin flap was reflected posteriorly.  Then, using a blunt dissection technique, a pocket was created posteriorly as well as a track that was made towards the left parietal area for later use.    At this time, the targeting apparatus again positioned and the entry point to the skull was marked.  Area of the intended gely hole was cleaned and pericranial tissue removed.  Then using an acorn drill, gely hole was created over this entry point to expose the dura.  The area was vigorously irrigated and bone wax used to control the bone bleeding.  Dura was coagulated with bipolar cautery for hemostasis, and again no active bleeding was noted.  Then, the gely hole was extended posterior by approximately 3 - 4 mm so that the opening was now elliptical.  This was in preparation for placing the research 2 x 16 contact subdural strip electrodes over the left motor strip.      At this time, the electrode fixation cover was fixed to the skull using two screws.   "The oval part of the gely hole was still covered by the gely hole cover.  Care was taken to overlap the pericranium over the edge of the cover to provide a smooth tissue transition.  Then, the electrode drive was attached to the targeting apparatus.  The entry into the dura was again checked.  It appeared that all positions of the Wesley Gun electrode culver were accessible without any interference from the bone edge.  Then using a Bovie cautery and a #4 Penfield instrument, opening was made into the dura, as well as a small corticectomy.  No active bleeding was noted.    The dural opening was then extended posteriorly.  The subdural space was now accessible.  Based on stereotactic localization, the hand motor area, the \"omega\" sign was localized and marked.  Using the stereotactic coordinates, this was previously marked on the scalp.  It measured to be about 5 cm from the gely hole.  The plan was to then place 7 cm of the subdural strip electrode so that the center of the 2 x 16 subdural strip electrode would be over the central sulcus and over the hand motor area.  The 2 x 16 subdural strip electrode was brought in and was slid into the subdural space posteriorly until about 7 cm was in the subdural space, going towards the marked area.  There was no resistance.  The strip electrode was inserted with ease.  No bleeding was noted.    Dr. Tiffany Hopkins and Dr. Rufino Murphy of the Neurology Department participated in the recording and neurological testing.  During the microelectrode recording and testing, Dr. Hopkins and Dr. Murphy took detailed notes of the electrophysiologic data and neurologic exam as well as any stimulation effects.  Patient also consented for the ECoG research project.  Therefore, intraoperative recording was performed while the patient performed a task.    At this time, the electrode guide tubes were inserted in the center, medial and posterior Wesley Gun positions and they were advanced slowly until " they were fully inserted at which point the tips would be well above the target.  No abnormal resistance was noted.  Duraseal was used to seal the entry site to provide a seal and to minimize cerebrospinal fluid leak and air entry.  Then, recording microelectrodes were brought in and placed within the guide tubes and they were connected for intraoperative recording.  The tips of the electrodes were now 15 mm above target.  The patient was awakened.  Then, using a micro drive, the electrodes were slowly advanced towards the target, collecting microelectrode recording data for mapping the track.  Center track yielded approximately 7.0 mm of GPi with somatosensory response activity in the upper extremity.  Optic tract was clearly found.  Posterior track yielded 5.5 to 6.5 mm of GPi with somatosensory response activity in the shoulder.  No optic tract was noted.  Medial track yielded 5.4 mm of GPi with somatosensory response in the elbow.  Distant optic track was noted.  Internal capsular effects were seen with ring stimulation in the medial track at 3.0 mm above target at 4.0 mA.      As per research protocol, during the microelectrode recording, when the cells were isolated, patient's electrophysiological data including the ECoG data was collected while the patient performed various tasks on the experimental apparatus.    Based on the mapping data, it was decided that the current center Wesley Gun position may be the best placement.  The electrode drive was positioned to the depth of 1.0 mm above the target level on the micro drive.  Since there is a 1 mm off set, anatomically, the location is 2.0 mm above target.  Then, the electrodes were pulled out of the guide tubes.  The permanent DBS electrode was brought into the field and placed in the anterior guide tube with the tip being placed at 1.0 mm above the target depth.  The electrode demonstrated good impedance values.  The electrode was tested.  With 1- and 2abc  configuration, 130 Hz and 60 microsecond pulse width, no internal capsular effects were noted even up to 6.0 mA.  No further testing was needed as based on our electrophysiology data, we decided that the current position was satisfactory for placement of the stimulating electrode.    We collected addition data with the permanent electrode and the subdural strip electrode in place while patient performed various tasks.    We also took various intraoperative fluorosocopy lateral views in order to document and localize the subdural strip electrode location.    With the satisfactory testing of the electrode position and the stimulation parameters, as well as collection of the research data, the electrode was secured at this location.  The patient was again made comfortable.  The subdural strip electrode was removed without any difficulty.  No bleeding was noted.  The guide tubes were also pulled out of the brain.  Duraseal was again used to seal any opening.  The area was generously irrigated.  Hemostasis was also obtained.  Fluoroscopy was brought into the field to test that the electrode did not move with each manipulation.  The electrode tip was seen to be above the target, as expected.  The electrode clamp was applied to hold the electrode.  Then, the electrode stylet was removed. The electrode was then removed from the electrode culver.  The cap cover was finally placed and secured in place.  Fluoroscopy confirmed that the tip of the electrode did not change in position with each manipulation.  The directional marker, on fluoroscopy, also demonstrated that the contact 2A is facing anteriorly.    The connecting portion of the electrode was covered with a protective covering/dead-end connector, and this apparatus was inserted into the subgaleal space/pocket towards the left parietal area that was created at the beginning of the case.  The excess wire was also buried posteriorly in the previously created pocket.   Fluoroscopy confirmed that the tip did not move.  The wound was then generously irrigated.    The galeal layer was reapproximated using 3-0 Vicryl sutures in an inverted interrupted fashion and the skin was reapproximated using 4-0 Monocryl suture in a running fashion.  The wound was cleaned and dried and prepped with ChoraPrep.  Then, Dermabond was applied.    Removal of the head frame and end of procedure.    The stereotactic targeting apparatus was removed.  The sterile drapes were then removed and the patient was taken out of the CRW head frame.  The 4 pin sites were clean and dry with no active bleeding noted.  Primapore dressings were placed over the two frontal pin sites.  Antibiotic ointment was placed over the pin sites.  The patient was then further awakened and taken to the recovery room in stable condition.     The sponge, needle and instrument counts were correct x2 at the end of the procedure.  There were no complications.      Etelvina KELLEY M.D., Ph.D., Neurosurgery Attending, was present and scrubbed for the entire case and performed the key and critical portions of the case.      ETELVINA WALDEN MD, PHD

## 2019-03-01 ENCOUNTER — ANESTHESIA EVENT (OUTPATIENT)
Dept: SURGERY | Facility: CLINIC | Age: 54
End: 2019-03-01
Payer: COMMERCIAL

## 2019-03-01 ENCOUNTER — HOSPITAL ENCOUNTER (OUTPATIENT)
Facility: CLINIC | Age: 54
Discharge: HOME | End: 2019-03-01
Attending: NEUROLOGICAL SURGERY | Admitting: NEUROLOGICAL SURGERY
Payer: COMMERCIAL

## 2019-03-01 ENCOUNTER — ANESTHESIA (OUTPATIENT)
Dept: SURGERY | Facility: CLINIC | Age: 54
End: 2019-03-01
Payer: COMMERCIAL

## 2019-03-01 VITALS
OXYGEN SATURATION: 99 % | HEART RATE: 91 BPM | SYSTOLIC BLOOD PRESSURE: 107 MMHG | WEIGHT: 153 LBS | DIASTOLIC BLOOD PRESSURE: 81 MMHG | HEIGHT: 66 IN | TEMPERATURE: 98.2 F | RESPIRATION RATE: 16 BRPM | BODY MASS INDEX: 24.59 KG/M2

## 2019-03-01 DIAGNOSIS — Z96.89 S/P DEEP BRAIN STIMULATOR PLACEMENT: Primary | ICD-10-CM

## 2019-03-01 LAB — GLUCOSE BLDC GLUCOMTR-MCNC: 89 MG/DL (ref 70–99)

## 2019-03-01 PROCEDURE — 37000009 ZZH ANESTHESIA TECHNICAL FEE, EACH ADDTL 15 MIN: Performed by: NEUROLOGICAL SURGERY

## 2019-03-01 PROCEDURE — 40000170 ZZH STATISTIC PRE-PROCEDURE ASSESSMENT II: Performed by: NEUROLOGICAL SURGERY

## 2019-03-01 PROCEDURE — C1767 GENERATOR, NEURO NON-RECHARG: HCPCS | Performed by: NEUROLOGICAL SURGERY

## 2019-03-01 PROCEDURE — 71000014 ZZH RECOVERY PHASE 1 LEVEL 2 FIRST HR: Performed by: NEUROLOGICAL SURGERY

## 2019-03-01 PROCEDURE — 36000059 ZZH SURGERY LEVEL 3 EA 15 ADDTL MIN UMMC: Performed by: NEUROLOGICAL SURGERY

## 2019-03-01 PROCEDURE — 82962 GLUCOSE BLOOD TEST: CPT

## 2019-03-01 PROCEDURE — 71000027 ZZH RECOVERY PHASE 2 EACH 15 MINS: Performed by: NEUROLOGICAL SURGERY

## 2019-03-01 PROCEDURE — 25000128 H RX IP 250 OP 636: Performed by: NURSE ANESTHETIST, CERTIFIED REGISTERED

## 2019-03-01 PROCEDURE — 25800030 ZZH RX IP 258 OP 636: Performed by: ANESTHESIOLOGY

## 2019-03-01 PROCEDURE — 37000008 ZZH ANESTHESIA TECHNICAL FEE, 1ST 30 MIN: Performed by: NEUROLOGICAL SURGERY

## 2019-03-01 PROCEDURE — 25000125 ZZHC RX 250: Performed by: NURSE ANESTHETIST, CERTIFIED REGISTERED

## 2019-03-01 PROCEDURE — 25000125 ZZHC RX 250: Performed by: NEUROLOGICAL SURGERY

## 2019-03-01 PROCEDURE — C1883 ADAPT/EXT, PACING/NEURO LEAD: HCPCS | Performed by: NEUROLOGICAL SURGERY

## 2019-03-01 PROCEDURE — 25000565 ZZH ISOFLURANE, EA 15 MIN: Performed by: NEUROLOGICAL SURGERY

## 2019-03-01 PROCEDURE — 25800030 ZZH RX IP 258 OP 636: Performed by: NURSE ANESTHETIST, CERTIFIED REGISTERED

## 2019-03-01 PROCEDURE — 27210794 ZZH OR GENERAL SUPPLY STERILE: Performed by: NEUROLOGICAL SURGERY

## 2019-03-01 PROCEDURE — 25000128 H RX IP 250 OP 636: Performed by: NEUROLOGICAL SURGERY

## 2019-03-01 PROCEDURE — 36000057 ZZH SURGERY LEVEL 3 1ST 30 MIN - UMMC: Performed by: NEUROLOGICAL SURGERY

## 2019-03-01 PROCEDURE — 27211024 ZZHC OR SUPPLY OTHER OPNP: Performed by: NEUROLOGICAL SURGERY

## 2019-03-01 DEVICE — LEAD EXTENSION W/EXTEND TECHNOLOGY 50CM 6371ANS: Type: IMPLANTABLE DEVICE | Site: BRAIN | Status: FUNCTIONAL

## 2019-03-01 DEVICE — GENERATOR DBS SYSTEM INFINITY 5 IPG 6660ANS
Type: IMPLANTABLE DEVICE | Site: CHEST | Status: NON-FUNCTIONAL
Removed: 2020-11-24

## 2019-03-01 RX ORDER — CEFAZOLIN SODIUM 2 G/100ML
2 INJECTION, SOLUTION INTRAVENOUS
Status: COMPLETED | OUTPATIENT
Start: 2019-03-01 | End: 2019-03-01

## 2019-03-01 RX ORDER — OXYCODONE HYDROCHLORIDE 5 MG/1
5 TABLET ORAL EVERY 6 HOURS PRN
Qty: 20 TABLET | Refills: 0 | Status: SHIPPED | OUTPATIENT
Start: 2019-03-01 | End: 2019-03-14

## 2019-03-01 RX ORDER — FENTANYL CITRATE 50 UG/ML
25-50 INJECTION, SOLUTION INTRAMUSCULAR; INTRAVENOUS
Status: DISCONTINUED | OUTPATIENT
Start: 2019-03-01 | End: 2019-03-01 | Stop reason: HOSPADM

## 2019-03-01 RX ORDER — MEPERIDINE HYDROCHLORIDE 50 MG/ML
12.5 INJECTION INTRAMUSCULAR; INTRAVENOUS; SUBCUTANEOUS
Status: DISCONTINUED | OUTPATIENT
Start: 2019-03-01 | End: 2019-03-01 | Stop reason: HOSPADM

## 2019-03-01 RX ORDER — ONDANSETRON 2 MG/ML
4 INJECTION INTRAMUSCULAR; INTRAVENOUS EVERY 30 MIN PRN
Status: DISCONTINUED | OUTPATIENT
Start: 2019-03-01 | End: 2019-03-01 | Stop reason: HOSPADM

## 2019-03-01 RX ORDER — FENTANYL CITRATE 50 UG/ML
INJECTION, SOLUTION INTRAMUSCULAR; INTRAVENOUS PRN
Status: DISCONTINUED | OUTPATIENT
Start: 2019-03-01 | End: 2019-03-01

## 2019-03-01 RX ORDER — SODIUM CHLORIDE, SODIUM LACTATE, POTASSIUM CHLORIDE, CALCIUM CHLORIDE 600; 310; 30; 20 MG/100ML; MG/100ML; MG/100ML; MG/100ML
INJECTION, SOLUTION INTRAVENOUS CONTINUOUS
Status: DISCONTINUED | OUTPATIENT
Start: 2019-03-01 | End: 2019-03-01 | Stop reason: HOSPADM

## 2019-03-01 RX ORDER — ONDANSETRON 4 MG/1
4 TABLET, ORALLY DISINTEGRATING ORAL EVERY 30 MIN PRN
Status: DISCONTINUED | OUTPATIENT
Start: 2019-03-01 | End: 2019-03-01 | Stop reason: HOSPADM

## 2019-03-01 RX ORDER — DEXAMETHASONE SODIUM PHOSPHATE 4 MG/ML
INJECTION, SOLUTION INTRA-ARTICULAR; INTRALESIONAL; INTRAMUSCULAR; INTRAVENOUS; SOFT TISSUE PRN
Status: DISCONTINUED | OUTPATIENT
Start: 2019-03-01 | End: 2019-03-01

## 2019-03-01 RX ORDER — ONDANSETRON 2 MG/ML
INJECTION INTRAMUSCULAR; INTRAVENOUS PRN
Status: DISCONTINUED | OUTPATIENT
Start: 2019-03-01 | End: 2019-03-01

## 2019-03-01 RX ORDER — HYDROMORPHONE HYDROCHLORIDE 1 MG/ML
.3-.5 INJECTION, SOLUTION INTRAMUSCULAR; INTRAVENOUS; SUBCUTANEOUS EVERY 10 MIN PRN
Status: DISCONTINUED | OUTPATIENT
Start: 2019-03-01 | End: 2019-03-01 | Stop reason: HOSPADM

## 2019-03-01 RX ORDER — CEFAZOLIN SODIUM 1 G/3ML
1 INJECTION, POWDER, FOR SOLUTION INTRAMUSCULAR; INTRAVENOUS SEE ADMIN INSTRUCTIONS
Status: DISCONTINUED | OUTPATIENT
Start: 2019-03-01 | End: 2019-03-01 | Stop reason: HOSPADM

## 2019-03-01 RX ORDER — NALOXONE HYDROCHLORIDE 0.4 MG/ML
.1-.4 INJECTION, SOLUTION INTRAMUSCULAR; INTRAVENOUS; SUBCUTANEOUS
Status: DISCONTINUED | OUTPATIENT
Start: 2019-03-01 | End: 2019-03-01 | Stop reason: HOSPADM

## 2019-03-01 RX ORDER — CEPHALEXIN 500 MG/1
500 CAPSULE ORAL 3 TIMES DAILY
Qty: 21 CAPSULE | Refills: 0 | Status: SHIPPED | OUTPATIENT
Start: 2019-03-01 | End: 2019-05-08

## 2019-03-01 RX ORDER — DEXAMETHASONE SODIUM PHOSPHATE 4 MG/ML
4 INJECTION, SOLUTION INTRA-ARTICULAR; INTRALESIONAL; INTRAMUSCULAR; INTRAVENOUS; SOFT TISSUE EVERY 10 MIN PRN
Status: DISCONTINUED | OUTPATIENT
Start: 2019-03-01 | End: 2019-03-01 | Stop reason: HOSPADM

## 2019-03-01 RX ORDER — LIDOCAINE HYDROCHLORIDE 20 MG/ML
INJECTION, SOLUTION INFILTRATION; PERINEURAL PRN
Status: DISCONTINUED | OUTPATIENT
Start: 2019-03-01 | End: 2019-03-01

## 2019-03-01 RX ORDER — PROPOFOL 10 MG/ML
INJECTION, EMULSION INTRAVENOUS PRN
Status: DISCONTINUED | OUTPATIENT
Start: 2019-03-01 | End: 2019-03-01

## 2019-03-01 RX ORDER — LIDOCAINE 40 MG/G
CREAM TOPICAL
Status: DISCONTINUED | OUTPATIENT
Start: 2019-03-01 | End: 2019-03-01 | Stop reason: HOSPADM

## 2019-03-01 RX ADMIN — FENTANYL CITRATE 50 MCG: 50 INJECTION, SOLUTION INTRAMUSCULAR; INTRAVENOUS at 07:37

## 2019-03-01 RX ADMIN — SODIUM CHLORIDE, POTASSIUM CHLORIDE, SODIUM LACTATE AND CALCIUM CHLORIDE: 600; 310; 30; 20 INJECTION, SOLUTION INTRAVENOUS at 07:31

## 2019-03-01 RX ADMIN — SUGAMMADEX 200 MG: 100 INJECTION, SOLUTION INTRAVENOUS at 09:09

## 2019-03-01 RX ADMIN — PHENYLEPHRINE HYDROCHLORIDE 100 MCG: 10 INJECTION, SOLUTION INTRAMUSCULAR; INTRAVENOUS; SUBCUTANEOUS at 08:00

## 2019-03-01 RX ADMIN — ROCURONIUM BROMIDE 60 MG: 10 INJECTION INTRAVENOUS at 07:42

## 2019-03-01 RX ADMIN — PHENYLEPHRINE HYDROCHLORIDE 100 MCG: 10 INJECTION, SOLUTION INTRAMUSCULAR; INTRAVENOUS; SUBCUTANEOUS at 07:55

## 2019-03-01 RX ADMIN — MIDAZOLAM 2 MG: 1 INJECTION INTRAMUSCULAR; INTRAVENOUS at 07:31

## 2019-03-01 RX ADMIN — PROPOFOL 120 MG: 10 INJECTION, EMULSION INTRAVENOUS at 07:41

## 2019-03-01 RX ADMIN — PHENYLEPHRINE HYDROCHLORIDE 100 MCG: 10 INJECTION, SOLUTION INTRAMUSCULAR; INTRAVENOUS; SUBCUTANEOUS at 08:45

## 2019-03-01 RX ADMIN — PHENYLEPHRINE HYDROCHLORIDE 100 MCG: 10 INJECTION, SOLUTION INTRAMUSCULAR; INTRAVENOUS; SUBCUTANEOUS at 08:38

## 2019-03-01 RX ADMIN — LIDOCAINE HYDROCHLORIDE 100 MG: 20 INJECTION, SOLUTION INFILTRATION; PERINEURAL at 07:41

## 2019-03-01 RX ADMIN — FENTANYL CITRATE 25 MCG: 50 INJECTION, SOLUTION INTRAMUSCULAR; INTRAVENOUS at 08:19

## 2019-03-01 RX ADMIN — ONDANSETRON 4 MG: 2 INJECTION INTRAMUSCULAR; INTRAVENOUS at 08:51

## 2019-03-01 RX ADMIN — FENTANYL CITRATE 25 MCG: 50 INJECTION, SOLUTION INTRAMUSCULAR; INTRAVENOUS at 08:30

## 2019-03-01 RX ADMIN — PHENYLEPHRINE HYDROCHLORIDE 100 MCG: 10 INJECTION, SOLUTION INTRAMUSCULAR; INTRAVENOUS; SUBCUTANEOUS at 08:05

## 2019-03-01 RX ADMIN — CEFAZOLIN SODIUM 2 G: 2 INJECTION, SOLUTION INTRAVENOUS at 07:56

## 2019-03-01 RX ADMIN — DEXAMETHASONE SODIUM PHOSPHATE 4 MG: 4 INJECTION, SOLUTION INTRA-ARTICULAR; INTRALESIONAL; INTRAMUSCULAR; INTRAVENOUS; SOFT TISSUE at 07:41

## 2019-03-01 ASSESSMENT — MIFFLIN-ST. JEOR: SCORE: 1310.75

## 2019-03-01 ASSESSMENT — PAIN DESCRIPTION - DESCRIPTORS: DESCRIPTORS: ACHING

## 2019-03-01 NOTE — DISCHARGE INSTRUCTIONS
Take it easy when you get home.  Remember, same day surgery DOES NOT MEAN SAME DAY RECOVERY!  Healing is a gradual process.  You will need some time to recover - you may be more tired than you realize at first.  Rest and relax for at least the first 24 hours at home.  You'll feel better and heal faster if you take good care of yourself.    [facility], [place]  Same-Day Surgery   Adult Discharge Orders & Instructions     For 24 hours after surgery    1. Get plenty of rest.  A responsible adult must stay with you for at least 24 hours after you leave the hospital.   2. Do not drive or use heavy equipment.  If you have weakness or tingling, don't drive or use heavy equipment until this feeling goes away.  3. Do not drink alcohol.  4. Avoid strenuous or risky activities.  Ask for help when climbing stairs.   5. You may feel lightheaded.  IF so, sit for a few minutes before standing.  Have someone help you get up.   6. If you have nausea (feel sick to your stomach): Drink only clear liquids such as apple juice, ginger ale, broth or 7-Up.  Rest may also help.  Be sure to drink enough fluids.  Move to a regular diet as you feel able.  7. You may have a slight fever. Call the doctor if your fever is over 100 F (37.7 C) (taken under the tongue) or lasts longer than 24 hours.  8. You may have a dry mouth, a sore throat, muscle aches or trouble sleeping.  These should go away after 24 hours.  9. Do not make important or legal decisions.   Call your doctor for any of the followin.  Signs of infection (fever, growing tenderness at the surgery site, a large amount of drainage or bleeding, severe pain, foul-smelling drainage, redness, swelling).    2. It has been over 8 to 10 hours since surgery and you are still not able to urinate (pass water).    3.  Headache for over 24 hours.    To contact a doctor, call Dr Triana's office at [phone] at the Neurosurgery Clinic from 8 am till 5 pm --   or:        [phone] and ask for the resident on call for Neurosurgery (answered 24 hours a day)      Emergency Department:    [facility]: [phone]       (TTY for hearing impaired: [phone])           Tips for taking pain medications  To get the best pain relief possible , remember these points:      Take pain medications as directed, before pain becomes severe      Pain medication can upset your stomach: taking it with food may help      Constipation is a common side effect of pain medication. Drink plenty of  Fluids      Eat foods high in fiber. Take a stool softener  if recommended by your doctor or  Pharmacist.      Do not drink alcohol, drive or operate machinery while taking pain medications.      Ask about other ways to control pain, such as with heat, ice or relaxation.

## 2019-03-01 NOTE — OR NURSING
All discharge instructions given to pt and , both verbalized understanding of teaching. Rep for DBS has given  all equipment and teaching for DBS setup at home. Pt's  picked up RX, VS wnl, afebrile, pain controlled and denies n/v. IV removed and pressure dressing applied. All belongings returned to pt, [facility]

## 2019-03-01 NOTE — ANESTHESIA PREPROCEDURE EVALUATION
Anesthesia Pre-Procedure Evaluation    Patient: Sandra Jara   MRN:     [id number] Gender:   female   Age:    53 year old :      1965        Preoperative Diagnosis: Tremor   Procedure(s):  Left Deep Brain Stimulator Placement, Phase II, Placement Of Deep Brain Stimulator Generator/Battery Over The Left Chest Wall Latex Free     Past Medical History:   Diagnosis Date     Action induced myoclonus 2015     Corticobasal syndrome 2015     CTS (carpal tunnel syndrome) 2015     Disturbance of skin sensation 2015     Family history of breast cancer 2015     Family history of colon cancer 2015     Family history of Parkinson's disease 2014    Paternal aunt     History of EMG 2014    A right upper extremity EMG and nerve conduction study was done on 2014. It demonstrated some mild changes of right carpal tunnel syndrome but was otherwise normal.       History of MRI of brain and brain stem 2014    A brain MRI scan done on 2014 revealed no abnormalities.      History of MRI of cervical spine 2014    A cervical MRI done on 06/10/2014 revealed some mild to moderate degenerative changes but no significant central canal or foraminal stenosis, and no abnormalities of the spinal cord were noted.       Movement disorder 2014    I saw your patient, Deepali Contreras on 12/15/2014. She is a 49-year-old right-handed female here for evaluation of right upper extremity dysfunction and right hand tremor.  She has noted this problem for the last couple of years. She reports she is losing dexterity in her right hand. She has appreciated a tremor of the right hand with actions such as writing or postural maintenance. She has n      Past Surgical History:   Procedure Laterality Date     APPENDECTOMY       OPTICAL TRACKING SYSTEM INSERTION DEEP BRAIN STIMULATION Left 2019    Procedure: Stealth Assisted Left Side Deep Brain Stimulator Placement,  Phase I, Placement Of Left Side Deep Brain Stimulator Electrode, Target Left Globus Pallidus Internus With Microelectrode Recording;  Surgeon: Efren Triana MD;  Location: [facility] OR          Anesthesia Evaluation     . Pt has had prior anesthetic. Type: MAC    No history of anesthetic complications          ROS/MED HX    ENT/Pulmonary:  - neg pulmonary ROS     Neurologic:     (+)Parkinson's disease features: tremor,     Cardiovascular:  - neg cardiovascular ROS       METS/Exercise Tolerance:  3 - Able to walk 1-2 blocks without stopping   Hematologic:  - neg hematologic  ROS       Musculoskeletal:  - neg musculoskeletal ROS       GI/Hepatic:  - neg GI/hepatic ROS       Renal/Genitourinary:  - ROS Renal section negative       Endo:  - neg endo ROS       Psychiatric:  - neg psychiatric ROS       Infectious Disease:  - neg infectious disease ROS       Malignancy:      - no malignancy   Other:    - neg other ROS                     PHYSICAL EXAM:   Mental Status/Neuro: A/A/O   Airway: Facies: Feasible  Mallampati: I  Mouth/Opening: Full  TM distance: > 6 cm  Neck ROM: Full   Respiratory: Auscultation: CTAB     Resp. Rate: Normal     Resp. Effort: Normal      CV: Rhythm: Regular  Rate: Age appropriate  Heart: Normal Sounds   Comments:      Dental: Normal                  Lab Results   Component Value Date    WBC 6.5 02/23/2019    HGB 12.4 02/23/2019    HCT 36.0 02/23/2019     02/23/2019     02/23/2019    POTASSIUM 3.6 02/23/2019    CHLORIDE 109 02/23/2019    CO2 24 02/23/2019    BUN 8 02/23/2019    CR 0.58 02/23/2019    GLC 91 02/23/2019    ELVIRA 7.9 (L) 02/23/2019    PHOS 3.4 02/23/2019    MAG 1.9 02/23/2019    ALBUMIN 3.9 12/15/2014    PROTTOTAL 7.2 12/15/2014    ALT 14 12/15/2014    AST 10 12/15/2014    ALKPHOS 52 12/15/2014    BILITOTAL 0.6 12/15/2014    PTT 27 02/07/2019    INR 1.01 02/07/2019    TSH 2.44 12/15/2014       Preop Vitals  BP Readings from Last 3 Encounters:   03/01/19 137/86   02/23/19  "109/67   02/07/19 149/84    Pulse Readings from Last 3 Encounters:   03/01/19 86   02/23/19 89   02/07/19 89      Resp Readings from Last 3 Encounters:   03/01/19 18   02/23/19 14   02/07/19 16    SpO2 Readings from Last 3 Encounters:   03/01/19 95%   02/23/19 99%   02/07/19 96%      Temp Readings from Last 1 Encounters:   03/01/19 36.6  C (97.8  F) (Oral)    Ht Readings from Last 1 Encounters:   03/01/19 1.676 m (5' 6\")      Wt Readings from Last 1 Encounters:   03/01/19 69.4 kg (152 lb 16 oz)    Estimated body mass index is 24.69 kg/m  as calculated from the following:    Height as of this encounter: 1.676 m (5' 6\").    Weight as of this encounter: 69.4 kg (152 lb 16 oz).     LDA:  Peripheral IV 02/22/19 Left Upper forearm (Active)   Number of days: 7       Peripheral IV Left Hand (Active)   Number of days:             Assessment:   ASA SCORE: 2       Documentation: H&P complete; Preop Testing complete; Consents complete   Proceeding: Proceed without further delay  Tobacco Use:  NO Active use of Tobacco/UNKNOWN Tobacco use status     Plan:   Anes. Type:  General   Pre-Induction: Midazolam IV; Acetaminophen PO   Induction:  IV (Standard)   Airway: Oral ETT   Access/Monitoring: PIV   Maintenance: Balanced   Emergence: Procedure Site   Logistics: Same Day Surgery     Postop Pain/Sedation Strategy:  Standard-Options: Opioids PRN     PONV Management:  Adult Risk Factors: Female, Non-Smoker, Postop Opioids  Prevention: Ondansetron; Dexamethasone     CONSENT: Direct conversation   Plan and risks discussed with: Patient   Blood Products: Consent Deferred (Minimal Blood Loss)                         Colt Allen MD  "

## 2019-03-01 NOTE — H&P
NEUROSURGERY    HISTORY AND PHYSICAL EXAM UPDATE    Chief Complaint   Patient presents with     Consult       Ongoing DBS surgery for dystonia, s/p left side deep brain stimulator placement, phase I, placement of left side deep brain stimulator electrode, target left globus pallidus internus, with microelectrode recording on 2/22/2019       HISTORY OF PRESENT ILLNESS  Ms. Sandra Jara returns to day for her ongoing DBS surgery.  She is s/p left side deep brain stimulator placement, phase I, placement of left side deep brain stimulator electrode, target left globus pallidus internus, with microelectrode recording on 2/22/2019.  She was also a research participant.  Patient tolerated the procedure well and there were no complications.  Her postoperative CT head was without any concerning features and she was discharged to home on POD#1.  She did have some discomfort at the incision site and connector burial site.  She was also tired/fatigued for a few days.  She also had some balance issues for the first couple of days.  However, these are improving.  Otherwise, she denies any fevers, chills, nausea, vomiting, dizziness, weakness, numbness or seizure like activity.  She also denies any issues with her incision.  Briefly, she is a 53 year old right handed  woman who presents for DBS consult.  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  Her symptoms began over 5 years ago with occasional shaking in her right hand and loss of control.  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  She then went to see a neurologist in the [facility] who noticed slower than normal progression for CBD.  It was thought that she may have dystonia.  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 Hopkins.  She does have right upper extremity  dystonia and myoclonus.  She is followed by our neurology team, including Dr. Hopkins, for botulism toxin injections.  Her last bolutism injection provided no significant functional improvement so she has been referred for DBS evaluation.  Dr. Hopkins's thoughts are left GPi DBS for right upper extremity dystonia.  Today, she endorses dystonia in her right arm and now going down her right leg.  Cramping and shaking were symptoms that came after loss of control in her right arm/hand.  She wonders about performing activities after surgery, as she does boxing as part of Physical Therapy.  Her goals of DBS surgery is to get rid of the jerking and to get functionality back in her right upper extremity.  Her case at the Movement Disorder Consensus Group Meeting, and she was considered to be a good DBS candidate.  Recommendation was left GPi DBS, unilateral, Abbott system.     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 It demonstrated some mild changes of right carpal tunnel syndrome but was otherwise normal.       History of MRI of brain and brain stem 12/21/2014    A brain MRI scan done on 06/27/2014 revealed no abnormalities.      History of MRI of cervical spine 12/21/2014    A cervical MRI done on 06/10/2014 revealed some mild to moderate degenerative changes but no significant central canal or foraminal stenosis, and no abnormalities of the spinal cord were noted.       Movement disorder 12/21/2014    I saw your patient, Deepali Contreras on 12/15/2014. She is a 49-year-old right-handed  female here for evaluation of right upper extremity dysfunction and right hand tremor.  She has noted this problem for the last couple of years. She reports she is losing dexterity in her right hand. She has appreciated a tremor of the right hand with actions such as writing or postural maintenance. She has n     Past Surgical History:   Procedure Laterality Date     APPENDECTOMY       OPTICAL TRACKING SYSTEM INSERTION DEEP BRAIN STIMULATION Left 2/22/2019    Procedure: Stealth Assisted Left Side Deep Brain Stimulator Placement, Phase I, Placement Of Left Side Deep Brain Stimulator Electrode, Target Left Globus Pallidus Internus With Microelectrode Recording;  Surgeon: Efren Triana MD;  Location:  OR     Social History     Socioeconomic History     Marital status:      Spouse name: Not on file     Number of children: 3     Years of education: Not on file     Highest education level: Not on file   Occupational History     Not on file   Social Needs     Financial resource strain: Not on file     Food insecurity:     Worry: Not on file     Inability: Not on file     Transportation needs:     Medical: Not on file     Non-medical: Not on file   Tobacco Use     Smoking status: Never Smoker     Smokeless tobacco: Never Used   Substance and Sexual Activity     Alcohol use: Yes     Alcohol/week: 1.2 - 1.8 oz     Comment: WEEKLY     Drug use: No     Sexual activity: Yes     Partners: Male     Birth control/protection: Male Surgical   Lifestyle     Physical activity:     Days per week: Not on file     Minutes per session: Not on file     Stress: Not on file   Relationships     Social connections:     Talks on phone: Not on file     Gets together: Not on file     Attends Voodoo service: Not on file     Active member of club or organization: Not on file     Attends meetings of clubs or organizations: Not on file     Relationship status: Not on file     Intimate partner violence:     Fear of current or ex  partner: Not on file     Emotionally abused: Not on file     Physically abused: Not on file     Forced sexual activity: Not on file   Other Topics Concern     Parent/sibling w/ CABG, MI or angioplasty before 65F 55M? No   Social History Narrative        Izaiah Jara    [facility]      She does not smoke.  She has a couple of beers to drink on the weekend but otherwise is not a heavy user of alcohol    3 kids: son peña 21; 94, 96 and 98    2 sons    1 daughter    2 are in college and daughter is a sophomore.            FAMILY HISTORY:  Notable for a paternal aunt who is .  She had Parkinson's disease.  Otherwise, there is no other family history of neurologic problems.  There is no family history of dystonia.          90% Korean    Some norweigian                .       Family History   Problem Relation Age of Onset     Breast Cancer Mother      Cancer - colorectal Mother      Macular Degeneration Mother      Dementia Father      Deep Vein Thrombosis Father      No Known Problems Brother      No Known Problems Sister      No Known Problems Sister      No Known Problems Sister      Neurologic Disorder Paternal Aunt         Parkinson's Disease     Cerebrovascular Disease Brother         stroke at age 25 possibly from pfo     Dementia Paternal Grandmother       No Known Allergies    Current Outpatient Prescriptions   Medication     botulinum toxin type A (BOTOX) 100 units injection     carbidopa-levodopa (SINEMET CR)  MG per CR tablet     carbidopa-levodopa (SINEMET)  MG per tablet     Cholecalciferol (VITAMIN D) 2000 UNITS tablet     clonazePAM (KLONOPIN) 0.5 MG tablet     OnabotulinumtoxinA (BOTOX IJ)     baclofen (LIORESAL) 10 MG tablet     triamcinolone (KENALOG) 0.1 % cream     zonisamide (ZONEGRAN) 50 MG capsule      No current facility-administered medications for this visit.        REVIEW OF SYSTEMS: updated 3/1/2019, also per HPI.  General: POSITIVE for  chills/sweats/fever, difficulty sleeping, recent fatigue. Negative for headache or weight gain/loss.  Eyes: Negative for blurred vision, crossed eyes, double vision, recent eye infections, vision flashes, or vision halos.  Ears/Nose/Mouth/Throat: Negative for bleeding gums, difficulty swallowing, earache, ear discharge, hearing loss, hoarseness, nosebleeds, tinnitus, or sinus problems.  Respiratory:Negative for chronic cough, coughing blood. POSITIVE for night sweats. Negative for shortness of breath, Tuberculosis, or wheezing.  Cardiovascular: Negative for chest pain, dyspnea at night, heart murmur, palpitations, pacemaker, pacemaker, poor circulation, swollen legs/feet, or varicose veins.  Gastrointestinal: Negative for melena, hematochezia, chronic diarrhea, heartburn, Hepatitis A/B/C, increasing constipation, Liver Disease, nausea, or vomiting.   Genitourinary: POSITIVE for urgency. Negative for Urinary retention, genital discharge, urinary incontinence, or UTI.  Neurological: Negative for syncope, headaches, numbness of arms/legs, tingling in hands/arms/legs, memory problems, or seizures.  Psychological: Negative for anxiety, depression, panic attacks, or restlessness.  Skin: POSITIVE for skin rashes. Negative for chronic skin itching, color changes in hand/feet when cold, poor scarring, non-healing ulcers, unusual moles.  Musculoskeletal: Negative for arthritis, joint swelling in hands/wrists/hips/knees/joints, muscle tenderness in arms/legs, or osteoporosis.  Endocrine: Negative for excessive thirst/hunger, intolerance for warm rooms, loss of libido, multiple broken bones, rapid weight gain/loss, galactorrhea, or thyroid issues.  Hematologic/Lymphatic: POSITIVE for easy skin bruising. Negative for significant fatigue, prolonged bleeding, tender glands/lymph nodes.  Allergies: Negative for asthma or hay fever.        PHYSICAL EXAM  Vital signs:  Temp: 97.8  F (36.6  C) Temp src: Oral BP: 137/86 Pulse: 86    "Resp: 18 SpO2: 95 % O2 Device: None (Room air)   Height: 167.6 cm (5' 6\") Weight: 69.4 kg (152 lb 16 oz)  Estimated body mass index is 24.69 kg/m  as calculated from the following:    Height as of this encounter: 1.676 m (5' 6\").    Weight as of this encounter: 69.4 kg (152 lb 16 oz).    General: Awake, alert, oriented. Well nourished, well developed, no acute distress.  HEENT: Head normocephalic, atraumatic. No carotid bruit. Neck supple. Good range of motion. No palpable thyroid mass.  Heart: Regular rhythm and rate. No murmurs.  Lungs: Clear to auscultation and percussion bilaterally. No rhonchi, rales or wheeze. No bruise.  Abdomen: Soft, non-tender, non-distended. No hepatosplenomegaly.  Extremity: Warm with no clubbing or cyanosis. No lower extremity edema.  Incision: left frontal incision clean/dry and intact. No redness. No drainage. No fluid collection.    Neurological:  Awake, alert and oriented to date, time, place and person. Speech fluent.   Pupils equal, round, reactive to light.  Extraocular movement intact.  Visual fields are full on confrontation.  Hearing is grossly normal to finger rub.   Facial sensation intact.  Face symmetric.  Tongue midline.  Uvula elevates equally.    Motor: full strength throughout.  Sensation: Decreased sensation below the elbow on the right side.  Deep tendon reflexes: 2+ throughout. Negative for clonus. Herndon's sign on the right side. No dysmetria.      ASSESSMENT   Sandra Jara is a 53 year old right handed woman with dystonia and myoclonus in the right upper extremity.  Also slow progression corticobasal degeneration.  S/p left side deep brain stimulator placement, phase I, placement of left side deep brain stimulator electrode, target left globus pallidus internus, with microelectrode recording on 2/22/2019.    Patient presents today for her ongoing DBS surgery.  She has corticobulbar degeneration but her concerning symptoms are that of dystonia and " myoclonus in the right upper extremity.  She is unable to carry out typical activities with her right upper extremity.    Dr. Hopkins plans to meet her after the surgery in the recovery area in order to turn the device on today.    We discussed the phase II of DBS surgery, placement of the DBS generator/battery over the left chest wall under general anesthesia.     Risks, benefits, alternative therapies were discussed with the patient, including but not limited to infection and bleeding (intracranial), injury to the brain, stroke, death, hardware failure and possible need for more surgeries.  Surgical procedure was discussed in detail.  All questions were answered, and she expressed understanding.        PLAN:  1.  Deep brain stimulator placement, phase II, placement of deep brain stimulator generator/battery over the left chest wall.  2.  Dr. Hopkins to turn on the DBS device today in the recovery room.

## 2019-03-01 NOTE — ANESTHESIA POSTPROCEDURE EVALUATION
Anesthesia POST Procedure Evaluation    Patient: Sandra Jara   MRN:     [id number] Gender:   female   Age:    53 year old :      1965        Preoperative Diagnosis: Tremor   Procedure(s):  Left Deep Brain Stimulator Placement, Phase II, Placement Of Deep Brain Stimulator Generator/Battery Over The Left Chest Wall Latex Free   Postop Comments: No value filed.       Anesthesia Type:  General    Reportable Event: NO     PAIN: Uncomplicated   Sign Out status: Comfortable, Well controlled pain     PONV: No PONV   Sign Out status:  No Nausea or Vomiting     Neuro/Psych: Uneventful perioperative course   Sign Out Status: Preoperative baseline; Age appropriate mentation     Airway/Resp.: Uneventful perioperative course   Sign Out Status: Non labored breathing, age appropriate RR; Resp. Status within EXPECTED Parameters     CV: Uneventful perioperative course   Sign Out status: Appropriate BP and perfusion indices; Appropriate HR/Rhythm     Disposition:   Sign Out in:  PACU  Disposition:  Home  Recovery Course: Uneventful  Follow-Up: Not required           Last Anesthesia Record Vitals:  CRNA VITALS  3/1/2019 0849 - 3/1/2019 0949      3/1/2019             NIBP:  124/64    NIBP Mean:  86    Ht Rate:  93    SpO2:  100 %          Last PACU/Preop Vitals:  Vitals:    19 0922 19 0930 19 0945   BP: 124/64 120/74 118/72   Pulse:      Resp: 16 16 16   Temp: 36.6  C (97.8  F)     SpO2: 100%  100%         Electronically Signed By: Colt Allen MD, 2019, 10:11 AM

## 2019-03-01 NOTE — BRIEF OP NOTE
[facility], [place]    Brief Operative Note    Pre-operative diagnosis: Tremor  Post-operative diagnosis Tremor  Procedure: Procedure(s):  Left Deep Brain Stimulator Placement, Phase II, Placement Of Deep Brain Stimulator Generator/Battery Over The Left Chest Wall Latex Free  Surgeon: Surgeon(s) and Role:     * Efren Triana MD - Primary     * Wyatt Rendon MD - Resident - Assisting  Anesthesia: General   Estimated blood loss: 5cc  Drains: None  Specimens: None  Findings:  All impedances within normal limits  Complications: None.  Implants:   Name Type Inv Item Serial No. LRB Used Lot No. Latex?   LEAD EXTENSION W/EXTEND TECHNOLOGY 50CM 6371ANS Leads LEAD EXTENSION W/EXTEND TECHNOLOGY 50CM [id number] [id number] Left 1     : [employer]   GENERATOR DBS SYSTEM INFINITY 5 IPG 6660ANS Neurology device GENERATOR DBS SYSTEM INFINITY 5 IPG 6660ANS [id number] Left 1     : [employer]

## 2019-03-01 NOTE — ANESTHESIA CARE TRANSFER NOTE
Patient: Sandra Jara    Procedure(s):  Left Deep Brain Stimulator Placement, Phase II, Placement Of Deep Brain Stimulator Generator/Battery Over The Left Chest Wall Latex Free    Diagnosis: Tremor  Diagnosis Additional Information: No value filed.    Anesthesia Type:   No value filed.     Note:  Airway :Face Mask  Patient transferred to:PACU  Comments: Pt alert, breathing spontaneously on 6L O2 via FM. VSS. Report shared with RN.Handoff Report: Identifed the Patient, Identified the Reponsible Provider, Reviewed the pertinent medical history, Discussed the surgical course, Reviewed Intra-OP anesthesia mangement and issues during anesthesia, Set expectations for post-procedure period and Allowed opportunity for questions and acknowledgement of understanding      Vitals: (Last set prior to Anesthesia Care Transfer)    CRNA VITALS  3/1/2019 0849 - 3/1/2019 0926      3/1/2019             NIBP:  124/64    NIBP Mean:  86    Ht Rate:  93    SpO2:  100 %                Electronically Signed By: SAIRA Renee CRNA  March 1, 2019  9:26 AM

## 2019-03-03 NOTE — OP NOTE
PATIENT NAME: SOFIA SEAMAN  YOB: 1965  MRN:   [id number]  ACCOUNT:  [id number]      DATE OF PROCEDURE:  03/01/2019    PREOPERATIVE DIAGNOSIS:   1.  Dystonic tremor  2.  Corticobasal degeneration  3.  Right upper extremity dystonia and myoclonus  4.  S/p left side deep brain stimulator placement, phase I, placement of left side deep brain stimulator electrode, target left globus pallidus internus (GPi), with microelectrode recording on 2/22/2019    POSTOPERATIVE DIAGNOSIS:  1.  Dystonic tremor  2.  Corticobasal degeneration  3.  Right upper extremity dystonia and myoclonus  4.  S/p left side deep brain stimulator placement, phase I, placement of left side deep brain stimulator electrode, target left globus pallidus internus (GPi), with microelectrode recording on 2/22/2019    PROCEDURES PERFORMED:  1.  Deep brain stimulator placement, phase II, placement of new deep brain stimulator generator/battery, model Infinity 5, over left chest wall.    2.  Placement of one extension wire and connection of the left side deep brain stimulator electrode, one electrode array, to the new generator/battery.  3.  Electrical interrogation and analysis of the deep brain stimulator system.    ATTENDING SURGEON:  Efren Triana MD, PhD.    RESIDENT SURGEON:  Wyatt Rendon MD.    ANESTHESIA:  General.    ESTIMATED BLOOD LOSS:  5 mL.    COMPLICATIONS:  None.    FINDINGS:  All impedance values within normal.  No problems found.    IMPLANTS:  1.  Abbott Infinity 5 Implantable DBS Generator/Battery, REF# [id number], S/N [id number]  2.  Abbott Infinity DBS 8 Channel Flexible Extension Cable, 50cm, REF# [id number], S/N [id number]    INDICATIONS FOR PROCEDURE:  Ms. Sofia Jara returns today for her ongoing DBS surgery.  She is s/p left side deep brain stimulator placement, phase I, placement of left side deep brain stimulator electrode, target left globus pallidus internus, with microelectrode recording on 2/22/2019.  She  was also a research participant.  Patient tolerated the procedure well and there were no complications.  Her postoperative CT head was without any concerning features and she was discharged to home on POD#1.  She did have some discomfort at the incision site and connector burial site.  She was also tired/fatigued for a few days.  She also had some balance issues for the first couple of days.  However, these are improving.  Otherwise, she denies any fevers, chills, nausea, vomiting, dizziness, weakness, numbness or seizure like activity.  She also denies any issues with her incision.  Briefly, she is a 53 year old right handed  woman who presents for DBS consult.  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  Her symptoms began over 5 years ago with occasional shaking in her right hand and loss of control.  She was initial diagnosis in 2015 at [place] with corticobasal degeneration (CBD).  She then went to see a neurologist in the [facility] who noticed slower than normal progression for CBD.  It was thought that she may have dystonia.  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 Hopkins.  She does have right upper extremity dystonia and myoclonus.  She is followed by our neurology team, including Dr. Hopkins, for botulism toxin injections.  Her last bolutism injection provided no significant functional improvement so she has been referred for DBS evaluation.  Her case at the Movement Disorder Consensus Group Meeting, and she was considered to be a good DBS candidate.  Recommendation was left GPi DBS, unilateral, Abbott system.  We discussed the phase II of DBS surgery, placement of the DBS generator/battery over the left chest wall under general anesthesia.  Risks, benefits, alternative therapies were discussed with the patient, including but not limited to  infection and bleeding (intracranial), injury to the brain, stroke, death, hardware failure and possible need for more surgeries.  Surgical procedure was discussed in detail.  All questions were answered, and she expressed understanding.  Dr. Hopkins plans to meet her after the surgery in the recovery area in order to turn the device on today.    DESCRIPTION OF PROCEDURE:  The patient was taken to the operating room and positioned supine on the operating room table.  All pressure points were appropriately padded.  With placement of the endotracheal tube, general endotracheal anesthesia was induced.  Her head was turned to the right side, thus exposing the left parietal area of the head.  The previously implanted connector portion of the stimulating electrode from the left side could be palpated on the left side of the head.  A small area of hair was removed over this palpable connector using a surgical hair clipper.  Then the left side of the head, neck and chest area was prepped and draped in normal sterile fashion.  Local anesthetic was injected in the areas of intended incision at the left scalp and left chest wall as well as along the path of the intended tunneling.  A total of 30 mL of 1% lidocaine plain mixed with 0.25% Marcaine with 1:200,000 epinephrin in a 50:50 combination was used.  A timeout was performed confirming the patient's identity, procedure to be performed, site and side of surgery and administration of preoperative prophylactic antibiotics.    Attention was turned to the head.  A #10 blade scalpel was used to make a 2 cm skin incision at the distal end of the connector that was palpated in her scalp.  Hemostasis was achieved with bipolar cautery.  The incision was carried down to the pericranium and galea opened.  Previously buried protected connector was visualized.  A mosquito was used to advance the protective cover superiorly.  At that point, a pocket was made using a Rosalee clamp down  toward behind the ear into the nuchal line.  This was in preparation for tunneling of the extension wire.    At that point, attention was turned to the left chest wall area.  A #10 blade scalpel was used to make a 6-7 cm incision on the left chest wall.  Monopolar cautery was used to finish dissection down to the proper plane.  A dissecting plane superficial to the pectoralis muscle was found and maintained.  A combination of blunt dissection as well as the monopolar cautery with a mosquito was used to establish a subcutaneous pocket about 3 fingerbreadths wide and deep enough to encompass the full length of the fingers.  Hemostasis was achieved with bipolar cautery.  The pocket was copiously irrigated with antibiotic-impregnated saline and sponges were placed in the pocket.    At this point, a  was brought into the field.  The subcutaneous passage was tunneled from the head incision to the chest wall incision, careful not to be too superficial to the skin, but staying within the correct plane and taking a course over the clavicle.  The  was removed leaving the plastic sheath.  The extension wire was manually fed through the plastic sheath from the cranial incision to the chest wall incision.  Subsequently, the plastic sheath was pulled out from the chest incision, leaving behind the tunneled extension wire.    Using a mosquito, the protected end of the intracranial lead was removed from the cranial incision.  We then proceeded to make the connection between the left intracranial lead and the extension wire.  The protective covering was removed from the connector portion of the stimulating electrode.  The contacts were inspected to be clean and without damage.  Then, the stimulating electrode was inserted into the extension wire connection.  The connection was secured with a screwdriver.  Then, the extension wire was gently pulled from the chest incision and the excess wire length towards the head  was also buried superiorly until the connector was within the incision.  Then, the connector was buried further superiorly in the incision until it was no longer directly under the incision.    At this point, the extension wire was connected to the Abbott Infinity 5 generator/battery, with the extension wire being inserted into the anterior portal.  The connection was secured with the torque screwdriver.  A plastic plug was placed in the posterior portal and secured with the torque screwdriver.  The sponges were removed from the pocket and the pocket was inspected for hemostasis.  Then, the new generator/battery with the excess wire being placed posterior to the generator/battery was placed within the left chest wall pocket that was created previously.  The entire apparatus fit into the pocket comfortably.  The generator/battery was anchored to the pocket using two 2-0 Ethibond sutures.      With proper placement of the connection of the deep brain stimulator system, wireless interrogation and analysis was performed.  All the impedance values were noted to be within normal to the left DBS electrode.  No problems were found.      We began closing the wounds at this point.  The left parietal scalp incision was closed using 3-0 Vicryl sutures in an inverted interrupted fashion to reapproximate the galea layer over the hardware.  The skin was closed using 4-0 Monocryl suture in a simple running fashion.  The left chest incision was closed in the following manner.  The deep pocket was reapproximated using 3-0 Vicryl sutures in an inverted interrupted fashion.  The dermal layer was reapproximated using 3-0 Vicryl sutures in an inverted interrupted fashion.  The skin was reapproximated using 4-0 Monocryl suture in a subcuticular fashion.  Both incisions were cleaned with wet and dry sponges followed by ChloraPrep and then Dermabond was applied on both incisions.      At the end of the case, all counts including needle,  sponge and instrument counts were correct x 2.  There were no complications.  Patient was extubated and taken to the recovery room in a stable condition.    We again performed wireless interrogation and analysis of the entire deep brain stimulator system.  All impedances were noted to be within normal and no problems were found.      Dr. Etelvina Triana, Neurosurgery staff, was present and scrubbed for the entire operation.       ETELVINA TRIANA MD, PHD    As prepared by FRANCISCA JARQUIN MD      NEUROSURGERY ATTENDING ATTESTATION: IEtelvina M.D., Ph.D., Neurosurgery Attending, was present and scrubbed for the entire case and performed the key and critical portions of the case.

## 2019-03-04 ENCOUNTER — PATIENT OUTREACH (OUTPATIENT)
Dept: NEUROSURGERY | Facility: CLINIC | Age: 54
End: 2019-03-04

## 2019-03-04 NOTE — PROGRESS NOTES
Pt s/p DBS battery replacement on 3/1/19 by Dr. Triana. Left message checking on pt's post-op status. Requested that she call me on my direct line at her earliest convenience. Will f/u if I do not hear back.

## 2019-03-05 DIAGNOSIS — G24.8 ACQUIRED TORSION DYSTONIA: ICD-10-CM

## 2019-03-05 RX ORDER — TRAMADOL HYDROCHLORIDE 50 MG/1
TABLET ORAL
Qty: 90 TABLET | Refills: 0 | Status: CANCELLED | OUTPATIENT
Start: 2019-03-05

## 2019-03-05 NOTE — PROGRESS NOTES
2nd post-op VM message left for pt. Pt is S/p DBS battery placement on 3/1/2019 by Dr. Triana. Called to check on pt's post-op status, left my direct number, and requested pt call at her earliest convenience.

## 2019-03-06 NOTE — PROCEDURES
I did a brief threshold testing on contacts 3 and 4 in order to turn the patient's DBS ON initial settings today so that we can obtain results earlier than later. Prior to this programming I mapped out the lead locations (see details below) in order to focus in on the most effective contacts.    Left Hemisphere Brief Threshold Testing    Contacts Amplitude    mA PW   (usec) Rate  (Hz) Assessment Adverse Effects   Case+, 3- 3.0-5.0 90 130  No side effects    Case+, 4- 3.0-5.0 90 130  No side effects      Imaging/Lead Locations:  I personally reviewed the post-operative head CT for lead location and merged it to the pre-operative brain MRI on the [employer]. The lead was in reasonable location. Based on lead location contact(s) 3 and 4 on the left side are expected to be the best contacts.    Detailed lead locations are:    Left GPi Lat AP Z   C1 -18.39 -0.64 -4.28   C2 -18.41 0.47 -2.62   C3 -18.43 1.58 -0.95   C4 -18.45 2.69 0.71   @AC-PC -18.44 2.22                       Final Settings:  Left Brain Program 1   Contacts  C+;3-   Amplitude (mA)  3.0   Pulse Width ( s)  90   Frequency (Hz)  130   Impedence    675   Patient Control (min/max)   NA   Step size NA     Tiffany Hopkins MD    of Neurology

## 2019-03-06 NOTE — PROCEDURES
Intraoperative ESTEVAN and test stimulation for DBS placement    Procedure Note    Patient Name: Sandra Jara  MRN: [id number]  Date of Service: February 22, 2019    Procedures: 1. Intraoperative microelectrode recording for guidance of deep brain stimulator lead placement. 2. Test microstimulation from the microelectrode. 3. Test macrostimulation from the microelectrode canula and from the implanted DBS lead.    Physician performing procedure: Tiffany Hopkins MD    Associated Surgical Procedure: Unilateral right globus pallidus interna deep brain stimulator placement.    Surgeon: Efren Triana MD, PhD    Patient Identification: Sandra Jara is a 53-year-old right-handed woman with severe right arm dystonia and associated myoclonus/tremor due to probable corticobasal degeneration is undergoing unilateral left GPi DBS for treatment of medication and botox-refractory right-sided myoclonus/tremor and dystonia.    Description of Procedure: Prior to surgery the patient underwent stereotactic MR scanning for localization of the globus pallidi. The images were imported into the Stealth system for computation, reformatting, and trajectory planning. The initial anatomic target was defined on stereotactic brain MRI by direct visualization of the GPi borders and optic tract using T2-weighted, susceptibility-weighted images (SWI), and T1 MPRAGE MRI. The initial target and trajectory was planned with the goal that the quadripolar electrode array would terminate near the dorsolateral border of the optic tract and traverse the intercommissural plane within the posterior GPi at a distance of at least ~3 mm from the pallidocapsular border. This corresponded to the posteroventral GPi as directly visualized on T2 weighted, SWI, and MPRAGE T1 images. A near parasagittal trajectory were planned to the target that avoided the ventricles, sulci, and cortical veins as visualized on MR.    The morning of surgery the  CRW stereotactic head frame was placed and the patient underwent stereotactic CT scanning with the localizer box in place. The images were then imported into the Stealth system for computational fusion with the previously described MRI scan.     After the surgical procedure was initiated and the bur hole was made by Dr. Efren Triana, intraoperative microelectrode recording was performed. The right side was mapped and implanted. Three simultaneous microelectrode penetrations were made using the Wesley-Gun device. Microelectrode recording was performed using the center (aimed to the anatomic target as determined by Stealth system planning), 2 mm medial, and 2 mm posterior positions of the Wesley-Gun system.    An electrocorticography research protocol was also performed during this case.    Center track:     This revealed a greater than 13 mm segment of pallidal cellular activity, with GPi cellular activity between 7 mm above and 0.2 mm above target. Neuronal activity consistent with Larry activity was noted between 13.2 and 8.4 mm above target. Several GPi cells modulated by passive arm and leg movement were identified (see paper chart for details). A few border cells and quieter areas were noted during the recording, representing border zones or laminae. Outside of the pallidal base the optic tract was identified at 1 and 1.5 mm below target using light-evoked action potential discharge. Microelectrode recording penetrations were not made greater than 2 mm deep to the pallidal base to avoid the risk of injuring a vessel in the choroidal fissure.    Medial track:    This revealed a greater than 11 mm segment of pallidal cellular activity, with GPi cellular activity between 7 mm above and 1 mm above target. Neuronal activity consistent with Larry activity was noted between 12.2 and 9.1 mm above target. One GPi cell modulated by passive arm movement was identified (see paper chart for details). A few border cells and quieter  areas were noted during the recording, representing border zones or laminae. Outside of the pallidal base the optic tract was identified very distantly at 1 and 1.5 mm below target using light-evoked action potential discharge. Microelectrode recording penetrations were not made greater than 2 mm deep to the pallidal base to avoid the risk of injuring a vessel in the choroidal fissure.    Posterior track:    This revealed a 12 mm segment of pallidal cellular activity, with GPi cellular activity between 7.5 mm above and 0.7 mm above target. Neuronal activity consistent with Larry activity was noted between 12.8 and 8.7 mm above target. Several GPi cells modulated by passive arm and leg movement were identified (see paper chart for details). A few border cells and quieter areas were noted during the recording, representing border zones or laminae. Outside of the pallidal base the optic tract was identified at distantly at 1 mm below target but not at 1.5 mm below target using light-evoked action potential discharge. Microelectrode recording penetrations were not made greater than 2 mm deep to the pallidal base to avoid the risk of injuring a vessel in the choroidal fissure.     Macrostimulation from the ESTEVAN canula from the medial track:    Test macrostimulation from the ESTEVAN canula from the medial track was next performed at 5 mm above target. At a pulse width 90 microseconds, 130 Hz, and there were no side effects up to 6.0 milliamps.     Test macrostimulation next performed at 2 mm above target. At a pulse width 90 microseconds, 130 Hz, and at 4.0 milliamps she had slight right upper lip twitch.    This microelectrode recording and test stimulation was thought to be reasonable and a  Abbott segmented lead (0.5mm interelectrode spacing) was fastened into place with the tip at target.    Test stimulation from the DBS lead:    Test stimulation was then performed with 1 negative, 2 positive, 90 microseconds, 130 Hz, and  she had no side effects up to 6.0 mA.       These side effect thresholds were thought to be reasonable and the Abbott segmented lead (0.5mm interelectrode spacing) was fastened into place with the tip at target so that contacts 1, 2, and 3 were in the GPi somatomotor territory.     The patient tolerated the above procedures without complication.    Physician time: A total of 2 hours and 35 minutes was spent to perform the above intraoperative microelectrode recording and test stimulation.    Tiffany Hopkins MD   of Neurology

## 2019-03-08 ENCOUNTER — TELEPHONE (OUTPATIENT)
Dept: NEUROLOGY | Facility: CLINIC | Age: 54
End: 2019-03-08

## 2019-03-08 DIAGNOSIS — F41.9 ANXIETY: Primary | ICD-10-CM

## 2019-03-08 DIAGNOSIS — G24.8 ACQUIRED TORSION DYSTONIA: ICD-10-CM

## 2019-03-08 RX ORDER — TRAMADOL HYDROCHLORIDE 50 MG/1
TABLET ORAL
Qty: 90 TABLET | Refills: 0 | Status: SHIPPED | OUTPATIENT
Start: 2019-03-08 | End: 2019-06-14

## 2019-03-08 NOTE — TELEPHONE ENCOUNTER
Nurse received refill request for:   clonazePAM (KLONOPIN) 0.5 MG tablet 15 tablet 5 9/13/2018  No   Sig - Route: Take 0.5 tablets (0.25 mg) by mouth At Bedtime - Oral     Pharmacy: [facility]    Last re-ordered: 9/13/2018    Last appointment: 12/10/2018    Action taken: Prescription sent to be signed by provider

## 2019-03-08 NOTE — TELEPHONE ENCOUNTER
traMADol (ULTRAM) 50 MG tablet 90 tablet 0 3/8/2019  --   Sig: Take 1/2 to 1 tab by mouth every 6 hours as needed, up to 3 tabs per day.     Called in prescription to [facility] and spoke with female pharmacist.    Called patient to let her know this was done. She appreciated the call.

## 2019-03-10 ENCOUNTER — MYC REFILL (OUTPATIENT)
Dept: NEUROLOGY | Facility: CLINIC | Age: 54
End: 2019-03-10

## 2019-03-12 ENCOUNTER — MYC REFILL (OUTPATIENT)
Dept: NEUROLOGY | Facility: CLINIC | Age: 54
End: 2019-03-12

## 2019-03-13 RX ORDER — CLONAZEPAM 0.5 MG/1
0.25 TABLET ORAL AT BEDTIME
Qty: 15 TABLET | Refills: 5
Start: 2019-03-13 | End: 2019-09-16

## 2019-03-14 ENCOUNTER — OFFICE VISIT (OUTPATIENT)
Dept: NEUROLOGY | Facility: CLINIC | Age: 54
End: 2019-03-14
Payer: COMMERCIAL

## 2019-03-14 ENCOUNTER — OFFICE VISIT (OUTPATIENT)
Dept: NEUROSURGERY | Facility: CLINIC | Age: 54
End: 2019-03-14
Payer: COMMERCIAL

## 2019-03-14 ENCOUNTER — TELEPHONE (OUTPATIENT)
Dept: NEUROLOGY | Facility: CLINIC | Age: 54
End: 2019-03-14

## 2019-03-14 VITALS
BODY MASS INDEX: 24.23 KG/M2 | WEIGHT: 150.1 LBS | HEART RATE: 90 BPM | OXYGEN SATURATION: 100 % | SYSTOLIC BLOOD PRESSURE: 128 MMHG | DIASTOLIC BLOOD PRESSURE: 81 MMHG

## 2019-03-14 VITALS
SYSTOLIC BLOOD PRESSURE: 128 MMHG | WEIGHT: 150.13 LBS | OXYGEN SATURATION: 100 % | HEART RATE: 90 BPM | BODY MASS INDEX: 24.23 KG/M2 | DIASTOLIC BLOOD PRESSURE: 81 MMHG

## 2019-03-14 DIAGNOSIS — G25.3 MYOCLONUS: ICD-10-CM

## 2019-03-14 DIAGNOSIS — G31.85 CORTICOBASAL DEGENERATION (H): ICD-10-CM

## 2019-03-14 DIAGNOSIS — R25.1 TREMOR: ICD-10-CM

## 2019-03-14 DIAGNOSIS — G24.8 ACQUIRED TORSION DYSTONIA: ICD-10-CM

## 2019-03-14 DIAGNOSIS — G24.9 DYSTONIA: Primary | ICD-10-CM

## 2019-03-14 DIAGNOSIS — F41.9 ANXIETY: ICD-10-CM

## 2019-03-14 DIAGNOSIS — Z96.89 STATUS POST DEEP BRAIN STIMULATOR PLACEMENT: ICD-10-CM

## 2019-03-14 DIAGNOSIS — R26.9 GAIT DIFFICULTY: ICD-10-CM

## 2019-03-14 ASSESSMENT — UNIFIED PARKINSONS DISEASE RATING SCALE (UPDRS)
HANDMOVEMENTS_RIGHT: SEVERE: CANNOT OR CAN ONLY BARELY PERFORM THE TASK BECAUSE OF SLOWING, INTERRUPTIONS, OR DECREMENTS.
POSTURAL_STABILITY: SLIGHT: 3-5 STEPS, BUT RECOVERS UNAIDED.
AMPLITUDE_RLE: NORMAL: NO TREMOR.
PRONATION_SUPINATION_LEFT: MODERATE: ANY OF THE FOLLOWING:  A) MORE THAN 5 INTERRUPTIONS  OR AT LEAST ONE LONGER ARREST (FREEZE) IN ONGOING MOVEMENT  B) MODERATE SLOWING C) THE AMPLITUDE DECREMENTS STARTING AFTER THE FIRST MOVEMENT.
AMPLITUDE_LLE: NORMAL: NO TREMOR.
RIGIDITY_LLE: MILD: RIGIDITY DETECTED WITHOUT THE ACTIVATION MANEUVER.  FULL RANGE OF MOTION IS EASILY ACHIEVED.
RIGIDITY_RLE: MILD: RIGIDITY DETECTED WITHOUT THE ACTIVATION MANEUVER.  FULL RANGE OF MOTION IS EASILY ACHIEVED.
TOETAPPING_RIGHT: SEVERE: CANNOT OR CAN ONLY BARELY PERFORM THE TASK BECAUSE OF SLOWING, INTERRUPTIONS, OR DECREMENTS.
PARKINSONS_MEDS: ON
AXIAL_SCORE: 8
HANDMOVEMENTS_LEFT: MODERATE: ANY OF THE FOLLOWING:  A) MORE THAN 5 INTERRUPTIONS  OR AT LEAST ONE LONGER ARREST (FREEZE) IN ONGOING MOVEMENT  B) MODERATE SLOWING C) THE AMPLITUDE DECREMENTS STARTING AFTER THE FIRST MOVEMENT.
TOTAL_SCORE: 64
TOTAL_SCORE_LEFT: 24
SPEECH: SLIGHT: LOSS OF MODULATION, DICTION OR VOLUME, BUT STILL ALL WORDS EASY TO UNDERSTAND.
ARISING_CHAIR: NORMAL: ABLE TO ARISE QUICKLY WITHOUT HESITATION.
PRONATION_SUPINATION_RIGHT: SEVERE: CANNOT OR CAN ONLY BARELY PERFORM THE TASK BECAUSE OF SLOWING, INTERRUPTIONS, OR DECREMENTS.
GAIT: SLIGHT: INDEPENDENT WALKING WITH MINOR GAIT IMPAIRMENT.
POSTURE: 2 MILD.  DEFINITE FLEXION, SCOLIOSIS OR LEANING OT ONE SIDE, BUT CAN CORRECT POSTURE TO NORMAL WHEN ASKED.
TOTAL_SCORE: 32
LEG_AGILITY_RIGHT: SEVERE: CANNOT OR CAN ONLY BARELY PERFORM THE TASK BECAUSE OF SLOWING, INTERRUPTIONS, OR DECREMENTS.
FREEZING_GAIT: NORMAL
SPONTANEITY_OF_MOVEMENT: 1: SLIGHT: SLIGHT GLOBAL SLOWNESS AND POVERTY OF SPONTANEOUS MOVEMENTS.
RIGIDITY_RUE: SEVERE: RIGIDITY DETECTED WITHOUT THE ACTIVATION MANEUVER AND FULL RANGE OF MOTION NOT ACHIEVED.
AMPLITUDE_LIP_JAW: NORMAL: NO TREMOR.
RIGIDITY_LUE: MILD: RIGIDITY DETECTED WITHOUT THE ACTIVATION MANEUVER.  FULL RANGE OF MOTION IS EASILY ACHIEVED.
AMPLITUDE_LUE: NORMAL: NO TREMOR.
TOETAPPING_LEFT: MODERATE: ANY OF THE FOLLOWING:  A) MORE THAN 5 INTERRUPTIONS OR AT LEAST ONE LONGER ARREST (FREEZE) IN ONGOING MOVEMENT  B) MODERATE SLOWING C) THE AMPLITUDE DECREMENTS STARTING AFTER THE FIRST MOVEMENT.
FINGER_TAPPING_LEFT: MODERATE: ANY OF THE FOLLOWING:  A) MORE THAN 5 INTERRUPTIONS  OR AT LEAST ONE LONGER ARREST (FREEZE) IN ONGOING MOVEMENT  B) MODERATE SLOWING C) THE AMPLITUDE DECREMENTS STARTING AFTER THE FIRST MOVEMENT.
CONSTANCY_TREMOR_ATREST: NORMAL: NO TREMOR.
FINGER_TAPPING_RIGHT: SEVERE: CANNOT OR CAN ONLY BARELY PERFORM THE TASK BECAUSE OF SLOWING, INTERRUPTIONS, OR DECREMENTS.
AMPLITUDE_RUE: NORMAL: NO TREMOR.
RIGIDITY_NECK: SLIGHT: RIGIDITY ONLY DETECTED WITH ACTIVATION MANEUVER.
LEG_AGILITY_LEFT: MODERATE: ANY OF THE FOLLOWING:  A) MORE THAN 5 INTERRUPTIONS  OR AT LEAST ONE LONGER ARREST (FREEZE) IN ONGOING MOVEMENT  B) MODERATE SLOWING C) THE AMPLITUDE DECREMENTS STARTING AFTER THE FIRST MOVEMENT.
FACIAL_EXPRESSION: SLIGHT: MINIMAL MASKED FACIES MANIFESTED ONLY BY DECREASED FREQUENCY OF BLINKING.

## 2019-03-14 ASSESSMENT — PAIN SCALES - GENERAL
PAINLEVEL: NO PAIN (0)
PAINLEVEL: NO PAIN (0)

## 2019-03-14 NOTE — LETTER
RE: Sandra Jara  [address]  [place] [address]     Dear Colleague,    Thank you for referring your patient, Sandra Jara, to the [facility] NEUROSURGERY at [facility]. Please see a copy of my visit note below.    NEUROSURGERY    Chief Complaint   Patient presents with     RECHECK     UMP RETURN - 2 WEEK POST OP WOUND CHECK AND SUTURE REMOVAL     HISTORY OF PRESENT ILLNESS  Ms. Sandra Jara returns today for her follow up.  She is s/p left side deep brain stimulator placement, phase I, placement of left side deep brain stimulator electrode, target left globus pallidus internus, with microeletrode recording on 2/22/2019 and s/p deep brain stimulator placement, phase II, placement of deep brain stimulator generator/battery over the left chest wall on 3/1/2019.  Patient tolerated both of the procedure well and there were no complications.   After her phase I surgery, her postoperative CT head was without any concerning features and she was discharged to home on POD#1.  She did have some discomfort at the incision site and connector burial site.  She was also tired/fatigued for a few days.  She also had some balance issues for the first couple of days.  However, these are improving.  She also did well after her phase II surgery, which was an outpatient surgery.  Due to her symptoms, Dr. Hopkins activated and programmed her DBS device immediately after her phase II surgery.  She will see Dr. Hopkins again today.  Otherwise, she denies any fevers, chills, nausea, vomiting, dizziness, weakness, numbness or seizure like activity.  She also denies any issues with her incision.  Briefly, she is a 53 year old right handed  woman who presents for DBS consult.  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  Her symptoms began over 5 years ago with occasional shaking in her right hand and loss of control.  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  She then went to see a neurologist in the [facility] who noticed slower than normal progression for CBD.  It was thought that she may have dystonia.  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 Hopkins.  She does have right upper extremity dystonia and myoclonus.  She is followed by our neurology team, including Dr. Hopkins, for botulism toxin injections.  Her last bolutism injection provided no significant functional improvement so she has been referred for DBS evaluation.  Dr. Hopkins's thoughts are left GPi DBS for right upper extremity dystonia.  Today, she endorses dystonia in her right arm and now going down her right leg.  Cramping and shaking were symptoms that came after loss of control in her right arm/hand.  She wonders about performing activities after surgery, as she does boxing as part of Physical Therapy.  Her goals of DBS surgery is to get rid of the jerking and to get functionality back in her right upper extremity.  Her case at the Movement Disorder Consensus Group Meeting, and she was considered to be a good DBS candidate.  Recommendation was left GPi DBS, unilateral, Abbott system.     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 It demonstrated some mild changes of right carpal tunnel syndrome but was otherwise normal.       History of MRI of brain and brain stem 12/21/2014     A brain MRI scan done on 06/27/2014 revealed no abnormalities.      History of MRI of cervical spine 12/21/2014    A cervical MRI done on 06/10/2014 revealed some mild to moderate degenerative changes but no significant central canal or foraminal stenosis, and no abnormalities of the spinal cord were noted.       Movement disorder 12/21/2014    I saw your patient, Deepali Contreras on 12/15/2014. She is a 49-year-old right-handed female here for evaluation of right upper extremity dysfunction and right hand tremor.  She has noted this problem for the last couple of years. She reports she is losing dexterity in her right hand. She has appreciated a tremor of the right hand with actions such as writing or postural maintenance. She has n     Past Surgical History:   Procedure Laterality Date     APPENDECTOMY       IMPLANT DEEP BRAIN STIMULATION GENERATOR / BATTERY Left 3/1/2019    Procedure: Left Deep Brain Stimulator Placement, Phase II, Placement Of Deep Brain Stimulator Generator/Battery Over The Left Chest Wall Latex Free;  Surgeon: Efren Triana MD;  Location: [facility] OR     OPTICAL TRACKING SYSTEM INSERTION DEEP BRAIN STIMULATION Left 2/22/2019    Procedure: Stealth Assisted Left Side Deep Brain Stimulator Placement, Phase I, Placement Of Left Side Deep Brain Stimulator Electrode, Target Left Globus Pallidus Internus With Microelectrode Recording;  Surgeon: Efren Triana MD;  Location: [facility] OR     Social History     Socioeconomic History     Marital status:      Spouse name: Not on file     Number of children: 3     Years of education: Not on file     Highest education level: Not on file   Occupational History     Not on file   Social Needs     Financial resource strain: Not on file     Food insecurity:     Worry: Not on file     Inability: Not on file     Transportation needs:     Medical: Not on file     Non-medical: Not on file   Tobacco Use     Smoking status: Never Smoker     Smokeless  tobacco: Never Used   Substance and Sexual Activity     Alcohol use: Yes     Alcohol/week: 1.2 - 1.8 oz     Comment: WEEKLY     Drug use: No     Sexual activity: Yes     Partners: Male     Birth control/protection: Male Surgical   Lifestyle     Physical activity:     Days per week: Not on file     Minutes per session: Not on file     Stress: Not on file   Relationships     Social connections:     Talks on phone: Not on file     Gets together: Not on file     Attends Tenriism service: Not on file     Active member of club or organization: Not on file     Attends meetings of clubs or organizations: Not on file     Relationship status: Not on file     Intimate partner violence:     Fear of current or ex partner: Not on file     Emotionally abused: Not on file     Physically abused: Not on file     Forced sexual activity: Not on file   Other Topics Concern     Parent/sibling w/ CABG, MI or angioplasty before 65F 55M? No   Social History Narrative        Izaiah Jara    Lives in [facility]      She does not smoke.  She has a couple of beers to drink on the weekend but otherwise is not a heavy user of alcohol    3 kids: son peña 21; 94, 96 and 98    2 sons    1 daughter    2 are in college and daughter is a sophomore.            FAMILY HISTORY:  Notable for a paternal aunt who is .  She had Parkinson's disease.  Otherwise, there is no other family history of neurologic problems.  There is no family history of dystonia.          90% Nepali    Some norweigian                .       Family History   Problem Relation Age of Onset     Breast Cancer Mother      Cancer - colorectal Mother      Macular Degeneration Mother      Dementia Father      Deep Vein Thrombosis Father      No Known Problems Brother      No Known Problems Sister      No Known Problems Sister      No Known Problems Sister      Neurologic Disorder Paternal Aunt         Parkinson's Disease     Cerebrovascular Disease Brother          stroke at age 25 possibly from pfo     Dementia Paternal Grandmother       No Known Allergies    Current Outpatient Medications   Medication     acyclovir (ZOVIRAX) 200 MG capsule     baclofen (LIORESAL) 10 MG tablet     botulinum toxin type A (BOTOX) 100 units injection     carbidopa-levodopa (SINEMET CR)  MG per CR tablet     carbidopa-levodopa (SINEMET)  MG tablet     Cholecalciferol (VITAMIN D) 2000 UNITS tablet     clonazePAM (KLONOPIN) 0.5 MG tablet     OnabotulinumtoxinA (BOTOX IJ)     traMADol (ULTRAM) 50 MG tablet     Current Facility-Administered Medications   Medication     botulinum toxin type A (BOTOX) 100 units injection 400 Units     PHYSICAL EXAM  /81 (BP Location: Left arm, Patient Position: Sitting, Cuff Size: Adult Regular)   Pulse 90   Wt 68.1 kg (150 lb 2.1 oz)   SpO2 100%   BMI 24.23 kg/m       General: Awake, alert, oriented. Well nourished, well developed, no acute distress.  HEENT: Head normocephalic, atraumatic. No carotid bruit. Neck supple. Good range of motion. No palpable thyroid mass.  Heart: Regular rhythm and rate. No murmurs.  Lungs: Clear to auscultation and percussion bilaterally. No rhonchi, rales or wheeze. No bruise.  Abdomen: Soft, non-tender, non-distended. No hepatosplenomegaly.  Extremity: Warm with no clubbing or cyanosis. No lower extremity edema.  Incision: left frontal incision clean/dry and intact.  No redness.  No drainage.  No fluid collection.  Left parietal incision clean/dry and intact.  No redness.  No drainage.  No fluid collection.  Left chest wall incision clean/dry and intact.  No redness.  No drainage.  No fluid collection.  Dermabond dressing coming off - took all of the Dermabond off from all the incisions without any difficulty.    Neurological:  Awake, alert and oriented to date, time, place and person. Speech fluent with tremor fluctuation.   Pupils equal, round, reactive to light.  Extraocular movement intact.  Visual  fields are full on confrontation.  Hearing is grossly normal to finger rub.   Facial sensation intact.  Face symmetric.  Tongue midline.  Uvula elevates equally.    Motor: full strength throughout.  Sensation: Decreased sensation below the elbow on the right side.  Deep tendon reflexes: 2+ throughout. Negative for clonus. Herndon's sign on the right side. No dysmetria.      ASSESSMENT   Sandra Jara is a 53 year old right handed woman with dystonia and myoclonus in the right upper extremity.  Also slow progression corticobasal degeneration.  S/p left side deep brain stimulator placement, phase I, placement of left side deep brain stimulator electrode, target left globus pallidus internus, with microelectrode recording on 2/22/2019.  S/p deep brain stimulator placement, phase II, placement of deep brain stimulator generator/battery over the left chest wall on 3/1/2019.    Patient has done well with the surgeries.  Her incisions are all healing well with no signs of infection or any healing issues.  Dermabond dressing was removed from all the incisions without difficulties.    Patient will be seeing Dr. Hopkins for further programming.  Patient has not noticed any significant change in her tremors/dystonia.  I told her that this would take time.    Patient and her family are planning for a trip to [place] soon.  We briefly discussed some activity restrictions.  For now, I told her that she can travel by airplane and her activity can be liberalized, as much as tolerated.  She also could get her incisions wet but I told her not to submerge them still.    Otherwise, no need for further follow up with neurosurgery for now.       PLAN:  Follow up with neurosurgery as needed.    Again, thank you for allowing me to participate in the care of your patient.      Sincerely,    Efren Triana MD

## 2019-03-14 NOTE — LETTER
"3/14/2019       RE: Sandra Jara  [address]  [place] [address]     Dear Colleague,    Thank you for referring your patient, Sandra Jara, to the [facility] NEUROLOGY at [facility]. Please see a copy of my visit note below.    [facility]  DBS Note    Chief Complaint: corticobasal syndrome    History: Sandra Jara is a 53 year-old female with a history of probable corticobasal syndrome s/p L GPi DBS who presents in follow-up. She had her GPi lead placed on 2/22/19. She had her last Botox injections on 12/10/18. At that time doses was increased to wrist extensors/flexors due to wrist tightness and spasms, and a CR Sinemet was added.    Following DBS placement she had a very brief review of the system and was turned on, on 3/1/19.    She reports that since the surgery she has had smaller steps/shuffling and some imbalance but no falls. More difficulty rising from a chair. Left hand more \"jittery\" when she tries to use it. When the DBS was turned on, there was no clear change. However over the past few days has had no aching wrist pain.    Review of Systems:  A complete ROS was otherwise noncontributory    Medications:  Current Outpatient Medications   Medication Sig Dispense Refill     acyclovir (ZOVIRAX) 200 MG capsule as needed  1     baclofen (LIORESAL) 10 MG tablet Please take 1/2 tab to 1 tab up to three times a day as needed. (Patient taking differently: Take 10 mg by mouth 3 times daily Please take 1/2 tab to 1 tab up to three times a day as needed.) 30 tablet 3     botulinum toxin type A (BOTOX) 100 units injection Inject 280 Units into the muscle once Lot # /C3 with Expiration Date:  11/2020       carbidopa-levodopa (SINEMET CR)  MG per CR tablet Take 1 tablet by mouth At Bedtime 30 tablet 6     carbidopa-levodopa (SINEMET)  MG tablet Take 3 tabs at 530AM, and 2 tabs at 10AM, " 2PM, 6PM, and 10PM 990 tablet 3     Cholecalciferol (VITAMIN D) 2000 UNITS tablet Take 2,000 Units by mouth every morning        clonazePAM (KLONOPIN) 0.5 MG tablet Take 0.5 tablets (0.25 mg) by mouth At Bedtime 15 tablet 5     OnabotulinumtoxinA (BOTOX IJ) Inject 300 Units as directed once [id number] with Expiration Date:  12/2020       ondansetron (ZOFRAN) 4 MG tablet Take 1-2 tablets (4-8 mg) by mouth every 8 hours as needed for nausea or vomiting 30 tablet 0     oxyCODONE (ROXICODONE) 5 MG tablet Take 1 tablet (5 mg) by mouth every 6 hours as needed for severe pain 20 tablet 0     oxyCODONE (ROXICODONE) 5 MG tablet Take 1-2 tablets (5-10 mg) by mouth every 4 hours as needed for moderate to severe pain 30 tablet 0     traMADol (ULTRAM) 50 MG tablet Take 1/2 to 1 tab by mouth every 6 hours as needed, up to 3 tabs per day. 90 tablet 0        PD Medications                   530 1000 200 600 10   C/L 25/100                                 3 2 2 2 2    clonazepam 0.5mg                                1.2    C/L 50/200 CR         1    Baclofen 10mg, 1/2 tab daily PRN (not taking lately)      Social History:  Social History     Socioeconomic History     Marital status:      Spouse name: None     Number of children: 3     Years of education: None     Highest education level: None   Occupational History     None   Social Needs     Financial resource strain: None     Food insecurity:     Worry: None     Inability: None     Transportation needs:     Medical: None     Non-medical: None   Tobacco Use     Smoking status: Never Smoker     Smokeless tobacco: Never Used   Substance and Sexual Activity     Alcohol use: Yes     Alcohol/week: 1.2 - 1.8 oz     Comment: WEEKLY     Drug use: No     Sexual activity: Yes     Partners: Male     Birth control/protection: Male Surgical   Lifestyle     Physical activity:     Days per week: None     Minutes per session: None     Stress: None   Relationships     Social connections:      Talks on phone: None     Gets together: None     Attends Latter-day service: None     Active member of club or organization: None     Attends meetings of clubs or organizations: None     Relationship status: None     Intimate partner violence:     Fear of current or ex partner: None     Emotionally abused: None     Physically abused: None     Forced sexual activity: None   Other Topics Concern     Parent/sibling w/ CABG, MI or angioplasty before 65F 55M? No   Social History Narrative        Izaiah Jara    Lives in [facility]      She does not smoke.  She has a couple of beers to drink on the weekend but otherwise is not a heavy user of alcohol    3 kids: son peña 21; 94, 96 and 98    2 sons    1 daughter    2 are in college and daughter is a sophomore.            FAMILY HISTORY:  Notable for a paternal aunt who is .  She had Parkinson's disease.  Otherwise, there is no other family history of neurologic problems.  There is no family history of dystonia.          90% Bengali    Some norweigian                .         Family History:  Family History   Problem Relation Age of Onset     Breast Cancer Mother      Cancer - colorectal Mother      Macular Degeneration Mother      Dementia Father      Deep Vein Thrombosis Father      No Known Problems Brother      No Known Problems Sister      No Known Problems Sister      No Known Problems Sister      Neurologic Disorder Paternal Aunt         Parkinson's Disease     Cerebrovascular Disease Brother         stroke at age 25 possibly from pfo     Dementia Paternal Grandmother        Physical Exam:  The patient's  weight is 68.1 kg (150 lb 1.6 oz). Her blood pressure is 128/81 and her pulse is 90. Her oxygen saturation is 100%.      Incisions: CDI     Neurological Examination:   Apraxia of left hand when asked to mime turning keys.  Right arm very rigid with myoclonic jerks that developed with activation.  No sensory neglect  UPDRS Values  3/14/2019   Time: 9:50 AM   Medication On   R Brain DBS: None   L Brain DBS: On   Speech 1   Facial Expression 1   Rigidity Neck 1   Rigidity RUE 4   Rigidity LUE 2   Rigidity RLE 2   Rigidity LLE 2   Finger Taps R 4   Finger Taps L 3   Hand Mvt R 4   Hand Mvt L 3   Pron-/Supinate R 4   Pron-/Supinate L 3   Toe Tap R 4   Toe Tap L 3   Leg Agility R 4   Leg Agility L 3   Arise From Chair 0   Gait 1   Gait Freezing 0   Postural Stability 1   Posture 2   Global Spont Mvt 1   Postural Tremor RUE 3   Postural Tremor LUE 2   Kinetic Tremor RUE 3   Kinetic Tremor LUE 3   Rest Tremor RUE 0   Rest Tremor LUE 0   Rest Tremor RLE 0   Rest Tremor LLE 0   Rest Tremor Lip/Jaw 0   Rest Tremor Constancy 0   Total Right 32   Total Left 24   Axial Total 8   Total 64       Procedure: DBS Programming    Lead(s):    Left Right   DBS Target GPi          DBS Lead Type    Abbott 0.5mm spacing       Lead Implant Date   2/22/19          IPG(s):   1 2   IPG Infinity 6660          IPG Implant Date 3/1/10          Location    L chest       Battery (V)    2.96         Full Impedence/Currents Check: 2C with >3000 ohms    Initial Settings:  Left Brain Program 1 Program B Program C Program D   Contacts  C+3-         Amplitude (mA) 3          Pulse Width ( s)  90         Frequency (Hz)  130         Impedence/ Currents 712 ohms  /   /   /      Right Brain Program A Program B Program C Program D   Contacts  N/A         Amplitude (V)           Pulse Width ( s)           Frequency (Hz)           Impedence/ Currents  /   /   /   /      Initial program selected:    1      Left Hemisphere Monopolar Review  Bolded likely represent upper limit / threshold  Contacts Amplitude    (mA) PW   (usec) Rate  (Hz) Assessment Adverse Effects   C+1- 1.75 120 130 Not assessed R face contraction     1.5 120   better    2.25 90   R face contraction     2.00 90   better   C+2- 4.5 120   R face contraction     4.25 120   better     5.5 90   R face contraction     5.25 90    better   C+3- 5.5 120   R face contraction     5.25 120   better     7 90   R face contraction     6.75 90   better    C+4- 6 120   R face contraction    5.5 120    better     7 90   R face contraction     6.5 90   better   C+3- 4.0 120                              Final Settings:  Left Brain Program 1 Program B Program C Program D   Contacts  C+3-         Amplitude (mA)  4.0         Pulse Width ( s) 120          Frequency (Hz)  130         Impedence/ Currents 700ohms   /   /   /    Patient Control (+/- V) +/- 1.0mA  /   /   /      Right Brain Program A Program B Program C Program D   Contacts  N/A         Amplitude (V)           Pulse Width ( s)           Frequency (Hz)           Impedence/ Currents  /   /   /   /    Patient Control (+/- V)  /   /   /   /      Final Program selected:    1     Impression:  Sandra Jara is a 53 year old female with 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 Hopeful for some improvement in right arm dystonia/rigidity/myoclonus given some element of lesion effect on rigidity and today some reports of improvement in pain. Thresholds are good. Both pulse width and current were increased.     Her gait side effects sound somewhat parkinsonian. Possible etiologies include transient edema/lesion effect from surgery that would be expected to improve with time. Another possibility is a transient worsening in her condition due to the stress of surgery. Another possibility is progression but it seems somewhat quick for this. That it occurred prior to turning on DBS makes stimulation seem an unlikely cause at this time. Will continue to follow.    Recommendations:   -increased current and pulse width today  -will see again in 1 week for follow-up    Joey Bradford MD  Movement Disorders Fellow    Neurology Attending Attestation:     Tiffany KELLEY, personally saw this patient with our Movement Disorders Fellow and agree with the  fellow's findings and plan of care as documented in the movement disorder fellow's note. I personally performed salient aspects of the history and neurological examination.     I personally reviewed the vital signs, medications, and labs. I personally viewed the imaging, and agree with the interpretation documented by the fellow.    I personally performed or supervised all procedures.    Time spent with patient: Greater than 50% of this 60 minute visit was spent in counseling and coordination of care related to DBS expectations and plan of care, gait difficulty, myoclonus, dystonia, pain.    Additional time spent for separate DBS programmin minutes    Tiffany Hopkins MD    of Neurology

## 2019-03-14 NOTE — TELEPHONE ENCOUNTER
botulinum toxin type A (BOTOX) 100 units injection 400 Units 400 Units SEE ADMIN INSTRUCTIONS 12/10/2018  --   Admin Instructions: Inject 400 Units every 3 months     Prior Authorization Infusion/Clinic Administered Request    Location: Clinics and surgery center [address] [place] [address], [facility]  Diagnosis and ICD:Dystonia G24.9  Drug/Therapy: See above    Previously Tried and Failed Therapies: N/A    Date of provider note with supporting information: 12/10/2018    Urgency (When is the patient scheduled?): 02.9 Other Brain    Would you like to include any research articles? No  This is the standard of care for patients with Dystonia.

## 2019-03-14 NOTE — TELEPHONE ENCOUNTER
clonazePAM (KLONOPIN) 0.5 MG tablet 15 tablet 5 3/13/2019  No   Sig - Route: Take 0.5 tablets (0.25 mg) by mouth At Bedtime - Oral     Called [facility] pharmacy and spoke to Santiago (pharmacist). Santiago will be filling the above prescription.

## 2019-03-14 NOTE — PROGRESS NOTES
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 She had her GPi lead placed on 2/22/19. She had her last Botox injections on 12/10/18. At that time doses was increased to wrist extensors/flexors due to wrist tightness and spasms, and a CR Sinemet was added.    Following DBS placement she had a very brief review of the system and was turned on, on 3/1/19.    She reports that since the surgery she has had smaller steps/shuffling and some imbalance but no falls. More difficulty rising from a chair. Left hand more \"jittery\" when she tries to use it. When the DBS was turned on, there was no clear change. However over the past few days has had no aching wrist pain.    Review of Systems:  A complete ROS was otherwise noncontributory    Medications:  Current Outpatient Medications   Medication Sig Dispense Refill     acyclovir (ZOVIRAX) 200 MG capsule as needed  1     baclofen (LIORESAL) 10 MG tablet Please take 1/2 tab to 1 tab up to three times a day as needed. (Patient taking differently: Take 10 mg by mouth 3 times daily Please take 1/2 tab to 1 tab up to three times a day as needed.) 30 tablet 3     botulinum toxin type A (BOTOX) 100 units injection Inject 280 Units into the muscle once Lot # /C3 with Expiration Date:  11/2020       carbidopa-levodopa (SINEMET CR)  MG per CR tablet Take 1 tablet by mouth At Bedtime 30 tablet 6     carbidopa-levodopa (SINEMET)  MG tablet Take 3 tabs at 530AM, and 2 tabs at 10AM, 2PM, 6PM, and 10PM 990 tablet 3     Cholecalciferol (VITAMIN D) 2000 UNITS tablet Take 2,000 Units by mouth every morning        clonazePAM (KLONOPIN) 0.5 MG tablet Take 0.5 tablets (0.25 mg) by mouth At Bedtime 15 tablet 5     OnabotulinumtoxinA (BOTOX IJ) Inject 300 Units as directed once " [id number] with Expiration Date:  12/2020       ondansetron (ZOFRAN) 4 MG tablet Take 1-2 tablets (4-8 mg) by mouth every 8 hours as needed for nausea or vomiting 30 tablet 0     oxyCODONE (ROXICODONE) 5 MG tablet Take 1 tablet (5 mg) by mouth every 6 hours as needed for severe pain 20 tablet 0     oxyCODONE (ROXICODONE) 5 MG tablet Take 1-2 tablets (5-10 mg) by mouth every 4 hours as needed for moderate to severe pain 30 tablet 0     traMADol (ULTRAM) 50 MG tablet Take 1/2 to 1 tab by mouth every 6 hours as needed, up to 3 tabs per day. 90 tablet 0        PD Medications                   530 1000 200 600 10   C/L 25/100                                 3 2 2 2 2    clonazepam 0.5mg                                1.2    C/L 50/200 CR         1    Baclofen 10mg, 1/2 tab daily PRN (not taking lately)      Social History:  Social History     Socioeconomic History     Marital status:      Spouse name: None     Number of children: 3     Years of education: None     Highest education level: None   Occupational History     None   Social Needs     Financial resource strain: None     Food insecurity:     Worry: None     Inability: None     Transportation needs:     Medical: None     Non-medical: None   Tobacco Use     Smoking status: Never Smoker     Smokeless tobacco: Never Used   Substance and Sexual Activity     Alcohol use: Yes     Alcohol/week: 1.2 - 1.8 oz     Comment: WEEKLY     Drug use: No     Sexual activity: Yes     Partners: Male     Birth control/protection: Male Surgical   Lifestyle     Physical activity:     Days per week: None     Minutes per session: None     Stress: None   Relationships     Social connections:     Talks on phone: None     Gets together: None     Attends Buddhist service: None     Active member of club or organization: None     Attends meetings of clubs or organizations: None     Relationship status: None     Intimate partner violence:     Fear of current or ex partner: None      Emotionally abused: None     Physically abused: None     Forced sexual activity: None   Other Topics Concern     Parent/sibling w/ CABG, MI or angioplasty before 65F 55M? No   Social History Narrative        Izaiah Jara    [facility]      She does not smoke.  She has a couple of beers to drink on the weekend but otherwise is not a heavy user of alcohol    3 kids: son peña 21; 94, 96 and 98    2 sons    1 daughter    2 are in college and daughter is a sophomore.            FAMILY HISTORY:  Notable for a paternal aunt who is .  She had Parkinson's disease.  Otherwise, there is no other family history of neurologic problems.  There is no family history of dystonia.          90% Tajik    Some norweigian                .         Family History:  Family History   Problem Relation Age of Onset     Breast Cancer Mother      Cancer - colorectal Mother      Macular Degeneration Mother      Dementia Father      Deep Vein Thrombosis Father      No Known Problems Brother      No Known Problems Sister      No Known Problems Sister      No Known Problems Sister      Neurologic Disorder Paternal Aunt         Parkinson's Disease     Cerebrovascular Disease Brother         stroke at age 25 possibly from pfo     Dementia Paternal Grandmother        Physical Exam:  The patient's  weight is 68.1 kg (150 lb 1.6 oz). Her blood pressure is 128/81 and her pulse is 90. Her oxygen saturation is 100%.      Incisions: CDI     Neurological Examination:   Apraxia of left hand when asked to mime turning keys.  Right arm very rigid with myoclonic jerks that developed with activation.  No sensory neglect  UPDRS Values 3/14/2019   Time: 9:50 AM   Medication On   R Brain DBS: None   L Brain DBS: On   Speech 1   Facial Expression 1   Rigidity Neck 1   Rigidity RUE 4   Rigidity LUE 2   Rigidity RLE 2   Rigidity LLE 2   Finger Taps R 4   Finger Taps L 3   Hand Mvt R 4   Hand Mvt L 3   Pron-/Supinate R 4    Pron-/Supinate L 3   Toe Tap R 4   Toe Tap L 3   Leg Agility R 4   Leg Agility L 3   Arise From Chair 0   Gait 1   Gait Freezing 0   Postural Stability 1   Posture 2   Global Spont Mvt 1   Postural Tremor RUE 3   Postural Tremor LUE 2   Kinetic Tremor RUE 3   Kinetic Tremor LUE 3   Rest Tremor RUE 0   Rest Tremor LUE 0   Rest Tremor RLE 0   Rest Tremor LLE 0   Rest Tremor Lip/Jaw 0   Rest Tremor Constancy 0   Total Right 32   Total Left 24   Axial Total 8   Total 64       Procedure: DBS Programming    Lead(s):    Left Right   DBS Target GPi          DBS Lead Type    Abbott 0.5mm spacing       Lead Implant Date   2/22/19          IPG(s):   1 2   IPG Infinity 6660          IPG Implant Date 3/1/10          Location    L chest       Battery (V)    2.96         Full Impedence/Currents Check: 2C with >3000 ohms    Initial Settings:  Left Brain Program 1 Program B Program C Program D   Contacts  C+3-         Amplitude (mA) 3          Pulse Width ( s)  90         Frequency (Hz)  130         Impedence/ Currents 712 ohms  /   /   /      Right Brain Program A Program B Program C Program D   Contacts  N/A         Amplitude (V)           Pulse Width ( s)           Frequency (Hz)           Impedence/ Currents  /   /   /   /      Initial program selected:    1      Left Hemisphere Monopolar Review  Bolded likely represent upper limit / threshold  Contacts Amplitude    (mA) PW   (usec) Rate  (Hz) Assessment Adverse Effects   C+1- 1.75 120 130 Not assessed R face contraction     1.5 120   better    2.25 90   R face contraction     2.00 90   better   C+2- 4.5 120   R face contraction     4.25 120   better     5.5 90   R face contraction     5.25 90   better   C+3- 5.5 120   R face contraction     5.25 120   better     7 90   R face contraction     6.75 90   better    C+4- 6 120   R face contraction    5.5 120    better     7 90   R face contraction     6.5 90   better   C+3- 4.0 120                              Final  Settings:  Left Brain Program 1 Program B Program C Program D   Contacts  C+3-         Amplitude (mA)  4.0         Pulse Width ( s) 120          Frequency (Hz)  130         Impedence/ Currents 700ohms   /   /   /    Patient Control (+/- V) +/- 1.0mA  /   /   /      Right Brain Program A Program B Program C Program D   Contacts  N/A         Amplitude (V)           Pulse Width ( s)           Frequency (Hz)           Impedence/ Currents  /   /   /   /    Patient Control (+/- V)  /   /   /   /      Final Program selected:    1     Impression:  Sandra Jara is a 53 year old female with 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 Hopeful for some improvement in right arm dystonia/rigidity/myoclonus given some element of lesion effect on rigidity and today some reports of improvement in pain. Thresholds are good. Both pulse width and current were increased.     Her gait side effects sound somewhat parkinsonian. Possible etiologies include transient edema/lesion effect from surgery that would be expected to improve with time. Another possibility is a transient worsening in her condition due to the stress of surgery. Another possibility is progression but it seems somewhat quick for this. That it occurred prior to turning on DBS makes stimulation seem an unlikely cause at this time. Will continue to follow.    Recommendations:   -increased current and pulse width today  -will see again in 1 week for follow-up    Joey Bradford MD  Movement Disorders Fellow    Neurology Attending Attestation:     I, Tiffany Hopkins, personally saw this patient with our Movement Disorders Fellow and agree with the fellow's findings and plan of care as documented in the movement disorder fellow's note. I personally performed salient aspects of the history and neurological examination.     I personally reviewed the vital signs, medications, and labs. I personally viewed the imaging, and  agree with the interpretation documented by the fellow.    I personally performed or supervised all procedures.    Time spent with patient: Greater than 50% of this 60 minute visit was spent in counseling and coordination of care related to DBS expectations and plan of care, gait difficulty, myoclonus, dystonia, pain.    Additional time spent for separate DBS programmin minutes    Tiffany Hopkins MD    of Neurology

## 2019-03-14 NOTE — NURSING NOTE
Chief Complaint   Patient presents with     RECHECK     [facility] RETURN - 2 WEEK POST OP WOUND CHECK AND SUTURE REMOVAL       Isaiah Dsouza, EMT

## 2019-03-18 NOTE — PROGRESS NOTES
NEUROSURGERY    Chief Complaint   Patient presents with     RECHECK     [facility] RETURN - 2 WEEK POST OP WOUND CHECK AND SUTURE REMOVAL     HISTORY OF PRESENT ILLNESS  Ms. Sandra Jara returns today for her follow up.  She is s/p left side deep brain stimulator placement, phase I, placement of left side deep brain stimulator electrode, target left globus pallidus internus, with microeletrode recording on 2/22/2019 and s/p deep brain stimulator placement, phase II, placement of deep brain stimulator generator/battery over the left chest wall on 3/1/2019.  Patient tolerated both of the procedure well and there were no complications.  After her phase I surgery, her postoperative CT head was without any concerning features and she was discharged to home on POD#1.  She did have some discomfort at the incision site and connector burial site.  She was also tired/fatigued for a few days.  She also had some balance issues for the first couple of days.  However, these are improving.  She also did well after her phase II surgery, which was an outpatient surgery.  Due to her symptoms, Dr. Hopkins activated and programmed her DBS device immediately after her phase II surgery.  She will see Dr. Hopkins again today.  Otherwise, she denies any fevers, chills, nausea, vomiting, dizziness, weakness, numbness or seizure like activity.  She also denies any issues with her incision.  Briefly, she is a 53 year old right handed  woman who presents for DBS consult.  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  Her symptoms began over 5 years ago with occasional shaking in her right hand and loss of control.  She was initial diagnosis in 2015 at [place] with corticobasal degeneration (CBD).  She then went to see a neurologist in the [facility] who noticed slower than normal progression for CBD.  It was thought that she may have dystonia.  Recently, she had a DATscan with asymmetric-metabolism in the left  parietal and occipital lobes, thus thought of having slowly progressing corticobasal degeneration (CBD), per Dr. Hopkins.  She does have right upper extremity dystonia and myoclonus.  She is followed by our neurology team, including Dr. Hopkins, for botulism toxin injections.  Her last bolutism injection provided no significant functional improvement so she has been referred for DBS evaluation.  Dr. Hopkins's thoughts are left GPi DBS for right upper extremity dystonia.  Today, she endorses dystonia in her right arm and now going down her right leg.  Cramping and shaking were symptoms that came after loss of control in her right arm/hand.  She wonders about performing activities after surgery, as she does boxing as part of Physical Therapy.  Her goals of DBS surgery is to get rid of the jerking and to get functionality back in her right upper extremity.  Her case at the Movement Disorder Consensus Group Meeting, and she was considered to be a good DBS candidate.  Recommendation was left GPi DBS, unilateral, Abbott system.     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 It demonstrated some mild changes of right carpal tunnel syndrome but was otherwise normal.       History of MRI of brain and brain stem 12/21/2014    A brain MRI scan done on 06/27/2014 revealed no abnormalities.      History of MRI of cervical spine 12/21/2014    A cervical MRI done on 06/10/2014 revealed some mild to moderate degenerative changes but no significant central canal or foraminal stenosis, and no abnormalities of  the spinal cord were noted.       Movement disorder 12/21/2014    I saw your patient, Deepali Contreras on 12/15/2014. She is a 49-year-old right-handed female here for evaluation of right upper extremity dysfunction and right hand tremor.  She has noted this problem for the last couple of years. She reports she is losing dexterity in her right hand. She has appreciated a tremor of the right hand with actions such as writing or postural maintenance. She has n     Past Surgical History:   Procedure Laterality Date     APPENDECTOMY       IMPLANT DEEP BRAIN STIMULATION GENERATOR / BATTERY Left 3/1/2019    Procedure: Left Deep Brain Stimulator Placement, Phase II, Placement Of Deep Brain Stimulator Generator/Battery Over The Left Chest Wall Latex Free;  Surgeon: Efren Triana MD;  Location: [facility] OR     OPTICAL TRACKING SYSTEM INSERTION DEEP BRAIN STIMULATION Left 2/22/2019    Procedure: Stealth Assisted Left Side Deep Brain Stimulator Placement, Phase I, Placement Of Left Side Deep Brain Stimulator Electrode, Target Left Globus Pallidus Internus With Microelectrode Recording;  Surgeon: Efren Triana MD;  Location: [facility] OR     Social History     Socioeconomic History     Marital status:      Spouse name: Not on file     Number of children: 3     Years of education: Not on file     Highest education level: Not on file   Occupational History     Not on file   Social Needs     Financial resource strain: Not on file     Food insecurity:     Worry: Not on file     Inability: Not on file     Transportation needs:     Medical: Not on file     Non-medical: Not on file   Tobacco Use     Smoking status: Never Smoker     Smokeless tobacco: Never Used   Substance and Sexual Activity     Alcohol use: Yes     Alcohol/week: 1.2 - 1.8 oz     Comment: WEEKLY     Drug use: No     Sexual activity: Yes     Partners: Male     Birth control/protection: Male Surgical   Lifestyle     Physical activity:     Days per  week: Not on file     Minutes per session: Not on file     Stress: Not on file   Relationships     Social connections:     Talks on phone: Not on file     Gets together: Not on file     Attends Pentecostalism service: Not on file     Active member of club or organization: Not on file     Attends meetings of clubs or organizations: Not on file     Relationship status: Not on file     Intimate partner violence:     Fear of current or ex partner: Not on file     Emotionally abused: Not on file     Physically abused: Not on file     Forced sexual activity: Not on file   Other Topics Concern     Parent/sibling w/ CABG, MI or angioplasty before 65F 55M? No   Social History Narrative        Izaiah Jara    L[facility] in [facility]      She does not smoke.  She has a couple of beers to drink on the weekend but otherwise is not a heavy user of alcohol    3 kids: son peña 21; 94, 96 and 98    2 sons    1 daughter    2 are in college and daughter is a sophomore.            FAMILY HISTORY:  Notable for a paternal aunt who is .  She had Parkinson's disease.  Otherwise, there is no other family history of neurologic problems.  There is no family history of dystonia.          90% Barbadian    Some norweigian                .       Family History   Problem Relation Age of Onset     Breast Cancer Mother      Cancer - colorectal Mother      Macular Degeneration Mother      Dementia Father      Deep Vein Thrombosis Father      No Known Problems Brother      No Known Problems Sister      No Known Problems Sister      No Known Problems Sister      Neurologic Disorder Paternal Aunt         Parkinson's Disease     Cerebrovascular Disease Brother         stroke at age 25 possibly from pfo     Dementia Paternal Grandmother       No Known Allergies    Current Outpatient Medications   Medication     acyclovir (ZOVIRAX) 200 MG capsule     baclofen (LIORESAL) 10 MG tablet     botulinum toxin type A (BOTOX) 100 units  injection     carbidopa-levodopa (SINEMET CR)  MG per CR tablet     carbidopa-levodopa (SINEMET)  MG tablet     Cholecalciferol (VITAMIN D) 2000 UNITS tablet     clonazePAM (KLONOPIN) 0.5 MG tablet     OnabotulinumtoxinA (BOTOX IJ)     traMADol (ULTRAM) 50 MG tablet     Current Facility-Administered Medications   Medication     botulinum toxin type A (BOTOX) 100 units injection 400 Units       REVIEW OF SYSTEMS: updated 3/14/2019, also per HPI.  General: POSITIVE for chills/sweats/fever, difficulty sleeping, recent fatigue. Negative for headache or weight gain/loss.  Eyes: Negative for blurred vision, crossed eyes, double vision, recent eye infections, vision flashes, or vision halos.  Ears/Nose/Mouth/Throat: Negative for bleeding gums, difficulty swallowing, earache, ear discharge, hearing loss, hoarseness, nosebleeds, tinnitus, or sinus problems.  Respiratory:Negative for chronic cough, coughing blood. POSITIVE for night sweats. Negative for shortness of breath, Tuberculosis, or wheezing.  Cardiovascular: Negative for chest pain, dyspnea at night, heart murmur, palpitations, pacemaker, pacemaker, poor circulation, swollen legs/feet, or varicose veins.  Gastrointestinal: Negative for melena, hematochezia, chronic diarrhea, heartburn, Hepatitis A/B/C, increasing constipation, Liver Disease, nausea, or vomiting.   Genitourinary: POSITIVE for urgency. Negative for Urinary retention, genital discharge, urinary incontinence, or UTI.  Neurological: Negative for syncope, headaches, numbness of arms/legs, tingling in hands/arms/legs, memory problems, or seizures.  Psychological: Negative for anxiety, depression, panic attacks, or restlessness.  Skin: POSITIVE for skin rashes. Negative for chronic skin itching, color changes in hand/feet when cold, poor scarring, non-healing ulcers, unusual moles.  Musculoskeletal: Negative for arthritis, joint swelling in hands/wrists/hips/knees/joints, muscle tenderness in  arms/legs, or osteoporosis.  Endocrine: Negative for excessive thirst/hunger, intolerance for warm rooms, loss of libido, multiple broken bones, rapid weight gain/loss, galactorrhea, or thyroid issues.  Hematologic/Lymphatic: POSITIVE for easy skin bruising. Negative for significant fatigue, prolonged bleeding, tender glands/lymph nodes.  Allergies: Negative for asthma or hay fever.      PHYSICAL EXAM  /81 (BP Location: Left arm, Patient Position: Sitting, Cuff Size: Adult Regular)   Pulse 90   Wt 68.1 kg (150 lb 2.1 oz)   SpO2 100%   BMI 24.23 kg/m      General: Awake, alert, oriented. Well nourished, well developed, no acute distress.  HEENT: Head normocephalic, atraumatic. No carotid bruit. Neck supple. Good range of motion. No palpable thyroid mass.  Heart: Regular rhythm and rate. No murmurs.  Lungs: Clear to auscultation and percussion bilaterally. No rhonchi, rales or wheeze. No bruise.  Abdomen: Soft, non-tender, non-distended. No hepatosplenomegaly.  Extremity: Warm with no clubbing or cyanosis. No lower extremity edema.  Incision: left frontal incision clean/dry and intact.  No redness.  No drainage.  No fluid collection.  Left parietal incision clean/dry and intact.  No redness.  No drainage.  No fluid collection.  Left chest wall incision clean/dry and intact.  No redness.  No drainage.  No fluid collection.  Dermabond dressing coming off - took all of the Dermabond off from all the incisions without any difficulty.    Neurological:  Awake, alert and oriented to date, time, place and person. Speech fluent with tremor fluctuation.   Pupils equal, round, reactive to light.  Extraocular movement intact.  Visual fields are full on confrontation.  Hearing is grossly normal to finger rub.   Facial sensation intact.  Face symmetric.  Tongue midline.  Uvula elevates equally.    Motor: full strength throughout.  Sensation: Decreased sensation below the elbow on the right side.  Deep tendon reflexes: 2+  throughout. Negative for clonus. Herndon's sign on the right side. No dysmetria.      ASSESSMENT   Sandra Jara is a 53 year old right handed woman with dystonia and myoclonus in the right upper extremity.  Also slow progression corticobasal degeneration.  S/p left side deep brain stimulator placement, phase I, placement of left side deep brain stimulator electrode, target left globus pallidus internus, with microelectrode recording on 2/22/2019.  S/p deep brain stimulator placement, phase II, placement of deep brain stimulator generator/battery over the left chest wall on 3/1/2019.    Patient has done well with the surgeries.  Her incisions are all healing well with no signs of infection or any healing issues.  Dermabond dressing was removed from all the incisions without difficulties.    Patient will be seeing Dr. Hopkins for further programming.  Patient has not noticed any significant change in her tremors/dystonia.  I told her that this would take time.    Patient and her family are planning for a trip to [place] soon.  We briefly discussed some activity restrictions.  For now, I told her that she can travel by airplane and her activity can be liberalized, as much as tolerated.  She also could get her incisions wet but I told her not to submerge them still.    Otherwise, no need for further follow up with neurosurgery for now.       PLAN:  Follow up with neurosurgery as needed.

## 2019-03-19 ENCOUNTER — TRANSFERRED RECORDS (OUTPATIENT)
Dept: HEALTH INFORMATION MANAGEMENT | Facility: CLINIC | Age: 54
End: 2019-03-19

## 2019-03-20 DIAGNOSIS — G24.1 DYSTONIA, TORSION, FRAGMENTS OF: ICD-10-CM

## 2019-03-20 RX ORDER — CLONAZEPAM 0.5 MG/1
0.25 TABLET ORAL AT BEDTIME
Qty: 15 TABLET | Refills: 5 | OUTPATIENT
Start: 2019-03-20

## 2019-03-20 RX ORDER — CARBIDOPA AND LEVODOPA 50; 200 MG/1; MG/1
1 TABLET, EXTENDED RELEASE ORAL AT BEDTIME
Qty: 30 TABLET | Refills: 11 | Status: SHIPPED | OUTPATIENT
Start: 2019-03-20 | End: 2020-04-21

## 2019-03-20 NOTE — TELEPHONE ENCOUNTER
Nurse received refill request for:   carbidopa-levodopa (SINEMET CR)  MG per CR tablet 30 tablet 6 9/13/2018  No   Sig - Route: Take 1 tablet by mouth At Bedtime - Oral     Pharmacy: [facility]    Last re-ordered: 9/13/2018    Last appointment: 3/14/2019    Action taken: Prescription sent to be signed by provider.

## 2019-03-20 NOTE — TELEPHONE ENCOUNTER
clonazePAM (KLONOPIN) 0.5 MG tablet 15 tablet 5 3/13/2019  No   Sig - Route: Take 0.5 tablets (0.25 mg) by mouth At Bedtime - Oral   Class: No Print Out     Above prescription refilled and called in on 3/14/2019.

## 2019-03-20 NOTE — TELEPHONE ENCOUNTER
clonazePAM (KLONOPIN) 0.5 MG tablet 15 tablet 5 3/13/2019  No   Sig - Route: Take 0.5 tablets (0.25 mg) by mouth At Bedtime - Oral     Above medication refilled and called in on 3/14/2019.

## 2019-03-21 ENCOUNTER — OFFICE VISIT (OUTPATIENT)
Dept: NEUROLOGY | Facility: CLINIC | Age: 54
End: 2019-03-21
Payer: COMMERCIAL

## 2019-03-21 ENCOUNTER — ANCILLARY PROCEDURE (OUTPATIENT)
Dept: CT IMAGING | Facility: CLINIC | Age: 54
End: 2019-03-21
Attending: PSYCHIATRY & NEUROLOGY
Payer: COMMERCIAL

## 2019-03-21 VITALS
DIASTOLIC BLOOD PRESSURE: 75 MMHG | HEART RATE: 87 BPM | WEIGHT: 149.7 LBS | OXYGEN SATURATION: 99 % | HEIGHT: 66 IN | SYSTOLIC BLOOD PRESSURE: 131 MMHG | BODY MASS INDEX: 24.06 KG/M2

## 2019-03-21 DIAGNOSIS — G25.3 MYOCLONUS: ICD-10-CM

## 2019-03-21 DIAGNOSIS — G24.8 ACQUIRED TORSION DYSTONIA: ICD-10-CM

## 2019-03-21 DIAGNOSIS — G31.85 CORTICOBASAL SYNDROME (H): ICD-10-CM

## 2019-03-21 SDOH — HEALTH STABILITY: MENTAL HEALTH: HOW OFTEN DO YOU HAVE A DRINK CONTAINING ALCOHOL?: NEVER

## 2019-03-21 ASSESSMENT — UNIFIED PARKINSONS DISEASE RATING SCALE (UPDRS)
GAIT: SLIGHT: INDEPENDENT WALKING WITH MINOR GAIT IMPAIRMENT.
HANDMOVEMENTS_RIGHT: SEVERE: CANNOT OR CAN ONLY BARELY PERFORM THE TASK BECAUSE OF SLOWING, INTERRUPTIONS, OR DECREMENTS.
RIGIDITY_LUE: MILD: RIGIDITY DETECTED WITHOUT THE ACTIVATION MANEUVER.  FULL RANGE OF MOTION IS EASILY ACHIEVED.
TOETAPPING_LEFT: MODERATE: ANY OF THE FOLLOWING:  A) MORE THAN 5 INTERRUPTIONS OR AT LEAST ONE LONGER ARREST (FREEZE) IN ONGOING MOVEMENT  B) MODERATE SLOWING C) THE AMPLITUDE DECREMENTS STARTING AFTER THE FIRST MOVEMENT.
RIGIDITY_NECK: MILD: RIGIDITY DETECTED WITHOUT THE ACTIVATION MANEUVER.  FULL RANGE OF MOTION IS EASILY ACHIEVED.
FINGER_TAPPING_RIGHT: SEVERE: CANNOT OR CAN ONLY BARELY PERFORM THE TASK BECAUSE OF SLOWING, INTERRUPTIONS, OR DECREMENTS.
POSTURAL_STABILITY: SLIGHT: 3-5 STEPS, BUT RECOVERS UNAIDED.
RIGIDITY_LLE: NORMAL
SPEECH: SLIGHT: LOSS OF MODULATION, DICTION OR VOLUME, BUT STILL ALL WORDS EASY TO UNDERSTAND.
TOTAL_SCORE: 35
SPONTANEITY_OF_MOVEMENT: 0: NORMAL.  NO PROBLEMS.
AMPLITUDE_LUE: NORMAL: NO TREMOR.
FACIAL_EXPRESSION: SLIGHT: MINIMAL MASKED FACIES MANIFESTED ONLY BY DECREASED FREQUENCY OF BLINKING.
PRONATION_SUPINATION_RIGHT: SEVERE: CANNOT OR CAN ONLY BARELY PERFORM THE TASK BECAUSE OF SLOWING, INTERRUPTIONS, OR DECREMENTS.
TOTAL_SCORE_LEFT: 23
HANDMOVEMENTS_LEFT: MODERATE: ANY OF THE FOLLOWING:  A) MORE THAN 5 INTERRUPTIONS  OR AT LEAST ONE LONGER ARREST (FREEZE) IN ONGOING MOVEMENT  B) MODERATE SLOWING C) THE AMPLITUDE DECREMENTS STARTING AFTER THE FIRST MOVEMENT.
LEG_AGILITY_LEFT: SEVERE: CANNOT OR CAN ONLY BARELY PERFORM THE TASK BECAUSE OF SLOWING, INTERRUPTIONS, OR DECREMENTS.
TOTAL_SCORE: 69
POSTURE: 1 SLIGHT.  NOT QUITE ERECT BUT COULD BE NORMAL FOR OLDER PERSONS.
AMPLITUDE_LIP_JAW: NORMAL: NO TREMOR.
PRONATION_SUPINATION_LEFT: MODERATE: ANY OF THE FOLLOWING:  A) MORE THAN 5 INTERRUPTIONS  OR AT LEAST ONE LONGER ARREST (FREEZE) IN ONGOING MOVEMENT  B) MODERATE SLOWING C) THE AMPLITUDE DECREMENTS STARTING AFTER THE FIRST MOVEMENT.
RIGIDITY_RUE: SEVERE: RIGIDITY DETECTED WITHOUT THE ACTIVATION MANEUVER AND FULL RANGE OF MOTION NOT ACHIEVED.
FREEZING_GAIT: NORMAL
TOETAPPING_RIGHT: SEVERE: CANNOT OR CAN ONLY BARELY PERFORM THE TASK BECAUSE OF SLOWING, INTERRUPTIONS, OR DECREMENTS.
FINGER_TAPPING_LEFT: MODERATE: ANY OF THE FOLLOWING:  A) MORE THAN 5 INTERRUPTIONS  OR AT LEAST ONE LONGER ARREST (FREEZE) IN ONGOING MOVEMENT  B) MODERATE SLOWING C) THE AMPLITUDE DECREMENTS STARTING AFTER THE FIRST MOVEMENT.
ARISING_CHAIR: SLIGHT: ARISING IS SLOWER THAN NORMAL, OR MAY NEED MORE THAN ONE ATTEMPT, OR MAY NEED TO MOVE FORWARD IN THE CHAIR TO ARISE.  NO NEED TO USE THE ARMS OF THE CHAIR.
AMPLITUDE_RLE: NORMAL: NO TREMOR.
AMPLITUDE_LLE: NORMAL: NO TREMOR.
PARKINSONS_MEDS: ON
CONSTANCY_TREMOR_ATREST: MODERATE: TREMOR AT REST IS PRESENT 51-75% OF THE ENTIRE EXAMINATION PERIOD.
AMPLITUDE_RUE: MODERATE: 3 - 10 CM IN MAXIMAL AMPLITUDE.
RIGIDITY_RLE: MILD: RIGIDITY DETECTED WITHOUT THE ACTIVATION MANEUVER.  FULL RANGE OF MOTION IS EASILY ACHIEVED.
AXIAL_SCORE: 8
LEG_AGILITY_RIGHT: SEVERE: CANNOT OR CAN ONLY BARELY PERFORM THE TASK BECAUSE OF SLOWING, INTERRUPTIONS, OR DECREMENTS.

## 2019-03-21 ASSESSMENT — PAIN SCALES - GENERAL: PAINLEVEL: NO PAIN (0)

## 2019-03-21 ASSESSMENT — MIFFLIN-ST. JEOR: SCORE: 1287.84

## 2019-03-21 NOTE — PATIENT INSTRUCTIONS
You now have 4 programs.    You are now on Program 2 which is basically a version of your prior program but with more stimulation.    If you have problems with this program you can always go back to Program 1 which is your old program.    Let us know if how you are doing before you leave for your trip.    After you return, get in touch with us. We will likely consider changing to Program 4 at that point.    Follow-up 4-6 weeks      ---  You decreased the current from 4.5 to 4.25mA just prior to leaving due to right eye closure.  If this continues to be an issue you can decrease it to 4.0mA

## 2019-03-21 NOTE — PROGRESS NOTES
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 She had her GPi lead placed on 2/22/19.     She was last seen 3/14/19 for monopolar review and had her current increased from 3 to 4 mA and pulse width from 90 to 120 s.    Today she reports her balance is slightly better. There has been no significant change in stiffness in her right arm although pain has not returned. Possibly there is more jerking in her right hand than one week ago.    Review of Systems:  A complete ROS was otherwise noncontributory    Medications:  Current Outpatient Medications   Medication Sig Dispense Refill     acyclovir (ZOVIRAX) 200 MG capsule as needed  1     baclofen (LIORESAL) 10 MG tablet Please take 1/2 tab to 1 tab up to three times a day as needed. (Patient taking differently: Take 10 mg by mouth 3 times daily Please take 1/2 tab to 1 tab up to three times a day as needed.) 30 tablet 3     botulinum toxin type A (BOTOX) 100 units injection Inject 280 Units into the muscle once Lot # /C3 with Expiration Date:  11/2020       carbidopa-levodopa (SINEMET CR)  MG CR tablet Take 1 tablet by mouth At Bedtime 30 tablet 11     carbidopa-levodopa (SINEMET)  MG tablet Take 3 tabs at 530AM, and 2 tabs at 10AM, 2PM, 6PM, and 10PM 990 tablet 3     Cholecalciferol (VITAMIN D) 2000 UNITS tablet Take 2,000 Units by mouth every morning        clonazePAM (KLONOPIN) 0.5 MG tablet Take 0.5 tablets (0.25 mg) by mouth At Bedtime 15 tablet 5     OnabotulinumtoxinA (BOTOX IJ) Inject 300 Units as directed once [id number] with Expiration Date:  12/2020       traMADol (ULTRAM) 50 MG tablet Take 1/2 to 1 tab by mouth every 6 hours as needed, up to 3 tabs per day. 90 tablet 0        PD Medications                   530 1000 200 600 10   C/L 25/100                                  3 2 2 2 2    clonazepam 0.5mg                                1.2    C/L 50/200 CR         1    Baclofen 10mg, 1/2 tab daily PRN (not taking lately)      Social History:  Social History     Socioeconomic History     Marital status:      Spouse name: None     Number of children: 3     Years of education: None     Highest education level: None   Occupational History     None   Social Needs     Financial resource strain: None     Food insecurity:     Worry: None     Inability: None     Transportation needs:     Medical: None     Non-medical: None   Tobacco Use     Smoking status: Never Smoker     Smokeless tobacco: Never Used   Substance and Sexual Activity     Alcohol use: Yes     Alcohol/week: 1.2 - 1.8 oz     Comment: WEEKLY     Drug use: No     Sexual activity: Yes     Partners: Male     Birth control/protection: Male Surgical   Lifestyle     Physical activity:     Days per week: None     Minutes per session: None     Stress: None   Relationships     Social connections:     Talks on phone: None     Gets together: None     Attends Amish service: None     Active member of club or organization: None     Attends meetings of clubs or organizations: None     Relationship status: None     Intimate partner violence:     Fear of current or ex partner: None     Emotionally abused: None     Physically abused: None     Forced sexual activity: None   Other Topics Concern     Parent/sibling w/ CABG, MI or angioplasty before 65F 55M? No   Social History Narrative        Izaiah Jara    Lives in [facility]      She does not smoke.  She has a couple of beers to drink on the weekend but otherwise is not a heavy user of alcohol    3 kids: son peña 21; 94, 96 and 98    2 sons    1 daughter    2 are in college and daughter is a sophomore.            FAMILY HISTORY:  Notable for a paternal aunt who is .  She had Parkinson's disease.  Otherwise, there is no other  "family history of neurologic problems.  There is no family history of dystonia.          90% Citizen of [place]    Some norweigian                .         Family History:  Family History   Problem Relation Age of Onset     Breast Cancer Mother      Cancer - colorectal Mother      Macular Degeneration Mother      Dementia Father      Deep Vein Thrombosis Father      No Known Problems Brother      No Known Problems Sister      No Known Problems Sister      No Known Problems Sister      Neurologic Disorder Paternal Aunt         Parkinson's Disease     Cerebrovascular Disease Brother         stroke at age 25 possibly from pfo     Dementia Paternal Grandmother        Physical Exam:  The patient's  height is 1.664 m (5' 5.5\") and weight is 67.9 kg (149 lb 11.2 oz). Her blood pressure is 131/75 and her pulse is 87. Her oxygen saturation is 99%.      Incisions: CDI  Neurological Examination:   UPDRS Values 3/21/2019   Time: 9:00 AM   Medication On   R Brain DBS: None   L Brain DBS: On   Speech 1   Facial Expression 1   Rigidity Neck 2   Rigidity RUE 4   Rigidity LUE 2   Rigidity RLE 2   Rigidity LLE 0   Finger Taps R 4   Finger Taps L 3   Hand Mvt R 4   Hand Mvt L 3   Pron-/Supinate R 4   Pron-/Supinate L 3   Toe Tap R 4   Toe Tap L 3   Leg Agility R 4   Leg Agility L 4   Arise From Chair 1   Gait 1   Gait Freezing 0   Postural Stability 1   Posture 1   Global Spont Mvt 0   Postural Tremor RUE 3   Postural Tremor LUE 2   Kinetic Tremor RUE 3   Kinetic Tremor LUE 3   Rest Tremor RUE 3   Rest Tremor LUE 0   Rest Tremor RLE 0   Rest Tremor LLE 0   Rest Tremor Lip/Jaw 0   Rest Tremor Constancy 3   Total Right 35   Total Left 23   Axial Total 8   Total 69       Procedure: DBS Programming    Lead(s):    Left Right   DBS Target GPi          DBS Lead Type    Abbott 0.5mm spacing       Lead Implant Date   2/22/19          IPG(s):   1 2   IPG Infinity 6660          IPG Implant Date 3/1/19       Location    L chest       Battery (V) 2.94V     "     Full Impedence/Currents Check: 2C with >3000 ohms     Initial Settings:  Left Brain Program 1 Program B Program C Program D   Contacts  C+3-         Amplitude (mA)  4.0         Pulse Width ( s) 120          Frequency (Hz)  130         Impedence/ Currents 725   /   /   /    Patient Control (+/- V) +/- 1.0mA  /   /   /      Right Brain Program A Program B Program C Program D   Contacts  N/A         Amplitude (V)           Pulse Width ( s)           Frequency (Hz)           Impedence/ Currents  /   /   /   /    Patient Control (+/- V)  /   /   /   /      Initial program selected:    1      Left Hemisphere Monopolar Review  Contacts Amplitude    (mA) PW   (usec) Rate  (Hz) Assessment Adverse Effects   C+3-4- 3 120 130      5 120       6 120   ?hint of R face sensation    5.75 120   resolved   C+3- 4 150       5 150   ? Hint R face sensation    4.75 150   resolved   C+2- 1.5 120   1062 ohms   C+2AB- 1.5 120   1075 ohms   C+2C- 1.5 120   1700 ohms   C+2- 4.00 120       5.00 120   R face pulling    4.75 120   resolved   C+3-4- 4 120       4.5 120       5 120   Slight R face pull    4.8 120   resolved                         Final Settings:  Left Brain Program 1 Program 2 Program 3 Program 4   Contacts  C+3-  C+3- C+3-4-  C+2-3-    Amplitude (mA)  4.0  4.5-->4.25 4.0  4.0    Pulse Width ( s) 120   150  120  120   Frequency (Hz)  130  130  130  130   Impedence/ Currents 725ohms  712 512  525    Patient Control (+/- V) +/- 1.0mA +0 / -1.5 +1.5 / -1.0 +0.5 / -1.0      Final Program selected:    2     Impression:  Sandra Jara is a 53 year old female with 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 No significant clinical change on exam today however pain has not returned and it has only been one week since settings have changed. Increased current and pulse width today. As patient about to leave and learning patient  she stated she felt a slight sensation around her right  eye. As this was similar to her likely capsular side effects we turned it down on the patient  to 4.25mA.    She has been given two new programs (3 and 4) that are double monopolar. Will likely attempt program 4 after she returns from vacation in a few weeks, then see her in follow-up after that.      Recommendations:   -increased current and pulse width today in a new program 2  -program 1 remains with settings from prior to visit today  -will likely trial program 4 after she returns from her vacation    Follow-up 4-6 weeks    Joey Bradford MD  Movement Disorders Fellow      Neurology Attending Attestation:     I, Tiffany Hopkins, personally saw this patient with our Movement Disorders Fellow and agree with the fellow's findings and plan of care as documented in the movement disorder fellow's note. I personally performed salient aspects of the history and neurological examination.     I personally reviewed the vital signs, medications, and labs. I personally viewed the imaging, and agree with the interpretation documented by the fellow.    I personally performed or supervised all procedures.    Additional time spent for separate DBS programmin minutes    Tiffany Hopkins MD    of Neurology

## 2019-03-21 NOTE — NURSING NOTE
Chief Complaint   Patient presents with     RECHECK     [facility] RETURN - MOVEMENT DISORDER       Isaiah Dsouza, EMT

## 2019-03-21 NOTE — LETTER
3/21/2019       RE: Sandra Jara  [address]  [place] [address]     Dear Colleague,    Thank you for referring your patient, Sandra Jara, to the [facility] NEUROLOGY at [facility]. Please see a copy of my visit note below.    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 She had her GPi lead placed on 2/22/19.     She was last seen 3/14/19 for monopolar review and had her current increased from 3 to 4 mA and pulse width from 90 to 120 s.    Today she reports her balance is slightly better. There has been no significant change in stiffness in her right arm although pain has not returned. Possibly there is more jerking in her right hand than one week ago.    Review of Systems:  A complete ROS was otherwise noncontributory    Medications:  Current Outpatient Medications   Medication Sig Dispense Refill     acyclovir (ZOVIRAX) 200 MG capsule as needed  1     baclofen (LIORESAL) 10 MG tablet Please take 1/2 tab to 1 tab up to three times a day as needed. (Patient taking differently: Take 10 mg by mouth 3 times daily Please take 1/2 tab to 1 tab up to three times a day as needed.) 30 tablet 3     botulinum toxin type A (BOTOX) 100 units injection Inject 280 Units into the muscle once Lot # /C3 with Expiration Date:  11/2020       carbidopa-levodopa (SINEMET CR)  MG CR tablet Take 1 tablet by mouth At Bedtime 30 tablet 11     carbidopa-levodopa (SINEMET)  MG tablet Take 3 tabs at 530AM, and 2 tabs at 10AM, 2PM, 6PM, and 10PM 990 tablet 3     Cholecalciferol (VITAMIN D) 2000 UNITS tablet Take 2,000 Units by mouth every morning        clonazePAM (KLONOPIN) 0.5 MG tablet Take 0.5 tablets (0.25 mg) by mouth At Bedtime 15 tablet 5      OnabotulinumtoxinA (BOTOX IJ) Inject 300 Units as directed once [id number] with Expiration Date:  12/2020       traMADol (ULTRAM) 50 MG tablet Take 1/2 to 1 tab by mouth every 6 hours as needed, up to 3 tabs per day. 90 tablet 0        PD Medications                   530 1000 200 600 10   C/L 25/100                                 3 2 2 2 2    clonazepam 0.5mg                                1.2    C/L 50/200 CR         1    Baclofen 10mg, 1/2 tab daily PRN (not taking lately)      Social History:  Social History     Socioeconomic History     Marital status:      Spouse name: None     Number of children: 3     Years of education: None     Highest education level: None   Occupational History     None   Social Needs     Financial resource strain: None     Food insecurity:     Worry: None     Inability: None     Transportation needs:     Medical: None     Non-medical: None   Tobacco Use     Smoking status: Never Smoker     Smokeless tobacco: Never Used   Substance and Sexual Activity     Alcohol use: Yes     Alcohol/week: 1.2 - 1.8 oz     Comment: WEEKLY     Drug use: No     Sexual activity: Yes     Partners: Male     Birth control/protection: Male Surgical   Lifestyle     Physical activity:     Days per week: None     Minutes per session: None     Stress: None   Relationships     Social connections:     Talks on phone: None     Gets together: None     Attends Shinto service: None     Active member of club or organization: None     Attends meetings of clubs or organizations: None     Relationship status: None     Intimate partner violence:     Fear of current or ex partner: None     Emotionally abused: None     Physically abused: None     Forced sexual activity: None   Other Topics Concern     Parent/sibling w/ CABG, MI or angioplasty before 65F 55M? No   Social History Narrative        Izaiah Jara    Lives in [facility]      She does not smoke.  She has a couple of beers to drink on  "the weekend but otherwise is not a heavy user of alcohol    3 kids: son peña 21; 94, 96 and 98    2 sons    1 daughter    2 are in college and daughter is a sophomore.            FAMILY HISTORY:  Notable for a paternal aunt who is .  She had Parkinson's disease.  Otherwise, there is no other family history of neurologic problems.  There is no family history of dystonia.          90% Persian    Some norweigian                .         Family History:  Family History   Problem Relation Age of Onset     Breast Cancer Mother      Cancer - colorectal Mother      Macular Degeneration Mother      Dementia Father      Deep Vein Thrombosis Father      No Known Problems Brother      No Known Problems Sister      No Known Problems Sister      No Known Problems Sister      Neurologic Disorder Paternal Aunt         Parkinson's Disease     Cerebrovascular Disease Brother         stroke at age 25 possibly from pfo     Dementia Paternal Grandmother        Physical Exam:  The patient's  height is 1.664 m (5' 5.5\") and weight is 67.9 kg (149 lb 11.2 oz). Her blood pressure is 131/75 and her pulse is 87. Her oxygen saturation is 99%.      Incisions: CDI  Neurological Examination:   UPDRS Values 3/21/2019   Time: 9:00 AM   Medication On   R Brain DBS: None   L Brain DBS: On   Speech 1   Facial Expression 1   Rigidity Neck 2   Rigidity RUE 4   Rigidity LUE 2   Rigidity RLE 2   Rigidity LLE 0   Finger Taps R 4   Finger Taps L 3   Hand Mvt R 4   Hand Mvt L 3   Pron-/Supinate R 4   Pron-/Supinate L 3   Toe Tap R 4   Toe Tap L 3   Leg Agility R 4   Leg Agility L 4   Arise From Chair 1   Gait 1   Gait Freezing 0   Postural Stability 1   Posture 1   Global Spont Mvt 0   Postural Tremor RUE 3   Postural Tremor LUE 2   Kinetic Tremor RUE 3   Kinetic Tremor LUE 3   Rest Tremor RUE 3   Rest Tremor LUE 0   Rest Tremor RLE 0   Rest Tremor LLE 0   Rest Tremor Lip/Jaw 0   Rest Tremor Constancy 3   Total Right 35   Total Left 23   Axial Total 8 "   Total 69       Procedure: DBS Programming    Lead(s):    Left Right   DBS Target GPi          DBS Lead Type    Abbott 0.5mm spacing       Lead Implant Date   2/22/19          IPG(s):   1 2   IPG Infinity 6660          IPG Implant Date 3/1/19       Location    L chest       Battery (V) 2.94V         Full Impedence/Currents Check: 2C with >3000 ohms     Initial Settings:  Left Brain Program 1 Program B Program C Program D   Contacts  C+3-         Amplitude (mA)  4.0         Pulse Width ( s) 120          Frequency (Hz)  130         Impedence/ Currents 725   /   /   /    Patient Control (+/- V) +/- 1.0mA  /   /   /      Right Brain Program A Program B Program C Program D   Contacts  N/A         Amplitude (V)           Pulse Width ( s)           Frequency (Hz)           Impedence/ Currents  /   /   /   /    Patient Control (+/- V)  /   /   /   /      Initial program selected:    1      Left Hemisphere Monopolar Review  Contacts Amplitude    (mA) PW   (usec) Rate  (Hz) Assessment Adverse Effects   C+3-4- 3 120 130      5 120       6 120   ?hint of R face sensation    5.75 120   resolved   C+3- 4 150       5 150   ? Hint R face sensation    4.75 150   resolved   C+2- 1.5 120   1062 ohms   C+2AB- 1.5 120   1075 ohms   C+2C- 1.5 120   1700 ohms   C+2- 4.00 120       5.00 120   R face pulling    4.75 120   resolved   C+3-4- 4 120       4.5 120       5 120   Slight R face pull    4.8 120   resolved                         Final Settings:  Left Brain Program 1 Program B Program C Program D   Contacts  C+3-  C+3- C+3-4-  C+2-3-    Amplitude (mA)  4.0  4.5 4.0  4.0    Pulse Width ( s) 120   150  120  120   Frequency (Hz)  130  130  130  130   Impedence/ Currents 725ohms  712 512  525    Patient Control (+/- V) +/- 1.0mA +0 / -1.5 +1.5 / -1.0 +0.5 / -1.0      Right Brain Program A Program B Program C Program D   Contacts  N/A         Amplitude (V)           Pulse Width ( s)           Frequency (Hz)           Impedence/ Currents   /   /   /   /    Patient Control (+/- V)  /   /   /   /      Final Program selected:    1     Impression:  Sandra Jara is a 53 year old female with 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 No significant clinical change on exam today however pain has not returned and it has only been one week since settings have changed. Increased current and pulse width today. As patient about to leave and learning patient  she stated she felt a slight sensation around her right eye. As this was similar to her likely capsular side effects we turned it down on the patient  to 4.25mA.    She has been given two new programs (3 and 4) that are double monopolar. Will likely attempt program 4 after she returns from vacation in a few weeks, then see her in follow-up after that.    Recommendations:   -increased current and pulse width today in a new program 2  -program 1 remains with settings from prior to visit today  -will likely trial program 4 after she returns from her vacation    Follow-up 4-6 weeks    Joey Bradford MD  Movement Disorders Fellow      Neurology Attending Attestation:     I, Tiffany Hopkins, personally saw this patient with our Movement Disorders Fellow and agree with the fellow's findings and plan of care as documented in the movement disorder fellow's note. I personally performed salient aspects of the history and neurological examination.     I personally reviewed the vital signs, medications, and labs. I personally viewed the imaging, and agree with the interpretation documented by the fellow.    I personally performed or supervised all procedures.    Additional time spent for separate DBS programmin minutes    Tiffany Hopkins MD    of Neurology

## 2019-03-28 ENCOUNTER — DOCUMENTATION ONLY (OUTPATIENT)
Dept: NEUROLOGY | Facility: CLINIC | Age: 54
End: 2019-03-28

## 2019-03-28 NOTE — PROGRESS NOTES
Received Plan Of Care from [facility] 3/26/19, forms was placed in Dr folder for signature 3/16/19    Received forms back signed by provider 3/28/19, forms were fax to [phone] 3/28/19, forms were sent to b dina 3/28/19    Estefani Guevara CMA

## 2019-03-29 ENCOUNTER — MYC MEDICAL ADVICE (OUTPATIENT)
Dept: NEUROLOGY | Facility: CLINIC | Age: 54
End: 2019-03-29

## 2019-04-18 ENCOUNTER — OFFICE VISIT (OUTPATIENT)
Dept: NEUROLOGY | Facility: CLINIC | Age: 54
End: 2019-04-18
Payer: COMMERCIAL

## 2019-04-18 VITALS
RESPIRATION RATE: 15 BRPM | HEART RATE: 88 BPM | BODY MASS INDEX: 24.27 KG/M2 | TEMPERATURE: 98 F | DIASTOLIC BLOOD PRESSURE: 82 MMHG | WEIGHT: 151 LBS | HEIGHT: 66 IN | OXYGEN SATURATION: 98 % | SYSTOLIC BLOOD PRESSURE: 132 MMHG

## 2019-04-18 DIAGNOSIS — G24.1 DYSTONIA, TORSION, FRAGMENTS OF: ICD-10-CM

## 2019-04-18 DIAGNOSIS — G25.3 MYOCLONUS: ICD-10-CM

## 2019-04-18 DIAGNOSIS — R26.9 GAIT DIFFICULTY: Primary | ICD-10-CM

## 2019-04-18 DIAGNOSIS — G31.85 CORTICOBASAL SYNDROME (H): ICD-10-CM

## 2019-04-18 RX ORDER — BACLOFEN 10 MG/1
TABLET ORAL
Qty: 30 TABLET | Refills: 3 | Status: SHIPPED | OUTPATIENT
Start: 2019-04-18 | End: 2020-04-09

## 2019-04-18 ASSESSMENT — UNIFIED PARKINSONS DISEASE RATING SCALE (UPDRS)
RIGIDITY_LUE: MILD: RIGIDITY DETECTED WITHOUT THE ACTIVATION MANEUVER.  FULL RANGE OF MOTION IS EASILY ACHIEVED.
FINGER_TAPPING_LEFT: MODERATE: ANY OF THE FOLLOWING:  A) MORE THAN 5 INTERRUPTIONS  OR AT LEAST ONE LONGER ARREST (FREEZE) IN ONGOING MOVEMENT  B) MODERATE SLOWING C) THE AMPLITUDE DECREMENTS STARTING AFTER THE FIRST MOVEMENT.
FINGER_TAPPING_RIGHT: SEVERE: CANNOT OR CAN ONLY BARELY PERFORM THE TASK BECAUSE OF SLOWING, INTERRUPTIONS, OR DECREMENTS.
AMPLITUDE_RLE: NORMAL: NO TREMOR.
POSTURE: 1 SLIGHT.  NOT QUITE ERECT BUT COULD BE NORMAL FOR OLDER PERSONS.
HANDMOVEMENTS_LEFT: MODERATE: ANY OF THE FOLLOWING:  A) MORE THAN 5 INTERRUPTIONS  OR AT LEAST ONE LONGER ARREST (FREEZE) IN ONGOING MOVEMENT  B) MODERATE SLOWING C) THE AMPLITUDE DECREMENTS STARTING AFTER THE FIRST MOVEMENT.
TOTAL_SCORE_LEFT: 21
AXIAL_SCORE: 8
AMPLITUDE_RUE: MODERATE: 3 - 10 CM IN MAXIMAL AMPLITUDE.
TOETAPPING_RIGHT: SEVERE: CANNOT OR CAN ONLY BARELY PERFORM THE TASK BECAUSE OF SLOWING, INTERRUPTIONS, OR DECREMENTS.
SPEECH: SLIGHT: LOSS OF MODULATION, DICTION OR VOLUME, BUT STILL ALL WORDS EASY TO UNDERSTAND.
PARKINSONS_MEDS: ON
FACIAL_EXPRESSION: SLIGHT: MINIMAL MASKED FACIES MANIFESTED ONLY BY DECREASED FREQUENCY OF BLINKING.
GAIT: SLIGHT: INDEPENDENT WALKING WITH MINOR GAIT IMPAIRMENT.
TOTAL_SCORE: 35
TOETAPPING_LEFT: MODERATE: ANY OF THE FOLLOWING:  A) MORE THAN 5 INTERRUPTIONS OR AT LEAST ONE LONGER ARREST (FREEZE) IN ONGOING MOVEMENT  B) MODERATE SLOWING C) THE AMPLITUDE DECREMENTS STARTING AFTER THE FIRST MOVEMENT.
LEG_AGILITY_LEFT: MODERATE: ANY OF THE FOLLOWING:  A) MORE THAN 5 INTERRUPTIONS  OR AT LEAST ONE LONGER ARREST (FREEZE) IN ONGOING MOVEMENT  B) MODERATE SLOWING C) THE AMPLITUDE DECREMENTS STARTING AFTER THE FIRST MOVEMENT.
LEG_AGILITY_RIGHT: SEVERE: CANNOT OR CAN ONLY BARELY PERFORM THE TASK BECAUSE OF SLOWING, INTERRUPTIONS, OR DECREMENTS.
CONSTANCY_TREMOR_ATREST: MODERATE: TREMOR AT REST IS PRESENT 51-75% OF THE ENTIRE EXAMINATION PERIOD.
PRONATION_SUPINATION_RIGHT: SEVERE: CANNOT OR CAN ONLY BARELY PERFORM THE TASK BECAUSE OF SLOWING, INTERRUPTIONS, OR DECREMENTS.
AMPLITUDE_LIP_JAW: NORMAL: NO TREMOR.
AMPLITUDE_LUE: NORMAL: NO TREMOR.
RIGIDITY_RUE: SEVERE: RIGIDITY DETECTED WITHOUT THE ACTIVATION MANEUVER AND FULL RANGE OF MOTION NOT ACHIEVED.
TOTAL_SCORE: 67
RIGIDITY_RLE: MILD: RIGIDITY DETECTED WITHOUT THE ACTIVATION MANEUVER.  FULL RANGE OF MOTION IS EASILY ACHIEVED.
ARISING_CHAIR: SLIGHT: ARISING IS SLOWER THAN NORMAL, OR MAY NEED MORE THAN ONE ATTEMPT, OR MAY NEED TO MOVE FORWARD IN THE CHAIR TO ARISE.  NO NEED TO USE THE ARMS OF THE CHAIR.
RIGIDITY_NECK: MILD: RIGIDITY DETECTED WITHOUT THE ACTIVATION MANEUVER.  FULL RANGE OF MOTION IS EASILY ACHIEVED.
RIGIDITY_LLE: NORMAL
POSTURAL_STABILITY: NORMAL:  RECOVERS WITH ONE OR TWO STEPS.
SPONTANEITY_OF_MOVEMENT: 1: SLIGHT: SLIGHT GLOBAL SLOWNESS AND POVERTY OF SPONTANEOUS MOVEMENTS.
PRONATION_SUPINATION_LEFT: MODERATE: ANY OF THE FOLLOWING:  A) MORE THAN 5 INTERRUPTIONS  OR AT LEAST ONE LONGER ARREST (FREEZE) IN ONGOING MOVEMENT  B) MODERATE SLOWING C) THE AMPLITUDE DECREMENTS STARTING AFTER THE FIRST MOVEMENT.
AMPLITUDE_LLE: NORMAL: NO TREMOR.
HANDMOVEMENTS_RIGHT: SEVERE: CANNOT OR CAN ONLY BARELY PERFORM THE TASK BECAUSE OF SLOWING, INTERRUPTIONS, OR DECREMENTS.
FREEZING_GAIT: NORMAL

## 2019-04-18 ASSESSMENT — ENCOUNTER SYMPTOMS
DISTURBANCES IN COORDINATION: 1
TREMORS: 0
NUMBNESS: 0
HEADACHES: 0
DIZZINESS: 1
SPEECH CHANGE: 1
LOSS OF CONSCIOUSNESS: 0
PARALYSIS: 0
MEMORY LOSS: 0
TINGLING: 0
WEAKNESS: 1
SEIZURES: 0

## 2019-04-18 ASSESSMENT — PAIN SCALES - GENERAL: PAINLEVEL: NO PAIN (0)

## 2019-04-18 ASSESSMENT — MIFFLIN-ST. JEOR: SCORE: 1293.74

## 2019-04-18 NOTE — PATIENT INSTRUCTIONS
We increased your current from 4 to 4.5mA on program 4      Give this setting at least two weeks. If you are clearly worsening on it, you can change back to program 2.    Follow-up on 5/8/19 at 2pm.

## 2019-04-18 NOTE — PROGRESS NOTES
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 She had her GPi lead placed on 2/22/19.     At her last visit, her settings were effectively increased at contact 3 (program 2). She went on a trip to [place]. When she returned one week ago she changed to program 4 which is a double monopolar setting with both 2 and 3 activated.    Overall there is the sense that she has had slowly worsening pain and stiffness in the shoulder. However her  feels it was easier for him to left her arm on program 2 than since she changed to program 4.    The patient feels she has had improvement in what she describes as jerks (larger amplitude movements). She also had two days in which she had resolution of her shaking (the lower amplitude tremulous movements).    Her  is also feels like she has been turning in her right foot more since changing to program 4 (although this did happen while on program 2 as well.      PD Medications                   530 1000 200 600 10   C/L 25/100                                 3 2 2 2 2    clonazepam 0.5mg                                0.5    C/L 50/200 CR         1    Baclofen 10mg, 1/2 tab daily PRN (not taking lately)  Tramadol 50mg qhs      Review of Systems:  A complete ROS was otherwise noncontributory    Medications:  Current Outpatient Medications   Medication Sig Dispense Refill     acyclovir (ZOVIRAX) 200 MG capsule as needed  1     baclofen (LIORESAL) 10 MG tablet Please take 1/2 tab to 1 tab up to three times a day as needed. (Patient taking differently: Take 10 mg by mouth 3 times daily Please take 1/2 tab to 1 tab up to three times a day as needed.) 30 tablet 3     carbidopa-levodopa (SINEMET CR)  MG CR tablet Take 1 tablet by mouth At Bedtime 30 tablet 11      carbidopa-levodopa (SINEMET)  MG tablet Take 3 tabs at 530AM, and 2 tabs at 10AM, 2PM, 6PM, and 10PM 990 tablet 3     Cholecalciferol (VITAMIN D) 2000 UNITS tablet Take 2,000 Units by mouth every morning        clonazePAM (KLONOPIN) 0.5 MG tablet Take 0.5 tablets (0.25 mg) by mouth At Bedtime 15 tablet 5     traMADol (ULTRAM) 50 MG tablet Take 1/2 to 1 tab by mouth every 6 hours as needed, up to 3 tabs per day. 90 tablet 0     botulinum toxin type A (BOTOX) 100 units injection Inject 280 Units into the muscle once Lot # /C3 with Expiration Date:  11/2020       OnabotulinumtoxinA (BOTOX IJ) Inject 300 Units as directed once [id number] with Expiration Date:  12/2020          Social History:  Social History     Socioeconomic History     Marital status:      Spouse name: None     Number of children: 3     Years of education: None     Highest education level: None   Occupational History     None   Social Needs     Financial resource strain: None     Food insecurity:     Worry: None     Inability: None     Transportation needs:     Medical: None     Non-medical: None   Tobacco Use     Smoking status: Never Smoker     Smokeless tobacco: Never Used   Substance and Sexual Activity     Alcohol use: Yes     Alcohol/week: 1.2 - 1.8 oz     Comment: WEEKLY     Drug use: No     Sexual activity: Yes     Partners: Male     Birth control/protection: Male Surgical   Lifestyle     Physical activity:     Days per week: None     Minutes per session: None     Stress: None   Relationships     Social connections:     Talks on phone: None     Gets together: None     Attends Orthodox service: None     Active member of club or organization: None     Attends meetings of clubs or organizations: None     Relationship status: None     Intimate partner violence:     Fear of current or ex partner: None     Emotionally abused: None     Physically abused: None     Forced sexual activity: None   Other Topics Concern     Parent/sibling  "w/ CABG, MI or angioplasty before 65F 55M? No   Social History Narrative        Izaiah Jara    [facility]      She does not smoke.  She has a couple of beers to drink on the weekend but otherwise is not a heavy user of alcohol    3 kids: son peña 21; 94, 96 and 98    2 sons    1 daughter    2 are in college and daughter is a sophomore.            FAMILY HISTORY:  Notable for a paternal aunt who is .  She had Parkinson's disease.  Otherwise, there is no other family history of neurologic problems.  There is no family history of dystonia.          90% Uzbek    Some norweigian                .         Family History:  Family History   Problem Relation Age of Onset     Breast Cancer Mother      Cancer - colorectal Mother      Macular Degeneration Mother      Dementia Father      Deep Vein Thrombosis Father      No Known Problems Brother      No Known Problems Sister      No Known Problems Sister      No Known Problems Sister      Neurologic Disorder Paternal Aunt         Parkinson's Disease     Cerebrovascular Disease Brother         stroke at age 25 possibly from pfo     Dementia Paternal Grandmother        Physical Exam:  The patient's  height is 1.664 m (5' 5.5\") and weight is 68.5 kg (151 lb). Her oral temperature is 98  F (36.7  C). Her blood pressure is 132/82 and her pulse is 88. Her respiration is 15 and oxygen saturation is 98%.      Incisions: CDI  Neglect to double simultaneous stimulation in right arm  Neurological Examination:   UPDRS Values 2019   Time: 12:00 PM   Medication On   R Brain DBS: None   L Brain DBS: On   Speech 1   Facial Expression 1   Rigidity Neck 2   Rigidity RUE 4   Rigidity LUE 2   Rigidity RLE 2   Rigidity LLE 0   Finger Taps R 4   Finger Taps L 3   Hand Mvt R 4   Hand Mvt L 3   Pron-/Supinate R 4   Pron-/Supinate L 3   Toe Tap R 4   Toe Tap L 3   Leg Agility R 4   Leg Agility L 3   Arise From Chair 1   Gait 1   Gait Freezing 0   Postural " Stability 0   Posture 1   Global Spont Mvt 1   Postural Tremor RUE 3   Postural Tremor LUE 2   Kinetic Tremor RUE 3   Kinetic Tremor LUE 2   Rest Tremor RUE 3   Rest Tremor LUE 0   Rest Tremor RLE 0   Rest Tremor LLE 0   Rest Tremor Lip/Jaw 0   Rest Tremor Constancy 3   Total Right 35   Total Left 21   Axial Total 8   Total 67       Procedure: DBS Programming    Lead(s):    Left Right   DBS Target GPi          DBS Lead Type    Abbott 0.5mm spacing       Lead Implant Date   2/22/19          IPG(s):   1 2   IPG Infinity 6660          IPG Implant Date 3/1/19       Location    L chest       Battery (V) 2.92V         Full Impedence/Currents Check: WNL    Initial Settings:  Left Brain Program 1 Program 2 Program 3 Program 4   Contacts  C+3-  C+3- C+3-4-  C+2-3-    Amplitude (mA)  4.0  4.5 4.0  4.0    Pulse Width ( s) 120   150  120  120   Frequency (Hz)  130  130  130  130   Impedence/ Currents     500    Patient Control (+/- V) +/- 1.0mA +0 / -1.5 +1.5 / -1.0 +0.5 / -1.0      Right Brain Program A Program B Program C Program D   Contacts  N/A         Amplitude (V)           Pulse Width ( s)           Frequency (Hz)           Impedence/ Currents  /   /   /   /    Patient Control (+/- V)  /   /   /   /      Initial program selected:    4      Left Hemisphere Monopolar Review  Contacts Amplitude    (mA) PW   (usec) Rate  (Hz) Assessment Adverse Effects   C+2-3- 4.5 120 130  ? Slight face sensation on right, patient not sure    5    Mild right face contraction    4.5    Improved, feels at baseline               Final Settings:  Left Brain Program 1 Program B Program C Program D   Contacts  C+3-  C+3- C+3-4-  C+2-3-    Amplitude (mA)  4.0  4.5 4.0  4.5    Pulse Width ( s) 120   150  120  120   Frequency (Hz)  130  130  130  130   Impedence/ Currents    512    Patient Control (+/- V) +/- 1.0mA +0 / -1.5 +1.5 / -1.0 +0/ -4.5      Right Brain Program A Program B Program C Program D   Contacts  N/A         Amplitude (V)            Pulse Width ( s)           Frequency (Hz)           Impedence/ Currents  /   /   /   /    Patient Control (+/- V)  /   /   /   /      Final Program selected:    4     Impression:  Sandra Jara is a 53 year old female with 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 Has only been on program 4 (double monopolar setting) for one week. Dystonia can initially worsen with DBS changes and the effect can have some latency so will give these settings more time.    Recommendations:   -Current increased to 4.5mA on program 4  -if worse after 2 weeks, will change back to program 2    Follow-up 3-4 weeks    Joey Bradford MD  Movement Disorders Fellow    Neurology Attending Attestation:     I, Tiffany Hopkins, personally saw this patient with our Movement Disorders Fellow and agree with the fellow's findings and plan of care as documented in the movement disorder fellow's note. I personally performed salient aspects of the history and neurological examination.     I personally reviewed the vital signs, medications, and labs. I personally viewed the imaging, and agree with the interpretation documented by the fellow.    I personally performed or supervised all procedures.    Time spent with patient: Greater than 50% of this 25 minute visit was spent in counseling and coordination of care related to corticobasal syndrome, right arm dystonia, gait difficulty, expectations and plan of care.    Additional time spent for separate DBS programming: 15 minutes    Tiffany Hopkins MD    of Neurology

## 2019-04-18 NOTE — LETTER
4/18/2019       RE: Sandra Jara  [address]  [facility] [address]     Dear Colleague,    Thank you for referring your patient, Sandra Jara, to the [facility] at [facility]. Please see a copy of my visit note below.    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 She had her GPi lead placed on 2/22/19.     At her last visit, her settings were effectively increased at contact 3 (program 2). She went on a trip to [place]. When she returned one week ago she changed to program 4 which is a double monopolar setting with both 2 and 3 activated.    Overall there is the sense that she has had slowly worsening pain and stiffness in the shoulder. However her  feels it was easier for him to left her arm on program 2 than since she changed to program 4.    The patient feels she has had improvement in what she describes as jerks (larger amplitude movements). She also had two days in which she had resolution of her shaking (the lower amplitude tremulous movements).    Her  is also feels like she has been turning in her right foot more since changing to program 4 (although this did happen while on program 2 as well.      PD Medications                   530 1000 200 600 10   C/L 25/100                                 3 2 2 2 2    clonazepam 0.5mg                                0.5    C/L 50/200 CR         1    Baclofen 10mg, 1/2 tab daily PRN (not taking lately)  Tramadol 50mg qhs    Review of Systems:  A complete ROS was otherwise noncontributory    Medications:  Current Outpatient Medications   Medication Sig Dispense Refill     acyclovir (ZOVIRAX) 200 MG capsule as needed  1     baclofen (LIORESAL) 10 MG tablet Please take 1/2 tab to  1 tab up to three times a day as needed. (Patient taking differently: Take 10 mg by mouth 3 times daily Please take 1/2 tab to 1 tab up to three times a day as needed.) 30 tablet 3     carbidopa-levodopa (SINEMET CR)  MG CR tablet Take 1 tablet by mouth At Bedtime 30 tablet 11     carbidopa-levodopa (SINEMET)  MG tablet Take 3 tabs at 530AM, and 2 tabs at 10AM, 2PM, 6PM, and 10PM 990 tablet 3     Cholecalciferol (VITAMIN D) 2000 UNITS tablet Take 2,000 Units by mouth every morning        clonazePAM (KLONOPIN) 0.5 MG tablet Take 0.5 tablets (0.25 mg) by mouth At Bedtime 15 tablet 5     traMADol (ULTRAM) 50 MG tablet Take 1/2 to 1 tab by mouth every 6 hours as needed, up to 3 tabs per day. 90 tablet 0     botulinum toxin type A (BOTOX) 100 units injection Inject 280 Units into the muscle once Lot # /C3 with Expiration Date:  11/2020       OnabotulinumtoxinA (BOTOX IJ) Inject 300 Units as directed once [id number] with Expiration Date:  12/2020          Social History:  Social History     Socioeconomic History     Marital status:      Spouse name: None     Number of children: 3     Years of education: None     Highest education level: None   Occupational History     None   Social Needs     Financial resource strain: None     Food insecurity:     Worry: None     Inability: None     Transportation needs:     Medical: None     Non-medical: None   Tobacco Use     Smoking status: Never Smoker     Smokeless tobacco: Never Used   Substance and Sexual Activity     Alcohol use: Yes     Alcohol/week: 1.2 - 1.8 oz     Comment: WEEKLY     Drug use: No     Sexual activity: Yes     Partners: Male     Birth control/protection: Male Surgical   Lifestyle     Physical activity:     Days per week: None     Minutes per session: None     Stress: None   Relationships     Social connections:     Talks on phone: None     Gets together: None     Attends Mandaeism service: None     Active member of club or organization: None  "    Attends meetings of clubs or organizations: None     Relationship status: None     Intimate partner violence:     Fear of current or ex partner: None     Emotionally abused: None     Physically abused: None     Forced sexual activity: None   Other Topics Concern     Parent/sibling w/ CABG, MI or angioplasty before 65F 55M? No   Social History Narrative        Izaiah Jara    L[facility] in [facility]      She does not smoke.  She has a couple of beers to drink on the weekend but otherwise is not a heavy user of alcohol    3 kids: son peña 21; 94, 96 and 98    2 sons    1 daughter    2 are in college and daughter is a sophomore.            FAMILY HISTORY:  Notable for a paternal aunt who is .  She had Parkinson's disease.  Otherwise, there is no other family history of neurologic problems.  There is no family history of dystonia.          90% English    Some norweigian                .         Family History:  Family History   Problem Relation Age of Onset     Breast Cancer Mother      Cancer - colorectal Mother      Macular Degeneration Mother      Dementia Father      Deep Vein Thrombosis Father      No Known Problems Brother      No Known Problems Sister      No Known Problems Sister      No Known Problems Sister      Neurologic Disorder Paternal Aunt         Parkinson's Disease     Cerebrovascular Disease Brother         stroke at age 25 possibly from pfo     Dementia Paternal Grandmother        Physical Exam:  The patient's  height is 1.664 m (5' 5.5\") and weight is 68.5 kg (151 lb). Her oral temperature is 98  F (36.7  C). Her blood pressure is 132/82 and her pulse is 88. Her respiration is 15 and oxygen saturation is 98%.      Incisions: CDI  Neglect to double simultaneous stimulation in right arm  Neurological Examination:   UPDRS Values 2019   Time: 12:00 PM   Medication On   R Brain DBS: None   L Brain DBS: On   Speech 1   Facial Expression 1   Rigidity Neck 2   Rigidity " RUE 4   Rigidity LUE 2   Rigidity RLE 2   Rigidity LLE 0   Finger Taps R 4   Finger Taps L 3   Hand Mvt R 4   Hand Mvt L 3   Pron-/Supinate R 4   Pron-/Supinate L 3   Toe Tap R 4   Toe Tap L 3   Leg Agility R 4   Leg Agility L 3   Arise From Chair 1   Gait 1   Gait Freezing 0   Postural Stability 0   Posture 1   Global Spont Mvt 1   Postural Tremor RUE 3   Postural Tremor LUE 2   Kinetic Tremor RUE 3   Kinetic Tremor LUE 2   Rest Tremor RUE 3   Rest Tremor LUE 0   Rest Tremor RLE 0   Rest Tremor LLE 0   Rest Tremor Lip/Jaw 0   Rest Tremor Constancy 3   Total Right 35   Total Left 21   Axial Total 8   Total 67       Procedure: DBS Programming    Lead(s):    Left Right   DBS Target GPi          DBS Lead Type    Abbott 0.5mm spacing       Lead Implant Date   2/22/19          IPG(s):   1 2   IPG Infinity 6660          IPG Implant Date 3/1/19       Location    L chest       Battery (V) 2.92V         Full Impedence/Currents Check: WNL    Initial Settings:  Left Brain Program 1 Program 2 Program 3 Program 4   Contacts  C+3-  C+3- C+3-4-  C+2-3-    Amplitude (mA)  4.0  4.5 4.0  4.0    Pulse Width ( s) 120   150  120  120   Frequency (Hz)  130  130  130  130   Impedence/ Currents     500    Patient Control (+/- V) +/- 1.0mA +0 / -1.5 +1.5 / -1.0 +0.5 / -1.0      Right Brain Program A Program B Program C Program D   Contacts  N/A         Amplitude (V)           Pulse Width ( s)           Frequency (Hz)           Impedence/ Currents  /   /   /   /    Patient Control (+/- V)  /   /   /   /      Initial program selected:    4      Left Hemisphere Monopolar Review  Contacts Amplitude    (mA) PW   (usec) Rate  (Hz) Assessment Adverse Effects   C+2-3- 4.5 120 130  ? Slight face sensation on right, patient not sure    5    Mild right face contraction    4.5    Improved, feels at baseline               Final Settings:  Left Brain Program 1 Program B Program C Program D   Contacts  C+3-  C+3- C+3-4-  C+2-3-    Amplitude (mA)  4.0  4.5  4.0  4.5    Pulse Width ( s) 120   150  120  120   Frequency (Hz)  130  130  130  130   Impedence/ Currents    512    Patient Control (+/- V) +/- 1.0mA +0 / -1.5 +1.5 / -1.0 +0/ -4.5      Right Brain Program A Program B Program C Program D   Contacts  N/A         Amplitude (V)           Pulse Width ( s)           Frequency (Hz)           Impedence/ Currents  /   /   /   /    Patient Control (+/- V)  /   /   /   /      Final Program selected:    4     Impression:  Sandra Jara is a 53 year old female with 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 Has only been on program 4 (double monopolar setting) for one week. Dystonia can initially worsen with DBS changes and the effect can have some latency so will give these settings more time.    Recommendations:   -Current increased to 4.5mA on program 4  -if worse after 2 weeks, will change back to program 2    Follow-up 3-4 weeks    Joey Bradford MD  Movement Disorders Fellow    Neurology Attending Attestation:     I, Tiffany Hopkins, personally saw this patient with our Movement Disorders Fellow and agree with the fellow's findings and plan of care as documented in the movement disorder fellow's note. I personally performed salient aspects of the history and neurological examination.     I personally reviewed the vital signs, medications, and labs. I personally viewed the imaging, and agree with the interpretation documented by the fellow.    I personally performed or supervised all procedures.    Time spent with patient: Greater than 50% of this 25 minute visit was spent in counseling and coordination of care related to corticobasal syndrome, right arm dystonia, gait difficulty, expectations and plan of care.    Additional time spent for separate DBS programming: 15 minutes    Tiffany Hopkins MD    of Neurology

## 2019-04-18 NOTE — NURSING NOTE
Chief Complaint   Patient presents with     Dystonia     [facility] RETURN- DYSTONIA F/U       Farooq Torres, EMT

## 2019-04-23 ENCOUNTER — TRANSFERRED RECORDS (OUTPATIENT)
Dept: HEALTH INFORMATION MANAGEMENT | Facility: CLINIC | Age: 54
End: 2019-04-23

## 2019-05-02 ENCOUNTER — DOCUMENTATION ONLY (OUTPATIENT)
Dept: NEUROLOGY | Facility: CLINIC | Age: 54
End: 2019-05-02

## 2019-05-02 NOTE — PROGRESS NOTES
Received Plan Of Care from [facility], form was given to provider signed and given back, form was fax to [phone], 5/2/16, sent to be scanned     Estefani Guevara CMA

## 2019-05-08 ENCOUNTER — OFFICE VISIT (OUTPATIENT)
Dept: NEUROLOGY | Facility: CLINIC | Age: 54
End: 2019-05-08
Payer: COMMERCIAL

## 2019-05-08 VITALS
BODY MASS INDEX: 24.57 KG/M2 | OXYGEN SATURATION: 99 % | WEIGHT: 149.9 LBS | HEART RATE: 81 BPM | SYSTOLIC BLOOD PRESSURE: 132 MMHG | DIASTOLIC BLOOD PRESSURE: 70 MMHG

## 2019-05-08 DIAGNOSIS — G25.3 MYOCLONUS: ICD-10-CM

## 2019-05-08 DIAGNOSIS — G24.1 DYSTONIA, TORSION, FRAGMENTS OF: ICD-10-CM

## 2019-05-08 DIAGNOSIS — R26.9 GAIT DIFFICULTY: ICD-10-CM

## 2019-05-08 DIAGNOSIS — G31.85 CORTICOBASAL SYNDROME (H): Primary | ICD-10-CM

## 2019-05-08 SDOH — HEALTH STABILITY: MENTAL HEALTH: HOW MANY STANDARD DRINKS CONTAINING ALCOHOL DO YOU HAVE ON A TYPICAL DAY?: 1 OR 2

## 2019-05-08 SDOH — HEALTH STABILITY: MENTAL HEALTH: HOW OFTEN DO YOU HAVE A DRINK CONTAINING ALCOHOL?: 2-4 TIMES A MONTH

## 2019-05-08 ASSESSMENT — PAIN SCALES - GENERAL: PAINLEVEL: NO PAIN (0)

## 2019-05-08 NOTE — PROGRESS NOTES
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     Interval History:  She had a fall during PT and broke her right hand 2 weeks ago.     She was on Program 4 since her last visit 2019 up until last week, when they switched it to program 2.    On Program 4: her right arm sharp pain was still under control, but over time on that program her right arm was tightening up significantly. The walking did not improve. She feels it is related    In general she has had a significant decline in her walking. Although she was starting to have more difficulty with stairs prior to surgery.     About 1 week ago she switched to Program 2: They think perhaps her right shoulder is loosening up a bit.    Yesterday she started to have a new type of severe right arm spasms of straightening at the elbow, lasting 2 seconds. She had a total of about 10 episodes yesterday.     In between the spasms she continues to have jerking in between.       Review of Systems:  Other than that noted at the end of this note, the remainder of 12 systems reviewed were negative.    Medications:  Current Outpatient Medications   Medication Sig Dispense Refill     acyclovir (ZOVIRAX) 200 MG capsule as needed  1     baclofen (LIORESAL) 10 MG tablet Please take 1/2 tab to 1 tab up to three times a day as needed. 30 tablet 3     botulinum toxin type A (BOTOX) 100 units injection Inject 280 Units into the muscle once Lot # /C3 with Expiration Date:  2020       carbidopa-levodopa (SINEMET CR)  MG CR tablet Take 1 tablet by  mouth At Bedtime 30 tablet 11     carbidopa-levodopa (SINEMET)  MG tablet Take 3 tabs at 530AM, and 2 tabs at 10AM, 2PM, 6PM, and 10PM 990 tablet 3     Cholecalciferol (VITAMIN D) 2000 UNITS tablet Take 2,000 Units by mouth every morning        clonazePAM (KLONOPIN) 0.5 MG tablet Take 0.5 tablets (0.25 mg) by mouth At Bedtime 15 tablet 5     OnabotulinumtoxinA (BOTOX IJ) Inject 300 Units as directed once [id number] with Expiration Date:  12/2020       traMADol (ULTRAM) 50 MG tablet Take 1/2 to 1 tab by mouth every 6 hours as needed, up to 3 tabs per day. 90 tablet 0          Movement Disorder-related Medications                   530 1000 200PM 600PM 1000PM     Carbidopa/levodopa 25/100                                         2 2 2 2        Clonazepam 0.5 mg                         1/2   Tramadol 50mg                            1      Baclofen 10mg     1   Oxycodone (started after fall)     1       Allergies: has No Known Allergies.    Past Medical History:  Past Medical History:   Diagnosis Date     Action induced myoclonus 12/1/2015     Corticobasal syndrome 12/1/2015     CTS (carpal tunnel syndrome) 1/12/2015     Disturbance of skin sensation 1/12/2015     Family history of breast cancer 1/12/2015     Family history of colon cancer 1/12/2015     Family history of Parkinson's disease 12/21/2014    Paternal aunt     History of EMG 12/21/2014    A right upper extremity EMG and nerve conduction study was done on 06/18/2014. It demonstrated some mild changes of right carpal tunnel syndrome but was otherwise normal.       History of MRI of brain and brain stem 12/21/2014    A brain MRI scan done on 06/27/2014 revealed no abnormalities.      History of MRI of cervical spine 12/21/2014    A cervical MRI done on 06/10/2014 revealed some mild to moderate degenerative changes but no significant central canal or foraminal stenosis, and no abnormalities of the spinal cord were noted.       Movement disorder 12/21/2014    I  saw your patient, Deepali Contreras on 12/15/2014. She is a 49-year-old right-handed female here for evaluation of right upper extremity dysfunction and right hand tremor.  She has noted this problem for the last couple of years. She reports she is losing dexterity in her right hand. She has appreciated a tremor of the right hand with actions such as writing or postural maintenance. She has n       Past Surgical History:  Past Surgical History:   Procedure Laterality Date     APPENDECTOMY       IMPLANT DEEP BRAIN STIMULATION GENERATOR / BATTERY Left 3/1/2019    Procedure: Left Deep Brain Stimulator Placement, Phase II, Placement Of Deep Brain Stimulator Generator/Battery Over The Left Chest Wall Latex Free;  Surgeon: Efren Triana MD;  Location: [facility] OR     OPTICAL TRACKING SYSTEM INSERTION DEEP BRAIN STIMULATION Left 2/22/2019    Procedure: Stealth Assisted Left Side Deep Brain Stimulator Placement, Phase I, Placement Of Left Side Deep Brain Stimulator Electrode, Target Left Globus Pallidus Internus With Microelectrode Recording;  Surgeon: Efren Triana MD;  Location: [facility] OR       Social History:  Social History     Socioeconomic History     Marital status:      Spouse name: None     Number of children: 3     Years of education: None     Highest education level: None   Occupational History     None   Social Needs     Financial resource strain: None     Food insecurity:     Worry: None     Inability: None     Transportation needs:     Medical: None     Non-medical: None   Tobacco Use     Smoking status: Never Smoker     Smokeless tobacco: Never Used   Substance and Sexual Activity     Alcohol use: Yes     Alcohol/week: 1.2 - 1.8 oz     Frequency: 2-4 times a month     Drinks per session: 1 or 2     Drug use: No     Sexual activity: Yes     Partners: Male     Birth control/protection: Male Surgical   Lifestyle     Physical activity:     Days per week: None     Minutes per session: None      Stress: None   Relationships     Social connections:     Talks on phone: None     Gets together: None     Attends Uatsdin service: None     Active member of club or organization: None     Attends meetings of clubs or organizations: None     Relationship status: None     Intimate partner violence:     Fear of current or ex partner: None     Emotionally abused: None     Physically abused: None     Forced sexual activity: None   Other Topics Concern     Parent/sibling w/ CABG, MI or angioplasty before 65F 55M? No   Social History Narrative        Izaiah Jara    Lives in [facility]      She does not smoke.  She has a couple of beers to drink on the weekend but otherwise is not a heavy user of alcohol    3 kids: son peña 21; 94, 96 and 98    2 sons    1 daughter    2 are in college and daughter is a sophomore.            FAMILY HISTORY:  Notable for a paternal aunt who is .  She had Parkinson's disease.  Otherwise, there is no other family history of neurologic problems.  There is no family history of dystonia.          90% Sinhala    Some norweigian                .         Family History:  Family History   Problem Relation Age of Onset     Breast Cancer Mother      Cancer - colorectal Mother      Macular Degeneration Mother      Dementia Father      Deep Vein Thrombosis Father      No Known Problems Brother      No Known Problems Sister      No Known Problems Sister      No Known Problems Sister      Neurologic Disorder Paternal Aunt         Parkinson's Disease     Cerebrovascular Disease Brother         stroke at age 25 possibly from pfo     Dementia Paternal Grandmother        Physical Exam:  The patient's  weight is 68 kg (149 lb 14.4 oz). Her blood pressure is 132/70 and her pulse is 81. Her oxygen saturation is 99%.      Incisions: Well-healed.     Neurological Examination:   .She is alert and oriented and has fluent speech without dysarthria and is able to provide an interval  medical history. Speech is somewhat slowed and deliberate.  Extraocular movements are full. She has normal smooth pursuits and saccades. Her face is symmetric with equal activation.     Marked rigidity in right arm with early contracture at elbow, wrist, and fingers. She has a brace on the right arm/wrist and a rigid brace on the index finger. There is swelling and bruising of the right hand. Minimal myoclonus on exam today.  Moderate rigidity in the right leg.  Gait with prominent rigidity in right leg, mild external rotation at the hip, right arm posturing somewhat to the side and behind her.      Procedure: DBS Programming     Lead(s):     Left Right   DBS Target GPi          DBS Lead Type    Abbott 0.5mm spacing       Lead Implant Date   2/22/19           IPG(s):    1 2   IPG Infinity 6660          IPG Implant Date 3/1/19       Location    L chest       Battery (V) 2.92V          Full Impedence/Currents Check: WNL     Initial Settings:  Left Brain Program 2   Contacts  C+3-   Amplitude (mA)  4.5   Pulse Width ( s)  150   Frequency (Hz)  130   Impedence 837       Initial program selected:    Program 2    Final Settings:    Left Brain Program 2 Program 3   Contacts  C+3- C+3-4-    Amplitude (mA)  4.5 4.5    Pulse Width ( s)  150  150   Frequency (Hz)  130  130   Impedence 837  537    Patient Control (min/max) 3.0/4.5 4.0/5.5        Final Program selected:    2       Impression:  Sandra aJra is a 53 year old female with 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 She fell and broke her right hand 2 weeks ago during physical therapy. She continues to struggle with gait, right arm spasms, dystonia, and myoclonus. We are continuing to see if we can further optimize her DBS. Thus far she has had significant improvement in arm pain, but we have not seen benefit for her dystonia or myoclonus. In addition, ever since surgery (before turning on the DBS) she has a worsening of her gait.      Recommendations:    1. I want to give the current program 2 more time to see if:  -your right shoulder loosens further  -your reduced jerking today is a real effect or not  -your unusual arm straightening spasms represent just a transition between programs or a new effect from Program 2    2. If you start to notice bothersome spasms again, you could consider taking an extra low dose of clonazepam in the morning (but beware of possible side effects of sleepiness or memory issues)    3. Approximately 2 weeks before your next appointment, I want you to switch to a new Program 3 that I set up today.    4. Follow-up in 2 months      Time spent with patient: Greater than 50% of this 25 minute visit was spent in counseling and coordination of care related to the above issues.    Time spent for separate DBS programmin minutes    Tiffany Hopkins MD    of Neurology         Answers for HPI/ROS submitted by the patient on 2019   General Symptoms: No  Skin Symptoms: No  HENT Symptoms: No  EYE SYMPTOMS: No  HEART SYMPTOMS: No  LUNG SYMPTOMS: No  INTESTINAL SYMPTOMS: No  URINARY SYMPTOMS: No  GYNECOLOGIC SYMPTOMS: No  BREAST SYMPTOMS: No  SKELETAL SYMPTOMS: No  BLOOD SYMPTOMS: No  NERVOUS SYSTEM SYMPTOMS: No  MENTAL HEALTH SYMPTOMS: No

## 2019-05-08 NOTE — PATIENT INSTRUCTIONS
1. I want to give the current program 2 more time to see if:  -your right shoulder loosens further  -your reduced jerking today is a real effect or not  -your unusual arm straightening spasms represent just a transition between programs or a new effect from Program 2    2. If you start to notice bothersome spasms again, you could consider taking an extra low dose of clonazepam in the morning (but beware of possible side effects of sleepiness or memory issues)    3. Approximately 2 weeks before your next appointment, I want you to switch to a new Program 3 that I set up today.    4. Follow-up in 2 months

## 2019-05-08 NOTE — LETTER
2019       RE: Sandra Jara  [address]  [place] [address]     Dear Colleague,    Thank you for referring your patient, Sandra Jara, to the [facility] NEUROLOGY at [facility]. Please see a copy of my visit note below.    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     Interval History:  She had a fall during PT and broke her right hand 2 weeks ago.     She was on Program 4 since her last visit 2019 up until last week, when they switched it to program 2.    On Program 4: her right arm sharp pain was still under control, but over time on that program her right arm was tightening up significantly. The walking did not improve. She feels it is related    In general she has had a significant decline in her walking. Although she was starting to have more difficulty with stairs prior to surgery.     About 1 week ago she switched to Program 2: They think perhaps her right shoulder is loosening up a bit.    Yesterday she started to have a new type of severe right arm spasms of straightening at the elbow, lasting 2 seconds. She had a total of about 10 episodes yesterday.     In between the spasms she continues to have jerking in between.     Review of Systems:  Other than that noted at the end of this note, the remainder of 12 systems reviewed were negative.    Medications:  Current Outpatient Medications   Medication Sig Dispense Refill     acyclovir (ZOVIRAX) 200 MG capsule as needed  1     baclofen (LIORESAL) 10 MG tablet  Please take 1/2 tab to 1 tab up to three times a day as needed. 30 tablet 3     botulinum toxin type A (BOTOX) 100 units injection Inject 280 Units into the muscle once Lot # /C3 with Expiration Date:  11/2020       carbidopa-levodopa (SINEMET CR)  MG CR tablet Take 1 tablet by mouth At Bedtime 30 tablet 11     carbidopa-levodopa (SINEMET)  MG tablet Take 3 tabs at 530AM, and 2 tabs at 10AM, 2PM, 6PM, and 10PM 990 tablet 3     Cholecalciferol (VITAMIN D) 2000 UNITS tablet Take 2,000 Units by mouth every morning        clonazePAM (KLONOPIN) 0.5 MG tablet Take 0.5 tablets (0.25 mg) by mouth At Bedtime 15 tablet 5     OnabotulinumtoxinA (BOTOX IJ) Inject 300 Units as directed once [id number] with Expiration Date:  12/2020       traMADol (ULTRAM) 50 MG tablet Take 1/2 to 1 tab by mouth every 6 hours as needed, up to 3 tabs per day. 90 tablet 0          Movement Disorder-related Medications                   530 1000 200PM 600PM 1000PM     Carbidopa/levodopa 25/100                                         2 2 2 2        Clonazepam 0.5 mg                         1/2   Tramadol 50mg                            1      Baclofen 10mg     1   Oxycodone (started after fall)     1       Allergies: has No Known Allergies.    Past Medical History:  Past Medical History:   Diagnosis Date     Action induced myoclonus 12/1/2015     Corticobasal syndrome 12/1/2015     CTS (carpal tunnel syndrome) 1/12/2015     Disturbance of skin sensation 1/12/2015     Family history of breast cancer 1/12/2015     Family history of colon cancer 1/12/2015     Family history of Parkinson's disease 12/21/2014    Paternal aunt     History of EMG 12/21/2014    A right upper extremity EMG and nerve conduction study was done on 06/18/2014. It demonstrated some mild changes of right carpal tunnel syndrome but was otherwise normal.       History of MRI of brain and brain stem 12/21/2014    A brain MRI scan done on 06/27/2014 revealed no  abnormalities.      History of MRI of cervical spine 12/21/2014    A cervical MRI done on 06/10/2014 revealed some mild to moderate degenerative changes but no significant central canal or foraminal stenosis, and no abnormalities of the spinal cord were noted.       Movement disorder 12/21/2014    I saw your patient, Deepali Contreras on 12/15/2014. She is a 49-year-old right-handed female here for evaluation of right upper extremity dysfunction and right hand tremor.  She has noted this problem for the last couple of years. She reports she is losing dexterity in her right hand. She has appreciated a tremor of the right hand with actions such as writing or postural maintenance. She has n       Past Surgical History:  Past Surgical History:   Procedure Laterality Date     APPENDECTOMY       IMPLANT DEEP BRAIN STIMULATION GENERATOR / BATTERY Left 3/1/2019    Procedure: Left Deep Brain Stimulator Placement, Phase II, Placement Of Deep Brain Stimulator Generator/Battery Over The Left Chest Wall Latex Free;  Surgeon: Efren Triana MD;  Location: [facility] OR     OPTICAL TRACKING SYSTEM INSERTION DEEP BRAIN STIMULATION Left 2/22/2019    Procedure: Stealth Assisted Left Side Deep Brain Stimulator Placement, Phase I, Placement Of Left Side Deep Brain Stimulator Electrode, Target Left Globus Pallidus Internus With Microelectrode Recording;  Surgeon: Efren Triana MD;  Location: [facility] OR       Social History:  Social History     Socioeconomic History     Marital status:      Spouse name: None     Number of children: 3     Years of education: None     Highest education level: None   Occupational History     None   Social Needs     Financial resource strain: None     Food insecurity:     Worry: None     Inability: None     Transportation needs:     Medical: None     Non-medical: None   Tobacco Use     Smoking status: Never Smoker     Smokeless tobacco: Never Used   Substance and Sexual Activity     Alcohol  use: Yes     Alcohol/week: 1.2 - 1.8 oz     Frequency: 2-4 times a month     Drinks per session: 1 or 2     Drug use: No     Sexual activity: Yes     Partners: Male     Birth control/protection: Male Surgical   Lifestyle     Physical activity:     Days per week: None     Minutes per session: None     Stress: None   Relationships     Social connections:     Talks on phone: None     Gets together: None     Attends Moravian service: None     Active member of club or organization: None     Attends meetings of clubs or organizations: None     Relationship status: None     Intimate partner violence:     Fear of current or ex partner: None     Emotionally abused: None     Physically abused: None     Forced sexual activity: None   Other Topics Concern     Parent/sibling w/ CABG, MI or angioplasty before 65F 55M? No   Social History Narrative        Izaiah Jara    Lives in [facility]      She does not smoke.  She has a couple of beers to drink on the weekend but otherwise is not a heavy user of alcohol    3 kids: son peña 21; 94, 96 and 98    2 sons    1 daughter    2 are in college and daughter is a sophomore.            FAMILY HISTORY:  Notable for a paternal aunt who is .  She had Parkinson's disease.  Otherwise, there is no other family history of neurologic problems.  There is no family history of dystonia.          90% Salvadorean    Some norweigian                .         Family History:  Family History   Problem Relation Age of Onset     Breast Cancer Mother      Cancer - colorectal Mother      Macular Degeneration Mother      Dementia Father      Deep Vein Thrombosis Father      No Known Problems Brother      No Known Problems Sister      No Known Problems Sister      No Known Problems Sister      Neurologic Disorder Paternal Aunt         Parkinson's Disease     Cerebrovascular Disease Brother         stroke at age 25 possibly from pfo     Dementia Paternal Grandmother        Physical  Exam:  The patient's  weight is 68 kg (149 lb 14.4 oz). Her blood pressure is 132/70 and her pulse is 81. Her oxygen saturation is 99%.      Incisions: Well-healed.     Neurological Examination:   .She is alert and oriented and has fluent speech without dysarthria and is able to provide an interval medical history. Speech is somewhat slowed and deliberate.  Extraocular movements are full. She has normal smooth pursuits and saccades. Her face is symmetric with equal activation.     Marked rigidity in right arm with early contracture at elbow, wrist, and fingers. She has a brace on the right arm/wrist and a rigid brace on the index finger. There is swelling and bruising of the right hand. Minimal myoclonus on exam today.  Moderate rigidity in the right leg.  Gait with prominent rigidity in right leg, mild external rotation at the hip, right arm posturing somewhat to the side and behind her.      Procedure: DBS Programming     Lead(s):     Left Right   DBS Target GPi          DBS Lead Type    Abbott 0.5mm spacing       Lead Implant Date   2/22/19           IPG(s):    1 2   IPG Infinity 6660          IPG Implant Date 3/1/19       Location    L chest       Battery (V) 2.92V          Full Impedence/Currents Check: WNL     Initial Settings:  Left Brain Program 2   Contacts  C+3-   Amplitude (mA)  4.5   Pulse Width ( s)  150   Frequency (Hz)  130   Impedence 837       Initial program selected:    Program 2    Final Settings:    Left Brain Program 2 Program 3   Contacts  C+3- C+3-4-    Amplitude (mA)  4.5 4.5    Pulse Width ( s)  150  1 50   Frequency (Hz)  130  130   Impedence 837  537    Patient Control (min/max) 3.0/4.5 4.0/5.5        Final Program selected:    2       Impression:  Sandra Jara is a 53 year old female with 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 She fell and broke her right hand 2 weeks ago during physical therapy. She continues to struggle with gait, right arm spasms, dystonia, and  myoclonus. We are continuing to see if we can further optimize her DBS. Thus far she has had significant improvement in arm pain, but we have not seen benefit for her dystonia or myoclonus. In addition, ever since surgery (before turning on the DBS) she has a worsening of her gait.      Recommendations:   1. I want to give the current program 2 more time to see if:  -your right shoulder loosens further  -your reduced jerking today is a real effect or not  -your unusual arm straightening spasms represent just a transition between programs or a new effect from Program 2    2. If you start to notice bothersome spasms again, you could consider taking an extra low dose of clonazepam in the morning (but beware of possible side effects of sleepiness or memory issues)    3. Approximately 2 weeks before your next appointment, I want you to switch to a new Program 3 that I set up today.    4. Follow-up in 2 months    Time spent with patient: Greater than 50% of this 25 minute visit was spent in counseling and coordination of care related to the above issues.    Time spent for separate DBS programmin minutes    Tiffany Hopkins MD    of Neurology

## 2019-05-08 NOTE — NURSING NOTE
Chief Complaint   Patient presents with     Botox     [facility] RETURN - BOTOX, DBS ADJUSTMENT       Isaiah Dsouza, EMT

## 2019-05-21 ENCOUNTER — TRANSFERRED RECORDS (OUTPATIENT)
Dept: HEALTH INFORMATION MANAGEMENT | Facility: CLINIC | Age: 54
End: 2019-05-21

## 2019-05-28 ENCOUNTER — DOCUMENTATION ONLY (OUTPATIENT)
Dept: NEUROLOGY | Facility: CLINIC | Age: 54
End: 2019-05-28

## 2019-05-28 NOTE — PROGRESS NOTES
Received plan of care from [facility], [place], operated by [facility] 5/28/19, forms was placed in provider folder for signature 5/28/19    Received form back signed by provider 5/29/19, form was fax to [phone]  5/29/19, sent to be scanned    Estefani Guevara CMA

## 2019-06-12 ENCOUNTER — MYC REFILL (OUTPATIENT)
Dept: NEUROLOGY | Facility: CLINIC | Age: 54
End: 2019-06-12

## 2019-06-12 DIAGNOSIS — G24.8 ACQUIRED TORSION DYSTONIA: ICD-10-CM

## 2019-06-12 RX ORDER — TRAMADOL HYDROCHLORIDE 50 MG/1
TABLET ORAL
Qty: 90 TABLET | Refills: 0 | Status: CANCELLED
Start: 2019-06-12

## 2019-06-12 NOTE — TELEPHONE ENCOUNTER
Last Written Prescription Date:3/18/19  Last Fill Quantity:90  Last office visit: 5/8/19  Future Office Visit: Return in 2 moths

## 2019-06-12 NOTE — TELEPHONE ENCOUNTER
Nurse received refill request for:   traMADol (ULTRAM) 50 MG tablet 90 tablet 0 3/8/2019  No   Sig: Take 1/2 to 1 tab by mouth every 6 hours as needed, up to 3 tabs per day.     Pharmacy: [facility]    Last re-ordered: 3/8/2019    Last appointment: 5/8/2019    Action taken: Sent to be signed by the provider.

## 2019-06-19 ENCOUNTER — DOCUMENTATION ONLY (OUTPATIENT)
Dept: NEUROLOGY | Facility: CLINIC | Age: 54
End: 2019-06-19

## 2019-06-20 ENCOUNTER — TELEPHONE (OUTPATIENT)
Dept: NEUROLOGY | Facility: CLINIC | Age: 54
End: 2019-06-20

## 2019-06-20 RX ORDER — TRAMADOL HYDROCHLORIDE 50 MG/1
TABLET ORAL
Qty: 90 TABLET | Refills: 0
Start: 2019-06-20 | End: 2019-08-13

## 2019-06-20 NOTE — TELEPHONE ENCOUNTER
traMADol (ULTRAM) 50 MG tablet 90 tablet 0 6/20/2019  No   Sig: Take 1/2 to 1 tab by mouth every 6 hours as needed, up to 3 tabs per day.     Called [facility] pharmacy and spoke to Vane (pharmacist). Vane will be filling the above prescription.

## 2019-06-27 ENCOUNTER — TRANSFERRED RECORDS (OUTPATIENT)
Dept: HEALTH INFORMATION MANAGEMENT | Facility: CLINIC | Age: 54
End: 2019-06-27

## 2019-07-18 ENCOUNTER — OFFICE VISIT (OUTPATIENT)
Dept: NEUROLOGY | Facility: CLINIC | Age: 54
End: 2019-07-18
Payer: COMMERCIAL

## 2019-07-18 VITALS
SYSTOLIC BLOOD PRESSURE: 141 MMHG | HEART RATE: 107 BPM | BODY MASS INDEX: 23.78 KG/M2 | DIASTOLIC BLOOD PRESSURE: 90 MMHG | TEMPERATURE: 98 F | OXYGEN SATURATION: 97 % | WEIGHT: 148 LBS | RESPIRATION RATE: 16 BRPM | HEIGHT: 66 IN

## 2019-07-18 DIAGNOSIS — G31.85 CORTICOBASAL SYNDROME (H): ICD-10-CM

## 2019-07-18 DIAGNOSIS — R26.9 GAIT DIFFICULTY: ICD-10-CM

## 2019-07-18 DIAGNOSIS — R25.1 TREMOR: ICD-10-CM

## 2019-07-18 DIAGNOSIS — F41.1 GENERALIZED ANXIETY DISORDER: Primary | ICD-10-CM

## 2019-07-18 DIAGNOSIS — G24.8 ACQUIRED TORSION DYSTONIA: ICD-10-CM

## 2019-07-18 PROBLEM — S62.336D CLOSED DISPLACED FRACTURE OF NECK OF FIFTH METACARPAL BONE OF RIGHT HAND WITH ROUTINE HEALING: Status: ACTIVE | Noted: 2019-05-01

## 2019-07-18 RX ORDER — DULOXETIN HYDROCHLORIDE 30 MG/1
CAPSULE, DELAYED RELEASE ORAL
Qty: 60 CAPSULE | Refills: 3 | Status: SHIPPED | OUTPATIENT
Start: 2019-07-18 | End: 2020-04-09

## 2019-07-18 ASSESSMENT — UNIFIED PARKINSONS DISEASE RATING SCALE (UPDRS)
ARISING_CHAIR: SEVERE: UNABLE TO ARISE WITHOUT HELP.
TOTAL_SCORE: 37
AMPLITUDE_LUE: NORMAL: NO TREMOR.
AXIAL_SCORE: 18
LEG_AGILITY_RIGHT: SEVERE: CANNOT OR CAN ONLY BARELY PERFORM THE TASK BECAUSE OF SLOWING, INTERRUPTIONS, OR DECREMENTS.
RIGIDITY_RLE: SEVERE: RIGIDITY DETECTED WITHOUT THE ACTIVATION MANEUVER AND FULL RANGE OF MOTION NOT ACHIEVED.
RIGIDITY_LUE: MILD: RIGIDITY DETECTED WITHOUT THE ACTIVATION MANEUVER.  FULL RANGE OF MOTION IS EASILY ACHIEVED.
RIGIDITY_NECK: MODERATE: RIGIDITY DETECTED WITHOUT THE ACTIVATION MANEUVER. FULL RANGE OF MOTION IS ACHIEVED WITH EFFORT.
POSTURAL_STABILITY: MODERATE: STANDS SAFELY, BUT WITH ABSENCE OF POSTURAL RESPONSE,  FALLS IF NOT CAUGHT BY EXAMINER.
FINGER_TAPPING_LEFT: SEVERE: CANNOT OR CAN ONLY BARELY PERFORM THE TASK BECAUSE OF SLOWING, INTERRUPTIONS, OR DECREMENTS.
AMPLITUDE_RUE: MODERATE: 3 - 10 CM IN MAXIMAL AMPLITUDE.
GAIT: MILD: INDEPENDENT WALKING BUT WITH SUBSTANTIAL GAIT IMPAIRMENT.
AMPLITUDE_RLE: NORMAL: NO TREMOR.
HANDMOVEMENTS_RIGHT: SEVERE: CANNOT OR CAN ONLY BARELY PERFORM THE TASK BECAUSE OF SLOWING, INTERRUPTIONS, OR DECREMENTS.
POSTURE: 1 SLIGHT.  NOT QUITE ERECT BUT COULD BE NORMAL FOR OLDER PERSONS.
CONSTANCY_TREMOR_ATREST: SEVERE: TREMOR AT REST IS PRESENT > 75% OF THE ENTIRE EXAMINATION PERIOD.
TOTAL_SCORE_LEFT: 24
AMPLITUDE_LLE: NORMAL: NO TREMOR.
PRONATION_SUPINATION_LEFT: MODERATE: ANY OF THE FOLLOWING:  A) MORE THAN 5 INTERRUPTIONS  OR AT LEAST ONE LONGER ARREST (FREEZE) IN ONGOING MOVEMENT  B) MODERATE SLOWING C) THE AMPLITUDE DECREMENTS STARTING AFTER THE FIRST MOVEMENT.
RIGIDITY_RUE: SEVERE: RIGIDITY DETECTED WITHOUT THE ACTIVATION MANEUVER AND FULL RANGE OF MOTION NOT ACHIEVED.
TOTAL_SCORE: 83
TOETAPPING_LEFT: MODERATE: ANY OF THE FOLLOWING:  A) MORE THAN 5 INTERRUPTIONS OR AT LEAST ONE LONGER ARREST (FREEZE) IN ONGOING MOVEMENT  B) MODERATE SLOWING C) THE AMPLITUDE DECREMENTS STARTING AFTER THE FIRST MOVEMENT.
HANDMOVEMENTS_LEFT: SEVERE: CANNOT OR CAN ONLY BARELY PERFORM THE TASK BECAUSE OF SLOWING, INTERRUPTIONS, OR DECREMENTS.
AMPLITUDE_LIP_JAW: NORMAL: NO TREMOR.
LEG_AGILITY_LEFT: SEVERE: CANNOT OR CAN ONLY BARELY PERFORM THE TASK BECAUSE OF SLOWING, INTERRUPTIONS, OR DECREMENTS.
FINGER_TAPPING_RIGHT: SEVERE: CANNOT OR CAN ONLY BARELY PERFORM THE TASK BECAUSE OF SLOWING, INTERRUPTIONS, OR DECREMENTS.
FREEZING_GAIT: NORMAL
SPONTANEITY_OF_MOVEMENT: 2: MILD: MILD GLOBAL SLOWNESS AND POVERTY OF SPONTANEOUS MOVEMENTS.
PRONATION_SUPINATION_RIGHT: SEVERE: CANNOT OR CAN ONLY BARELY PERFORM THE TASK BECAUSE OF SLOWING, INTERRUPTIONS, OR DECREMENTS.
PARKINSONS_MEDS: ON
RIGIDITY_LLE: NORMAL
SPEECH: SLIGHT: LOSS OF MODULATION, DICTION OR VOLUME, BUT STILL ALL WORDS EASY TO UNDERSTAND.
FACIAL_EXPRESSION: MILD: IN ADDITION TO DECREASED EYE-BLINK FREQUENCY, MASKED FACIES PRESENT IN THE LOWER FACE AS WELL, NAMELY FEWER MOVEMENTS AROUND THE MOUTH, SUCH AS LESS SPONTANEOUS SMILING, BUT LIPS NOT PARTED.
TOETAPPING_RIGHT: SEVERE: CANNOT OR CAN ONLY BARELY PERFORM THE TASK BECAUSE OF SLOWING, INTERRUPTIONS, OR DECREMENTS.

## 2019-07-18 ASSESSMENT — PAIN SCALES - GENERAL: PAINLEVEL: EXTREME PAIN (8)

## 2019-07-18 ASSESSMENT — MIFFLIN-ST. JEOR: SCORE: 1275.13

## 2019-07-18 NOTE — LETTER
2019       RE: Sandra Jara  [address]  [place] [address]     Dear Colleague,    Thank you for referring your patient, Sandra Jara, to the [facility] NEUROLOGY at [facility]. Please see a copy of my visit note below.    Department of Neurology  Movement Disorders Division     Patient: Sandra Jara   MRN: [id number]   : 1965   Date of Visit: 2019     Diagnosis: Corticobasal syndrome        DBS Target(s): Left GPi  Date(s) of DBS Lead Placement:   2019  Date(s) of IPG Placement:   3/1/2019    Reason for visit: Corticobasal syndrome follow up     History of Present Illness  Ms. 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 Patient was last seen on 2019 where the DBS settings were evaluated to improve pain in right arm, dystonia and myoclonus. A new program, Program 3, was created though patient was asked to remain on Program 2 to allow this program enough time to evaluate for benefit. Several weeks later, patient called and reported worsening shaking and muscle spasms and right foot swelling and pain. She was instructed to switch to Program 3 at that time.     History obtained from patient and accompanied by  and daughter. Patient reports that Program 3 began to take effect 2 to 10 days after switching.  She reported good pain control and tremor control on this program.  She noted that she was able to hold her pain medications.  This lasted for a couple weeks.  Her  noted that she was able to move her right arm while taking baths which is a huge improvement for her. However, in the past 2 weeks, she began to develop pain in her right shoulder and arm described as stiffness and rigidity which is a similar pain to what she has previously  "encountered.  She notes that the pain and rigidity in her right arm is better controlled in the morning but by 2 PM to 5 PM, the pain is uncontrolled.  She states that her right arm is very stiff and unable to relax and she does not have function of this arm.  Patient holds her right wrist with her left hand to \"relieve pain in her right shoulder.\"  She describes the pain in her right arm today as 9-10 out of 10.  She also describes stiffness in her right leg but denies pain.  She does have poor balance.  She uses furniture at home to maneuver around to prevent from falling.  She does not use walking aids.  She last fell about 2 weeks ago while walking out the door.  She states that she fell backwards after losing her balance prior to reaching the threshold.  She now needs assistance from getting up from a seated position, which is new from previous clinic visit.  She states that this is due to the pain she experiences.  She states that she feels anxious due to daily events such as what occurs at work as well as the pain which then makes her pain and rigidity worse. She reports that when she feels less anxious, her pain is more manageable.     She continues to work full-time.  She reports good sleep with clonazepam.  She has 1 bowel movement a day.  Her weight has been steady and her appetite is good.  She continues to participate in physical therapy.    Review of Systems:  Other than that mentioned above, the remainder of 12 systems reviewed were negative.    PMH: unchanged  PSH: unchanged  FH: unchanged  SH: unchanged    Medications:  Current Outpatient Medications   Medication Sig Dispense Refill     acyclovir (ZOVIRAX) 200 MG capsule as needed  1     baclofen (LIORESAL) 10 MG tablet Please take 1/2 tab to 1 tab up to three times a day as needed. 30 tablet 3     calcium-vitamin D (OSCAL) 250-125 MG-UNIT TABS per tablet Take 1 tablet by mouth 2 times daily       carbidopa-levodopa (SINEMET CR)  MG CR tablet " "Take 1 tablet by mouth At Bedtime 30 tablet 11     carbidopa-levodopa (SINEMET)  MG tablet Take 3 tabs at 530AM, and 2 tabs at 10AM, 2PM, 6PM, and 10PM 990 tablet 3     Cholecalciferol (VITAMIN D) 2000 UNITS tablet Take 2,000 Units by mouth every morning        clonazePAM (KLONOPIN) 0.5 MG tablet Take 0.5 tablets (0.25 mg) by mouth At Bedtime 15 tablet 5     DULoxetine (CYMBALTA) 30 MG capsule Start 1 cap po qPM x 1 week, then increase to 2 caps po qPM 60 capsule 3     traMADol (ULTRAM) 50 MG tablet Take 1/2 to 1 tab by mouth every 6 hours as needed, up to 3 tabs per day. 90 tablet 0     botulinum toxin type A (BOTOX) 100 units injection Inject 280 Units into the muscle once Lot # /[id number] with Expiration Date:  11/2020       OnabotulinumtoxinA (BOTOX IJ) Inject 300 Units as directed once [id number] with Expiration Date:  12/2020           Movement Disorder-related Medications                    200PM 600PM 1000PM     Carbidopa/levodopa 25/100                                         3  2 2 2         Clonazepam 0.5 mg                             1/2   Tramadol 50mg                             1      1    Baclofen 10mg   1       1   Clonazepam last taken at 10 PM 7/17/19  CD/LD last taken at 3 tabs 1020 AM  Tramadol last taken at 1030 AM      Physical Exam:  The patient's  height is 1.664 m (5' 5.5\") and weight is 67.1 kg (148 lb). Her oral temperature is 98  F (36.7  C). Her blood pressure is 141/90 and her pulse is 107. Her respiration is 16 and oxygen saturation is 97%.    Physical Exam:  Gen: alert, active, attentive, appropriately groomed   HEENT: normocephalic, eyes open with no discharge, nares patent, oropharynx clear-no lesions  Chest: normal configuration, chest rise equal b/l, non labored breathing   Extremities: distal pulses intact, several bruises over RUE from deep massages   Psych: mood stable, appears to be in some distress secondary to pain     Neurologic Exam:  Mental status: Alert " and oriented to person, place, time, and situation. Follows commands. Recent and remote memory intact. Attention span and concentration intact. Fund of knowledge intact to current events.   Speech: Decreased verbal output, intact comprehension and articulation  CN: visual fields intact in all fields, EOMIB,  no nystagmus, normal saccades, PERRL, facial sensation intact, facial movement symmetric, hearing grossly intact to conversation, tongue protrudes midline   Motor: Moves LUE, LLE equally against gravity, markedly increased rigidity in RUE. PROM and AROM restricted to extension and flexion at the elbow, wrist, fingers, and shoulder. Moderately increased rigidity in RLE. Some myoclonus observed of RUE.   Coordination: grossly intact with FTN in left arm, unable to perform in RUE.  Gait: unable to rise unassisted from a seated position and requires one person to provide an initial boost but able to rise with minimal assist afterward, decreased arm swing and length of gait, right foot internally rotated, right leg rigid with some circumlocution, right arm posturing      UPDRS III  UPDRS Values 4/18/2019 7/18/2019   Time: 12:00 PM 10:46 AM   Medication On On   R Brain DBS: None None   L Brain DBS: On On   Speech 1 1   Facial Expression 1 2   Rigidity Neck 2 3   Rigidity RUE 4 4   Rigidity LUE 2 2   Rigidity RLE 2 4   Rigidity LLE 0 0   Finger Taps R 4 4   Finger Taps L 3 4   Hand Mvt R 4 4   Hand Mvt L 3 4   Pron-/Supinate R 4 4   Pron-/Supinate L 3 3   Toe Tap R 4 4   Toe Tap L 3 3   Leg Agility R 4 4   Leg Agility L 3 4   Arise From Chair 1 4   Gait 1 2   Gait Freezing 0 0   Postural Stability 0 3   Posture 1 1   Global Spont Mvt 1 2   Postural Tremor RUE 3 3   Postural Tremor LUE 2 2   Kinetic Tremor RUE 3 3   Kinetic Tremor LUE 2 2   Rest Tremor RUE 3 3   Rest Tremor LUE 0 0   Rest Tremor RLE 0 0   Rest Tremor LLE 0 0   Rest Tremor Lip/Jaw 0 0   Rest Tremor Constancy 3 4   Total Right 35 37   Total Left 21 24    Axial Total 8 18   Total 67 83     Procedure: DBS Programming     Lead(s):     Left Right   DBS Target GPi        DBS Lead Type    Abbott 0.5mm spacing      Lead Implant Date   19        IPG(s):    1 2   IPG Infinity 6660        IPG Implant Date 3/1/19     Location    L chest     Battery (V) 2.93 V        Full Impedence/Currents Check: WNL     Initial Settings:  Left Brain Program 2 Program 3   Contacts  C+3- C+3-4-    Amplitude (mA)  4.5 4.5    Pulse Width ( s)  150  150   Frequency (Hz)  130  130   Impedence  662   Patient Control (min/max) 3.0/4.5 4.0/5.5     Initial program selected:    Program  3       Final Settings:     Left Brain Program 2 Program 3 Program 5 *   Contacts  C+3- C+3-4- 1. C+3-4-  2. C+2-    Amplitude (mA)  4.5 5.2 * 1. 5.2  2. 3.5   Pulse Width ( s) 150 150 1. 150  2. 90   Frequency (Hz) 130 130 1. 125  2. 125   Impedence    925   Patient Control (min/max) 3.0/4.5 4.0/5.4    * = change     Final Program selected:     Program 3    Left GPi  Contacts Amplitude Pulse width Freq Ramp? Effect Side effect     C+ 3-4-  4.5  150 130     662 ohms, 2.93 V       C+ 3-4-  5.5         subj pulling of R face     C+ 3-4-  5.4       resolved       C+ 3-4-  Program 3, final  5.2       675 ohms, min 4.0, max 5.4                      C+2-  5.4  90  125     Right face tightening     C+2-  5.1  90  125     Resolved     C+2-  3.5  90  125         1. C+3-4-  2. C+2-  Program 5, final 1. 5.2  2. 3.5  1. 150  2. 90 1. 125  2. 125   925 ohms         BOTULINUM NEUROTOXIN INJECTION PROCEDURES:    VERIFICATION OF PATIENT IDENTIFICATION AND PROCEDURE     Initials   Patient Name KS   Patient  KS   Procedure Verified by: KS     Prior to the start of the procedure and with procedural staff participation, I verbally confirmed the patient s identity using two indicators, relevant allergies, that the procedure was appropriate and matched the consent or emergent situation, and that the correct equipment/implants were  available. Immediately prior to starting the procedure I conducted the Time Out with the procedural staff and re-confirmed the patient s name, procedure, and site/side. (The Joint Commission universal protocol was followed.)  Yes    Sedation (Moderate or Deep): None    Above assessments performed by:  Dr. Hopkins     INDICATION/S FOR PROCEDURE/S:  Mrs. Calero is year 54 old patient with dystonia affecting the  right upper extremity secondary to a diagnosis of Corticobasal syndrome with associated  pain, tremor, spasms, loss of joint motion, loss of volitional motor control, hygiene difficulty, difficulty with activities of daily living and difficulty with ambulation and transfers.     Her baseline symptoms have been recalcitrant to oral medications and conservative therapy.  She is here today for an injection of RUE and right shoulder.      GOAL OF PROCEDURE:  The goal of this procedure is to increase active range of motion, improve volitional motor control, decrease pain  and enhance functional independence associated with dystonic movements.    TOTAL DOSE ADMINISTERED:  Dose Administered:  270 units Botox    Diluent Used:  Preservative Free Normal Saline  Total Volume of Diluent Used:  1 ml  Lot # with Expiration Date:    [id number] (2 vials used) - expiration 02/2022   [id number] (1 vial used) - expiration 01/2022   NDC #: Botox 100u ([address]-15)    Medication guide was offered to patient and was accepted.    CONSENT:  The risks, benefits, and treatment options were discussed with Mrs. Souza and she agreed to proceed.      Written consent was obtained by KS.     EQUIPMENT USED:  Needle-37mm stimulating/recording  EMG/NCS Machine    SKIN PREPARATION:  Skin preparation was performed using an alcohol wipe.    GUIDANCE DESCRIPTION:  Electro-myographic guidance was necessary throughout the procedure to accurately identify all areas of dystonic muscles while avoiding injection of non-dystonic muscles, neighboring nerves  and nearby vascular structures.     AREA/MUSCLE INJECTED:    Muscles Injected Units Injected Number of Injections   Right deltoid 65 3   Right biceps 25 1   Right triceps 55 3   Right latissimus dorsi 40 2   Right teres 10 1   Right pec major 25 1   Right trapezius  50 3             Total Units Injected: 270    Unavoidable Waste: 30    Total Units Billed 300      The patient tolerated the injections without difficulty.    Impression:  Ms. Abdias Jara is a 54 year old R handed female with PMH of probable corticobasal syndrome s/p L GPi DBS placement that presents to Neurology Movement clinic for follow up for DBS interrogation and management. She reports improvement on Program 3, though lasting only a couple weeks, in which she described decreased rigidity and pain in her RUE. She now has return of marked rigidity with associated pain involving her RUE that is uncontrolled on current medication regimen and Program 3 of her DBS.      Impedances were checked and no issues were found in patient's leads. Patient's DBS settings were changed to improve rigidity and pain in RUE. The amplitude on Program 3 was increased from 4.5 to 5.2 mA. A new program, Program 5, was created with interleaving involving C+3-4- and C+2-. Patient remains on Program 3.     Patient also underwent botulinum toxin injections focusing on her RUE and shoulders to alleviate pain and improve range of motion.     1. Probable corticobasal syndrome s/p left GPi DBS placement on 2/22/2019  2. 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 Generalized anxiety disorder     Plan:   - Changes to DBS settings detailed above  - Patient continues on Program 3  - Repeat botulinum toxin injections in 3 months   - Start duloxetine to goal dose of 60 mg each evening for anxiety and pain   - Start 30 mg each evening x 7 days then increase to 60 mg each evening  - Please consider having someone to be around to assist you with walking at all times  - No  changes to meds    Movement Disorder-related Medications                    200PM 600PM 1000PM     Carbidopa/levodopa 25/100                                         3  2 2 2         Clonazepam 0.5 mg                             1/2   Tramadol 50mg                             1      1    Baclofen 10mg   1       1     RTC: 3 months     Time spent for separate DBS programmin minutes    Marcella Worley DO  Movement Disorders Fellow    Patient seen and discussed with Dr. Hopkins.     Neurology Attending Attestation:     I, Tiffany Hopkins, personally saw this patient with our Movement Disorders Fellow and agree with the fellow's findings and plan of care as documented in the movement disorder fellow's note. I personally performed salient aspects of the history and neurological examination.     I personally reviewed the vital signs, medications, and labs. I personally viewed the imaging, and agree with the interpretation documented by the fellow.    I personally performed or supervised all procedures.    Time spent with patient: Greater than 50% of this 40 minute visit was spent in counseling and coordination of care related to the above issues.    Additional time spent for separate DBS programmin  minutes    Tiffany Hopkins MD    of Neurology

## 2019-07-18 NOTE — PATIENT INSTRUCTIONS
1. DBS - You are still on Program 3, but I increased the settings from 4.5 to 5.2 mA.    I also set up a new alternative program to try, Program 5 (which has an interleaving setting)    2. We repeated botox injections today focusing on the arm and shoulder to help your pain and range of motion.    You should start to notice effects after 3-7 days with full response starting at 2 weeks    3. To help with anxiety and stress, I recommend you start duloxetine 30mg each evening x 1 week, then increase to 60mg each evening. This medication may also help with pain to some degree.    Possible side effects: dry mouth, constipation, elevated blood pressure (rare), and possibly some increased anxiety initially on occasion.     We want to give this at least 6 weeks to assess its effect    4.Follow-up in 3 months

## 2019-07-18 NOTE — PROGRESS NOTES
Department of Neurology  Movement Disorders Division     Patient: Sandra Jara   MRN: [id number]   : 1965   Date of Visit: 2019     Diagnosis: Corticobasal syndrome        DBS Target(s): Left GPi  Date(s) of DBS Lead Placement:   2019  Date(s) of IPG Placement:   3/1/2019    Reason for visit: Corticobasal syndrome follow up     History of Present Illness  Ms. 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 Patient was last seen on 2019 where the DBS settings were evaluated to improve pain in right arm, dystonia and myoclonus. A new program, Program 3, was created though patient was asked to remain on Program 2 to allow this program enough time to evaluate for benefit. Several weeks later, patient called and reported worsening shaking and muscle spasms and right foot swelling and pain. She was instructed to switch to Program 3 at that time.     History obtained from patient and accompanied by  and daughter. Patient reports that Program 3 began to take effect 2 to 10 days after switching.  She reported good pain control and tremor control on this program.  She noted that she was able to hold her pain medications.  This lasted for a couple weeks.  Her  noted that she was able to move her right arm while taking baths which is a huge improvement for her. However, in the past 2 weeks, she began to develop pain in her right shoulder and arm described as stiffness and rigidity which is a similar pain to what she has previously encountered.  She notes that the pain and rigidity in her right arm is better controlled in the morning but by 2 PM to 5 PM, the pain is uncontrolled.  She states that her right arm is very stiff and unable to relax and she does not have function of this arm.  Patient holds her right wrist with her  "left hand to \"relieve pain in her right shoulder.\"  She describes the pain in her right arm today as 9-10 out of 10.  She also describes stiffness in her right leg but denies pain.  She does have poor balance.  She uses furniture at home to maneuver around to prevent from falling.  She does not use walking aids.  She last fell about 2 weeks ago while walking out the door.  She states that she fell backwards after losing her balance prior to reaching the threshold.  She now needs assistance from getting up from a seated position, which is new from previous clinic visit.  She states that this is due to the pain she experiences.  She states that she feels anxious due to daily events such as what occurs at work as well as the pain which then makes her pain and rigidity worse. She reports that when she feels less anxious, her pain is more manageable.     She continues to work full-time.  She reports good sleep with clonazepam.  She has 1 bowel movement a day.  Her weight has been steady and her appetite is good.  She continues to participate in physical therapy.    Review of Systems:  Other than that mentioned above, the remainder of 12 systems reviewed were negative.    PMH: unchanged  PSH: unchanged  FH: unchanged  SH: unchanged    Medications:  Current Outpatient Medications   Medication Sig Dispense Refill     acyclovir (ZOVIRAX) 200 MG capsule as needed  1     baclofen (LIORESAL) 10 MG tablet Please take 1/2 tab to 1 tab up to three times a day as needed. 30 tablet 3     calcium-vitamin D (OSCAL) 250-125 MG-UNIT TABS per tablet Take 1 tablet by mouth 2 times daily       carbidopa-levodopa (SINEMET CR)  MG CR tablet Take 1 tablet by mouth At Bedtime 30 tablet 11     carbidopa-levodopa (SINEMET)  MG tablet Take 3 tabs at 530AM, and 2 tabs at 10AM, 2PM, 6PM, and 10PM 990 tablet 3     Cholecalciferol (VITAMIN D) 2000 UNITS tablet Take 2,000 Units by mouth every morning        clonazePAM (KLONOPIN) 0.5 MG " "tablet Take 0.5 tablets (0.25 mg) by mouth At Bedtime 15 tablet 5     DULoxetine (CYMBALTA) 30 MG capsule Start 1 cap po qPM x 1 week, then increase to 2 caps po qPM 60 capsule 3     traMADol (ULTRAM) 50 MG tablet Take 1/2 to 1 tab by mouth every 6 hours as needed, up to 3 tabs per day. 90 tablet 0     botulinum toxin type A (BOTOX) 100 units injection Inject 280 Units into the muscle once Lot # /[id number] with Expiration Date:  11/2020       OnabotulinumtoxinA (BOTOX IJ) Inject 300 Units as directed once [id number] with Expiration Date:  12/2020           Movement Disorder-related Medications                    200PM 600PM 1000PM     Carbidopa/levodopa 25/100                                         3  2 2 2         Clonazepam 0.5 mg                             1/2   Tramadol 50mg                             1      1    Baclofen 10mg   1       1   Clonazepam last taken at 10 PM 7/17/19  CD/LD last taken at 3 tabs 1020 AM  Tramadol last taken at 1030 AM      Physical Exam:  The patient's  height is 1.664 m (5' 5.5\") and weight is 67.1 kg (148 lb). Her oral temperature is 98  F (36.7  C). Her blood pressure is 141/90 and her pulse is 107. Her respiration is 16 and oxygen saturation is 97%.    Physical Exam:  Gen: alert, active, attentive, appropriately groomed   HEENT: normocephalic, eyes open with no discharge, nares patent, oropharynx clear-no lesions  Chest: normal configuration, chest rise equal b/l, non labored breathing   Extremities: distal pulses intact, several bruises over RUE from deep massages   Psych: mood stable, appears to be in some distress secondary to pain     Neurologic Exam:  Mental status: Alert and oriented to person, place, time, and situation. Follows commands. Recent and remote memory intact. Attention span and concentration intact. Fund of knowledge intact to current events.   Speech: Decreased verbal output, intact comprehension and articulation  CN: visual fields intact in all " fields, EOMIB,  no nystagmus, normal saccades, PERRL, facial sensation intact, facial movement symmetric, hearing grossly intact to conversation, tongue protrudes midline   Motor: Moves LUE, LLE equally against gravity, markedly increased rigidity in RUE. PROM and AROM restricted to extension and flexion at the elbow, wrist, fingers, and shoulder. Moderately increased rigidity in RLE. Some myoclonus observed of RUE.   Coordination: grossly intact with FTN in left arm, unable to perform in RUE.  Gait: unable to rise unassisted from a seated position and requires one person to provide an initial boost but able to rise with minimal assist afterward, decreased arm swing and length of gait, right foot internally rotated, right leg rigid with some circumlocution, right arm posturing      UPDRS III  UPDRS Values 4/18/2019 7/18/2019   Time: 12:00 PM 10:46 AM   Medication On On   R Brain DBS: None None   L Brain DBS: On On   Speech 1 1   Facial Expression 1 2   Rigidity Neck 2 3   Rigidity RUE 4 4   Rigidity LUE 2 2   Rigidity RLE 2 4   Rigidity LLE 0 0   Finger Taps R 4 4   Finger Taps L 3 4   Hand Mvt R 4 4   Hand Mvt L 3 4   Pron-/Supinate R 4 4   Pron-/Supinate L 3 3   Toe Tap R 4 4   Toe Tap L 3 3   Leg Agility R 4 4   Leg Agility L 3 4   Arise From Chair 1 4   Gait 1 2   Gait Freezing 0 0   Postural Stability 0 3   Posture 1 1   Global Spont Mvt 1 2   Postural Tremor RUE 3 3   Postural Tremor LUE 2 2   Kinetic Tremor RUE 3 3   Kinetic Tremor LUE 2 2   Rest Tremor RUE 3 3   Rest Tremor LUE 0 0   Rest Tremor RLE 0 0   Rest Tremor LLE 0 0   Rest Tremor Lip/Jaw 0 0   Rest Tremor Constancy 3 4   Total Right 35 37   Total Left 21 24   Axial Total 8 18   Total 67 83     Procedure: DBS Programming     Lead(s):     Left Right   DBS Target GPi        DBS Lead Type    Abbott 0.5mm spacing      Lead Implant Date   2/22/19        IPG(s):    1 2   IPG Infinity 6660        IPG Implant Date 3/1/19     Location    L chest     Battery  (V) 2.93 V        Full Impedence/Currents Check: WNL     Initial Settings:  Left Brain Program 2 Program 3   Contacts  C+3- C+3-4-    Amplitude (mA)  4.5 4.5    Pulse Width ( s)  150  150   Frequency (Hz)  130  130   Impedence  662   Patient Control (min/max) 3.0/4.5 4.0/5.5     Initial program selected:    Program 3       Final Settings:     Left Brain Program 2 Program 3 Program 5 *   Contacts  C+3- C+3-4- 1. C+3-4-  2. C+2-    Amplitude (mA)  4.5 5.2 * 1. 5.2  2. 3.5   Pulse Width ( s) 150 150 1. 150  2. 90   Frequency (Hz) 130 130 1. 125  2. 125   Impedence    925   Patient Control (min/max) 3.0/4.5 4.0/5.4    * = change     Final Program selected:    Program 3    Left GPi  Contacts Amplitude Pulse width Freq Ramp? Effect Side effect    C+ 3-4-  4.5  150 130     662 ohms, 2.93 V       C+ 3-4-  5.5         subj pulling of R face     C+ 3-4-  5.4       resolved       C+ 3-4-  Program 3, final  5.2       675 ohms, min 4.0, max 5.4                      C+2-  5.4  90  125     Right face tightening     C+2-  5.1  90  125     Resolved     C+2-  3.5  90  125         1. C+3-4-  2. C+2-  Program 5, final 1. 5.2  2. 3.5  1. 150  2. 90 1. 125  2. 125   925 ohms           BOTULINUM NEUROTOXIN INJECTION PROCEDURES:    VERIFICATION OF PATIENT IDENTIFICATION AND PROCEDURE     Initials   Patient Name KS   Patient  KS   Procedure Verified by: KS     Prior to the start of the procedure and with procedural staff participation, I verbally confirmed the patient s identity using two indicators, relevant allergies, that the procedure was appropriate and matched the consent or emergent situation, and that the correct equipment/implants were available. Immediately prior to starting the procedure I conducted the Time Out with the procedural staff and re-confirmed the patient s name, procedure, and site/side. (The Joint Commission universal protocol was followed.)  Yes    Sedation (Moderate or Deep): None      Above assessments  performed by:  Dr. Hopkins     INDICATION/S FOR PROCEDURE/S:  Mrs. Calero is year 54 old patient with dystonia affecting the  right upper extremity secondary to a diagnosis of Corticobasal syndrome with associated  pain, tremor, spasms, loss of joint motion, loss of volitional motor control, hygiene difficulty, difficulty with activities of daily living and difficulty with ambulation and transfers.     Her baseline symptoms have been recalcitrant to oral medications and conservative therapy.  She is here today for an injection of RUE and right shoulder.      GOAL OF PROCEDURE:  The goal of this procedure is to increase active range of motion, improve volitional motor control, decrease pain  and enhance functional independence associated with dystonic movements.    TOTAL DOSE ADMINISTERED:  Dose Administered:  270 units Botox    Diluent Used:  Preservative Free Normal Saline  Total Volume of Diluent Used:  1 ml  Lot # with Expiration Date:    [id number] (2 vials used) - expiration 02/2022   [id number] (1 vial used) - expiration 01/2022   NDC #: Botox 100u ([address]-23)    Medication guide was offered to patient and was accepted.    CONSENT:  The risks, benefits, and treatment options were discussed with Mrs. Souza and she agreed to proceed.      Written consent was obtained by KS.     EQUIPMENT USED:  Needle-37mm stimulating/recording  EMG/NCS Machine    SKIN PREPARATION:  Skin preparation was performed using an alcohol wipe.    GUIDANCE DESCRIPTION:  Electro-myographic guidance was necessary throughout the procedure to accurately identify all areas of dystonic muscles while avoiding injection of non-dystonic muscles, neighboring nerves and nearby vascular structures.     AREA/MUSCLE INJECTED:          Muscles Injected Units Injected Number of Injections   Right deltoid 65 3   Right biceps 25 1   Right triceps 55 3   Right latissimus dorsi 40 2   Right teres 10 1   Right pec major 25 1   Right trapezius  50 3              Total Units Injected: 270    Unavoidable Waste: 30    Total Units Billed 300      The patient tolerated the injections without difficulty.    Impression:  Ms. Abdias Jara is a 54 year old R handed female with PMH of probable corticobasal syndrome s/p L GPi DBS placement that presents to Neurology Movement clinic for follow up for DBS interrogation and management. She reports improvement on Program 3, though lasting only a couple weeks, in which she described decreased rigidity and pain in her RUE. She now has return of marked rigidity with associated pain involving her RUE that is uncontrolled on current medication regimen and Program 3 of her DBS.      Impedances were checked and no issues were found in patient's leads. Patient's DBS settings were changed to improve rigidity and pain in RUE. The amplitude on Program 3 was increased from 4.5 to 5.2 mA. A new program, Program 5, was created with interleaving involving C+3-4- and C+2-. Patient remains on Program 3.     Patient also underwent botulinum toxin injections focusing on her RUE and shoulders to alleviate pain and improve range of motion.     1. Probable corticobasal syndrome s/p left GPi DBS placement on 2/22/2019  2. 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 Generalized anxiety disorder     Plan:   - Changes to DBS settings detailed above  - Patient continues on Program 3  - Repeat botulinum toxin injections in 3 months   - Start duloxetine to goal dose of 60 mg each evening for anxiety and pain   - Start 30 mg each evening x 7 days then increase to 60 mg each evening  - Please consider having someone to be around to assist you with walking at all times  - No changes to meds  Movement Disorder-related Medications                    200PM 600PM 1000PM     Carbidopa/levodopa 25/100                                         3  2 2 2         Clonazepam 0.5 mg                             1/2   Tramadol 50mg                              1      1    Baclofen 10mg   1       1     RTC: 3 months     Time spent for separate DBS programmin minutes    Marcella Worley DO  Movement Disorders Fellow    Patient seen and discussed with Dr. Hopkins.     Neurology Attending Attestation:     I, Tiffany Hopkins, personally saw this patient with our Movement Disorders Fellow and agree with the fellow's findings and plan of care as documented in the movement disorder fellow's note. I personally performed salient aspects of the history and neurological examination.     I personally reviewed the vital signs, medications, and labs. I personally viewed the imaging, and agree with the interpretation documented by the fellow.    I personally performed or supervised all procedures.    Time spent with patient: Greater than 50% of this 40 minute visit was spent in counseling and coordination of care related to the above issues.    Additional time spent for separate DBS programmin  minutes    Tiffany Hopkins MD    of Neurology

## 2019-07-18 NOTE — NURSING NOTE
Chief Complaint   Patient presents with     DBS Programming     P RETURN MOVEMENT DISORDER- DBS       Farooq Torres, EMT

## 2019-07-22 PROBLEM — F41.1 GENERALIZED ANXIETY DISORDER: Status: ACTIVE | Noted: 2019-07-22

## 2019-07-24 ENCOUNTER — DOCUMENTATION ONLY (OUTPATIENT)
Dept: NEUROLOGY | Facility: CLINIC | Age: 54
End: 2019-07-24

## 2019-07-24 NOTE — PROGRESS NOTES
Received form from [facility], [place], operated by [facility], form was placed in provider folder for signature     Received forms back signed by provider, forms were fax to [phone], sent to be scanned

## 2019-07-25 ENCOUNTER — TRANSFERRED RECORDS (OUTPATIENT)
Dept: HEALTH INFORMATION MANAGEMENT | Facility: CLINIC | Age: 54
End: 2019-07-25

## 2019-07-25 ENCOUNTER — MYC MEDICAL ADVICE (OUTPATIENT)
Dept: NEUROLOGY | Facility: CLINIC | Age: 54
End: 2019-07-25

## 2019-07-25 DIAGNOSIS — K59.01 SLOW TRANSIT CONSTIPATION: Primary | ICD-10-CM

## 2019-07-25 RX ORDER — AMOXICILLIN 250 MG
1 CAPSULE ORAL DAILY
Qty: 90 TABLET | Refills: 3 | Status: SHIPPED | OUTPATIENT
Start: 2019-07-25 | End: 2020-04-09

## 2019-07-25 NOTE — TELEPHONE ENCOUNTER
Called patient and spoke with Izaiah, her . Patient was on a conference call. Izaiah states that patient's RUE pain and stiffness have not improved. She is unable to walk now due to the pain and she has not been to work since last Thursday. In the past, Botox injections usually start around a week and they do not notice improvement. We discussed that it may take up to 1.5 to 2 weeks to start to notice an improvement. Patient has been taking 0.5 tabs tramadol 50 twice a day when she wakes up and when going to bed. She is experiencing constipation and is hesitant to take more. She takes baclofen 10 mg 1 tab at bedtime. She is also taking ibuprofen 3 tabs every few hours prn. We discussed scheduling tramadol and baclofen. Increase dose of tramadol 50 mg to one tab twice a day and baclofen 10 mg to one tab twice a day, scheduled. For constipation, I sent a prescription for senna/docusate to their pharmacy.  We dicussed seeing how well this helps to improve her pain and if she does not notice improvement in 1-2 days then switch to Program 5 on her DBS. However, if pain is unbearable, change meds as above and change to Program 5 today. Izaiah will discuss with his wife what she would like to do. They will call back with an update.     Marcella Worley DO  Movement Disorders Fellow  [facility]    This patient was discussed with Dr. Hopkins.

## 2019-08-12 DIAGNOSIS — G24.8 ACQUIRED TORSION DYSTONIA: Primary | ICD-10-CM

## 2019-08-12 NOTE — TELEPHONE ENCOUNTER
Last Written Prescription Date:  6/20/19  Last Fill Quantity: 90,  # refills: 0   Last office visit: 7/18/19  Future Office Visit:  10/10/19

## 2019-08-14 RX ORDER — TRAMADOL HYDROCHLORIDE 50 MG/1
TABLET ORAL
Qty: 90 TABLET | Refills: 0 | Status: SHIPPED | OUTPATIENT
Start: 2019-08-14 | End: 2019-12-07

## 2019-08-16 ENCOUNTER — MYC REFILL (OUTPATIENT)
Dept: NEUROLOGY | Facility: CLINIC | Age: 54
End: 2019-08-16

## 2019-08-16 DIAGNOSIS — G24.8 ACQUIRED TORSION DYSTONIA: ICD-10-CM

## 2019-08-16 RX ORDER — TRAMADOL HYDROCHLORIDE 50 MG/1
TABLET ORAL
Qty: 90 TABLET | Refills: 0 | OUTPATIENT
Start: 2019-08-16

## 2019-08-21 ENCOUNTER — DOCUMENTATION ONLY (OUTPATIENT)
Dept: NEUROLOGY | Facility: CLINIC | Age: 54
End: 2019-08-21

## 2019-08-21 NOTE — PROGRESS NOTES
Received PT form from [facility], form was placed in provider folder for signature    Received forms back signed by provider, forms were fax to [phone], sent to be scanned

## 2019-08-22 ENCOUNTER — TRANSFERRED RECORDS (OUTPATIENT)
Dept: HEALTH INFORMATION MANAGEMENT | Facility: CLINIC | Age: 54
End: 2019-08-22

## 2019-09-16 ENCOUNTER — MYC REFILL (OUTPATIENT)
Dept: NEUROLOGY | Facility: CLINIC | Age: 54
End: 2019-09-16

## 2019-09-16 DIAGNOSIS — F41.9 ANXIETY: ICD-10-CM

## 2019-09-16 NOTE — TELEPHONE ENCOUNTER
Rx Authorization:    Requested Medication/ Dose  clonazePAM (KLONOPIN) 0.5 MG     Date last refill ordered: 03/13/19    Quantity ordered: 15    # refills: 5    Date of last clinic visit with ordering provider: 07/18/19    Date of next clinic visit with ordering provider: 10/10/19    All pertinent protocol data (lab date/result):     Include pertinent information from patients message:

## 2019-09-18 RX ORDER — CLONAZEPAM 0.5 MG/1
0.25 TABLET ORAL AT BEDTIME
Qty: 15 TABLET | Refills: 5
Start: 2019-09-18 | End: 2020-04-09

## 2019-09-18 NOTE — TELEPHONE ENCOUNTER
clonazePAM (KLONOPIN) 0.5 MG tablet 15 tablet 5 9/18/2019  No   Sig - Route: Take 0.5 tablets (0.25 mg) by mouth At Bedtime - Oral     Called [facility] pharmacy and left a message for the pharmacist to refill the above prescription.

## 2019-09-27 ENCOUNTER — DOCUMENTATION ONLY (OUTPATIENT)
Dept: NEUROLOGY | Facility: CLINIC | Age: 54
End: 2019-09-27

## 2019-09-27 NOTE — PROGRESS NOTES
Forms have been reviewed, signed, and faxed to [facility]. Document has been sent to scan [phone]    Received Plan Of Care from [facility], forms were placed in provider folder for signature

## 2019-10-01 ENCOUNTER — TRANSFERRED RECORDS (OUTPATIENT)
Dept: HEALTH INFORMATION MANAGEMENT | Facility: CLINIC | Age: 54
End: 2019-10-01

## 2019-10-16 ENCOUNTER — OFFICE VISIT (OUTPATIENT)
Dept: NEUROLOGY | Facility: CLINIC | Age: 54
End: 2019-10-16
Payer: COMMERCIAL

## 2019-10-16 VITALS
WEIGHT: 148.8 LBS | DIASTOLIC BLOOD PRESSURE: 73 MMHG | OXYGEN SATURATION: 98 % | HEART RATE: 91 BPM | BODY MASS INDEX: 24.39 KG/M2 | SYSTOLIC BLOOD PRESSURE: 131 MMHG

## 2019-10-16 DIAGNOSIS — G24.1 DYSTONIA, TORSION, FRAGMENTS OF: ICD-10-CM

## 2019-10-16 DIAGNOSIS — G31.85 CORTICOBASAL DEGENERATION (H): Primary | ICD-10-CM

## 2019-10-16 DIAGNOSIS — G24.9 DYSTONIA: ICD-10-CM

## 2019-10-16 ASSESSMENT — UNIFIED PARKINSONS DISEASE RATING SCALE (UPDRS)
TOETAPPING_LEFT: MODERATE: ANY OF THE FOLLOWING:  A) MORE THAN 5 INTERRUPTIONS OR AT LEAST ONE LONGER ARREST (FREEZE) IN ONGOING MOVEMENT  B) MODERATE SLOWING C) THE AMPLITUDE DECREMENTS STARTING AFTER THE FIRST MOVEMENT.
SPEECH: SLIGHT: LOSS OF MODULATION, DICTION OR VOLUME, BUT STILL ALL WORDS EASY TO UNDERSTAND.
FACIAL_EXPRESSION: SLIGHT: MINIMAL MASKED FACIES MANIFESTED ONLY BY DECREASED FREQUENCY OF BLINKING.
FINGER_TAPPING_RIGHT: SEVERE: CANNOT OR CAN ONLY BARELY PERFORM THE TASK BECAUSE OF SLOWING, INTERRUPTIONS, OR DECREMENTS.
CONSTANCY_TREMOR_ATREST: NORMAL: NO TREMOR.
RIGIDITY_RUE: SEVERE: RIGIDITY DETECTED WITHOUT THE ACTIVATION MANEUVER AND FULL RANGE OF MOTION NOT ACHIEVED.
PARKINSONS_MEDS: ON
AXIAL_SCORE: 18
RIGIDITY_NECK: MODERATE: RIGIDITY DETECTED WITHOUT THE ACTIVATION MANEUVER. FULL RANGE OF MOTION IS ACHIEVED WITH EFFORT.
LEG_AGILITY_RIGHT: SEVERE: CANNOT OR CAN ONLY BARELY PERFORM THE TASK BECAUSE OF SLOWING, INTERRUPTIONS, OR DECREMENTS.
PRONATION_SUPINATION_LEFT: MODERATE: ANY OF THE FOLLOWING:  A) MORE THAN 5 INTERRUPTIONS  OR AT LEAST ONE LONGER ARREST (FREEZE) IN ONGOING MOVEMENT  B) MODERATE SLOWING C) THE AMPLITUDE DECREMENTS STARTING AFTER THE FIRST MOVEMENT.
POSTURE: 1 SLIGHT.  NOT QUITE ERECT BUT COULD BE NORMAL FOR OLDER PERSONS.
LEG_AGILITY_LEFT: MODERATE: ANY OF THE FOLLOWING:  A) MORE THAN 5 INTERRUPTIONS  OR AT LEAST ONE LONGER ARREST (FREEZE) IN ONGOING MOVEMENT  B) MODERATE SLOWING C) THE AMPLITUDE DECREMENTS STARTING AFTER THE FIRST MOVEMENT.
ARISING_CHAIR: SEVERE: UNABLE TO ARISE WITHOUT HELP.
SPONTANEITY_OF_MOVEMENT: 2: MILD: MILD GLOBAL SLOWNESS AND POVERTY OF SPONTANEOUS MOVEMENTS.
RIGIDITY_LUE: MILD: RIGIDITY DETECTED WITHOUT THE ACTIVATION MANEUVER.  FULL RANGE OF MOTION IS EASILY ACHIEVED.
AMPLITUDE_LLE: NORMAL: NO TREMOR.
HANDMOVEMENTS_RIGHT: SEVERE: CANNOT OR CAN ONLY BARELY PERFORM THE TASK BECAUSE OF SLOWING, INTERRUPTIONS, OR DECREMENTS.
RIGIDITY_RLE: SEVERE: RIGIDITY DETECTED WITHOUT THE ACTIVATION MANEUVER AND FULL RANGE OF MOTION NOT ACHIEVED.
FINGER_TAPPING_LEFT: MODERATE: ANY OF THE FOLLOWING:  A) MORE THAN 5 INTERRUPTIONS  OR AT LEAST ONE LONGER ARREST (FREEZE) IN ONGOING MOVEMENT  B) MODERATE SLOWING C) THE AMPLITUDE DECREMENTS STARTING AFTER THE FIRST MOVEMENT.
HANDMOVEMENTS_LEFT: MODERATE: ANY OF THE FOLLOWING:  A) MORE THAN 5 INTERRUPTIONS  OR AT LEAST ONE LONGER ARREST (FREEZE) IN ONGOING MOVEMENT  B) MODERATE SLOWING C) THE AMPLITUDE DECREMENTS STARTING AFTER THE FIRST MOVEMENT.
AMPLITUDE_RLE: NORMAL: NO TREMOR.
RIGIDITY_LLE: NORMAL
AMPLITUDE_RUE: MODERATE: 3 - 10 CM IN MAXIMAL AMPLITUDE.
GAIT: MODERATE: REQUIRES AN ASSISTANCE DEVICE FOR SAFE WALKING (WALKING STICK, WALKER) BUT NOT A PERSON.
AMPLITUDE_LIP_JAW: NORMAL: NO TREMOR.
TOETAPPING_RIGHT: SEVERE: CANNOT OR CAN ONLY BARELY PERFORM THE TASK BECAUSE OF SLOWING, INTERRUPTIONS, OR DECREMENTS.
PRONATION_SUPINATION_RIGHT: SEVERE: CANNOT OR CAN ONLY BARELY PERFORM THE TASK BECAUSE OF SLOWING, INTERRUPTIONS, OR DECREMENTS.
TOTAL_SCORE: 77
FREEZING_GAIT: NORMAL
POSTURAL_STABILITY: MODERATE: STANDS SAFELY, BUT WITH ABSENCE OF POSTURAL RESPONSE,  FALLS IF NOT CAUGHT BY EXAMINER.
TOTAL_SCORE: 37
AMPLITUDE_LUE: SLIGHT: < 1 CM IN MAXIMAL AMPLITUDE.
TOTAL_SCORE_LEFT: 22

## 2019-10-16 ASSESSMENT — PAIN SCALES - GENERAL: PAINLEVEL: MODERATE PAIN (4)

## 2019-10-16 NOTE — PROGRESS NOTES
Movement Disorders Botulinum Toxin Clinic Note    Chief Complaint: Corticobasal syndrome with dystonia involving RUE, right shoulder, right leg    History of Present Illness:  Sandra Jara is a 54 year old female who presents to clinic for botulinum toxin injections for treatment of dystonia involving right upper extremity, right shoulder, right leg.       Response to Last Injection: She reports good affect from previous injections on 7/18/2019.     Onset of effect:  About 2 weeks after injections.     Benefit from last injections:  No shaking and pain reduction to a 0/10.     Wearing off: She noticed wearing off about 2 weeks prior to this appointment.     Side effects: She reports no side effects.     Additional interval history: Her  accompanied her to this visit. She is on Program 5. She reports this program has not affected her in a negative way. She keeps the DBS on at all times. She reports discomfort in her right foot as her toes curl and her foot it turns in and she is unable to control it. She also states that her fingers are stuck in a clenched fist at all times.     She is planning to retire at the end of this year. She is sad that she must stop working but understands that her disease makes it difficult to complete the tasks needed of her for her job. Her  is very supportive and caring. They recently went on a family reunion vacation/trip to [place] for 2 weeks. They all went to visit her daughter whom is studying abroad. Her daughter is majoring in journalism in [place].       Current Outpatient Medications   Medication Sig Dispense Refill     acyclovir (ZOVIRAX) 200 MG capsule as needed  1     baclofen (LIORESAL) 10 MG tablet Please take 1/2 tab to 1 tab up to three times a day as needed. 30 tablet 3     botulinum toxin type A (BOTOX) 100 units injection Inject 280 Units into the muscle once Lot # /[id number] with Expiration Date:  11/2020       carbidopa-levodopa (SINEMET)   MG tablet Take 3 tabs at 530AM, and 2 tabs at 10AM, 2PM, 6PM, and 10PM 990 tablet 3     Cholecalciferol (VITAMIN D) 2000 UNITS tablet Take 2,000 Units by mouth every morning        clonazePAM (KLONOPIN) 0.5 MG tablet Take 0.5 tablets (0.25 mg) by mouth At Bedtime 15 tablet 5     OnabotulinumtoxinA (BOTOX IJ) Inject 300 Units as directed once [id number] with Expiration Date:  12/2020       senna-docusate (SENOKOT-S/PERICOLACE) 8.6-50 MG tablet Take 1 tablet by mouth daily 90 tablet 3     traMADol (ULTRAM) 50 MG tablet Take 1/2 to 1 tab by mouth every 6 hours as needed, up to 3 tabs per day. 90 tablet 0     calcium-vitamin D (OSCAL) 250-125 MG-UNIT TABS per tablet Take 1 tablet by mouth daily        carbidopa-levodopa (SINEMET CR)  MG CR tablet Take 1 tablet by mouth At Bedtime 30 tablet 11     DULoxetine (CYMBALTA) 30 MG capsule Start 1 cap po qPM x 1 week, then increase to 2 caps po qPM (Patient not taking: Reported on 10/16/2019) 60 capsule 3        Movement Disorder-related Medications                    200PM 600PM 1000PM     Carbidopa/levodopa 25/100                                         3   2 2 2 2        Clonazepam 0.5 mg                               1/2   Tramadol 50mg                             0.5       0.5   Baclofen 10mg - not taking   1       1       Allergies: She has No Known Allergies.    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 It demonstrated some mild changes of right carpal tunnel syndrome but was otherwise normal.       History of MRI of  brain and brain stem 12/21/2014    A brain MRI scan done on 06/27/2014 revealed no abnormalities.      History of MRI of cervical spine 12/21/2014    A cervical MRI done on 06/10/2014 revealed some mild to moderate degenerative changes but no significant central canal or foraminal stenosis, and no abnormalities of the spinal cord were noted.       Movement disorder 12/21/2014    I saw your patient, Deepali Contreras on 12/15/2014. She is a 49-year-old right-handed female here for evaluation of right upper extremity dysfunction and right hand tremor.  She has noted this problem for the last couple of years. She reports she is losing dexterity in her right hand. She has appreciated a tremor of the right hand with actions such as writing or postural maintenance. She has n       Past Surgical History:   Procedure Laterality Date     APPENDECTOMY       IMPLANT DEEP BRAIN STIMULATION GENERATOR / BATTERY Left 3/1/2019    Procedure: Left Deep Brain Stimulator Placement, Phase II, Placement Of Deep Brain Stimulator Generator/Battery Over The Left Chest Wall Latex Free;  Surgeon: Efren Triana MD;  Location: [facility] OR     OPTICAL TRACKING SYSTEM INSERTION DEEP BRAIN STIMULATION Left 2/22/2019    Procedure: Stealth Assisted Left Side Deep Brain Stimulator Placement, Phase I, Placement Of Left Side Deep Brain Stimulator Electrode, Target Left Globus Pallidus Internus With Microelectrode Recording;  Surgeon: Efren Triana MD;  Location: [facility] OR       Social History     Socioeconomic History     Marital status:      Spouse name: Not on file     Number of children: 3     Years of education: Not on file     Highest education level: Not on file   Occupational History     Not on file   Social Needs     Financial resource strain: Not on file     Food insecurity:     Worry: Not on file     Inability: Not on file     Transportation needs:     Medical: Not on file     Non-medical: Not on file   Tobacco Use      Smoking status: Never Smoker     Smokeless tobacco: Never Used   Substance and Sexual Activity     Alcohol use: Yes     Alcohol/week: 2.0 - 3.0 standard drinks     Frequency: 2-4 times a month     Drinks per session: 1 or 2     Drug use: No     Sexual activity: Yes     Partners: Male     Birth control/protection: Male Surgical   Lifestyle     Physical activity:     Days per week: Not on file     Minutes per session: Not on file     Stress: Not on file   Relationships     Social connections:     Talks on phone: Not on file     Gets together: Not on file     Attends Mandaeism service: Not on file     Active member of club or organization: Not on file     Attends meetings of clubs or organizations: Not on file     Relationship status: Not on file     Intimate partner violence:     Fear of current or ex partner: Not on file     Emotionally abused: Not on file     Physically abused: Not on file     Forced sexual activity: Not on file   Other Topics Concern     Parent/sibling w/ CABG, MI or angioplasty before 65F 55M? No   Social History Narrative        Izaiah Jara    Lives in [facility]      She does not smoke.  She has a couple of beers to drink on the weekend but otherwise is not a heavy user of alcohol    3 kids: son peña 21; 94, 96 and 98    2 sons    1 daughter    2 are in college and daughter is a sophomore.            FAMILY HISTORY:  Notable for a paternal aunt who is .  She had Parkinson's disease.  Otherwise, there is no other family history of neurologic problems.  There is no family history of dystonia.          90% [place]ian    Some norweigian                .         Family History   Problem Relation Age of Onset     Breast Cancer Mother      Cancer - colorectal Mother      Macular Degeneration Mother      Dementia Father      Deep Vein Thrombosis Father      No Known Problems Brother      No Known Problems Sister      No Known Problems Sister      No Known Problems Sister       Neurologic Disorder Paternal Aunt         Parkinson's Disease     Cerebrovascular Disease Brother         stroke at age 25 possibly from pfo     Dementia Paternal Grandmother        Physical Examination:  Vital Signs:   weight is 67.5 kg (148 lb 12.8 oz). Her blood pressure is 131/73 and her pulse is 91. Her oxygen saturation is 98%.   Physical Exam:  GENERAL: alert, active, attentive, appropriately groomed   HEENT: normocephalic, eyes open with no discharge, nares patent, oropharynx clear-no lesions  CHEST: normal configuration, chest rise equal b/l, non labored breathing   EXTREMITIES: no edema/cyanosis in BUE/BLE, distal pulses intact  PSYCH: mood stable     Neurologic Exam:  MENTAL STATUS: Alert and oriented to person, place, time, and situation. Follows commands. Recent and remote memory intact. Attention span and concentration intact. Fund of knowledge intact to current events.   SPEECH: Fluent, intact comprehension and articulation, bradyphrenic  CN: visual fields intact in all fields, EOMIB,  no nystagmus, normal saccades, PERRL, facial sensation intact, facial movement symmetric, hearing grossly intact to conversation, tongue protrudes midline   MOTOR: Moves all extremities equally against gravity without difficulty, dystonic tremor of RUE, mild tremor of LUE  Involuntary movements: refer to UPDRS III  SENSATION: intact to light touch throughout   GAIT: requires assistance from  to rise from seated position, holds on to 's hand for assistance with ambulation, right foot inverted with slight APF.     UPDRS III  UPDRS Values 10/16/2019   Time: 12:30 PM   Medication On   R Brain DBS: None   L Brain DBS: On   Speech 1   Facial Expression 1   Rigidity Neck 3   Rigidity RUE 4   Rigidity LUE 2   Rigidity RLE 4   Rigidity LLE 0   Finger Taps R 4   Finger Taps L 3   Hand Mvt R 4   Hand Mvt L 3   Pron-/Supinate R 4   Pron-/Supinate L 3   Toe Tap R 4   Toe Tap L 3   Leg Agility R 4   Leg Agility L 3    Arise From Chair 4   Gait 3   Gait Freezing 0   Postural Stability 3   Posture 1   Global Spont Mvt 2   Postural Tremor RUE 3   Postural Tremor LUE 2   Kinetic Tremor RUE 3   Kinetic Tremor LUE 2   Rest Tremor RUE 3   Rest Tremor LUE 1   Rest Tremor RLE 0   Rest Tremor LLE 0   Rest Tremor Lip/Jaw 0   Rest Tremor Constancy 0   Total Right 37   Total Left 22   Axial Total 18   Total 77   Previous 83    Procedure: DBS Programming     Lead(s):     Left Right   DBS Target GPi        DBS Lead Type    Abbott 0.5mm spacing      Lead Implant Date   19        IPG(s):    1 2   IPG Infinity 6660        IPG Implant Date 3/1/19     Location    L chest     Battery (V) 2.89 V (2.93 V)        Full Impedence/Currents Check: WNL     Initial Settings:     Left Brain Program 2 Program 3 Program 5    Contacts  C+3- C+3-4- 1. C+3-4-  2. C+2-    Amplitude (mA)  4.5 5.2  1. 5.2  2. 3.5   Pulse Width ( s) 150 150 1. 150  2. 90   Frequency (Hz) 130 130 1. 125  2. 125   Impedence      775   Patient Control (min/max) 3.0/4.5 4.0/5.4  4.0/5.4   * = change      Initial Program selected:    Program 5    Final Settings:     Left Brain Program 2 Program 3 Program 5    Contacts  C+3- C+3-4- 1. C+3-4-  2. C+2-    Amplitude (mA)  4.5 5.2  1. 5.2  2. 3.5   Pulse Width ( s) 150 150 1. 150  2. 90   Frequency (Hz) 130 130 1. 125  2. 125   Impedence      925   Patient Control (min/max) 3.0/4.5 4.0/5.4     * = change      Final Program selected:    Program 5 - no changes       BOTULINUM NEUROTOXIN INJECTION PROCEDURES:    VERIFICATION OF PATIENT IDENTIFICATION AND PROCEDURE     Initials   Patient Name KMD   Patient  KMD   Procedure Verified by: SKINNY     Prior to the start of the procedure and with procedural staff participation, I verbally confirmed the patient s identity using two indicators, relevant allergies, that the procedure was appropriate and matched the consent or emergent situation, and that the correct equipment/implants were available.  Immediately prior to starting the procedure I conducted the Time Out with the procedural staff and re-confirmed the patient s name, procedure, and site/side. (The Joint Commission universal protocol was followed.)  Yes    Sedation (Moderate or Deep): None      Above assessments performed by:  Resident/Fellow         Marcella Worley DO          INDICATION/S FOR PROCEDURE/S:  Sandra Jara is a 54 year old year old patient with dystonia affecting the  right upper extremity and right lower extremity secondary to a diagnosis of coritical basal syndrome with associated  pain, tremor, spasms, loss of joint motion, loss of volitional motor control, difficulty with activities of daily living and difficulty with ambulation and transfers.   Mrs. Calero is year 54 old patient with dystonia affecting the  right upper extremity secondary to a diagnosis of Corticobasal syndrome with associated  pain, tremor, spasms, loss of joint motion, loss of volitional motor control, hygiene difficulty, difficulty with activities of daily living and difficulty with ambulation and transfers.      Her baseline symptoms have been recalcitrant to oral medications and conservative therapy.  She is here today for an injection of RUE, right shoulder, right lower leg.      GOAL OF PROCEDURE:  The goal of this procedure is to increase active range of motion, improve volitional motor control, decrease pain  and enhance functional independence associated with dystonic movements.    TOTAL DOSE ADMINISTERED:  Dose Administered:  300 units Botox    Diluent Used:  Preservative Free Normal Saline  Total Volume of Diluent Used:  2 ml  Lot # [id number] with Expiration Date:  4/22  NDC #: Botox 100u ([address]-53)    Medication guide was offered to patient and was declined.    CONSENT:  The risks, benefits, and treatment options were discussed with Sandra Jara and she agreed to proceed.      Written consent was obtained by SKINNY.      EQUIPMENT USED:  Needle-37mm stimulating/recording  Needle-50mm stimulating/recording    SKIN PREPARATION:  Skin preparation was performed using an alcohol wipe.    GUIDANCE DESCRIPTION:  DBS was turned off for the procedure. Extension wires were located and noted bilaterally to avoid during Botox injections.   Electro-myographic guidance was necessary throughout the procedure to accurately identify all areas of dystonic muscles while avoiding injection of non-dystonic muscles, neighboring nerves and nearby vascular structures.     AREA/MUSCLE INJECTED:    Muscles Injected Units Injected Number of Injections   Right deltoid 65 3   Right biceps 25 3   Right triceps 25 3   Right latissimus dorsi 30 2   Right teres 10 1   Right pec major 25 1   Right profundus 15 1   Right trapezius  55 4   Right flexor digitorum longus 20   1   Right tibialis posterior  30  1        Total Units Injected: 300     Unavoidable Waste: 0     Total Units Billed 300        The patient tolerated the injections without difficulty.      Assessment:    Sandra Jara is a 54 year old female with Corticobasal syndrome with dystonia involving RUE, right shoulder, right leg.  Today we did repeat botulinum toxin injections. We added injections to right lower leg due to complaints of right foot inversion with slight APF. Dr. Hedrick injected a small amount in her right forearm to try to improve finger flexion. For next visit, we will inject more into her right forearm to improve right finger flexion and right lower leg to improve right ankle inversion and flexion of toes.    Plan  Follow-up in 3 months' time to consider repeat injections.  DBS was evaluated and no issues were found. No DBS settings were made this visit as we decided to concentrate on Botulinum toxin injections to improve dystonia.     Marcella Worley DO  Movement Disorders Fellow    I, Martínez Hedrick MD, attest that I have reviewed Sandra Jara's  history, examination with Dr.Krisitne Worley.  I agree with  's impression and plan of care.   I personally reviewed the vital signs, medications and labs/imaging.    Martínez Hedrick MD

## 2019-10-16 NOTE — NURSING NOTE
Chief Complaint   Patient presents with     RECHECK     [facility] RETURN - FOLLOW UP       Isaiah Dsouza, EMT

## 2019-10-16 NOTE — PATIENT INSTRUCTIONS
Plan:  - Today we injected Botulinum toxin into your right arm and into your right leg. Next time, I will order more Botulinum toxin to inject your forearm.  - Take baclofen 10 mg twice a day as needed if you start to feel muscle tightness.   - Follow the medication regimen below:  Movement Disorder-related Medications                    200PM 600PM 1000PM     Carbidopa/levodopa 25/100                                         3   2 2 2 2        Clonazepam 0.5 mg                               1/2   Tramadol 50mg                             0.5       0.5   Baclofen 10mg prn   1       1   - We didn't make any changes to your DBS device. Stay on Program 5.     We will see you back in 3 months.

## 2019-10-23 ENCOUNTER — TRANSFERRED RECORDS (OUTPATIENT)
Dept: HEALTH INFORMATION MANAGEMENT | Facility: CLINIC | Age: 54
End: 2019-10-23

## 2019-11-18 ENCOUNTER — TRANSFERRED RECORDS (OUTPATIENT)
Dept: HEALTH INFORMATION MANAGEMENT | Facility: CLINIC | Age: 54
End: 2019-11-18

## 2019-11-18 ENCOUNTER — DOCUMENTATION ONLY (OUTPATIENT)
Dept: NEUROLOGY | Facility: CLINIC | Age: 54
End: 2019-11-18

## 2019-11-18 NOTE — PROGRESS NOTES
Received forms from [facility], forms were placed in provider folder for signature    Received form back signed by provider, form was fax to [phone], sent to be scanned

## 2019-12-05 ENCOUNTER — DOCUMENTATION ONLY (OUTPATIENT)
Dept: NEUROLOGY | Facility: CLINIC | Age: 54
End: 2019-12-05

## 2019-12-05 NOTE — PROGRESS NOTES
Received FMLA form from [facility], form was placed in provider folder for signature    Received FMLA form back signed by provider, form was faxt to [facility] [phone], sent to be scanned

## 2019-12-06 ENCOUNTER — TRANSFERRED RECORDS (OUTPATIENT)
Dept: HEALTH INFORMATION MANAGEMENT | Facility: CLINIC | Age: 54
End: 2019-12-06

## 2019-12-07 ENCOUNTER — MYC REFILL (OUTPATIENT)
Dept: NEUROLOGY | Facility: CLINIC | Age: 54
End: 2019-12-07

## 2019-12-07 DIAGNOSIS — G24.8 ACQUIRED TORSION DYSTONIA: ICD-10-CM

## 2019-12-09 RX ORDER — TRAMADOL HYDROCHLORIDE 50 MG/1
TABLET ORAL
Qty: 90 TABLET | Refills: 0
Start: 2019-12-09

## 2019-12-09 NOTE — TELEPHONE ENCOUNTER
Rx Authorization:    Requested Medication/ Dose: Tramadol 50mg tabs    Date last refill ordered: 8/14/2019    Quantity ordered: 90    # refills: 0    Date of last clinic visit with ordering provider: 7/18/2019    Date of next clinic visit with ordering provider: none    All pertinent protocol data (lab date/result):     Include pertinent information from patients message:

## 2019-12-10 ENCOUNTER — TELEPHONE (OUTPATIENT)
Dept: NEUROLOGY | Facility: CLINIC | Age: 54
End: 2019-12-10

## 2019-12-10 NOTE — TELEPHONE ENCOUNTER
traMADol (ULTRAM) 50 MG tablet 90 tablet 0 12/9/2019  No   Sig: Take 1/2 to 1 tab by mouth every 6 hours as needed, up to 3 tabs per day.     Called [facility] pharmacy and left a message for the pharmacist to fill the above prescription.

## 2019-12-20 ENCOUNTER — DOCUMENTATION ONLY (OUTPATIENT)
Dept: NEUROLOGY | Facility: CLINIC | Age: 54
End: 2019-12-20

## 2019-12-20 NOTE — PROGRESS NOTES
Received [facility]: [facility] recent office visit note from 12/6/2019. A copy has been sent to scanning and a copy was placed in Dr. Worley's folder for review.

## 2019-12-23 DIAGNOSIS — G24.8 ACQUIRED TORSION DYSTONIA: Primary | ICD-10-CM

## 2020-01-02 ENCOUNTER — TRANSFERRED RECORDS (OUTPATIENT)
Dept: HEALTH INFORMATION MANAGEMENT | Facility: CLINIC | Age: 55
End: 2020-01-02

## 2020-01-03 ENCOUNTER — DOCUMENTATION ONLY (OUTPATIENT)
Dept: NEUROLOGY | Facility: CLINIC | Age: 55
End: 2020-01-03

## 2020-01-03 NOTE — PROGRESS NOTES
Received a PT forms for patient, form was signed by provider and fax to [phone], sent to be scanned

## 2020-01-06 ENCOUNTER — TELEPHONE (OUTPATIENT)
Dept: NEUROLOGY | Facility: CLINIC | Age: 55
End: 2020-01-06

## 2020-01-06 NOTE — TELEPHONE ENCOUNTER
[facility] Call Center    Phone Message    May a detailed message be left on voicemail: yes    Reason for Call: Other: FU on physical therapy plan of care that was faxed on 12/12 and 12/31.This needs to be faxed to [phone] ASAP. If any questions please call Deedee at [phone].     Action Taken: Message routed to:  [facility] ([facility] Neuro

## 2020-01-08 ENCOUNTER — OFFICE VISIT (OUTPATIENT)
Dept: NEUROLOGY | Facility: CLINIC | Age: 55
End: 2020-01-08
Payer: COMMERCIAL

## 2020-01-08 VITALS
BODY MASS INDEX: 25.61 KG/M2 | SYSTOLIC BLOOD PRESSURE: 129 MMHG | OXYGEN SATURATION: 98 % | WEIGHT: 156.3 LBS | HEART RATE: 90 BPM | DIASTOLIC BLOOD PRESSURE: 84 MMHG

## 2020-01-08 DIAGNOSIS — G25.3 MYOCLONUS: ICD-10-CM

## 2020-01-08 DIAGNOSIS — G24.8 ACQUIRED TORSION DYSTONIA: Primary | ICD-10-CM

## 2020-01-08 DIAGNOSIS — G31.85 CORTICOBASAL DEGENERATION (H): ICD-10-CM

## 2020-01-08 ASSESSMENT — UNIFIED PARKINSONS DISEASE RATING SCALE (UPDRS)
RIGIDITY_LUE: MILD: RIGIDITY DETECTED WITHOUT THE ACTIVATION MANEUVER.  FULL RANGE OF MOTION IS EASILY ACHIEVED.
FINGER_TAPPING_RIGHT: SEVERE: CANNOT OR CAN ONLY BARELY PERFORM THE TASK BECAUSE OF SLOWING, INTERRUPTIONS, OR DECREMENTS.
RIGIDITY_LLE: NORMAL
TOTAL_SCORE: 77
PRONATION_SUPINATION_LEFT: MODERATE: ANY OF THE FOLLOWING:  A) MORE THAN 5 INTERRUPTIONS  OR AT LEAST ONE LONGER ARREST (FREEZE) IN ONGOING MOVEMENT  B) MODERATE SLOWING C) THE AMPLITUDE DECREMENTS STARTING AFTER THE FIRST MOVEMENT.
POSTURAL_STABILITY: MILD:  MORE THAN 5 STEPS, BUT SUBJECT RECOVERS UNAIDED.
RIGIDITY_NECK: MODERATE: RIGIDITY DETECTED WITHOUT THE ACTIVATION MANEUVER. FULL RANGE OF MOTION IS ACHIEVED WITH EFFORT.
FACIAL_EXPRESSION: MILD: IN ADDITION TO DECREASED EYE-BLINK FREQUENCY, MASKED FACIES PRESENT IN THE LOWER FACE AS WELL, NAMELY FEWER MOVEMENTS AROUND THE MOUTH, SUCH AS LESS SPONTANEOUS SMILING, BUT LIPS NOT PARTED.
LEG_AGILITY_LEFT: MODERATE: ANY OF THE FOLLOWING:  A) MORE THAN 5 INTERRUPTIONS  OR AT LEAST ONE LONGER ARREST (FREEZE) IN ONGOING MOVEMENT  B) MODERATE SLOWING C) THE AMPLITUDE DECREMENTS STARTING AFTER THE FIRST MOVEMENT.
ARISING_CHAIR: SEVERE: UNABLE TO ARISE WITHOUT HELP.
AMPLITUDE_RLE: NORMAL: NO TREMOR.
TOTAL_SCORE: 35
FREEZING_GAIT: NORMAL
AMPLITUDE_LIP_JAW: NORMAL: NO TREMOR.
SPEECH: SLIGHT: LOSS OF MODULATION, DICTION OR VOLUME, BUT STILL ALL WORDS EASY TO UNDERSTAND.
RIGIDITY_RLE: SEVERE: RIGIDITY DETECTED WITHOUT THE ACTIVATION MANEUVER AND FULL RANGE OF MOTION NOT ACHIEVED.
TOTAL_SCORE_LEFT: 22
AXIAL_SCORE: 20
PARKINSONS_MEDS: ON
TOETAPPING_RIGHT: SEVERE: CANNOT OR CAN ONLY BARELY PERFORM THE TASK BECAUSE OF SLOWING, INTERRUPTIONS, OR DECREMENTS.
HANDMOVEMENTS_RIGHT: SEVERE: CANNOT OR CAN ONLY BARELY PERFORM THE TASK BECAUSE OF SLOWING, INTERRUPTIONS, OR DECREMENTS.
POSTURE: 1 SLIGHT.  NOT QUITE ERECT BUT COULD BE NORMAL FOR OLDER PERSONS.
LEG_AGILITY_RIGHT: SEVERE: CANNOT OR CAN ONLY BARELY PERFORM THE TASK BECAUSE OF SLOWING, INTERRUPTIONS, OR DECREMENTS.
AMPLITUDE_LUE: NORMAL: NO TREMOR.
RIGIDITY_RUE: SEVERE: RIGIDITY DETECTED WITHOUT THE ACTIVATION MANEUVER AND FULL RANGE OF MOTION NOT ACHIEVED.
TOETAPPING_LEFT: MODERATE: ANY OF THE FOLLOWING:  A) MORE THAN 5 INTERRUPTIONS OR AT LEAST ONE LONGER ARREST (FREEZE) IN ONGOING MOVEMENT  B) MODERATE SLOWING C) THE AMPLITUDE DECREMENTS STARTING AFTER THE FIRST MOVEMENT.
AMPLITUDE_LLE: NORMAL: NO TREMOR.
SPONTANEITY_OF_MOVEMENT: 3: MODERATE: MODERATE GLOBAL SLOWNESS AND POVERTY OF MOVEMENTS.
GAIT: SEVERE: CANNOT WALK AT ALL OR ONLY WITH ANOTHER PERSON'S ASSISTANCE.
PRONATION_SUPINATION_RIGHT: SEVERE: CANNOT OR CAN ONLY BARELY PERFORM THE TASK BECAUSE OF SLOWING, INTERRUPTIONS, OR DECREMENTS.
FINGER_TAPPING_LEFT: SEVERE: CANNOT OR CAN ONLY BARELY PERFORM THE TASK BECAUSE OF SLOWING, INTERRUPTIONS, OR DECREMENTS.
CONSTANCY_TREMOR_ATREST: NORMAL: NO TREMOR.
HANDMOVEMENTS_LEFT: MODERATE: ANY OF THE FOLLOWING:  A) MORE THAN 5 INTERRUPTIONS  OR AT LEAST ONE LONGER ARREST (FREEZE) IN ONGOING MOVEMENT  B) MODERATE SLOWING C) THE AMPLITUDE DECREMENTS STARTING AFTER THE FIRST MOVEMENT.
AMPLITUDE_RUE: SLIGHT: < 1 CM IN MAXIMAL AMPLITUDE.

## 2020-01-08 ASSESSMENT — PAIN SCALES - GENERAL: PAINLEVEL: NO PAIN (0)

## 2020-01-08 ASSESSMENT — MOVEMENT DISORDERS SOCIETY - UNIFIED PARKINSONS DISEASE RATING SCALE (MDS-UPDRS)
FREEZING: SEVERE: BECAUSE OF FREEZING, MOST OR ALL OF THE TIME, I NEED TO USE A WALKING AID OR SOMEONE'S HELP.
HOBBIES_AND_OTHER_ACTIVITIES: SEVERE:  I AM UNABLE TO DO MOST OR ALL OF THESE ACTVITIES.
WALKING_AND_BALANCE: SEVERE: I USUALLY USE THE SUPPORT OF ANOTHER PERSONS TO WALK SAFELY WITHOUT FALLING.
SPEECH: MILD: MY SPEECH CAUSES PEOPLE TO ASK ME TO OCCASIONALLY REPEAT MYSELF, BUT NOT EVERYDAY.
TOTAL_SCORE: 41
TREMOR: MODERATE: SHAKING OR TREMOR CAUSES PROBLEMS WITH MANY OF MY DAILY ACTIVITES.
TURNING_IN_BED: SEVERE: I AM UNABLE TO TURN OVER WITHOUT HELP FROM SOMEONE ELSE.
CHEWING_AND_SWALLOWING: NORMAL: NO PROBLEMS.
HANDWRITING: SEVERE: MOST OR ALL WORDS CANNOT BE READ.
GETTING_OUT_OF_BED_CAR_DEEP_CHAIR: SEVERE: I NEED HELP MOST OR ALL OF THE TIME.
DRESSING: SEVERE: I NEED HELP FOR MOST OR ALL DRESSING TASKS.
SALIVA_AND_DROOLING: NORMAL: NOT AT ALL (NO PROBLEMS).
HYGIENE: SEVERE: I NEED HELP FOR MOST OR ALL OF MY HYGIENE TASKS.

## 2020-01-08 NOTE — PROGRESS NOTES
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    Interval History: She was last seen on 10/16/2019 when she received Botulinum toxin injections for right shoulder , right upper extremity and also for right foot dystonia with right foot  inversion and \" toes curling in\".  She is overall pleased with the results as far as the stiffness involving  the right shoulder  and right upper extremity .She is holding the right arm with the opposite one but she states not because of the pain but because of the \"shaking\".Today she would like us to address the right hand dystonia ( she keeps her hand flexed into a fist).  She is not complaining of pain today , \"it is stiffness\", she would like to further address the right foot inversion , while walking she keeps the right foot inverted and flexed so during walking she is not 'clearing the floor \".  For the right shoulder stiffness she is taking Tramadol.  As per  he did not notice any changes in her cognition, recently returned from a trip in [place].  She is denying trouble swallowing, speech is slurred.  No new medical problems.    Review of Systems:  Other than that noted at the end of this note, the remainder of 12 systems reviewed were negative.    Medications:  Current Outpatient Medications   Medication Sig Dispense Refill     acyclovir (ZOVIRAX) 200 MG capsule as needed  1     " baclofen (LIORESAL) 10 MG tablet Please take 1/2 tab to 1 tab up to three times a day as needed. 30 tablet 3     botulinum toxin type A (BOTOX) 100 units injection Inject 280 Units into the muscle once Lot # /[id number] with Expiration Date:  11/2020       calcium-vitamin D (OSCAL) 250-125 MG-UNIT TABS per tablet Take 1 tablet by mouth daily        carbidopa-levodopa (SINEMET CR)  MG CR tablet Take 1 tablet by mouth At Bedtime 30 tablet 11     carbidopa-levodopa (SINEMET)  MG tablet Take 3 tabs at 530AM, and 2 tabs at 10AM, 2PM, 6PM, and 10PM 990 tablet 3     Cholecalciferol (VITAMIN D) 2000 UNITS tablet Take 2,000 Units by mouth every morning        clonazePAM (KLONOPIN) 0.5 MG tablet Take 0.5 tablets (0.25 mg) by mouth At Bedtime 15 tablet 5     DULoxetine (CYMBALTA) 30 MG capsule Start 1 cap po qPM x 1 week, then increase to 2 caps po qPM (Patient not taking: Reported on 10/16/2019) 60 capsule 3     OnabotulinumtoxinA (BOTOX IJ) Inject 300 Units as directed once [id number] with Expiration Date:  12/2020       senna-docusate (SENOKOT-S/PERICOLACE) 8.6-50 MG tablet Take 1 tablet by mouth daily 90 tablet 3     traMADol (ULTRAM) 50 MG tablet Take 1/2 to 1 tab by mouth every 6 hours as needed, up to 3 tabs per day. 90 tablet 0        Movement Disorder-related Medications                    200PM 600PM 1000PM     Carbidopa/levodopa 25/100                                         2   2 2 2 2    Tramadol 50mg PRN , did not take for along time untill yesterday                                        CD/LD 50/200 mg CR      1   Baclofen 10mg    1/2       1/2        Allergies: has No Known Allergies.    Past Medical History:  Past Medical History:   Diagnosis Date     Action induced myoclonus 12/1/2015     Corticobasal syndrome (H) 12/1/2015     CTS (carpal tunnel syndrome) 1/12/2015     Disturbance of skin sensation 1/12/2015     Family history of breast cancer 1/12/2015     Family history of colon cancer  1/12/2015     Family history of Parkinson's disease 12/21/2014    Paternal aunt     History of EMG 12/21/2014    A right upper extremity EMG and nerve conduction study was done on 06/18/2014. It demonstrated some mild changes of right carpal tunnel syndrome but was otherwise normal.       History of MRI of brain and brain stem 12/21/2014    A brain MRI scan done on 06/27/2014 revealed no abnormalities.      History of MRI of cervical spine 12/21/2014    A cervical MRI done on 06/10/2014 revealed some mild to moderate degenerative changes but no significant central canal or foraminal stenosis, and no abnormalities of the spinal cord were noted.       Movement disorder 12/21/2014    I saw your patient, Deepali Contreras on 12/15/2014. She is a 49-year-old right-handed female here for evaluation of right upper extremity dysfunction and right hand tremor.  She has noted this problem for the last couple of years. She reports she is losing dexterity in her right hand. She has appreciated a tremor of the right hand with actions such as writing or postural maintenance. She has n       Past Surgical History:  Past Surgical History:   Procedure Laterality Date     APPENDECTOMY       IMPLANT DEEP BRAIN STIMULATION GENERATOR / BATTERY Left 3/1/2019    Procedure: Left Deep Brain Stimulator Placement, Phase II, Placement Of Deep Brain Stimulator Generator/Battery Over The Left Chest Wall Latex Free;  Surgeon: Efren Triana MD;  Location:  OR     OPTICAL TRACKING SYSTEM INSERTION DEEP BRAIN STIMULATION Left 2/22/2019    Procedure: Stealth Assisted Left Side Deep Brain Stimulator Placement, Phase I, Placement Of Left Side Deep Brain Stimulator Electrode, Target Left Globus Pallidus Internus With Microelectrode Recording;  Surgeon: Efren Triana MD;  Location:  OR       Social History:  Social History     Socioeconomic History     Marital status:      Spouse name: Not on file     Number of children:  3     Years of education: Not on file     Highest education level: Not on file   Occupational History     Not on file   Social Needs     Financial resource strain: Not on file     Food insecurity:     Worry: Not on file     Inability: Not on file     Transportation needs:     Medical: Not on file     Non-medical: Not on file   Tobacco Use     Smoking status: Never Smoker     Smokeless tobacco: Never Used   Substance and Sexual Activity     Alcohol use: Yes     Alcohol/week: 2.0 - 3.0 standard drinks     Frequency: 2-4 times a month     Drinks per session: 1 or 2     Drug use: No     Sexual activity: Yes     Partners: Male     Birth control/protection: Male Surgical   Lifestyle     Physical activity:     Days per week: Not on file     Minutes per session: Not on file     Stress: Not on file   Relationships     Social connections:     Talks on phone: Not on file     Gets together: Not on file     Attends [employer] service: Not on file     Active member of club or organization: Not on file     Attends meetings of clubs or organizations: Not on file     Relationship status: Not on file     Intimate partner violence:     Fear of current or ex partner: Not on file     Emotionally abused: Not on file     Physically abused: Not on file     Forced sexual activity: Not on file   Other Topics Concern     Parent/sibling w/ CABG, MI or angioplasty before 65F 55M? No   Social History Narrative        Izaiah Jara    Lives in [facility]      She does not smoke.  She has a couple of beers to drink on the weekend but otherwise is not a heavy user of alcohol    3 kids: son peña 21; 94, 96 and 98    2 sons    1 daughter    2 are in college and daughter is a sophomore.            FAMILY HISTORY:  Notable for a paternal aunt who is .  She had Parkinson's disease.  Otherwise, there is no other family history of neurologic problems.  There is no family history of dystonia.          90% Ma[place]    Some  bettie                .       Family History:  Family History   Problem Relation Age of Onset     Breast Cancer Mother      Cancer - colorectal Mother      Macular Degeneration Mother      Dementia Father      Deep Vein Thrombosis Father      No Known Problems Brother      No Known Problems Sister      No Known Problems Sister      No Known Problems Sister      Neurologic Disorder Paternal Aunt         Parkinson's Disease     Cerebrovascular Disease Brother         stroke at age 25 possibly from pfo     Dementia Paternal Grandmother        Physical Exam:  /84 (BP Location: Left arm, Patient Position: Sitting, Cuff Size: Adult Regular)   Pulse 90   Wt 70.9 kg (156 lb 4.8 oz)   SpO2 98%   BMI 25.61 kg/m      Incisions: well healed      Neurological Examination:   She is alert and oriented , provides details of interval medical history.Speech is dysarthric.Follows commands . 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  Motor exam: holding the right arm with the opposite one due to dystonic tremor.  UPDRS Values 1/8/2020   Time: 4:26 PM   Medication On   R Brain DBS: None   L Brain DBS: On   Speech 1   Facial Expression 2   Rigidity Neck 3   Rigidity RUE 4   Rigidity LUE 2   Rigidity RLE 4   Rigidity LLE 0   Finger Taps R 4   Finger Taps L 4   Hand Mvt R 4   Hand Mvt L 3   Pron-/Supinate R 4   Pron-/Supinate L 3   Toe Tap R 4   Toe Tap L 3   Leg Agility R 4   Leg Agility L 3   Arise From Chair 4   Gait 4   Gait Freezing 0   Postural Stability 2   Posture 1   Global Spont Mvt 3   Postural Tremor RUE 3   Postural Tremor LUE 2   Kinetic Tremor RUE 3   Kinetic Tremor LUE 2   Rest Tremor RUE 1   Rest Tremor LUE 0   Rest Tremor RLE 0   Rest Tremor LLE 0   Rest Tremor Lip/Jaw 0   Rest Tremor Constancy 0   Total Right 35   Total Left 22   Axial Total 20   Total 77     Gait:she requires assistance to get off the chair and direct supervision while ambulating, she has a wide based gait and right foot inverted  and ankle plantar flexed.    Procedure: DBS Programming     Lead(s):     Left Right   DBS Target GPi        DBS Lead Type    Abbott 0.5mm spacing      Lead Implant Date   19        IPG(s):    1 2   IPG Infinity 6660        IPG Implant Date 3/1/19     Location    L chest     Battery (V) 2.88 V (2.89 V)        Full Impedence/Currents Check: WNL     No changes were made to the patient's program.  DBS was turned off during injections and turned back on at the end of the procedure.     Left Brain Program 2 Program 3 Program 5    Contacts  C+3- C+3-4- 1. C+3-4-  2. C+2-    Amplitude (mA)  4.5 5.2  1. 4.15  2. 3.5   Pulse Width ( s) 150 150 1. 150  2. 90   Frequency (Hz) 130 130 1. 125  2. 125   Impedence        Patient Control (min/max) 3.0/4.5 4.0/5.4  4.0/5.4   * = change      Initial Program selected:    Program 5     Final Settings:     Left Brain Program 2 Program 3 Program 5    Contacts  C+3- C+3-4- 1. C+3-4-  2. C+2-    Amplitude (mA)  4.5 5.2  1. 4.15  2. 3.5   Pulse Width ( s) 150 150 1. 150  2. 90   Frequency (Hz) 130 130 1. 125  2. 125   Impedence         Patient Control (min/max) 3.0/4.5 4.0/5.4     * = change      Final Program selected:    Program 5 - no changes      BOTULINUM NEUROTOXIN INJECTION PROCEDURES:    VERIFICATION OF PATIENT IDENTIFICATION AND PROCEDURE     Initials   Patient Name AP   Patient  AP   Procedure Verified by: AP     Prior to the start of the procedure and with procedural staff participation, I verbally confirmed the patient s identity using two indicators, relevant allergies, that the procedure was appropriate and matched the consent or emergent situation, and that the correct equipment/implants were available. Immediately prior to starting the procedure I conducted the Time Out with the procedural staff and re-confirmed the patient s name, procedure, and site/side. (The Joint Commission universal protocol was followed.)  Yes    Sedation (Moderate or Deep): None    Above  assessments performed by:  Resident/Fellow         Cynthia Mc MD          The attending provider was present for the entire procedure documented below.    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     Her baseline symptoms have been recalcitrant to oral medications and conservative therapy.  She is here today for an injection of Botox.      GOAL OF PROCEDURE:  The goal of this procedure is to increase active range of motion associated with dystonic movements.    TOTAL DOSE ADMINISTERED:  Dose Administered:  385 units Botox    Diluent Used:  Preservative Free Normal Saline  Total Volume of Diluent Used:  4 ml  Lot # [id number] with Expiration Date:  05/22  NDC #: Botox 100u ([address]-93)    Medication guide was offered to patient and was accepted.    CONSENT:  The risks, benefits, and treatment options were discussed with Sandra Jara and she agreed to proceed.      Written consent was obtained by AP.     EQUIPMENT USED:  EMG/NCS Machine    SKIN PREPARATION:  Skin preparation was performed using an alcohol wipe.    GUIDANCE DESCRIPTION:  Electro-myographic guidance was necessary throughout the procedure to accurately identify all areas of dystonic muscles while avoiding injection of non-dystonic muscles, neighboring nerves and nearby vascular structures.     AREA/MUSCLE INJECTED:    Muscles Injected Units Injected Number of Injections   Right deltoid 35 2   Right biceps 45 2   Right triceps 20 1   Right latissimus dorsi 35 3   Right trapezius  70 4   Right tibialis posterior  100  2   Right flexor digitorum superficialis 60 4   Extensor carpi radialis    20   1   Total Units Injected: 385     Unavoidable Waste: 15     Total Units Billed 400          The patient tolerated the injections without difficulty.    Impression:  Sandra Jara is a 54 year old female with Probable   "Corticobasal syndrome with dystonia involving RUE, right shoulder, right leg.      Today we did repeat botulinum toxin injections.     We increased the dose of the right tibialis posterior to address the right foot inversion with ankle plantar flexion.  Also we did inject the right flexor digitorum superficialis to address the \"clenched fist\".      Recommendations:   We did not make any changes to the DBS. DBS was evaluated and there no open or short circuits.    Return to clinic in 3 months to consider repeat injections.    Cynthia Mc MD  Movement Disorder Fellow    Neurology Attending Attestation:     I, Tiffany Hopkins MD, personally saw this patient with our Movement Disorders Fellow and agree with the fellow's findings and plan of care as documented in the movement disorder fellow's note. I personally performed salient aspects of the history and neurological examination.     I personally reviewed the vital signs, medications, and labs. I personally viewed the imaging, and agree with the interpretation documented by the fellow.    I personally performed or supervised all procedures.    Time spent with patient: Greater than 50% of this 50 minute visit was spent in counseling and coordination of care related to the above issues.    Additional time spent for separate DBS programming: DBS analyzed without reprogramming.    Tiffany Hopkins MD    of Neurology           "

## 2020-01-08 NOTE — LETTER
"2020       RE: Sandra Jara  [address]  [place] [address]     Dear Colleague,    Thank you for referring your patient, Sandra Jara, to the [facility] NEUROLOGY at [facility]. Please see a copy of my visit note below.    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    Interval History: She was last seen on 10/16/2019 when she received Botulinum toxin injections for right shoulder , right upper extremity and also for right foot dystonia with right foot  inversion and \" toes curling in\".  She is overall pleased with the results as far as the stiffness involving  the right shoulder  and right upper extremity .She is holding the right arm with the opposite one but she states not because of the pain but because of the \"shaking\".Today she would like us to address the right hand dystonia ( she keeps her hand flexed into a fist).  She is not complaining of pain today , \"it is stiffness\", she would like to further address the right foot inversion , while walking she keeps the right foot inverted and flexed so during walking she is not 'clearing the floor \".  For the right shoulder stiffness she is taking Tramadol.  As per  he did not notice any changes in her cognition, recently returned from a trip in [place].  She is denying trouble swallowing, speech is " slurred.  No new medical problems.    Review of Systems:  Other than that noted at the end of this note, the remainder of 12 systems reviewed were negative.    Medications:  Current Outpatient Medications   Medication Sig Dispense Refill     acyclovir (ZOVIRAX) 200 MG capsule as needed  1     baclofen (LIORESAL) 10 MG tablet Please take 1/2 tab to 1 tab up to three times a day as needed. 30 tablet 3     botulinum toxin type A (BOTOX) 100 units injection Inject 280 Units into the muscle once Lot # /[id number] with Expiration Date:  11/2020       calcium-vitamin D (OSCAL) 250-125 MG-UNIT TABS per tablet Take 1 tablet by mouth daily        carbidopa-levodopa (SINEMET CR)  MG CR tablet Take 1 tablet by mouth At Bedtime 30 tablet 11     carbidopa-levodopa (SINEMET)  MG tablet Take 3 tabs at 530AM, and 2 tabs at 10AM, 2PM, 6PM, and 10PM 990 tablet 3     Cholecalciferol (VITAMIN D) 2000 UNITS tablet Take 2,000 Units by mouth every morning        clonazePAM (KLONOPIN) 0.5 MG tablet Take 0.5 tablets (0.25 mg) by mouth At Bedtime 15 tablet 5     DULoxetine (CYMBALTA) 30 MG capsule Start 1 cap po qPM x 1 week, then increase to 2 caps po qPM (Patient not taking: Reported on 10/16/2019) 60 capsule 3     OnabotulinumtoxinA (BOTOX IJ) Inject 300 Units as directed once [id number] with Expiration Date:  12/2020       senna-docusate (SENOKOT-S/PERICOLACE) 8.6-50 MG tablet Take 1 tablet by mouth daily 90 tablet 3     traMADol (ULTRAM) 50 MG tablet Take 1/2 to 1 tab by mouth every 6 hours as needed, up to 3 tabs per day. 90 tablet 0        Movement Disorder-related Medications                    200PM 600PM 1000PM     Carbidopa/levodopa 25/100                                         2   2 2 2 2    Tramadol 50mg PRN , did not take for along time untill yesterday                                        CD/LD 50/200 mg CR      1   Baclofen 10mg    1/2       1/2        Allergies: has No Known Allergies.    Past Medical  History:  Past Medical History:   Diagnosis Date     Action induced myoclonus 12/1/2015     Corticobasal syndrome (H) 12/1/2015     CTS (carpal tunnel syndrome) 1/12/2015     Disturbance of skin sensation 1/12/2015     Family history of breast cancer 1/12/2015     Family history of colon cancer 1/12/2015     Family history of Parkinson's disease 12/21/2014    Paternal aunt     History of EMG 12/21/2014    A right upper extremity EMG and nerve conduction study was done on 06/18/2014. It demonstrated some mild changes of right carpal tunnel syndrome but was otherwise normal.       History of MRI of brain and brain stem 12/21/2014    A brain MRI scan done on 06/27/2014 revealed no abnormalities.      History of MRI of cervical spine 12/21/2014    A cervical MRI done on 06/10/2014 revealed some mild to moderate degenerative changes but no significant central canal or foraminal stenosis, and no abnormalities of the spinal cord were noted.       Movement disorder 12/21/2014    I saw your patient, Deepali Contreras on 12/15/2014. She is a 49-year-old right-handed female here for evaluation of right upper extremity dysfunction and right hand tremor.  She has noted this problem for the last couple of years. She reports she is losing dexterity in her right hand. She has appreciated a tremor of the right hand with actions such as writing or postural maintenance. She has n       Past Surgical History:  Past Surgical History:   Procedure Laterality Date     APPENDECTOMY       IMPLANT DEEP BRAIN STIMULATION GENERATOR / BATTERY Left 3/1/2019    Procedure: Left Deep Brain Stimulator Placement, Phase II, Placement Of Deep Brain Stimulator Generator/Battery Over The Left Chest Wall Latex Free;  Surgeon: Efren Triana MD;  Location: [facility] OR     OPTICAL TRACKING SYSTEM INSERTION DEEP BRAIN STIMULATION Left 2/22/2019    Procedure: Stealth Assisted Left Side Deep Brain Stimulator Placement, Phase I, Placement Of Left Side Deep  Brain Stimulator Electrode, Target Left Globus Pallidus Internus With Microelectrode Recording;  Surgeon: Efren Triana MD;  Location:  OR       Social History:  Social History     Socioeconomic History     Marital status:      Spouse name: Not on file     Number of children: 3     Years of education: Not on file     Highest education level: Not on file   Occupational History     Not on file   Social Needs     Financial resource strain: Not on file     Food insecurity:     Worry: Not on file     Inability: Not on file     Transportation needs:     Medical: Not on file     Non-medical: Not on file   Tobacco Use     Smoking status: Never Smoker     Smokeless tobacco: Never Used   Substance and Sexual Activity     Alcohol use: Yes     Alcohol/week: 2.0 - 3.0 standard drinks     Frequency: 2-4 times a month     Drinks per session: 1 or 2     Drug use: No     Sexual activity: Yes     Partners: Male     Birth control/protection: Male Surgical   Lifestyle     Physical activity:     Days per week: Not on file     Minutes per session: Not on file     Stress: Not on file   Relationships     Social connections:     Talks on phone: Not on file     Gets together: Not on file     Attends Sabianism service: Not on file     Active member of club or organization: Not on file     Attends meetings of clubs or organizations: Not on file     Relationship status: Not on file     Intimate partner violence:     Fear of current or ex partner: Not on file     Emotionally abused: Not on file     Physically abused: Not on file     Forced sexual activity: Not on file   Other Topics Concern     Parent/sibling w/ CABG, MI or angioplasty before 65F 55M? No   Social History Narrative        Izaiah Jara    Lives in [facility]      She does not smoke.  She has a couple of beers to drink on the weekend but otherwise is not a heavy user of alcohol    3 kids: son peña 21; 94, 96 and 98    2 sons    1 daughter     2 are in college and daughter is a sophomore.            FAMILY HISTORY:  Notable for a paternal aunt who is .  She had Parkinson's disease.  Otherwise, there is no other family history of neurologic problems.  There is no family history of dystonia.          90% Kazakh    Some norweigian                .       Family History:  Family History   Problem Relation Age of Onset     Breast Cancer Mother      Cancer - colorectal Mother      Macular Degeneration Mother      Dementia Father      Deep Vein Thrombosis Father      No Known Problems Brother      No Known Problems Sister      No Known Problems Sister      No Known Problems Sister      Neurologic Disorder Paternal Aunt         Parkinson's Disease     Cerebrovascular Disease Brother         stroke at age 25 possibly from pfo     Dementia Paternal Grandmother        Physical Exam:  /84 (BP Location: Left arm, Patient Position: Sitting, Cuff Size: Adult Regular)   Pulse 90   Wt 70.9 kg (156 lb 4.8 oz)   SpO2 98%   BMI 25.61 kg/m       Incisions: well healed      Neurological Examination:   She is alert and oriented , provides details of interval medical history.Speech is dysarthric.Follows commands . 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  Motor exam: holding the right arm with the opposite one due to dystonic tremor.  UPDRS Values 2020   Time: 4:26 PM   Medication On   R Brain DBS: None   L Brain DBS: On   Speech 1   Facial Expression 2   Rigidity Neck 3   Rigidity RUE 4   Rigidity LUE 2   Rigidity RLE 4   Rigidity LLE 0   Finger Taps R 4   Finger Taps L 4   Hand Mvt R 4   Hand Mvt L 3   Pron-/Supinate R 4   Pron-/Supinate L 3   Toe Tap R 4   Toe Tap L 3   Leg Agility R 4   Leg Agility L 3   Arise From Chair 4   Gait 4   Gait Freezing 0   Postural Stability 2   Posture 1   Global Spont Mvt 3   Postural Tremor RUE 3   Postural Tremor LUE 2   Kinetic Tremor RUE 3   Kinetic Tremor LUE 2   Rest Tremor RUE 1   Rest Tremor LUE 0    Rest Tremor RLE 0   Rest Tremor LLE 0   Rest Tremor Lip/Jaw 0   Rest Tremor Constancy 0   Total Right 35   Total Left 22   Axial Total 20   Total 77     Gait:she requires assistance to get off the chair and direct supervision while ambulating, she has a wide based gait and right foot inverted and ankle plantar flexed.    Procedure: DBS Programming     Lead(s):     Left Right   DBS Target GPi        DBS Lead Type    Abbott 0.5mm spacing      Lead Implant Date   19        IPG(s):    1 2   IPG Infinity 6660        IPG Implant Date 3/1/19     Location    L chest     Battery (V) 2.88 V (2.89 V)        Full Impedence/Currents Check: WNL     No changes were made to the patient's program.  DBS was turned off during injections and turned back on at the end of the procedure.     Left Brain Program 2 Program 3 Program 5    Contacts  C+3- C+3-4- 1. C+3-4-  2. C+2-    Amplitude (mA)  4.5 5.2  1. 4.15  2. 3.5   Pulse Width ( s) 150 150 1. 150  2. 90   Frequency (Hz) 130 130 1. 125  2. 125   Impedence        Patient Control (min/max) 3.0/4.5 4.0/5.4  4.0/5.4   * = change      Initial Program selected:    Program 5     Final Settings:     Left Brain Program 2 Program 3 Program 5    Contacts  C+3- C+3-4- 1. C+3-4-  2. C+2-    Amplitude (mA)  4.5 5.2  1. 4.15  2. 3.5   Pulse Width ( s) 150 150 1. 150  2. 90   Frequency (Hz) 130 130 1. 125  2. 125   Impedence         Patient Control (min/max) 3.0/4.5 4.0/5.4     * = change      Final Program selected:    Program 5 - no changes      BOTULINUM NEUROTOXIN INJECTION PROCEDURES:    VERIFICATION OF PATIENT IDENTIFICATION AND PROCEDURE     Initials   Patient Name AP   Patient  AP   Procedure Verified by: AP     Prior to the start of the procedure and with procedural staff participation, I verbally confirmed the patient s identity using two indicators, relevant allergies, that the procedure was appropriate and matched the consent or emergent situation, and that the correct  equipment/implants were available. Immediately prior to starting the procedure I conducted the Time Out with the procedural staff and re-confirmed the patient s name, procedure, and site/side. (The Joint Commission universal protocol was followed.)  Yes    Sedation (Moderate or Deep): None    Above assessments performed by:  Resident/Fellow         Cynthia Mc MD          The attending provider was present for the entire procedure documented below.    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     Her baseline symptoms have been recalcitrant to oral medications and conservative therapy.  She is here today for an injection of Botox.      GOAL OF PROCEDURE:  The goal of this procedure is to increase active range of motion associated with dystonic movements.    TOTAL DOSE ADMINISTERED:  Dose Administered:  385 units Botox    Diluent Used:  Preservative Free Normal Saline  Total Volume of Diluent Used:  4 ml  Lot # [id number] with Expiration Date:  05/22  NDC #: Botox 100u ([address]-43)    Medication guide was offered to patient and was accepted.    CONSENT:  The risks, benefits, and treatment options were discussed with Sandra Jara and she agreed to proceed.      Written consent was obtained by AP.     EQUIPMENT USED:  EMG/NCS Machine    SKIN PREPARATION:  Skin preparation was performed using an alcohol wipe.    GUIDANCE DESCRIPTION:  Electro-myographic guidance was necessary throughout the procedure to accurately identify all areas of dystonic muscles while avoiding injection of non-dystonic muscles, neighboring nerves and nearby vascular structures.     AREA/MUSCLE INJECTED:    Muscles Injected Units Injected Number of Injections   Right deltoid 35 2   Right biceps 45 2   Right triceps 20 1   Right latissimus dorsi 35 3   Right trapezius  70 4   Right tibialis posterior  100  2   Right  "flexor digitorum superficialis 60 4   Extensor carpi radialis    20   1   Total Units Injected: 385     Unavoidable Waste: 15     Total Units Billed 400          The patient tolerated the injections without difficulty.    Impression:  Sandra Jara is a 54 year old female with Probable  Corticobasal syndrome with dystonia involving RUE, right shoulder, right leg.      Today we did repeat botulinum toxin injections.     We increased the dose of the right tibialis posterior to address the right foot inversion with ankle plantar flexion.  Also we did inject the right flexor digitorum superficialis to address the \"clenched fist\".      Recommendations:   We did not make any changes to the DBS. DBS was evaluated and there no open or short circuits.    Return to clinic in 3 months to consider repeat injections.    Cynthia Mc MD  Movement Disorder Fellow    Neurology Attending Attestation:     I, Tiffany Hopkins MD, personally saw this patient with our Movement Disorders Fellow and agree with the fellow's findings and plan of care as documented in the movement disorder fellow's note. I personally performed salient aspects of the history and neurological examination.     I personally reviewed the vital signs, medications, and labs. I personally viewed the imaging, and agree with the interpretation documented by the fellow.    I personally performed or supervised all procedures.    Time spent with patient: Greater than 50% of this 50 minute visit was spent in counseling and coordination of care related to the above issues.    Additional time spent for separate DBS programming: DBS analyzed without reprogramming.    Tiffany Hopkins MD    of Neurology       "

## 2020-01-17 ENCOUNTER — MYC MEDICAL ADVICE (OUTPATIENT)
Dept: NEUROLOGY | Facility: CLINIC | Age: 55
End: 2020-01-17

## 2020-01-21 NOTE — TELEPHONE ENCOUNTER
Called Mrs. Souza at [phone] spoke with MrYulisa And Mrs. Calero and discussed her concerns. She is having shaking in her RUE and would like to revisit her DBS settings to see if this may be evaluated and improved. She denies pain from her last botulinum toxin injections. We discussed scheduling an appointment with Dr. Hopkins for DBS troubleshooting and they are interested in this option. Will send a message to Dr. Hopkins regarding a day and time that the patient may come in for DBS troubleshooting.     Marcella Worley DO  Movement Disorders Fellow  [facility]

## 2020-01-21 NOTE — TELEPHONE ENCOUNTER
Called Mrs. Souza at [phone] and [phone], no answer. Left VM to call the clinic to discuss concerns.    Marcella Worley DO  Movement Disorders Fellow  [facility]

## 2020-01-22 NOTE — TELEPHONE ENCOUNTER
I am going to review her programming notes and come up with an option for her to try even before she comes into clinic. I already let the patient know this.

## 2020-01-27 ENCOUNTER — DOCUMENTATION ONLY (OUTPATIENT)
Dept: NEUROLOGY | Facility: CLINIC | Age: 55
End: 2020-01-27

## 2020-01-27 NOTE — PROGRESS NOTES
Received plan of care from [facility], order was placed in provider folder for signature    Received forms back signed by provider, forms were fax to [phone] sent to be scanned

## 2020-02-06 ENCOUNTER — TRANSFERRED RECORDS (OUTPATIENT)
Dept: HEALTH INFORMATION MANAGEMENT | Facility: CLINIC | Age: 55
End: 2020-02-06

## 2020-02-10 ENCOUNTER — DOCUMENTATION ONLY (OUTPATIENT)
Dept: NEUROLOGY | Facility: CLINIC | Age: 55
End: 2020-02-10

## 2020-02-10 NOTE — PROGRESS NOTES
Received plan of care forms from [facility], forms were placed in provider folder for signature    Received form back signed by provider, form was fax to [phone],sent to be scanned

## 2020-02-24 ENCOUNTER — TELEPHONE (OUTPATIENT)
Dept: NEUROLOGY | Facility: CLINIC | Age: 55
End: 2020-02-24

## 2020-02-24 ENCOUNTER — TRANSFERRED RECORDS (OUTPATIENT)
Dept: HEALTH INFORMATION MANAGEMENT | Facility: CLINIC | Age: 55
End: 2020-02-24

## 2020-02-24 NOTE — TELEPHONE ENCOUNTER
Received Plan of Care form [facility]  Records Date of service: 2/24/20  Copy has been sent to scanning and encounter routed to specialty nurse for review. FYI was placed in provider folder

## 2020-03-02 ENCOUNTER — HEALTH MAINTENANCE LETTER (OUTPATIENT)
Age: 55
End: 2020-03-02

## 2020-03-17 DIAGNOSIS — G24.1 DYSTONIA, TORSION, FRAGMENTS OF: ICD-10-CM

## 2020-03-17 RX ORDER — CARBIDOPA AND LEVODOPA 25; 100 MG/1; MG/1
TABLET ORAL
Qty: 990 TABLET | Refills: 3
Start: 2020-03-17 | End: 2020-06-10

## 2020-03-17 NOTE — TELEPHONE ENCOUNTER
Rx Authorization:    Requested Medication/ Dose: Carbidopa-Levodopa     Date last refill ordered: 1/23/2019    Quantity ordered: 990    # refills: 3    Date of last clinic visit with ordering provider: 1/8/20    Date of next clinic visit with ordering provider: 4/8/20    All pertinent protocol data (lab date/result):     Include pertinent information from patients message:

## 2020-03-17 NOTE — TELEPHONE ENCOUNTER
carbidopa-levodopa (SINEMET)  MG tablet  990 tablet  3  3/17/2020   No    Sig: Take 2 tabs at 530AM, 10AM, 2PM, 6PM, and 10PM      Called [facility] pharmacy and left a message for the pharmacist to fill the above prescription.

## 2020-03-31 ENCOUNTER — TRANSFERRED RECORDS (OUTPATIENT)
Dept: HEALTH INFORMATION MANAGEMENT | Facility: CLINIC | Age: 55
End: 2020-03-31

## 2020-04-01 ENCOUNTER — TELEPHONE (OUTPATIENT)
Dept: NEUROLOGY | Facility: CLINIC | Age: 55
End: 2020-04-01

## 2020-04-01 NOTE — TELEPHONE ENCOUNTER
Received records from [facility]   Records Date of service: 3/31/20  Copy has been sent to scanning and encounter routed to specialty nurse for review.

## 2020-04-08 ENCOUNTER — VIRTUAL VISIT (OUTPATIENT)
Dept: NEUROLOGY | Facility: CLINIC | Age: 55
End: 2020-04-08
Payer: COMMERCIAL

## 2020-04-08 DIAGNOSIS — G21.8 OTHER SECONDARY PARKINSONISM (H): ICD-10-CM

## 2020-04-08 DIAGNOSIS — G24.8 ACQUIRED TORSION DYSTONIA: ICD-10-CM

## 2020-04-08 DIAGNOSIS — R26.9 GAIT DIFFICULTY: ICD-10-CM

## 2020-04-08 DIAGNOSIS — G31.85 CORTICOBASAL SYNDROME (H): Primary | ICD-10-CM

## 2020-04-08 NOTE — PROGRESS NOTES
"Department of Neurology  Movement Disorders Division   Initial Telehealth Video Evaluation     Patient: Sandra Jara   MRN: [id number]   : 1965   Date of Visit: 2020    Sadnra Jara is a 54 year old female who is being evaluated via a billable video visit due to the Covid 19 Pandemic.     The patient has been notified of following:     \"This video visit will be conducted via a call between you and your physician/provider. We have found that certain health care needs can be provided without the need for an in-person physical exam.  This service lets us provide the care you need with a video conversation.  If a prescription is necessary we can send it directly to your pharmacy.  If lab work is needed we can place an order for that and you can then stop by our lab to have the test done at a later time.    If during the course of the call the physician/provider feels a video visit is not appropriate, you will not be charged for this service.\"     Patient has given verbal consent for Video visit? 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    History of Present Illness  She has stopped Sinemet aboutn 1 month and has noticed no worsening in her symptoms. She stopped taking baclofen around the same time with no change.    He last botox injections were on 20. She started to notice some stiffness in the arm starting about 1 week ago, but so far doesn't have pain. She did not noticed any benefit to her ankle dystonia from her botox injections. She is concerned about not being able to get her next set of injections on time due to the Covid 19 pandemic.    She has noticed intermittent swelling in her right foot and curling of the toes. It can be uncomfortable but not " painful.    DBS: when she was at her last visit she was on Program 5. However, we had her switch to program 3 on 1/23/20. She possibly noticed some increased shoulder tightnes and shaking, and some worsening of walking and balance.     She has had functional declines. She can no longer feed herself because she can't use utensils. She gets hel  To walk she is needing assistance from others. They are considering a walker.     She has returned to see Dr. Braden, last in December. She has suggested a walker and wheelchair for longer distances.    She is sleeping OK 6 hours per night.   No issues with urinary   She is having regular bowel movements.     Swallowing: no issues with  Swallow.  Cognitive: She is noticing increasing trouble getting out what she is thinking.      She has not noticed any sensory changes.     She has not had any recent falls. Her last fall was in January, and she fell backwards.         Review of Systems:  Other than that mentioned above and at the end of this note, the remainder of 12 systems reviewed were negative.    Medications:  Current Outpatient Medications   Medication Sig Dispense Refill     botulinum toxin type A (BOTOX) 100 units injection Inject 280 Units into the muscle once Lot # /C3 with Expiration Date:  11/2020       calcium-vitamin D (OSCAL) 250-125 MG-UNIT TABS per tablet Take 1 tablet by mouth daily        Cholecalciferol (VITAMIN D) 2000 UNITS tablet Take 2,000 Units by mouth every morning        OnabotulinumtoxinA (BOTOX IJ) Inject 300 Units as directed once [id number] with Expiration Date:  12/2020       traMADol (ULTRAM) 50 MG tablet Take 1/2 to 1 tab by mouth every 6 hours as needed, up to 3 tabs per day. 90 tablet 0     acyclovir (ZOVIRAX) 200 MG capsule as needed  1      Allergies: has No Known Allergies.    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 It demonstrated some mild changes of right carpal tunnel syndrome but was otherwise normal.       History of MRI of brain and brain stem 12/21/2014    A brain MRI scan done on 06/27/2014 revealed no abnormalities.      History of MRI of cervical spine 12/21/2014    A cervical MRI done on 06/10/2014 revealed some mild to moderate degenerative changes but no significant central canal or foraminal stenosis, and no abnormalities of the spinal cord were noted.       Movement disorder 12/21/2014    I saw your patient, Deepali Contreras on 12/15/2014. She is a 49-year-old right-handed female here for evaluation of right upper extremity dysfunction and right hand tremor.  She has noted this problem for the last couple of years. She reports she is losing dexterity in her right hand. She has appreciated a tremor of the right hand with actions such as writing or postural maintenance. She has n       Past Surgical History:   Past Surgical History:   Procedure Laterality Date     APPENDECTOMY       IMPLANT DEEP BRAIN STIMULATION GENERATOR / BATTERY Left 3/1/2019    Procedure: Left Deep Brain Stimulator Placement, Phase II, Placement Of Deep Brain Stimulator Generator/Battery Over The Left Chest Wall Latex Free;  Surgeon: Efren Triana MD;  Location: [facility] OR     OPTICAL TRACKING SYSTEM INSERTION DEEP BRAIN STIMULATION Left 2/22/2019    Procedure: Stealth Assisted Left Side Deep Brain Stimulator Placement, Phase I, Placement Of Left Side Deep Brain Stimulator Electrode, Target Left Globus Pallidus Internus With Microelectrode Recording;  Surgeon: Efren Triana MD;   Location:  OR       Social History:   Social History     Socioeconomic History     Marital status:      Spouse name: Not on file     Number of children: 3     Years of education: Not on file     Highest education level: Not on file   Occupational History     Not on file   Social Needs     Financial resource strain: Not on file     Food insecurity     Worry: Not on file     Inability: Not on file     Transportation needs     Medical: Not on file     Non-medical: Not on file   Tobacco Use     Smoking status: Never Smoker     Smokeless tobacco: Never Used   Substance and Sexual Activity     Alcohol use: Yes     Alcohol/week: 2.0 - 3.0 standard drinks     Frequency: 2-4 times a month     Drinks per session: 1 or 2     Drug use: No     Sexual activity: Yes     Partners: Male     Birth control/protection: Male Surgical   Lifestyle     Physical activity     Days per week: Not on file     Minutes per session: Not on file     Stress: Not on file   Relationships     Social connections     Talks on phone: Not on file     Gets together: Not on file     Attends Jew service: Not on file     Active member of club or organization: Not on file     Attends meetings of clubs or organizations: Not on file     Relationship status: Not on file     Intimate partner violence     Fear of current or ex partner: Not on file     Emotionally abused: Not on file     Physically abused: Not on file     Forced sexual activity: Not on file   Other Topics Concern     Parent/sibling w/ CABG, MI or angioplasty before 65F 55M? No   Social History Narrative        Izaiah Jara    Lives in [facility]      She does not smoke.  She has a couple of beers to drink on the weekend but otherwise is not a heavy user of alcohol    3 kids: son peña 21; 94, 96 and 98    2 sons    1 daughter    2 are in college and daughter is a sophomore.            FAMILY HISTORY:  Notable for a paternal aunt who is .  She had  Parkinson's disease.  Otherwise, there is no other family history of neurologic problems.  There is no family history of dystonia.          90% Georgian    Some norweigian                .         Family History:  Family History   Problem Relation Age of Onset     Breast Cancer Mother      Cancer - colorectal Mother      Macular Degeneration Mother      Dementia Father      Deep Vein Thrombosis Father      No Known Problems Brother      No Known Problems Sister      No Known Problems Sister      No Known Problems Sister      Neurologic Disorder Paternal Aunt         Parkinson's Disease     Cerebrovascular Disease Brother         stroke at age 25 possibly from pfo     Dementia Paternal Grandmother           Physical Exam:  The patient's  vitals were not taken for this visit.  She is well-nourished and in no acute distress.  Neurological Examination:   Alert and oriented with slowed but fluent speech. Face is symmetric with equal activation. She has moderated masked facies. Difficulty with smooth pursuits and testing of EOMI with the help of the patient's daughter. It appears that she has limitation of EOM both horizontally and vertically at this point, but this will need to be verified at our next in-person visit.     Her right hand is clenched in a tight fist with thumb clasped over. She cannot opten the hand voluntarily at all and she is unalbe to lift the arm or bend the elbow either. She can very slightly pronate the arm. Her shoulder is elevation. With attempts to move the arm she has tremor into the hand and wrist.     She clearly has significant involvement of the left side with severe bradykinesia with open-close hand movements but no tremor.  Unable to test gait during this exam.     Procedure: DBS Programming     Lead(s):     Left Right   DBS Target GPi        DBS Lead Type    Abbott 0.5mm spacing      Lead Implant Date   2/22/19        IPG(s):    1 2   IPG Infinity 6660        IPG Implant Date 3/1/19      Location    L chest     Battery (V) NA        Full Impedence/Currents Check: NA        Left Brain Program 2 Program 3 Program 5    Contacts  C+3- C+3-4- 1. C+3-4-  2. C+2-    Amplitude (mA)  4.5 5.2  1. 4.15  2. 3.5   Pulse Width ( s) 150 150 1. 150  2. 90   Frequency (Hz) 130 130 1. 125  2. 125   Impedence         Patient Control (min/max) 3.0/4.5 4.0/5.4  4.0/5.4   * = change      Initial Program selected:    Program 3     Final Settings:     Left Brain Program 2 Program 3 Program 5    Contacts  C+3- C+3-4- 1. C+3-4-  2. C+2-    Amplitude (mA)  4.5 5.2  1. 4.15  2. 3.5   Pulse Width ( s) 150 150 1. 150  2. 90   Frequency (Hz) 130 130 1. 125  2. 125   Impedence         Patient Control (min/max) 3.0/4.5 4.0/5.4     * = change      Final Program selected:    Program 5. During the video visit I guided the patient and her daughter and confirmed a DBS programming change from Program 3 to Program 5. I confirmed that the amplitudes remain 4.15 and 3.5 mA as documented above.         Impression:   Sandra Jara is a 54 year old female with probable corticobasal syndrome with dystonia involving RUE, right shoulder, right leg. She continues to have progression now with no function essentially of the right arm with a fixed clenched right fist, increasing difficulty with ambulation requiring assistance, as well as spread of symptoms to the left side now with moderate bradykinesia involving the left hand. Her speech is also slower. We are     Recommendations:     1. DBS settings. Today during your video visit we switching your DBS program from Program 3 back to Program 5.    2. Please send a [employer] message in 2 weeks if you have not had any improvement or are having worsening. At that time we can restart Carbidopa/levodopa IR 25/100 2 tabs at 6AM, 10AM, 2PM, 6PM, 10PM to see if it was helping your left side symptoms (your left arm movements were significantly slower today on exam).    3. Continue using your right  hand brace at night.    4. Follow-up visit in 4 weeks (likely VIDEO VISIT but we can play it by ear). At that time if you have not yet started Carbidopa/levodopa again as above, then we will then.     5. If at any point your pain returns and becomes intolerable we can make arrangements to schedule botox injections quickly. However, if you are able to tolerate it until the pandemic restrictions are over, that would reduce your risk of getting infected.    Time was a key factor in today's visit, and greater than 50% of this 42 minute  visit was spent discussing the therapeutic plan, counseling, and coordinating care.    8 minutes was spent guiding and confirming the patient DBS programming changes as detailed above.      Tiffany Hopkins MD    of Neurology         Video-Visit Details    Type of service:  Video Visit    Video End Time (time video stopped): 9:47AM    Originating Location (pt. Location): Home    Distant Location (provider location):  [facility]     Mode of Communication:  Video Conference via [employer] mirela because [employer] was having technical issues.

## 2020-04-09 NOTE — PATIENT INSTRUCTIONS
1. DBS settings. Today during your video visit we switching your DBS program from Program 3 back to Program 5.    2. Please send a [email or web address] message in 2 weeks if you have not had any improvement or are having worsening. At that time we can restart Carbidopa/levodopa IR 25/100 2 tabs at 6AM, 10AM, 2PM, 6PM, 10PM to see if it was helping your left side symptoms (your left arm movements were significantly slower today on exam).    3. Continue using your right hand brace at night.    4. Follow-up visit in 4 weeks (likely VIDEO VISIT but we can play it by ear). At that time if you have not yet started Carbidopa/levodopa again as above, then we will then.     5. If at any point your pain returns and becomes intolerable we can make arrangements to schedule botox injections quickly. However, if you are able to tolerate it until the pandemic restrictions are over, that would reduce your risk of getting infected.

## 2020-04-16 ENCOUNTER — DOCUMENTATION ONLY (OUTPATIENT)
Dept: NEUROLOGY | Facility: CLINIC | Age: 55
End: 2020-04-16

## 2020-04-16 ENCOUNTER — DOCUMENTATION ONLY (OUTPATIENT)
Facility: CLINIC | Age: 55
End: 2020-04-16

## 2020-04-16 NOTE — PROGRESS NOTES
Received forms from [facility], forms were e-mailed to Dr Worley,for signature    Received FMLA forms back signed by provider, forms were sent to be scanned and fax to [phone]

## 2020-04-20 DIAGNOSIS — G24.1 DYSTONIA, TORSION, FRAGMENTS OF: ICD-10-CM

## 2020-04-20 NOTE — TELEPHONE ENCOUNTER
Rx Authorization:    Requested Medication/ Dose: Carbido-L-dopa 50-200MG    Date last refill ordered: 3/20/19    Quantity ordered: 30tabs    # refills: 30tabs    Date of last clinic visit with ordering provider: 1/8/20    Date of next clinic visit with ordering provider: 5/27/20    All pertinent protocol data (lab date/result):     Include pertinent information from patients message:

## 2020-04-21 RX ORDER — CARBIDOPA AND LEVODOPA 50; 200 MG/1; MG/1
1 TABLET, EXTENDED RELEASE ORAL AT BEDTIME
Qty: 30 TABLET | Refills: 11 | Status: SHIPPED | OUTPATIENT
Start: 2020-04-21 | End: 2020-08-13

## 2020-04-29 ENCOUNTER — MYC MEDICAL ADVICE (OUTPATIENT)
Dept: NEUROLOGY | Facility: CLINIC | Age: 55
End: 2020-04-29

## 2020-05-01 ENCOUNTER — MYC MEDICAL ADVICE (OUTPATIENT)
Dept: NEUROLOGY | Facility: CLINIC | Age: 55
End: 2020-05-01

## 2020-05-06 NOTE — TELEPHONE ENCOUNTER
Received Physician Statement form was filled out and emailed to provided for signature, received form back signed by provider, form was mailed to patient home.

## 2020-05-08 ENCOUNTER — TELEPHONE (OUTPATIENT)
Dept: NEUROLOGY | Facility: CLINIC | Age: 55
End: 2020-05-08

## 2020-05-08 ENCOUNTER — TELEPHONE (OUTPATIENT)
Facility: CLINIC | Age: 55
End: 2020-05-08

## 2020-05-08 NOTE — TELEPHONE ENCOUNTER
[facility] Call Center    Phone Message    May a detailed message be left on voicemail: yes     Reason for Call: Other: pts daughter is calling requesting a call back to discuss pts full disability form, please call alecia at [phone] thank you     Action Taken: Message routed to:  [facility] ([facility] neuro    Travel Screening: Not Applicable

## 2020-05-08 NOTE — TELEPHONE ENCOUNTER
Called and spoke with daughter and let her know that form was completed, signed by provider and mailed to patient address that was on file

## 2020-05-11 ENCOUNTER — MYC MEDICAL ADVICE (OUTPATIENT)
Dept: NEUROLOGY | Facility: CLINIC | Age: 55
End: 2020-05-11

## 2020-05-13 ASSESSMENT — ENCOUNTER SYMPTOMS
HEADACHES: 0
WEAKNESS: 1
LOSS OF CONSCIOUSNESS: 0
SPEECH CHANGE: 1
SEIZURES: 0
NUMBNESS: 0
TINGLING: 0
PARALYSIS: 0
MEMORY LOSS: 0
DISTURBANCES IN COORDINATION: 1
DIZZINESS: 0
TREMORS: 1

## 2020-05-14 ENCOUNTER — OFFICE VISIT (OUTPATIENT)
Dept: NEUROLOGY | Facility: CLINIC | Age: 55
End: 2020-05-14
Payer: COMMERCIAL

## 2020-05-14 VITALS — TEMPERATURE: 97.3 F

## 2020-05-14 DIAGNOSIS — G31.85 CORTICOBASAL SYNDROME (H): Primary | ICD-10-CM

## 2020-05-14 DIAGNOSIS — G24.3 CERVICAL DYSTONIA: ICD-10-CM

## 2020-05-14 DIAGNOSIS — G24.8 ACQUIRED TORSION DYSTONIA: ICD-10-CM

## 2020-05-14 DIAGNOSIS — G24.9 DYSTONIA: ICD-10-CM

## 2020-05-14 NOTE — PROGRESS NOTES
Movement Disorders Botulinum Toxin Clinic Note    Chief Complaint: dystonia, corticobasal syndrome.    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      She is having severe shoulder and neck pain, as well as pain in in her ankle and foot from toe curling.     Response to Last Injection:      Benefit from last injections:  Very helpful for pain and tightness.    Wearing off: She noticed wearing off over 1 month ago and the pain effects are now unbearable.     Side effects: She reports no side effects.     Additional interval history: She continues to progress and is now requiring full assistance to walk. She has swelling in her right leg.       Current Outpatient Medications   Medication Sig Dispense Refill     acyclovir (ZOVIRAX) 200 MG capsule as needed  1     botulinum toxin type A (BOTOX) 100 units injection Inject 280 Units into the muscle once Lot # /C3 with Expiration Date:  11/2020       calcium-vitamin D (OSCAL) 250-125 MG-UNIT TABS per tablet Take 1 tablet by mouth daily        carbidopa-levodopa (SINEMET CR)  MG CR tablet Take 1 tablet by mouth At Bedtime 30 tablet 11     carbidopa-levodopa (SINEMET)  MG tablet Take 2 tabs at 530AM, 10AM, 2PM, 6PM, and 10PM (Patient not taking: Reported on 4/8/2020) 990 tablet 3     Cholecalciferol (VITAMIN D) 2000 UNITS tablet Take 2,000 Units by mouth every morning        OnabotulinumtoxinA (BOTOX IJ) Inject 300 Units as directed once [id number] with Expiration Date:  12/2020       traMADol (ULTRAM) 50 MG tablet Take 1/2 to 1 tab by mouth every 6 hours as needed, up to 3 tabs per day. 90 tablet 0       Allergies: She has No Known Allergies.    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 It demonstrated some mild changes of right carpal tunnel syndrome but was otherwise normal.       History of MRI of brain and brain stem 12/21/2014    A brain MRI scan done on 06/27/2014 revealed no abnormalities.      History of MRI of cervical spine 12/21/2014    A cervical MRI done on 06/10/2014 revealed some mild to moderate degenerative changes but no significant central canal or foraminal stenosis, and no abnormalities of the spinal cord were noted.       Movement disorder 12/21/2014    I saw your patient, Deepali Contreras on 12/15/2014. She is a 49-year-old right-handed female here for evaluation of right upper extremity dysfunction and right hand tremor.  She has noted this problem for the last couple of years. She reports she is losing dexterity in her right hand. She has appreciated a tremor of the right hand with actions such as writing or postural maintenance. She has n       Past Surgical History:   Procedure Laterality Date     APPENDECTOMY       IMPLANT DEEP BRAIN STIMULATION GENERATOR / BATTERY Left 3/1/2019    Procedure: Left Deep Brain Stimulator Placement, Phase II, Placement Of Deep Brain Stimulator Generator/Battery Over The Left Chest Wall Latex Free;  Surgeon: Efren Triana MD;  Location: [facility] OR     OPTICAL TRACKING SYSTEM INSERTION DEEP BRAIN STIMULATION Left 2/22/2019    Procedure: Stealth Assisted Left Side Deep Brain Stimulator Placement, Phase I, Placement Of Left Side Deep Brain Stimulator Electrode, Target Left Globus Pallidus Internus With  Microelectrode Recording;  Surgeon: Efren Triana MD;  Location:  OR       Social History     Socioeconomic History     Marital status:      Spouse name: Not on file     Number of children: 3     Years of education: Not on file     Highest education level: Not on file   Occupational History     Not on file   Social Needs     Financial resource strain: Not on file     Food insecurity     Worry: Not on file     Inability: Not on file     Transportation needs     Medical: Not on file     Non-medical: Not on file   Tobacco Use     Smoking status: Never Smoker     Smokeless tobacco: Never Used   Substance and Sexual Activity     Alcohol use: Yes     Alcohol/week: 2.0 - 3.0 standard drinks     Frequency: 2-4 times a month     Drinks per session: 1 or 2     Drug use: No     Sexual activity: Yes     Partners: Male     Birth control/protection: Male Surgical   Lifestyle     Physical activity     Days per week: Not on file     Minutes per session: Not on file     Stress: Not on file   Relationships     Social connections     Talks on phone: Not on file     Gets together: Not on file     Attends Muslim service: Not on file     Active member of club or organization: Not on file     Attends meetings of clubs or organizations: Not on file     Relationship status: Not on file     Intimate partner violence     Fear of current or ex partner: Not on file     Emotionally abused: Not on file     Physically abused: Not on file     Forced sexual activity: Not on file   Other Topics Concern     Parent/sibling w/ CABG, MI or angioplasty before 65F 55M? No   Social History Narrative        Izaiah Jara    Lives in [facility]      She does not smoke.  She has a couple of beers to drink on the weekend but otherwise is not a heavy user of alcohol    3 kids: son peña 21; 94, 96 and 98    2 sons    1 daughter    2 are in college and daughter is a sophomore.            FAMILY HISTORY:  Notable for a  paternal aunt who is .  She had Parkinson's disease.  Otherwise, there is no other family history of neurologic problems.  There is no family history of dystonia.          90% E[place]    Some norweigian                .         Family History   Problem Relation Age of Onset     Breast Cancer Mother      Cancer - colorectal Mother      Macular Degeneration Mother      Dementia Father      Deep Vein Thrombosis Father      No Known Problems Brother      No Known Problems Sister      No Known Problems Sister      No Known Problems Sister      Neurologic Disorder Paternal Aunt         Parkinson's Disease     Cerebrovascular Disease Brother         stroke at age 25 possibly from pfo     Dementia Paternal Grandmother        Physical Examination:  Vital Signs:   temperature is 97.3  F (36.3  C).   She is alert and oriented and has fluent speech without dysarthria and is able to provide an interval medical history. He speech is somewhat slowed. She has severe right arm dystonia with fixed clenched fist contracture and contracture of elbow flexion. She also had cervical dystonia with right torticollis. She has marked right leg rigidity and dystonia with some toe curling. She has mild edema of the right foot and ankle. She is in a wheelchair and is unable to walk without full assistance (although she does not use a wheelchair at home).      Procedure: DBS Programming     Lead(s):     Left Right   DBS Target GPi        DBS Lead Type    Abbott 0.5mm spacing      Lead Implant Date   19        IPG(s):    1 2   IPG Infinity 6660        IPG Implant Date 3/1/19     Location    L chest     Battery (V) 2.84 V         Full Impedence/Currents Check: WNL     No changes were made to the patient's program.  DBS was turned off during injections and turned back on at the end of the procedure.     Left Brain Program 5     GPi1 GPi2   Contacts C+;3 (ABC)- C+;2(ABC)-   Amplitude (mA) 4.15 3.5   Pulse Width ( s) 150 90   Frequency  (Hz) 125 125   Impedence [phone]   * = change      Initial Program selected:    Program 5          Final Program selected:    Program 5 - no changes       BOTULINUM NEUROTOXIN INJECTION PROCEDURES:    VERIFICATION OF PATIENT IDENTIFICATION AND PROCEDURE     Initials   Patient Name LES   Patient  LES   Procedure Verified by: LES         Above assessments performed by:  Tiffany Hopkins MD      INDICATION/S FOR PROCEDURE/S:  Sandra Jara is a 54 year old year old patient with dystonia affecting the  head, neck and shoulder girdle musculature and right upper extremity secondary to a diagnosis of dystonia secondary to corticobasal syndrome with associated  pain, tremor, spasms, loss of joint motion, loss of volitional motor control and difficulty with activities of daily living.     Her baseline symptoms have been recalcitrant to oral medications and conservative therapy.  She is here today for an injection of Botox.      GOAL OF PROCEDURE:  The goal of this procedure is to increase active range of motion, improve volitional motor control, decrease pain  and enhance functional independence associated with dystonic movements.    TOTAL DOSE ADMINISTERED:  Dose Administered:  395 units Botox    Diluent Used:  Preservative Free Normal Saline  Total Volume of Diluent Used:  4 ml  Lot # [id number] with Expiration Date:  10/2022  NDC #: Botox 200u (6162-3102-60)    CONSENT:  The risks, benefits, and treatment options were discussed with Sandra Jara and she agreed to proceed.      Written consent was obtained by LES.     EQUIPMENT USED:  Needle-37mm stimulating/recording    SKIN PREPARATION:  Skin preparation was performed using an alcohol wipe.    GUIDANCE DESCRIPTION:  Electro-myographic guidance was necessary throughout the procedure to accurately identify all areas of dystonic muscles while avoiding injection of non-dystonic muscles, neighboring nerves and nearby vascular structures.     AREA/MUSCLE  "INJECTED:      AREA/MUSCLE INJECTED:    Muscles Injected Units Injected Number of Injections   Right deltoid 50 3   Right biceps 50 2   Right trapezius 100 5   Right latissimus dorsi 35 2   Right splenius capitis 50 2   Right levator scapulae 20 1   Left SCM 20 1   Right brachialis 25 1   Right brachioradialis    25   1   Right teres minor 20 1        Total Units Injected: 395     Unavoidable Waste: 5     Total Units Billed 400              The patient tolerated the injections without difficulty.      Assessment:    Sandra Jara is a 54 year old female with Probable  Corticobasal syndrome with dystonia involving RUE, right shoulder, right leg.  She has really struggled with progression of disease and with increased pain related to dystonia due to the delay of her botox injections with the Covid 19 pandemic. In addition to today's injections I have also given her a new prescription for tizanidine to see if this might help her increasing dystonia and spasticity that makes her unable to walk independently.      Today we did repeat botulinum toxin injections.        Plan    1. We did repeat injections today, focusing on your shoulder and neck pain, and your arm pulling up. I will request approval from your insurance for an increased dose so that we can add injections for your toe curling, and further injections for arm and neck pain.    2. Your DBS battery is starting to get low. I want you to start checking your battery with your remote once per week. When the OK status changes to \"Elective Replacement Interval (MELISSA)\" then you have an estimated 3 months before the battery dies. We want to know the timing of this so that we can monitor you for any worsening symptoms when it stops.  We will not plan on automatically replacing the battery unless we see clear worsening.    3. I have given you a prescription for a muscle relaxant tizanidine.  Start with 2mg (1/2 of 4mg tab) every 6 hours as needed. After 1 week " if you are tolerating this without side effects you can increase to 4mg (1 tab) every 6 hours as needed.    Possible side effects include sleepiness, lightheadedness, dry mouth, and rarely liver dysfunction  Follow-up in 3 months' time to consider repeat injections    Time spent with patient: Greater than 50% of this 30 minute visit was spent in counseling and coordination of care related to the above issues.    Additional time spent for separate DBS programming: DBS analyzed without reprogramming.     Tiffany Hopkins MD    of Neurology               Answers for HPI/ROS submitted by the patient on 5/13/2020   General Symptoms: No  Skin Symptoms: No  HENT Symptoms: No  EYE SYMPTOMS: No  HEART SYMPTOMS: No  LUNG SYMPTOMS: No  INTESTINAL SYMPTOMS: No  URINARY SYMPTOMS: No  GYNECOLOGIC SYMPTOMS: No  BREAST SYMPTOMS: No  SKELETAL SYMPTOMS: No  BLOOD SYMPTOMS: No  NERVOUS SYSTEM SYMPTOMS: Yes  MENTAL HEALTH SYMPTOMS: No  Trouble with coordination: Yes  Dizziness or trouble with balance: No  Fainting or black-out spells: No  Memory loss: No  Headache: No  Seizures: No  Speech problems: Yes  Tingling: No  Tremor: Yes  Weakness: Yes  Difficulty walking: Yes  Paralysis: No  Numbness: No

## 2020-05-14 NOTE — PATIENT INSTRUCTIONS
"1. We did repeat injections today, focusing on your shoulder and neck pain, and your arm pulling up. I will request approval from your insurance for an increased dose so that we can add injections for your toe curling, and further injections for arm and neck pain.    2. Your DBS battery is starting to get low. I want you to start checking your battery with your remote once per week. When the OK status changes to \"Elective Replacement Interval (MELISSA)\" then you have an estimated 3 months before the battery dies. We want to know the timing of this so that we can monitor you for any worsening symptoms when it stops.  We will not plan on automatically replacing the battery unless we see clear worsening.    3. I have given you a prescription for a muscle relaxant tizanidine.  Start with 2mg (1/2 of 4mg tab) every 6 hours as needed. After 1 week if you are tolerating this without side effects you can increase to 4mg (1 tab) every 6 hours as needed.    Possible side effects include sleepiness, lightheadedness, dry mouth, and rarely liver dysfunction  "

## 2020-05-14 NOTE — LETTER
5/14/2020     RE: Sandra Jara  [address]  [facility] [address]     Dear Colleague,    Thank you for referring your patient, Sandra Jara, to the [facility] NEUROLOGY at [facility]. Please see a copy of my visit note below.    Movement Disorders Botulinum Toxin Clinic Note    Chief Complaint: dystonia, corticobasal syndrome.    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      She is having severe shoulder and neck pain, as well as pain in in her ankle and foot from toe curling.     Response to Last Injection:      Benefit from last injections:  Very helpful for pain and tightness.    Wearing off: She noticed wearing off over 1 month ago and the pain effects are now unbearable.     Side effects: She reports no side effects.     Additional interval history: She continues to progress and is now requiring full assistance to walk. She has swelling in her right leg.       Current Outpatient Medications   Medication Sig Dispense Refill     acyclovir (ZOVIRAX) 200 MG capsule as needed  1     botulinum toxin type A (BOTOX) 100 units injection Inject 280 Units into the muscle once Lot # /C3 with Expiration Date:  11/2020       calcium-vitamin D (OSCAL) 250-125 MG-UNIT TABS per tablet Take 1 tablet by mouth daily        carbidopa-levodopa (SINEMET CR)  MG CR tablet Take 1 tablet by mouth At Bedtime 30 tablet 11     carbidopa-levodopa (SINEMET)  MG tablet Take 2 tabs at 530AM, 10AM, 2PM, 6PM, and 10PM (Patient not taking: Reported on 4/8/2020) 990 tablet 3     Cholecalciferol (VITAMIN D) 2000 UNITS tablet Take 2,000 Units by mouth every morning        OnabotulinumtoxinA (BOTOX  IJ) Inject 300 Units as directed once [id number] with Expiration Date:  12/2020       traMADol (ULTRAM) 50 MG tablet Take 1/2 to 1 tab by mouth every 6 hours as needed, up to 3 tabs per day. 90 tablet 0       Allergies: She has No Known Allergies.    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 It demonstrated some mild changes of right carpal tunnel syndrome but was otherwise normal.       History of MRI of brain and brain stem 12/21/2014    A brain MRI scan done on 06/27/2014 revealed no abnormalities.      History of MRI of cervical spine 12/21/2014    A cervical MRI done on 06/10/2014 revealed some mild to moderate degenerative changes but no significant central canal or foraminal stenosis, and no abnormalities of the spinal cord were noted.       Movement disorder 12/21/2014    I saw your patient, Deepali Contreras on 12/15/2014. She is a 49-year-old right-handed female here for evaluation of right upper extremity dysfunction and right hand tremor.  She has noted this problem for the last couple of years. She reports she is losing dexterity in her right hand. She has appreciated a tremor of the right hand with actions such as writing or postural maintenance. She has n       Past Surgical History:   Procedure Laterality Date     APPENDECTOMY       IMPLANT DEEP BRAIN STIMULATION GENERATOR / BATTERY Left 3/1/2019    Procedure: Left Deep Brain Stimulator Placement, Phase II, Placement Of Deep Brain Stimulator Generator/Battery Over The Left Chest Wall Latex Free;  Surgeon: Efren Triana  MD Rocky;  Location: [facility]     OPTICAL TRACKING SYSTEM INSERTION DEEP BRAIN STIMULATION Left 2/22/2019    Procedure: Stealth Assisted Left Side Deep Brain Stimulator Placement, Phase I, Placement Of Left Side Deep Brain Stimulator Electrode, Target Left Globus Pallidus Internus With Microelectrode Recording;  Surgeon: Efren Triana MD;  Location: [facility] OR       Social History     Socioeconomic History     Marital status:      Spouse name: Not on file     Number of children: 3     Years of education: Not on file     Highest education level: Not on file   Occupational History     Not on file   Social Needs     Financial resource strain: Not on file     Food insecurity     Worry: Not on file     Inability: Not on file     Transportation needs     Medical: Not on file     Non-medical: Not on file   Tobacco Use     Smoking status: Never Smoker     Smokeless tobacco: Never Used   Substance and Sexual Activity     Alcohol use: Yes     Alcohol/week: 2.0 - 3.0 standard drinks     Frequency: 2-4 times a month     Drinks per session: 1 or 2     Drug use: No     Sexual activity: Yes     Partners: Male     Birth control/protection: Male Surgical   Lifestyle     Physical activity     Days per week: Not on file     Minutes per session: Not on file     Stress: Not on file   Relationships     Social connections     Talks on phone: Not on file     Gets together: Not on file     Attends Jain service: Not on file     Active member of club or organization: Not on file     Attends meetings of clubs or organizations: Not on file     Relationship status: Not on file     Intimate partner violence     Fear of current or ex partner: Not on file     Emotionally abused: Not on file     Physically abused: Not on file     Forced sexual activity: Not on file   Other Topics Concern     Parent/sibling w/ CABG, MI or angioplasty before 65F 55M? No   Social History Narrative        Izaiah Jara    Lives in [place]           She does not smoke.  She has a couple of beers to drink on the weekend but otherwise is not a heavy user of alcohol    3 kids: son peña 21; 94, 96 and 98    2 sons    1 daughter    2 are in college and daughter is a sophomore.            FAMILY HISTORY:  Notable for a paternal aunt who is .  She had Parkinson's disease.  Otherwise, there is no other family history of neurologic problems.  There is no family history of dystonia.          90% Tajik    Some norweigian                .         Family History   Problem Relation Age of Onset     Breast Cancer Mother      Cancer - colorectal Mother      Macular Degeneration Mother      Dementia Father      Deep Vein Thrombosis Father      No Known Problems Brother      No Known Problems Sister      No Known Problems Sister      No Known Problems Sister      Neurologic Disorder Paternal Aunt         Parkinson's Disease     Cerebrovascular Disease Brother         stroke at age 25 possibly from pfo     Dementia Paternal Grandmother        Physical Examination:  Vital Signs:   temperature is 97.3  F (36.3  C).   She is alert and oriented and has fluent speech without dysarthria and is able to provide an interval medical history. He speech is somewhat slowed. She has severe right arm dystonia with fixed clenched fist contracture and contracture of elbow flexion. She also had cervical dystonia with right torticollis. She has marked right leg rigidity and dystonia with some toe curling. She has mild edema of the right foot and ankle. She is in a wheelchair and is unable to walk without full assistance (although she does not use a wheelchair at home).      Procedure: DBS Programming     Lead(s):     Left Right   DBS Target GPi        DBS Lead Type    Abbott 0.5mm spacing      Lead Implant Date   19        IPG(s):    1 2   IPG Infinity 6660        IPG Implant Date 3/1/19     Location    L chest     Battery (V) 2.84 V         Full Impedence/Currents  Check: WNL     No changes were made to the patient's program.  DBS was turned off during injections and turned back on at the end of the procedure.     Left Brain Program 5     GPi1 GPi2   Contacts C+;3 (ABC)- C+;2(ABC)-   Amplitude (mA) 4.15 3.5   Pulse Width ( s) 150 90   Frequency (Hz) 125 125   Impedence [phone]   * = change      Initial Program selected:    Program 5          Final Program selected:    Program 5 - no changes       BOTULINUM NEUROTOXIN INJECTION PROCEDURES:    VERIFICATION OF PATIENT IDENTIFICATION AND PROCEDURE     Initials   Patient Name LES   Patient  LES   Procedure Verified by: LES         Above assessments performed by:  Tiffany Hopkins MD      INDICATION/S FOR PROCEDURE/S:  Sandra Jara is a 54 year old year old patient with dystonia affecting the  head, neck and shoulder girdle musculature and right upper extremity secondary to a diagnosis of dystonia secondary to corticobasal syndrome with associated  pain, tremor, spasms, loss of joint motion, loss of volitional motor control and difficulty with activities of daily living.     Her baseline symptoms have been recalcitrant to oral medications and conservative therapy.  She is here today for an injection of Botox.      GOAL OF PROCEDURE:  The goal of this procedure is to increase active range of motion, improve volitional motor control, decrease pain  and enhance functional independence associated with dystonic movements.    TOTAL DOSE ADMINISTERED:  Dose Administered:  395 units Botox    Diluent Used:  Preservative Free Normal Saline  Total Volume of Diluent Used:  4 ml  Lot # [id number] with Expiration Date:  10/2022  NDC #: Botox 200u (1684-8791-35)    CONSENT:  The risks, benefits, and treatment options were discussed with Sandra Jara and she agreed to proceed.      Written consent was obtained by LES.     EQUIPMENT USED:  Needle-37mm stimulating/recording    SKIN PREPARATION:  Skin preparation was performed  "using an alcohol wipe.    GUIDANCE DESCRIPTION:  Electro-myographic guidance was necessary throughout the procedure to accurately identify all areas of dystonic muscles while avoiding injection of non-dystonic muscles, neighboring nerves and nearby vascular structures.     AREA/MUSCLE INJECTED:      AREA/MUSCLE INJECTED:    Muscles Injected Units Injected Number of Injections   Right deltoid 50 3   Right biceps 50 2   Right trapezius 100 5   Right latissimus dorsi 35 2   Right splenius capitis 50 2   Right levator scapulae 20 1   Left SCM 20 1   Right brachialis 25 1   Right brachioradialis    25   1   Right teres minor 20 1        Total Units Injected: 395     Unavoidable Waste: 5     Total Units Billed 400              The patient tolerated the injections without difficulty.      Assessment:    Sandra Jara is a 54 year old female with Probable  Corticobasal syndrome with dystonia involving RUE, right shoulder, right leg.  She has really struggled with progression of disease and with increased pain related to dystonia due to the delay of her botox injections with the Covid 19 pandemic. In addition to today's injections I have also given her a new prescription for tizanidine to see if this might help her increasing dystonia and spasticity that makes her unable to walk independently.      Today we did repeat botulinum toxin injections.        Plan    1. We did repeat injections today, focusing on your shoulder and neck pain, and your arm pulling up. I will request approval from your insurance for an increased dose so that we can add injections for your toe curling, and further injections for arm and neck pain.    2. Your DBS battery is starting to get low. I want you to start checking your battery with your remote once per week. When the OK status changes to \"Elective Replacement Interval (MELISSA)\" then you have an estimated 3 months before the battery dies. We want to know the timing of this so that we can " monitor you for any worsening symptoms when it stops.  We will not plan on automatically replacing the battery unless we see clear worsening.    3. I have given you a prescription for a muscle relaxant tizanidine.  Start with 2mg (1/2 of 4mg tab) every 6 hours as needed. After 1 week if you are tolerating this without side effects you can increase to 4mg (1 tab) every 6 hours as needed.    Possible side effects include sleepiness, lightheadedness, dry mouth, and rarely liver dysfunction  Follow-up in 3 months' time to consider repeat injections    Time spent with patient: Greater than 50% of this 30 minute visit was spent in counseling and coordination of care related to the above issues.    Additional time spent for separate DBS programming: DBS analyzed without reprogramming.     Tiffany Hopkins MD    of Neurology     Answers for HPI/ROS submitted by the patient on 5/13/2020   General Symptoms: No  Skin Symptoms: No  HENT Symptoms: No  EYE SYMPTOMS: No  HEART SYMPTOMS: No  LUNG SYMPTOMS: No  INTESTINAL SYMPTOMS: No  URINARY SYMPTOMS: No  GYNECOLOGIC SYMPTOMS: No  BREAST SYMPTOMS: No  SKELETAL SYMPTOMS: No  BLOOD SYMPTOMS: No  NERVOUS SYSTEM SYMPTOMS: Yes  MENTAL HEALTH SYMPTOMS: No  Trouble with coordination: Yes  Dizziness or trouble with balance: No  Fainting or black-out spells: No  Memory loss: No  Headache: No  Seizures: No  Speech problems: Yes  Tingling: No  Tremor: Yes  Weakness: Yes  Difficulty walking: Yes  Paralysis: No  Numbness: No    Again, thank you for allowing me to participate in the care of your patient.      Sincerely,    Tiffany Hopkins MD

## 2020-05-15 ENCOUNTER — TELEPHONE (OUTPATIENT)
Dept: NEUROLOGY | Facility: CLINIC | Age: 55
End: 2020-05-15

## 2020-05-15 NOTE — TELEPHONE ENCOUNTER
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

## 2020-06-08 ENCOUNTER — MYC MEDICAL ADVICE (OUTPATIENT)
Dept: NEUROLOGY | Facility: CLINIC | Age: 55
End: 2020-06-08

## 2020-06-08 DIAGNOSIS — G24.1 DYSTONIA, TORSION, FRAGMENTS OF: ICD-10-CM

## 2020-06-10 RX ORDER — CARBIDOPA AND LEVODOPA 25; 100 MG/1; MG/1
TABLET ORAL
Qty: 1 TABLET | Refills: 0 | COMMUNITY
Start: 2020-06-10 | End: 2020-11-07

## 2020-06-10 NOTE — TELEPHONE ENCOUNTER
Date: 6/10/2020    Time of Call: 3:14 PM (1:55 pm)     Diagnosis:  CBD      [ TORB ] Ordering provider: Rere Akins MD  Order: Take 2 tabs carbidopa/levodopa  mg every 4 hours (5 times per day)     Order received by: Ondina Khan RN     Follow-up/additional notes: Sent patient MyChart instructions asking her to report back in 2 weeks.

## 2020-06-25 ENCOUNTER — DOCUMENTATION ONLY (OUTPATIENT)
Dept: NEUROLOGY | Facility: CLINIC | Age: 55
End: 2020-06-25

## 2020-06-25 NOTE — PROGRESS NOTES
Received FMLA paper work from [employer], forms were emailed to Dr Hopkins, waiting for signature    Form were given to provider in clinic yesterday for review, waiting for forms to be signed by provider and handed back to me.    Forms were signed by provider and mailed back to [employer] [address] [facility] [address]

## 2020-07-07 ENCOUNTER — DOCUMENTATION ONLY (OUTPATIENT)
Dept: CARE COORDINATION | Facility: CLINIC | Age: 55
End: 2020-07-07

## 2020-07-14 ENCOUNTER — TRANSFERRED RECORDS (OUTPATIENT)
Dept: HEALTH INFORMATION MANAGEMENT | Facility: CLINIC | Age: 55
End: 2020-07-14

## 2020-07-14 ENCOUNTER — MYC MEDICAL ADVICE (OUTPATIENT)
Dept: NEUROLOGY | Facility: CLINIC | Age: 55
End: 2020-07-14

## 2020-07-20 ENCOUNTER — MYC REFILL (OUTPATIENT)
Dept: NEUROLOGY | Facility: CLINIC | Age: 55
End: 2020-07-20

## 2020-07-20 DIAGNOSIS — G31.85 CORTICOBASAL SYNDROME (H): ICD-10-CM

## 2020-07-31 ENCOUNTER — MYC MEDICAL ADVICE (OUTPATIENT)
Dept: NEUROLOGY | Facility: CLINIC | Age: 55
End: 2020-07-31

## 2020-08-04 ENCOUNTER — TELEPHONE (OUTPATIENT)
Dept: NEUROLOGY | Facility: CLINIC | Age: 55
End: 2020-08-04

## 2020-08-13 ENCOUNTER — OFFICE VISIT (OUTPATIENT)
Dept: NEUROLOGY | Facility: CLINIC | Age: 55
End: 2020-08-13
Payer: COMMERCIAL

## 2020-08-13 DIAGNOSIS — G24.9 DYSTONIA: Primary | ICD-10-CM

## 2020-08-13 NOTE — PROGRESS NOTES
Movement Disorders Botulinum Toxin Clinic Note     Chief Complaint: dystonia, corticobasal syndrome.     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       She is having severe shoulder and neck pain, as well as pain in in her ankle and foot from toe curling.      Response to Last Injection:       Benefit from last injections:  Very helpful for pain and tightness.     Wearing off: She noticed wearing off over 1 month ago and the pain effects are now unbearable.      Side effects: She reports no side effects.      Additional interval history: She would like injections to address right wrist weakness and right toe curling.      Current Outpatient Prescriptions          Current Outpatient Medications   Medication Sig Dispense Refill     acyclovir (ZOVIRAX) 200 MG capsule as needed   1     botulinum toxin type A (BOTOX) 100 units injection Inject 280 Units into the muscle once Lot # /C3 with Expiration Date:  11/2020         calcium-vitamin D (OSCAL) 250-125 MG-UNIT TABS per tablet Take 1 tablet by mouth daily          carbidopa-levodopa (SINEMET CR)  MG CR tablet Take 1 tablet by mouth At Bedtime 30 tablet 11     carbidopa-levodopa (SINEMET)  MG tablet Take 2 tabs at 530AM, 10AM, 2PM, 6PM, and 10PM (Patient not taking: Reported on 4/8/2020) 990 tablet 3     Cholecalciferol (VITAMIN D) 2000 UNITS tablet Take 2,000 Units by mouth every morning          OnabotulinumtoxinA (BOTOX IJ) Inject 300 Units as directed once [id number] with Expiration Date:  12/2020         traMADol (ULTRAM) 50 MG tablet Take 1/2 to 1 tab by mouth every 6 hours as needed, up to 3 tabs per day. 90 tablet 0           Allergies: She has No Known Allergies.     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 It demonstrated some mild changes of right carpal tunnel syndrome but was otherwise normal.       History of MRI of brain and brain stem 12/21/2014     A brain MRI scan done on 06/27/2014 revealed no abnormalities.      History of MRI of cervical spine 12/21/2014     A cervical MRI done on 06/10/2014 revealed some mild to moderate degenerative changes but no significant central canal or foraminal stenosis, and no abnormalities of the spinal cord were noted.       Movement disorder 12/21/2014     I saw your patient, Deepali Contreras on 12/15/2014. She is a 49-year-old right-handed female here for evaluation of right upper extremity dysfunction and right hand tremor.  She has noted this problem for the last couple of years. She reports she is losing dexterity in her right hand. She has appreciated a tremor of the right hand with actions such as writing or postural maintenance. She has n           Past Surgical History         Past Surgical History:   Procedure Laterality Date     APPENDECTOMY         IMPLANT DEEP BRAIN STIMULATION GENERATOR / BATTERY Left 3/1/2019     Procedure: Left Deep Brain Stimulator Placement, Phase II, Placement Of Deep Brain Stimulator Generator/Battery Over The Left Chest Wall Latex Free;  Surgeon: Efren Triana MD;  Location: [facility] OR     OPTICAL TRACKING SYSTEM INSERTION DEEP BRAIN STIMULATION Left 2/22/2019     Procedure: Stealth Assisted Left Side Deep Brain Stimulator Placement,  Phase I, Placement Of Left Side Deep Brain Stimulator Electrode, Target Left Globus Pallidus Internus With Microelectrode Recording;  Surgeon: Efren Triana MD;  Location:  OR           Social History   Social History            Socioeconomic History     Marital status:        Spouse name: Not on file     Number of children: 3     Years of education: Not on file     Highest education level: Not on file   Occupational History     Not on file   Social Needs     Financial resource strain: Not on file     Food insecurity       Worry: Not on file       Inability: Not on file     Transportation needs       Medical: Not on file       Non-medical: Not on file   Tobacco Use     Smoking status: Never Smoker     Smokeless tobacco: Never Used   Substance and Sexual Activity     Alcohol use: Yes       Alcohol/week: 2.0 - 3.0 standard drinks       Frequency: 2-4 times a month       Drinks per session: 1 or 2     Drug use: No     Sexual activity: Yes       Partners: Male       Birth control/protection: Male Surgical   Lifestyle     Physical activity       Days per week: Not on file       Minutes per session: Not on file     Stress: Not on file   Relationships     Social connections       Talks on phone: Not on file       Gets together: Not on file       Attends Synagogue service: Not on file       Active member of club or organization: Not on file       Attends meetings of clubs or organizations: Not on file       Relationship status: Not on file     Intimate partner violence       Fear of current or ex partner: Not on file       Emotionally abused: Not on file       Physically abused: Not on file       Forced sexual activity: Not on file   Other Topics Concern     Parent/sibling w/ CABG, MI or angioplasty before 65F 55M? No   Social History Narrative          Izaiah Jara     Lives in [facility]       She does not smoke.  She has a couple of beers to drink on the weekend but otherwise  is not a heavy user of alcohol     3 kids: son peña 21; 94, 96 and 98     2 sons     1 daughter     2 are in college and daughter is a sophomore.                 FAMILY HISTORY:  Notable for a paternal aunt who is .  She had Parkinson's disease.  Otherwise, there is no other family history of neurologic problems.  There is no family history of dystonia.             90% Brazilian     Some norweigian                       .             Family History         Family History   Problem Relation Age of Onset     Breast Cancer Mother       Cancer - colorectal Mother       Macular Degeneration Mother       Dementia Father       Deep Vein Thrombosis Father       No Known Problems Brother       No Known Problems Sister       No Known Problems Sister       No Known Problems Sister       Neurologic Disorder Paternal Aunt           Parkinson's Disease     Cerebrovascular Disease Brother           stroke at age 25 possibly from pfo     Dementia Paternal Grandmother                BOTULINUM NEUROTOXIN INJECTION PROCEDURES:     VERIFICATION OF PATIENT IDENTIFICATION AND PROCEDURE       Initials   Patient Name JYM   Patient  JYM   Procedure Verified by: STEVE            Above assessments performed by:  Martínez Hedrick MD        INDICATION/S FOR PROCEDURE/S:  Sandra Jara is a 54 year old year old patient with dystonia affecting the  head, neck and shoulder girdle musculature and right upper extremity secondary to a diagnosis of dystonia secondary to corticobasal syndrome with associated  pain, tremor, spasms, loss of joint motion, loss of volitional motor control and difficulty with activities of daily living.      Her baseline symptoms have been recalcitrant to oral medications and conservative therapy.  She is here today for an injection of Botox.       GOAL OF PROCEDURE:  The goal of this procedure is to increase active range of motion, improve volitional motor control, decrease pain  and enhance functional  independence associated with dystonic movements.     TOTAL DOSE ADMINISTERED:  Dose Administered:  485 units Botox    Diluent Used:  Preservative Free Normal Saline  Total Volume of Diluent Used:  4 ml  Lot # [id number] with Expiration Date:  4/2023  NDC #: Botox 200u (6342-2217-21) 2 vials   Lot # [id number] with Expiration Date: 2/23 1 vial  CONSENT:  The risks, benefits, and treatment options were discussed with Sandra Jara and she agreed to proceed.      Written consent was obtained by [facility].      EQUIPMENT USED:  Needle-37mm stimulating/recording     SKIN PREPARATION:  Skin preparation was performed using an alcohol wipe.     GUIDANCE DESCRIPTION:  Electro-myographic guidance was necessary throughout the procedure to accurately identify all areas of dystonic muscles while avoiding injection of non-dystonic muscles, neighboring nerves and nearby vascular structures.      AREA/MUSCLE INJECTED:       AREA/MUSCLE INJECTED:    Muscles Injected Units Injected Number of Injections   Right deltoid 50 3   Right biceps 50 2   Right trapezius 100 5   Right latissimus dorsi 35 2   Right splenius capitis 50 2   Right levator scapulae 20 1   Left SCM 20 1   Right brachialis 25 1   Right brachioradialis    25   1   Right teres minor 20 1   Right Extensor Carpi Radialis 15 1   Right Extensor Carpi Ulnaris 15 1   Right Flexor Carpi Ulnaris 10 1   Right Flexor Digitorum Longus 50 1                     Total Units Injected: 485     Unavoidable Waste: 15     Total Units Billed 500                 The patient tolerated the injections without difficulty.        Assessment:    Sandra Jara is a 54 year old female with Probable  Corticobasal syndrome with dystonia involving RUE, right shoulder, right leg.  She has really struggled with progression of disease and with increased pain related to dystonia due to the delay of her botox injections with the Covid 19 pandemic.    Today we did repeat botulinum toxin  injections    Martínez Hedrick MD

## 2020-08-13 NOTE — LETTER
8/13/2020       RE: Sandra Jara  [address]  [place] [address]     Dear Colleague,    Thank you for referring your patient, Sandra Jara, to the [facility] EMG at [facility]. Please see a copy of my visit note below.    Movement Disorders Botulinum Toxin Clinic Note     Chief Complaint: dystonia, corticobasal syndrome.     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       She is having severe shoulder and neck pain, as well as pain in in her ankle and foot from toe curling.      Response to Last Injection:       Benefit from last injections:  Very helpful for pain and tightness.     Wearing off: She noticed wearing off over 1 month ago and the pain effects are now unbearable.      Side effects: She reports no side effects.      Additional interval history: She would like injections to address right wrist weakness and right toe curling.      Current Outpatient Prescriptions          Current Outpatient Medications   Medication Sig Dispense Refill     acyclovir (ZOVIRAX) 200 MG capsule as needed   1     botulinum toxin type A (BOTOX) 100 units injection Inject 280 Units into the muscle once Lot # /C3 with Expiration Date:  11/2020         calcium-vitamin D (OSCAL) 250-125 MG-UNIT TABS per tablet Take 1 tablet by mouth daily          carbidopa-levodopa (SINEMET CR)  MG CR tablet Take 1 tablet by mouth At Bedtime 30 tablet 11     carbidopa-levodopa (SINEMET)  MG tablet Take 2 tabs at 530AM, 10AM, 2PM, 6PM, and 10PM (Patient not taking: Reported on 4/8/2020) 990 tablet 3     Cholecalciferol (VITAMIN D) 2000 UNITS tablet Take 2,000 Units by mouth every morning           OnabotulinumtoxinA (BOTOX IJ) Inject 300 Units as directed once [id number] with Expiration Date:  12/2020         traMADol (ULTRAM) 50 MG tablet Take 1/2 to 1 tab by mouth every 6 hours as needed, up to 3 tabs per day. 90 tablet 0           Allergies: She has No Known Allergies.     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 It demonstrated some mild changes of right carpal tunnel syndrome but was otherwise normal.       History of MRI of brain and brain stem 12/21/2014     A brain MRI scan done on 06/27/2014 revealed no abnormalities.      History of MRI of cervical spine 12/21/2014     A cervical MRI done on 06/10/2014 revealed some mild to moderate degenerative changes but no significant central canal or foraminal stenosis, and no abnormalities of the spinal cord were noted.       Movement disorder 12/21/2014     I saw your patient, Deepali Contreras on 12/15/2014. She is a 49-year-old right-handed female here for evaluation of right upper extremity dysfunction and right hand tremor.  She has noted this problem for the last couple of years. She reports she is losing dexterity in her right hand. She has appreciated a tremor of the right hand with actions such as writing or postural maintenance. She has n           Past Surgical History         Past Surgical History:   Procedure Laterality Date     APPENDECTOMY         IMPLANT DEEP BRAIN STIMULATION GENERATOR / BATTERY Left 3/1/2019     Procedure: Left Deep Brain Stimulator Placement, Phase II, Placement Of  Deep Brain Stimulator Generator/Battery Over The Left Chest Wall Latex Free;  Surgeon: Efren Triana MD;  Location: [facility] OR     OPTICAL TRACKING SYSTEM INSERTION DEEP BRAIN STIMULATION Left 2/22/2019     Procedure: Stealth Assisted Left Side Deep Brain Stimulator Placement, Phase I, Placement Of Left Side Deep Brain Stimulator Electrode, Target Left Globus Pallidus Internus With Microelectrode Recording;  Surgeon: Efren Triana MD;  Location: [facility] OR           Social History   Social History            Socioeconomic History     Marital status:        Spouse name: Not on file     Number of children: 3     Years of education: Not on file     Highest education level: Not on file   Occupational History     Not on file   Social Needs     Financial resource strain: Not on file     Food insecurity       Worry: Not on file       Inability: Not on file     Transportation needs       Medical: Not on file       Non-medical: Not on file   Tobacco Use     Smoking status: Never Smoker     Smokeless tobacco: Never Used   Substance and Sexual Activity     Alcohol use: Yes       Alcohol/week: 2.0 - 3.0 standard drinks       Frequency: 2-4 times a month       Drinks per session: 1 or 2     Drug use: No     Sexual activity: Yes       Partners: Male       Birth control/protection: Male Surgical   Lifestyle     Physical activity       Days per week: Not on file       Minutes per session: Not on file     Stress: Not on file   Relationships     Social connections       Talks on phone: Not on file       Gets together: Not on file       Attends Worship service: Not on file       Active member of club or organization: Not on file       Attends meetings of clubs or organizations: Not on file       Relationship status: Not on file     Intimate partner violence       Fear of current or ex partner: Not on file       Emotionally abused: Not on file       Physically abused: Not on file       Forced sexual activity: Not on  file   Other Topics Concern     Parent/sibling w/ CABG, MI or angioplasty before 65F 55M? No   Social History Narrative          Izaiah Jara     [facility]       She does not smoke.  She has a couple of beers to drink on the weekend but otherwise is not a heavy user of alcohol     3 kids: son peña 21; 94, 96 and 98     2 sons     1 daughter     2 are in college and daughter is a sophomore.                 FAMILY HISTORY:  Notable for a paternal aunt who is .  She had Parkinson's disease.  Otherwise, there is no other family history of neurologic problems.  There is no family history of dystonia.             90% Arabic     Some norweigian                       .             Family History         Family History   Problem Relation Age of Onset     Breast Cancer Mother       Cancer - colorectal Mother       Macular Degeneration Mother       Dementia Father       Deep Vein Thrombosis Father       No Known Problems Brother       No Known Problems Sister       No Known Problems Sister       No Known Problems Sister       Neurologic Disorder Paternal Aunt           Parkinson's Disease     Cerebrovascular Disease Brother           stroke at age 25 possibly from pfo     Dementia Paternal Grandmother                BOTULINUM NEUROTOXIN INJECTION PROCEDURES:     VERIFICATION OF PATIENT IDENTIFICATION AND PROCEDURE       Initials   Patient Name JYM   Patient  JYM   Procedure Verified by: STEVE            Above assessments performed by:  Martínez Hedrick MD        INDICATION/S FOR PROCEDURE/S:  Sandra Jara is a 54 year old year old patient with dystonia affecting the  head, neck and shoulder girdle musculature and right upper extremity secondary to a diagnosis of dystonia secondary to corticobasal syndrome with associated  pain, tremor, spasms, loss of joint motion, loss of volitional motor control and difficulty with activities of daily living.      Her baseline  symptoms have been recalcitrant to oral medications and conservative therapy.  She is here today for an injection of Botox.       GOAL OF PROCEDURE:  The goal of this procedure is to increase active range of motion, improve volitional motor control, decrease pain  and enhance functional independence associated with dystonic movements.     TOTAL DOSE ADMINISTERED:  Dose Administered:  485 units Botox    Diluent Used:  Preservative Free Normal Saline  Total Volume of Diluent Used:  4 ml  Lot # [id number] with Expiration Date:  4/2023  NDC #: Botox 200u (3723-2241-40) 2 vials   Lot # [id number] with Expiration Date: 2/23 1 vial  CONSENT:  The risks, benefits, and treatment options were discussed with Sandra Jara and she agreed to proceed.      Written consent was obtained by [facility].      EQUIPMENT USED:  Needle-37mm stimulating/recording     SKIN PREPARATION:  Skin preparation was performed using an alcohol wipe.     GUIDANCE DESCRIPTION:  Electro-myographic guidance was necessary throughout the procedure to accurately identify all areas of dystonic muscles while avoiding injection of non-dystonic muscles, neighboring nerves and nearby vascular structures.      AREA/MUSCLE INJECTED:       AREA/MUSCLE INJECTED:    Muscles Injected Units Injected Number of Injections   Right deltoid 50 3   Right biceps 50 2   Right trapezius 100 5   Right latissimus dorsi 35 2   Right splenius capitis 50 2   Right levator scapulae 20 1   Left SCM 20 1   Right brachialis 25 1   Right brachioradialis    25   1   Right teres minor 20 1   Right Extensor Carpi Radialis 15 1   Right Extensor Carpi Ulnaris 15 1   Right Flexor Carpi Ulnaris 10 1   Right Flexor Digitorum Longus 50 1                     Total Units Injected: 485     Unavoidable Waste: 15     Total Units Billed 500                 The patient tolerated the injections without difficulty.        Assessment:    Sandra Jara is a 54 year old female with  Probable  Corticobasal syndrome with dystonia involving RUE, right shoulder, right leg.  She has really struggled with progression of disease and with increased pain related to dystonia due to the delay of her botox injections with the Covid 19 pandemic.    Today we did repeat botulinum toxin injections    Martínez Hedrick MD

## 2020-10-12 ENCOUNTER — TELEPHONE (OUTPATIENT)
Dept: NEUROLOGY | Facility: CLINIC | Age: 55
End: 2020-10-12

## 2020-10-12 NOTE — TELEPHONE ENCOUNTER
[facility] Call Center    Phone Message    May a detailed message be left on voicemail: yes     Reason for Call: Other: Sandra calling due to changing her insurance. She is requesting an approval for her botox appointment on 11/4/20. Please call Sandra with any questions or concerns.     Action Taken: Patient transferred to: [facility]    Travel Screening: Not Applicable

## 2020-10-19 ENCOUNTER — DOCUMENTATION ONLY (OUTPATIENT)
Dept: NEUROLOGY | Facility: CLINIC | Age: 55
End: 2020-10-19

## 2020-10-19 NOTE — PROGRESS NOTES
Received Extended Insurance Benefits form from [employer], form was given to provider to sign    Received forms back signed by provider, forms were emailed to [email or web address], sent to be scanned

## 2020-10-21 ENCOUNTER — MYC MEDICAL ADVICE (OUTPATIENT)
Dept: NEUROLOGY | Facility: CLINIC | Age: 55
End: 2020-10-21

## 2020-10-23 ENCOUNTER — TRANSFERRED RECORDS (OUTPATIENT)
Dept: HEALTH INFORMATION MANAGEMENT | Facility: CLINIC | Age: 55
End: 2020-10-23

## 2020-10-29 NOTE — PROGRESS NOTES
"Department of Neurology  Movement Disorders Division   DBS Follow-up Note    Patient: Sandra Jara  MRN: [id number]   : 1965   Date of Visit: 2020    Diagnosis: CBS  DBS Target(s): L GPi   Date(s) of DBS Lead Placement:     Date(s) of IPG Placement:  Device: Abbott    Chief Complaint:  Sandra Jara is a 55 year old female who returns to clinic for follow up of CBS status post left GPi DBS and botulinum toxin injections of the RUE and RLE for the treatment of dystonia.    Response to Last Injection:  Onset of effect: within 2 weeks  Benefit from last injections: \"full benefit\", pain relief    Wearing off: She noticed wearing off a week ago.  Side effects: She reports none    Additional interval history:   Her walking is worse and her right foot is curling in and giving out.  Using a wheelchair.  Much harder to understand her.  Having difficulty thinking of words.  Feeling more anxious.    Review of Systems:  Other than that noted at the end of this note, the remainder of 12 systems reviewed were negative.      Current Outpatient Medications   Medication Sig Dispense Refill     botulinum toxin type A (BOTOX) 100 units injection Inject 280 Units into the muscle once Lot # /C3 with Expiration Date:  2020       calcium-vitamin D (OSCAL) 250-125 MG-UNIT TABS per tablet Take 1 tablet by mouth daily        Cholecalciferol (VITAMIN D) 2000 UNITS tablet Take 2,000 Units by mouth every morning        tiZANidine (ZANAFLEX) 4 MG tablet Take 1/2-1 tab po q6h prn for muscle spasms and pain 90 tablet 3     traMADol (ULTRAM) 50 MG tablet Take 1/2 to 1 tab by mouth every 6 hours as needed, up to 3 tabs per day. 90 tablet 0     OnabotulinumtoxinA (BOTOX IJ) Inject 300 Units as directed once [id number] with Expiration Date:  2020       Allergies: She has No Known Allergies.    Past Medical History:   Diagnosis Date     Action induced myoclonus 2015     Corticobasal syndrome (H) 2015 "     CTS (carpal tunnel syndrome) 1/12/2015     Disturbance of skin sensation 1/12/2015     Family history of breast cancer 1/12/2015     Family history of colon cancer 1/12/2015     Family history of Parkinson's disease 12/21/2014    Paternal aunt     History of EMG 12/21/2014    A right upper extremity EMG and nerve conduction study was done on 06/18/2014. It demonstrated some mild changes of right carpal tunnel syndrome but was otherwise normal.       History of MRI of brain and brain stem 12/21/2014    A brain MRI scan done on 06/27/2014 revealed no abnormalities.      History of MRI of cervical spine 12/21/2014    A cervical MRI done on 06/10/2014 revealed some mild to moderate degenerative changes but no significant central canal or foraminal stenosis, and no abnormalities of the spinal cord were noted.       Movement disorder 12/21/2014    I saw your patient, Deepali Contreras on 12/15/2014. She is a 49-year-old right-handed female here for evaluation of right upper extremity dysfunction and right hand tremor.  She has noted this problem for the last couple of years. She reports she is losing dexterity in her right hand. She has appreciated a tremor of the right hand with actions such as writing or postural maintenance. She has n       Past Surgical History:   Procedure Laterality Date     APPENDECTOMY       IMPLANT DEEP BRAIN STIMULATION GENERATOR / BATTERY Left 3/1/2019    Procedure: Left Deep Brain Stimulator Placement, Phase II, Placement Of Deep Brain Stimulator Generator/Battery Over The Left Chest Wall Latex Free;  Surgeon: Efren Triana MD;  Location: [facility] OR     OPTICAL TRACKING SYSTEM INSERTION DEEP BRAIN STIMULATION Left 2/22/2019    Procedure: Stealth Assisted Left Side Deep Brain Stimulator Placement, Phase I, Placement Of Left Side Deep Brain Stimulator Electrode, Target Left Globus Pallidus Internus With Microelectrode Recording;  Surgeon: Efren Triana MD;  Location: [facility] OR        Social History     Socioeconomic History     Marital status:      Spouse name: Not on file     Number of children: 3     Years of education: Not on file     Highest education level: Not on file   Occupational History     Not on file   Social Needs     Financial resource strain: Not on file     Food insecurity     Worry: Not on file     Inability: Not on file     Transportation needs     Medical: Not on file     Non-medical: Not on file   Tobacco Use     Smoking status: Never Smoker     Smokeless tobacco: Never Used   Substance and Sexual Activity     Alcohol use: Yes     Alcohol/week: 2.0 - 3.0 standard drinks     Frequency: 2-4 times a month     Drinks per session: 1 or 2     Drug use: No     Sexual activity: Yes     Partners: Male     Birth control/protection: Male Surgical   Lifestyle     Physical activity     Days per week: Not on file     Minutes per session: Not on file     Stress: Not on file   Relationships     Social connections     Talks on phone: Not on file     Gets together: Not on file     Attends Latter day service: Not on file     Active member of club or organization: Not on file     Attends meetings of clubs or organizations: Not on file     Relationship status: Not on file     Intimate partner violence     Fear of current or ex partner: Not on file     Emotionally abused: Not on file     Physically abused: Not on file     Forced sexual activity: Not on file   Other Topics Concern     Parent/sibling w/ CABG, MI or angioplasty before 65F 55M? No   Social History Narrative        Izaiah Jara    Lives in [facility]      She does not smoke.  She has a couple of beers to drink on the weekend but otherwise is not a heavy user of alcohol    3 kids: son peña 21; 94, 96 and 98    2 sons    1 daughter    2 are in college and daughter is a sophomore.            FAMILY HISTORY:  Notable for a paternal aunt who is .  She had Parkinson's disease.  Otherwise, there is  no other family history of neurologic problems.  There is no family history of dystonia.          90% Citizen of the [place]    Some norweigian                .         Family History   Problem Relation Age of Onset     Breast Cancer Mother      Cancer - colorectal Mother      Macular Degeneration Mother      Dementia Father      Deep Vein Thrombosis Father      No Known Problems Brother      No Known Problems Sister      No Known Problems Sister      No Known Problems Sister      Neurologic Disorder Paternal Aunt         Parkinson's Disease     Cerebrovascular Disease Brother         stroke at age 25 possibly from pfo     Dementia Paternal Grandmother        Physical Examination:  Vital Signs:   weight is 68 kg (150 lb). Her blood pressure is 115/70 and her pulse is 87. Her oxygen saturation is 97%.     Neurological Examination:   Moderate dysarthria, stimulus induced myoclonus of LUE, dystonia of RUE resulting in clenched fist with alternating flexion/extension of wrist, inversion of right foot, hypertrophy of right tibialis anterior, wheelchair bound    Procedure: DBS Interrogation & Programming  Lead(s):     Left   DBS Target GPi      DBS Lead Type    Abbott 0.5mm spacing    Lead Implant Date   2/22/19      IPG(s):    1   IPG Infinity 6660      IPG Implant Date 3/1/19   Location    L chest   Battery (V) <2.73 V        Full Impedence/Currents Check: No issues identified.    Initial Settings:  Left Brain Program 5       GPi1 GPi2   Contacts C+;3(ABC)-4- C+;2(ABC)-   Amplitude (mA) 4.15 [4-5.4] 3.5 [3-4.5]   Pulse Width ( s) 150 90   Frequency (Hz) 125 125   Impedence 800 1025     Initial program selected: Program 5    Final Settings:  Left Brain Program 5       GPi1 GPi2   Contacts C+;3(ABC)-4- C+;2(ABC)-   Amplitude (mA) 4.15 [4-5.4] 3.5 [3-4.5]   Pulse Width ( s) 150 90   Frequency (Hz) 125 125     Final Program selected: Program 5       BOTULINUM NEUROTOXIN INJECTION PROCEDURES:    VERIFICATION OF PATIENT IDENTIFICATION AND  PROCEDURE     Initials   Patient Name [facility]   Patient  [facility]   Procedure Verified by: [facility]     Above assessments performed by:  Resident/Fellow         China Chen MD          The attending provider was present for the entire procedure documented below.      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      Her baseline symptoms have been recalcitrant to oral medications and conservative therapy.  She is here today for an injection of Botox.      GOAL OF PROCEDURE:  The goal of this procedure is to increase active range of motion, improve volitional motor control, decrease pain  and enhance functional independence associated with dystonic movements.    TOTAL DOSE ADMINISTERED:  Dose Administered:  580 units Botox    Diluent Used:  Preservative Free Normal Saline  Total Volume of Diluent Used:  6 ml  NDC #: Botox 100u ([address]-79)   NDC #: Botox 200u (8528-6798-30)    CONSENT:  The risks, benefits, and treatment options were discussed with Sandra Jara and she agreed to proceed.      Written consent was obtained by [facility].     EQUIPMENT USED:  Needle-37mm stimulating/recording  EMG/NCS Machine    SKIN PREPARATION:  Skin preparation was performed using an alcohol wipe.    GUIDANCE DESCRIPTION:  Electro-myographic guidance was necessary throughout the procedure to accurately identify all areas of dystonic muscles while avoiding injection of non-dystonic muscles, neighboring nerves and nearby vascular structures.     AREA/MUSCLE INJECTED:      Visual display of locations injections are scanned into the chart under MEDIA  tab.    Muscles Injected Units Injected Number of Injections   R splenius 50 2   R trapezius 70 4   R SCM 40 2   R cervical paraspinal 20 2   R biceps 50 2   R deltoid 50 2   R brachialis 25 1   R brachioradialis 25 1   R ECR 15 1   R ECU 20 1   R FCU 15 1   R tibialis anterior 180 9   R latissimus dorsi 20 1        Total Units Injected: 580    Unavoidable Waste: 20    Total Units Billed 600      The patient tolerated the injections without difficulty.      Assessment:    Sandra Jara is a 55 year old female with CBS status post left GPi DBS and botulinum toxin injections of the RUE and RLE for the treatment of dystonia.  Today we did repeat botulinum toxin injections.  Specific attention was paid to her right tibialis anterior which was greatly hypertrophied and causing painful foot inversion.  Her DBS battery is at MELISSA.  Plan is to allow the battery to die and observe for any changes as she does not perceive benefit from the device any longer.    Plan  - Follow-up in 3 months' time to consider repeat injections    China Chen MD  Movement Disorders Fellow    Neurology Attending Attestation:     I, Tiffany Hopkins MD, personally saw this patient with our Movement Disorders Fellow and agree with the fellow's findings and plan of care as documented in the movement disorder fellow's note. I personally performed salient aspects of the history and neurological examination.     I personally reviewed the vital signs, medications, and labs. I personally viewed the imaging, and agree with the interpretation documented by the fellow.    I personally performed or supervised all procedures.        Additional time spent for separate DBS programming: analyzed without reprogramming    Tiffany Hopkins MD    of Neurology     y

## 2020-11-02 DIAGNOSIS — G24.8 ACQUIRED TORSION DYSTONIA: Primary | ICD-10-CM

## 2020-11-05 ENCOUNTER — OFFICE VISIT (OUTPATIENT)
Dept: NEUROLOGY | Facility: CLINIC | Age: 55
End: 2020-11-05
Payer: COMMERCIAL

## 2020-11-05 VITALS
SYSTOLIC BLOOD PRESSURE: 115 MMHG | OXYGEN SATURATION: 97 % | HEART RATE: 87 BPM | WEIGHT: 150 LBS | DIASTOLIC BLOOD PRESSURE: 70 MMHG | BODY MASS INDEX: 24.58 KG/M2

## 2020-11-05 DIAGNOSIS — G31.85 CORTICOBASAL SYNDROME (H): ICD-10-CM

## 2020-11-05 DIAGNOSIS — G24.8 ACQUIRED TORSION DYSTONIA: Primary | ICD-10-CM

## 2020-11-05 DIAGNOSIS — G24.3 CERVICAL DYSTONIA: ICD-10-CM

## 2020-11-05 PROCEDURE — 95874 GUIDE NERV DESTR NEEDLE EMG: CPT | Performed by: PSYCHIATRY & NEUROLOGY

## 2020-11-05 PROCEDURE — 64643 CHEMODENERV 1 EXTREM 1-4 EA: CPT | Performed by: PSYCHIATRY & NEUROLOGY

## 2020-11-05 PROCEDURE — 95970 ALYS NPGT W/O PRGRMG: CPT | Performed by: PSYCHIATRY & NEUROLOGY

## 2020-11-05 PROCEDURE — 64644 CHEMODENERV 1 EXTREM 5/> MUS: CPT | Performed by: PSYCHIATRY & NEUROLOGY

## 2020-11-05 NOTE — LETTER
"2020       RE: Sandra Jara  [address]  [place] [address]     Dear Colleague,    Thank you for referring your patient, Sandra Jara, to the [facility] [place] [facility]. Please see a copy of my visit note below.    Department of Neurology  Movement Disorders Division   DBS Follow-up Note    Patient: Sandra Jara  MRN: [id number]   : 1965   Date of Visit: 2020    Diagnosis: CBS  DBS Target(s): L GPi   Date(s) of DBS Lead Placement:     Date(s) of IPG Placement:  Device: Abbott    Chief Complaint:  Sandra Jara is a 55 year old female who returns to clinic for follow up of CBS status post left GPi DBS and botulinum toxin injections of the RUE and RLE for the treatment of dystonia.    Response to Last Injection:  Onset of effect: within 2 weeks  Benefit from last injections: \"full benefit\", pain relief    Wearing off: She noticed wearing off a week ago.  Side effects: She reports none    Additional interval history:   Her walking is worse and her right foot is curling in and giving out.  Using a wheelchair.  Much harder to understand her.  Having difficulty thinking of words.  Feeling more anxious.    Review of Systems:  Other than that noted at the end of this note, the remainder of 12 systems reviewed were negative.      Current Outpatient Medications   Medication Sig Dispense Refill     botulinum toxin type A (BOTOX) 100 units injection Inject 280 Units into the muscle once Lot # /C3 with Expiration Date:  2020       calcium-vitamin D (OSCAL) 250-125 MG-UNIT TABS per tablet Take 1 tablet by mouth daily        Cholecalciferol (VITAMIN D) 2000 UNITS tablet Take 2,000 Units by mouth every morning        tiZANidine (ZANAFLEX) 4 MG tablet Take 1/2-1 tab po q6h prn for muscle spasms and pain 90 tablet 3     traMADol (ULTRAM) 50 MG tablet Take 1/2 to 1 tab by mouth every 6 " hours as needed, up to 3 tabs per day. 90 tablet 0     OnabotulinumtoxinA (BOTOX IJ) Inject 300 Units as directed once [id number] with Expiration Date:  12/2020       Allergies: She has No Known Allergies.    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 It demonstrated some mild changes of right carpal tunnel syndrome but was otherwise normal.       History of MRI of brain and brain stem 12/21/2014    A brain MRI scan done on 06/27/2014 revealed no abnormalities.      History of MRI of cervical spine 12/21/2014    A cervical MRI done on 06/10/2014 revealed some mild to moderate degenerative changes but no significant central canal or foraminal stenosis, and no abnormalities of the spinal cord were noted.       Movement disorder 12/21/2014    I saw your patient, Deepali Contreras on 12/15/2014. She is a 49-year-old right-handed female here for evaluation of right upper extremity dysfunction and right hand tremor.  She has noted this problem for the last couple of years. She reports she is losing dexterity in her right hand. She has appreciated a tremor of the right hand with actions such as writing or postural maintenance. She has n       Past Surgical History:   Procedure Laterality Date     APPENDECTOMY       IMPLANT DEEP BRAIN STIMULATION GENERATOR / BATTERY Left 3/1/2019    Procedure: Left Deep Brain Stimulator Placement, Phase II, Placement Of Deep Brain Stimulator Generator/Battery Over The Left Chest Wall Latex Free;  Surgeon: Efren Triana MD;  Location:  OR     \[facility]\""  TRACKING SYSTEM INSERTION DEEP BRAIN STIMULATION Left 2/22/2019    Procedure: Stealth Assisted Left Side Deep Brain Stimulator Placement, Phase I, Placement Of Left Side Deep Brain Stimulator Electrode, Target Left Globus Pallidus Internus With Microelectrode Recording;  Surgeon: Efren Triana MD;  Location:  OR       Social History     Socioeconomic History     Marital status:      Spouse name: Not on file     Number of children: 3     Years of education: Not on file     Highest education level: Not on file   Occupational History     Not on file   Social Needs     Financial resource strain: Not on file     Food insecurity     Worry: Not on file     Inability: Not on file     Transportation needs     Medical: Not on file     Non-medical: Not on file   Tobacco Use     Smoking status: Never Smoker     Smokeless tobacco: Never Used   Substance and Sexual Activity     Alcohol use: Yes     Alcohol/week: 2.0 - 3.0 standard drinks     Frequency: 2-4 times a month     Drinks per session: 1 or 2     Drug use: No     Sexual activity: Yes     Partners: Male     Birth control/protection: Male Surgical   Lifestyle     Physical activity     Days per week: Not on file     Minutes per session: Not on file     Stress: Not on file   Relationships     Social connections     Talks on phone: Not on file     Gets together: Not on file     Attends Mu-ism service: Not on file     Active member of club or organization: Not on file     Attends meetings of clubs or organizations: Not on file     Relationship status: Not on file     Intimate partner violence     Fear of current or ex partner: Not on file     Emotionally abused: Not on file     Physically abused: Not on file     Forced sexual activity: Not on file   Other Topics Concern     Parent/sibling w/ CABG, MI or angioplasty before 65F 55M? No   Social History Narrative        Izaiah Jara    L[facility] in [facility]      She does not smoke.  She  has a couple of beers to drink on the weekend but otherwise is not a heavy user of alcohol    3 kids: son peña 21; 94, 96 and 98    2 sons    1 daughter    2 are in college and daughter is a sophomore.            FAMILY HISTORY:  Notable for a paternal aunt who is .  She had Parkinson's disease.  Otherwise, there is no other family history of neurologic problems.  There is no family history of dystonia.          90% [place]    Some norweigian                .         Family History   Problem Relation Age of Onset     Breast Cancer Mother      Cancer - colorectal Mother      Macular Degeneration Mother      Dementia Father      Deep Vein Thrombosis Father      No Known Problems Brother      No Known Problems Sister      No Known Problems Sister      No Known Problems Sister      Neurologic Disorder Paternal Aunt         Parkinson's Disease     Cerebrovascular Disease Brother         stroke at age 25 possibly from pfo     Dementia Paternal Grandmother        Physical Examination:  Vital Signs:   weight is 68 kg (150 lb). Her blood pressure is 115/70 and her pulse is 87. Her oxygen saturation is 97%.     Neurological Examination:   Moderate dysarthria, stimulus induced myoclonus of LUE, dystonia of RUE resulting in clenched fist with alternating flexion/extension of wrist, inversion of right foot, hypertrophy of right tibialis anterior, wheelchair bound    Procedure: DBS Interrogation & Programming  Lead(s):     Left   DBS Target GPi      DBS Lead Type    Abbott 0.5mm spacing    Lead Implant Date   19      IPG(s):    1   IPG Infinity 6660      IPG Implant Date 3/1/19   Location    L chest   Battery (V) <2.73 V        Full Impedence/Currents Check: No issues identified.    Initial Settings:  Left Brain Program 5       GPi1 GPi2   Contacts C+;3(ABC)-4- C+;2(ABC)-   Amplitude (mA) 4.15 [4-5.4] 3.5 [3-4.5]   Pulse Width ( s) 150 90   Frequency (Hz) 125 125   Impedence 800 1025     Initial program selected:  Program 5    Final Settings:  Left Brain Program 5       GPi1 GPi2   Contacts C+;3(ABC)-4- C+;2(ABC)-   Amplitude (mA) 4.15 [4-5.4] 3.5 [3-4.5]   Pulse Width ( s) 150 90   Frequency (Hz) 125 125     Final Program selected: Program 5       BOTULINUM NEUROTOXIN INJECTION PROCEDURES:    VERIFICATION OF PATIENT IDENTIFICATION AND PROCEDURE     Initials   Patient Name [facility]   Patient  [facility]   Procedure Verified by: [facility]     Above assessments performed by:  Resident/Fellow         China Chen MD          The attending provider was present for the entire procedure documented below.      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      Her baseline symptoms have been recalcitrant to oral medications and conservative therapy.  She is here today for an injection of Botox.      GOAL OF PROCEDURE:  The goal of this procedure is to increase active range of motion, improve volitional motor control, decrease pain  and enhance functional independence associated with dystonic movements.    TOTAL DOSE ADMINISTERED:  Dose Administered:  580 units Botox    Diluent Used:  Preservative Free Normal Saline  Total Volume of Diluent Used:  6 ml  NDC #: Botox 100u ([address]-53)   NDC #: Botox 200u ([id number])    CONSENT:  The risks, benefits, and treatment options were discussed with Sandra Jara and she agreed to proceed.      Written consent was obtained by [facility].     EQUIPMENT USED:  Needle-37mm stimulating/recording  EMG/NCS Machine    SKIN PREPARATION:  Skin preparation was performed using an alcohol wipe.    GUIDANCE  DESCRIPTION:  Electro-myographic guidance was necessary throughout the procedure to accurately identify all areas of dystonic muscles while avoiding injection of non-dystonic muscles, neighboring nerves and nearby vascular structures.     AREA/MUSCLE INJECTED:      Visual display of locations injections are scanned into the chart under MEDIA tab.    Muscles Injected Units Injected Number of Injections   R splenius 50 2   R trapezius 70 4   R SCM 40 2   R cervical paraspinal 20 2   R biceps 50 2   R deltoid 50 2   R brachialis 25 1   R brachioradialis 25 1   R ECR 15 1   R ECU 20 1   R FCU 15 1   R tibialis anterior 180 9   R latissimus dorsi 20 1        Total Units Injected: 580    Unavoidable Waste: 20    Total Units Billed 600      The patient tolerated the injections without difficulty.      Assessment:    Sandra Jara is a 55 year old female with CBS status post left GPi DBS and botulinum toxin injections of the RUE and RLE for the treatment of dystonia.  Today we did repeat botulinum toxin injections.  Specific attention was paid to her right tibialis anterior which was greatly hypertrophied and causing painful foot inversion.  Her DBS battery is at MELISSA.  Plan is to allow the battery to die and observe for any changes as she does not perceive benefit from the device any longer.    Plan  - Follow-up in 3 months' time to consider repeat injections    China Chen MD  Movement Disorders Fellow    Neurology Attending Attestation:     I, Tiffany Hopkins MD, personally saw this patient with our Movement Disorders Fellow and agree with the fellow's findings and plan of care as documented in the movement disorder fellow's note. I personally performed salient aspects of the history and neurological examination.     I personally reviewed the vital signs, medications, and labs. I personally viewed the imaging, and agree with the interpretation documented by the fellow.    I personally performed or  supervised all procedures.        Additional time spent for separate DBS programming: analyzed without reprogramming    Tiffany Hopkins MD    of Neurology

## 2020-11-05 NOTE — NURSING NOTE
Chief Complaint   Patient presents with     Botox     [facility] RETURN BOTOX     RECHECK     Michael Gan

## 2020-11-08 ENCOUNTER — MYC MEDICAL ADVICE (OUTPATIENT)
Dept: NEUROLOGY | Facility: CLINIC | Age: 55
End: 2020-11-08

## 2020-11-09 ENCOUNTER — MYC MEDICAL ADVICE (OUTPATIENT)
Dept: NEUROLOGY | Facility: CLINIC | Age: 55
End: 2020-11-09

## 2020-11-10 NOTE — TELEPHONE ENCOUNTER
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 Her  Izaiah writes today with concerns for extreme lethargy and slurred speech.    Background:  11/5/2020 - received 580 unite botulinum toxin  -Abbott Infinity 5 battery: <2.73 V    Assessment:  Izaiah reports that all of a Sudden on Sunday Sandra did not seem like herself. She became extremely lethargic and her speech was markedly more slurred than usual. Her speech has been getting more slurred over the past few months but not this bad. Since Sunday it has waxed and waned from completely slurred to somewhat better. She is unable to use her right foot and needs much more help with moving. Its like it no longer works. She can hardly keep her head up. Izaiah thinks it is due to her Deep Brain Stimulation battery dying. When checking her battery it shows a yellow triangle and says DONE.   Izaiah denies drooping on one side of the face \"if it is it is very slight.\" Because of her dystonia on the right side it is hard to tell if she is having weakness other than her right foot not moving at all now. Denies fever or signs of illness.    Plan/recommendation:  1. I informed Izaiah these all of a sudden occurring symptoms sounds unusual and I believe it is unlikely the Deep Brain Stimulation battery dying is causing her slurred speech  And sudden loss of function in her right foot. Izaiah was in a hurry because he was at work but I informed him she should be taken to the emergency room for an evaluation as this seems more stroke related.    2. I will connect with the team on what is going on.  "

## 2020-11-16 ENCOUNTER — VIRTUAL VISIT (OUTPATIENT)
Dept: NEUROSURGERY | Facility: CLINIC | Age: 55
End: 2020-11-16
Payer: COMMERCIAL

## 2020-11-16 DIAGNOSIS — G25.3 MYOCLONUS: ICD-10-CM

## 2020-11-16 DIAGNOSIS — G24.9 DYSTONIA: ICD-10-CM

## 2020-11-16 DIAGNOSIS — Z96.89 STATUS POST DEEP BRAIN STIMULATOR PLACEMENT: ICD-10-CM

## 2020-11-16 DIAGNOSIS — Z45.42 END OF BATTERY LIFE OF DEEP BRAIN STIMULATOR: ICD-10-CM

## 2020-11-16 DIAGNOSIS — R25.1 TREMOR: Primary | ICD-10-CM

## 2020-11-16 PROCEDURE — 99214 OFFICE O/P EST MOD 30 MIN: CPT | Mod: GT | Performed by: NEUROLOGICAL SURGERY

## 2020-11-16 NOTE — LETTER
"11/16/2020      RE: Sandra Jara  [address]  [facility] [address]       Sandra Jara is a 55 year old female who is being evaluated via a billable video visit.      The patient has been notified of following:     \"This video visit will be conducted via a call between you and your physician/provider. We have found that certain health care needs can be provided without the need for an in-person physical exam.  This service lets us provide the care you need with a video conversation.  If a prescription is necessary we can send it directly to your pharmacy.  If lab work is needed we can place an order for that and you can then stop by our lab to have the test done at a later time.    Video visits are billed at different rates depending on your insurance coverage.  Please reach out to your insurance provider with any questions.    If during the course of the call the physician/provider feels a video visit is not appropriate, you will not be charged for this service.\"    Patient has given verbal consent for Video visit?YES  How would you like to obtain your AVS? Caitlyn      Mr./Ms.  complains of    Chief Complaint   Patient presents with     Consult     DEPLETED DBS GENERATOR/BATTERY       I have reviewed and updated the patient's Past Medical History, Social History, Family History and Medication List.    ALLERGIES     No Known Allergies      ASSESSMENT  55 year old right handed female  Dystonia  Myoclonus  Slow progression corticobasal degeneration  S/p left side deep brain stimulator placement, phase I, placement of left side deep brain stimulator electrode, target left globus pallidus internus, with microelectrode recording on 2/22/2019  S/p deep brain stimulator placement, phase II, placement of deep brain stimulator generator/battery over the left chest wall on 3/1/2019    I have reviewed the note as documented above.  This accurately captures the substance of my conversation with " the patient.  The following were discussed in detail.    Ms. Sandra Jara is a 55 year old female who has a diagnosis of corticobasal degeneration, dystonia and myoclonus who underwent DBS workup and ultimately DBS placement back in 2019.  Ms. Abdias Crooks started having her symptoms over 5 years ago with occasional shaking in her right hand and loss of control.  She was initial diagnosis in 2015 at [place] with corticobasal degeneration (CBD).  She then went to see a neurologist in the [facility] who noticed slower than normal progression for CBD.  It was thought that she may have dystonia.  She had a DATscan with asymmetric-metabolism in the left parietal and occipital lobes, thus thought of having slowly progressing corticobasal degeneration (CBD), per Dr. Hopkins.  She does have right upper extremity dystonia and myoclonus.  She is followed by our neurology team, including Dr. Hopkins, for botulism toxin injections.  Her bolutism injections provided no significant functional improvement so she was referred for DBS evaluation.  Dr. Hopkins's thoughts were left GPi DBS for right upper extremity dystonia.  Patient reported dystonia in her right arm and going down her right leg.  Cramping and shaking were symptoms that came after loss of control in her right arm/hand.  Her goals of DBS surgery was to get rid of the jerking and to get functionality back in her right upper extremity.  Her case was discussed at the Movement Disorder Consensus Group Meeting, and she was considered to be a good DBS candidate.  Recommendation was left GPi DBS, unilateral, Abbott system.  Subsequently, she underwent left side deep brain stimulator placement, phase I, placement of left side deep brain stimulator electrode, target left globus pallidus internus, with microeletrode recording on 2/22/2019 and deep brain stimulator placement, phase II, placement of deep brain stimulator generator/battery over the left chest wall on  3/1/2019.  Patient tolerated both of the procedure well and there were no complications.  After her phase I surgery, her postoperative CT head was without any concerning features and she was discharged to home on POD#1.  She did have some discomfort at the incision site and connector burial site.  She was also tired/fatigued for a few days.  She also had some balance issues for the first couple of days.  That improved  She also did well after her phase II surgery, which was an outpatient surgery.  Due to her symptoms, Dr. Hopkins activated and programmed her DBS device immediately after her phase II surgery.    Per patient and her , patient has been getting worse for the past several months.  She is requiring more assistance and now she is wheelchair bound.  Her speech is getting slower and she is slurring her speech.  It was noted at the last Botox injection that her generator/battery was nearing the end of its life  Patient's  noticed a yellow warning sign when the patient controller was communicating with the device.    On Friday, patient's  would no longer connect with the device.  It is completely depleted.  Initially, patient and her  thought that the depleted DBS may be causing her more recent acute symptoms of difficulty speaking and weakness on the right side.  Patient's  called the neurology team and explained that over the weekend, patient has become more lethargic with marked slurred speech.  She was unable to use her right foot and needed much more help moving.  The thought was that she may be having a stroke and neurology team recommended evaluation in the ED.  However, patient's  and patient's caretaker thought that this was not a stroke.  Patient has been stable but continues to be slow with slurred speech.    When she was seen by Dr. Hopkins, the thought was that the DBS is not that helpful and the plan was to let it run out to see if there is any  difference.  Over the weekend, patient declined further.  Even though the belief was that DBS may not be doing much, functioning DBS may have been helping her with the symptoms more than initially thought.  Therefore, the decision was to go ahead with replacement of the generator/battery.    Her generator/battery, Infinity 5, lasted about 1 year and 9 months.  I suggested that she consider Infinity 7, the larger generator/battery, so that it may last longer.  However, patient wished to stay with Infinity 5.    We discussed the surgical procedure for replacing the DBS generator/battery over the left chest wall.  She currently has an Abbott Infinity 5 generator/battery, and this will be maintained during the upcoming procedure.  Risks, benefits, alternative therapies were discussed with the patient, including but not limited to infection and bleeding.  This surgical procedure will be performed under MAC with local anesthetic.  The thinned part of the wound/pocket may be corrected with mobilization of the tissue under the incision.  The pocket may need to be enlarged to minimize the stress on the closure.  Surgical procedure was discussed in detail.  All questions were answered, and the patient and her  expressed understanding.    PLAN  Replacement of deep brain stimulator generator/battery over the left chest wall  PAC visit  Preop labs  Negative Covid-19 test within 4 days of the surgery    Video-Visit Details    Type of service:  Video Visit    Video Start Time: 12:57 PM  Video End Time: 1:28 PM  Video -Visit Time: 31 minutes    Originating Location (pt. Location): Home    Distant Location (provider location):  [facility] NEUROSURGERY     Platform used for Video Visit: Ion Triana MD, PhD    31 minutes were spent face to face with the patient of which more than 50% of the time was spent counseling and discussing the above issues regarding diagnosis, differential, treatment options, and  steps for further evaluation.

## 2020-11-16 NOTE — PROGRESS NOTES
"Sandra Jara is a 55 year old female who is being evaluated via a billable video visit.      The patient has been notified of following:     \"This video visit will be conducted via a call between you and your physician/provider. We have found that certain health care needs can be provided without the need for an in-person physical exam.  This service lets us provide the care you need with a video conversation.  If a prescription is necessary we can send it directly to your pharmacy.  If lab work is needed we can place an order for that and you can then stop by our lab to have the test done at a later time.    Video visits are billed at different rates depending on your insurance coverage.  Please reach out to your insurance provider with any questions.    If during the course of the call the physician/provider feels a video visit is not appropriate, you will not be charged for this service.\"    Patient has given verbal consent for Video visit?YES  How would you like to obtain your AVS? Mychart        Video-Visit Details    Type of service:  Video Visit    Video Start Time: 12:57 pm  Video End Time: 1:28 pm    Originating Location (pt. Location): Home    Distant Location (provider location):  [facility]UROS[facility] [place]     Platform used for Video Visit: Ion Tracy, EMT    Additional provider notes: insert own note template here      Mr./Ms.  complains of    Chief Complaint   Patient presents with     Consult     DEPLETED DBS GENERATOR/BATTERY         I have reviewed and updated the patient's Past Medical History, Social History, Family History and Medication List.    ALLERGIES     No Known Allergies      ASSESSMENT  55 year old right handed female  Dystonia  Myoclonus  Slow progression corticobasal degeneration  S/p left side deep brain stimulator placement, phase I, placement of left side deep brain stimulator electrode, target left globus pallidus internus, with " microelectrode recording on 2/22/2019  S/p deep brain stimulator placement, phase II, placement of deep brain stimulator generator/battery over the left chest wall on 3/1/2019      I have reviewed the note as documented above.  This accurately captures the substance of my conversation with the patient.  The following were discussed in detail.    Ms. Sandra Jara is a 55 year old female who has a diagnosis of corticobasal degeneration, dystonia and myoclonus who underwent DBS workup and ultimately DBS placement back in 2019.  Ms. Abdias Crooks started having her symptoms over 5 years ago with occasional shaking in her right hand and loss of control.  She was initial diagnosis in 2015 at [place] with corticobasal degeneration (CBD).  She then went to see a neurologist in the [facility] who noticed slower than normal progression for CBD.  It was thought that she may have dystonia.  She had a DATscan with asymmetric-metabolism in the left parietal and occipital lobes, thus thought of having slowly progressing corticobasal degeneration (CBD), per Dr. Hopkins.  She does have right upper extremity dystonia and myoclonus.  She is followed by our neurology team, including Dr. Hopkins, for botulism toxin injections.  Her bolutism injections provided no significant functional improvement so she was referred for DBS evaluation.  Dr. Hopkins's thoughts were left GPi DBS for right upper extremity dystonia.  Patient reported dystonia in her right arm and going down her right leg.  Cramping and shaking were symptoms that came after loss of control in her right arm/hand.  Her goals of DBS surgery was to get rid of the jerking and to get functionality back in her right upper extremity.  Her case was discussed at the Movement Disorder Consensus Group Meeting, and she was considered to be a good DBS candidate.  Recommendation was left GPi DBS, unilateral, Abbott system.  Subsequently, she underwent left side deep brain  stimulator placement, phase I, placement of left side deep brain stimulator electrode, target left globus pallidus internus, with microeletrode recording on 2/22/2019 and deep brain stimulator placement, phase II, placement of deep brain stimulator generator/battery over the left chest wall on 3/1/2019.  Patient tolerated both of the procedure well and there were no complications.  After her phase I surgery, her postoperative CT head was without any concerning features and she was discharged to home on POD#1.  She did have some discomfort at the incision site and connector burial site.  She was also tired/fatigued for a few days.  She also had some balance issues for the first couple of days.  That improved  She also did well after her phase II surgery, which was an outpatient surgery.  Due to her symptoms, Dr. Hopkins activated and programmed her DBS device immediately after her phase II surgery.    Per patient and her , patient has been getting worse for the past several months.  She is requiring more assistance and now she is wheelchair bound.  Her speech is getting slower and she is slurring her speech.  It was noted at the last Botox injection that her generator/battery was nearing the end of its life  Patient's  noticed a yellow warning sign when the patient controller was communicating with the device.    On Friday, patient's  would no longer connect with the device.  It is completely depleted.  Initially, patient and her  thought that the depleted DBS may be causing her more recent acute symptoms of difficulty speaking and weakness on the right side.  Patient's  called the neurology team and explained that over the weekend, patient has become more lethargic with marked slurred speech.  She was unable to use her right foot and needed much more help moving.  The thought was that she may be having a stroke and neurology team recommended evaluation in the ED.  However,  patient's  and patient's caretaker thought that this was not a stroke.  Patient has been stable but continues to be slow with slurred speech.    When she was seen by Dr. Hopkins, the thought was that the DBS is not that helpful and the plan was to let it run out to see if there is any difference.  Over the weekend, patient declined further.  Even though the belief was that DBS may not be doing much, functioning DBS may have been helping her with the symptoms more than initially thought.  Therefore, the decision was to go ahead with replacement of the generator/battery.    Her generator/battery, Infinity 5, lasted about 1 year and 9 months.  I suggested that she consider Infinity 7, the larger generator/battery, so that it may last longer.  However, patient wished to stay with Infinity 5.    We discussed the surgical procedure for replacing the DBS generator/battery over the left chest wall.  She currently has an Abbott Infinity 5 generator/battery, and this will be maintained during the upcoming procedure.  Risks, benefits, alternative therapies were discussed with the patient, including but not limited to infection and bleeding.  This surgical procedure will be performed under MAC with local anesthetic.  The thinned part of the wound/pocket may be corrected with mobilization of the tissue under the incision.  The pocket may need to be enlarged to minimize the stress on the closure.  Surgical procedure was discussed in detail.  All questions were answered, and the patient and her  expressed understanding.      PLAN  Replacement of deep brain stimulator generator/battery over the left chest wall  PAC visit  Preop labs  Negative Covid-19 test within 4 days of the surgery      Video-Visit Details    Type of service:  Video Visit    Video Start Time: 12:57 PM  Video End Time: 1:28 PM  Video -Visit Time: 31 minutes    Originating Location (pt. Location): Home    Distant Location (provider location):    [facility] NEUROSURGERY     Platform used for Video Visit: Ion Triana MD, PhD      31 minutes were spent face to face with the patient of which more than 50% of the time was spent counseling and discussing the above issues regarding diagnosis, differential, treatment options, and steps for further evaluation.

## 2020-11-17 ENCOUNTER — TELEPHONE (OUTPATIENT)
Dept: NEUROSURGERY | Facility: CLINIC | Age: 55
End: 2020-11-17

## 2020-11-17 ENCOUNTER — DOCUMENTATION ONLY (OUTPATIENT)
Dept: NEUROSURGERY | Facility: CLINIC | Age: 55
End: 2020-11-17

## 2020-11-17 DIAGNOSIS — Z11.59 ENCOUNTER FOR SCREENING FOR OTHER VIRAL DISEASES: Primary | ICD-10-CM

## 2020-11-17 PROBLEM — Z45.42 END OF BATTERY LIFE OF DEEP BRAIN STIMULATOR: Status: ACTIVE | Noted: 2020-11-17

## 2020-11-17 PROBLEM — Z96.89 STATUS POST DEEP BRAIN STIMULATOR PLACEMENT: Status: ACTIVE | Noted: 2020-11-17

## 2020-11-17 NOTE — TELEPHONE ENCOUNTER
Patient is scheduled for surgery with Dr. Triana      Spoke or left message with: patients     Date of Surgery: 11/24/20    Location [facility] OR    H&P: Scheduled with PAC 11/20/20    Post op: 12/08/20    Additional imaging/appointments: NA    Surgery packet sent: the nurse will mail out     Additional comments: The patient is aware they need a PRE-OP/PAC appt at least a couple of weeks before surgery date. We went over the patient needing a  and someone to stay with them for 24 hours after the surgery. The patient was given my direct number for any questions [phone]

## 2020-11-18 DIAGNOSIS — Z01.818 PREOPERATIVE EVALUATION TO RULE OUT SURGICAL CONTRAINDICATION: Primary | ICD-10-CM

## 2020-11-18 NOTE — TELEPHONE ENCOUNTER
FUTURE VISIT INFORMATION      SURGERY INFORMATION:    Date: 11/24/20    Location: [facility] OR    Surgeon:  Efren Triana MD    Anesthesia Type:  Combined MAC with Local    Procedure: Replacement of deep brain stimulator generator/battery over left chest wall    Consult: virtual visit 11/16/20    RECORDS REQUESTED FROM:       Primary Care Provider: Ester Barnes MD  - Joel    Most recent Sleep Study:  8/18/20- [facility]

## 2020-11-20 ENCOUNTER — VIRTUAL VISIT (OUTPATIENT)
Dept: SURGERY | Facility: CLINIC | Age: 55
End: 2020-11-20
Payer: COMMERCIAL

## 2020-11-20 ENCOUNTER — PRE VISIT (OUTPATIENT)
Dept: SURGERY | Facility: CLINIC | Age: 55
End: 2020-11-20

## 2020-11-20 ENCOUNTER — ANESTHESIA EVENT (OUTPATIENT)
Dept: SURGERY | Facility: CLINIC | Age: 55
End: 2020-11-20
Payer: COMMERCIAL

## 2020-11-20 DIAGNOSIS — Z01.818 PREOP EXAMINATION: Primary | ICD-10-CM

## 2020-11-20 PROCEDURE — 99203 OFFICE O/P NEW LOW 30 MIN: CPT | Mod: GT | Performed by: PHYSICIAN ASSISTANT

## 2020-11-20 RX ORDER — IBUPROFEN 200 MG
CAPSULE ORAL EVERY MORNING
COMMUNITY

## 2020-11-20 ASSESSMENT — PAIN SCALES - GENERAL: PAINLEVEL: NO PAIN (0)

## 2020-11-20 ASSESSMENT — ENCOUNTER SYMPTOMS: SEIZURES: 0

## 2020-11-20 ASSESSMENT — LIFESTYLE VARIABLES: TOBACCO_USE: 0

## 2020-11-20 NOTE — H&P (VIEW-ONLY)
Pre-Operative H & P     CC:  Preoperative exam to assess for increased cardiopulmonary risk while undergoing surgery and anesthesia.    Date of Encounter: 11/20/2020  Primary Care Physician:  Ester Barnes  Associated diagnoses: Tremor, dystonia, myoclonus    HPI  Sandraisi Jara is a 55 year old female who presents via video visit for pre-operative H & P in preparation for replacement of DBS generator/battery over left chest wall with Dr. Triana on 11/24/2020 at [facility].  MAC with local anesthesia.    This is a 55-year-old female with past medical history significant for anxiety, DANY, cortical basal degeneration, dystonia, and myoclonus.  She started having symptoms over 5 years ago with shaking in her right hand and loss of control.  She ultimately underwent DBS placement in 2019.  The patient notes that her symptoms have been getting worse over the past several months.  She requires more assistance she is now wheelchair-bound.  She feels that her speech is getting slower and slurred.  She does undergo Botox injections for her symptoms as well.  Recently they have noticed that the generator of her DBS was nearing end-of-life.  There was some uncertainty regarding effectiveness of DBS but since the complete occlusion of the battery they feel that symptoms are deteriorating further.  The above procedure is now planned.     History is obtained from the patient, her , and chart review.    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 It demonstrated some mild changes of right carpal tunnel syndrome but was otherwise normal.       History of MRI of brain and brain stem 12/21/2014    A brain MRI scan done on 06/27/2014 revealed no abnormalities.      History of MRI of cervical spine 12/21/2014    A cervical MRI done on 06/10/2014 revealed some mild to moderate degenerative changes but no significant central canal or foraminal stenosis, and no abnormalities of the spinal cord were noted.       Movement disorder 12/21/2014    I saw your patient, Deepali Contreras on 12/15/2014. She is a 49-year-old right-handed female here for evaluation of right upper extremity dysfunction and right hand tremor.  She has noted this problem for the last couple of years. She reports she is losing dexterity in her right hand. She has appreciated a tremor of the right hand with actions such as writing or postural maintenance. She has n       Past Surgical History  Past Surgical History:   Procedure Laterality Date     APPENDECTOMY       IMPLANT DEEP BRAIN STIMULATION GENERATOR / BATTERY Left 3/1/2019    Procedure: Left Deep Brain Stimulator Placement, Phase II, Placement Of Deep Brain Stimulator Generator/Battery Over The Left Chest Wall Latex Free;  Surgeon: Efren Triana MD;  Location: [facility] OR     OPTICAL TRACKING SYSTEM INSERTION DEEP BRAIN STIMULATION Left 2/22/2019    Procedure: Stealth Assisted Left Side Deep Brain Stimulator Placement, Phase I, Placement Of Left Side Deep Brain Stimulator Electrode, Target Left Globus Pallidus Internus With Microelectrode Recording;  Surgeon: Efren Triana MD;  Location: [facility] OR       Hx of Blood transfusions/reactions: Denies    Hx of abnormal bleeding or anti-platelet use: Denies    Menstrual history: No LMP recorded. Patient is postmenopausal.:     Steroid use in the last year: Denies    Personal or FH with difficulty with Anesthesia: Denies    Prior to Admission  Medications  Current Outpatient Medications   Medication Sig Dispense Refill     botulinum toxin type A (BOTOX) 100 units injection Inject 280 Units into the muscle once Lot # /C3 with Expiration Date:  11/2020       calcium carbonate 500 mg, elemental, (OSCAL 500) 1250 (500 Ca) MG TABS tablet Take by mouth every morning       Cholecalciferol (VITAMIN D) 2000 UNITS tablet Take 2,000 Units by mouth every morning        OnabotulinumtoxinA (BOTOX IJ) Inject 300 Units as directed once [id number] with Expiration Date:  12/2020       tiZANidine (ZANAFLEX) 4 MG tablet Take 1/2-1 tab po q6h prn for muscle spasms and pain (Patient taking differently: as needed Take 1/2-1 tab po q6h prn for muscle spasms and pain) 90 tablet 3     traMADol (ULTRAM) 50 MG tablet Take 1/2 to 1 tab by mouth every 6 hours as needed, up to 3 tabs per day. (Patient taking differently: every 6 hours as needed Take 1/2 to 1 tab by mouth every 6 hours as needed, up to 3 tabs per day.) 90 tablet 0       Allergies  No Known Allergies    Social History  Social History     Socioeconomic History     Marital status:      Spouse name: Not on file     Number of children: 3     Years of education: Not on file     Highest education level: Not on file   Occupational History     Not on file   Social Needs     Financial resource strain: Not on file     Food insecurity     Worry: Not on file     Inability: Not on file     Transportation needs     Medical: Not on file     Non-medical: Not on file   Tobacco Use     Smoking status: Never Smoker     Smokeless tobacco: Never Used   Substance and Sexual Activity     Alcohol use: Yes     Alcohol/week: 2.0 - 3.0 standard drinks     Frequency: 2-4 times a month     Drinks per session: 1 or 2     Drug use: No     Sexual activity: Yes     Partners: Male     Birth control/protection: Male Surgical   Lifestyle     Physical activity     Days per week: Not on file     Minutes per session: Not on file     Stress: Not on  file   Relationships     Social connections     Talks on phone: Not on file     Gets together: Not on file     Attends Voodoo service: Not on file     Active member of club or organization: Not on file     Attends meetings of clubs or organizations: Not on file     Relationship status: Not on file     Intimate partner violence     Fear of current or ex partner: Not on file     Emotionally abused: Not on file     Physically abused: Not on file     Forced sexual activity: Not on file   Other Topics Concern     Parent/sibling w/ CABG, MI or angioplasty before 65F 55M? No   Social History Narrative        Izaiah Jara    Lives in [facility]      She does not smoke.  She has a couple of beers to drink on the weekend but otherwise is not a heavy user of alcohol    3 kids: son peña 21; 94, 96 and 98    2 sons    1 daughter    2 are in college and daughter is a sophomore.            FAMILY HISTORY:  Notable for a paternal aunt who is .  She had Parkinson's disease.  Otherwise, there is no other family history of neurologic problems.  There is no family history of dystonia.          90% Welsh    Some norweigian                .         Family History  Family History   Problem Relation Age of Onset     Breast Cancer Mother      Cancer - colorectal Mother      Macular Degeneration Mother      Dementia Father      Deep Vein Thrombosis Father      No Known Problems Brother      No Known Problems Sister      No Known Problems Sister      No Known Problems Sister      Neurologic Disorder Paternal Aunt         Parkinson's Disease     Cerebrovascular Disease Brother         stroke at age 25 possibly from pfo     Dementia Paternal Grandmother            Anesthesia Evaluation     . Pt has had prior anesthetic.     No history of anesthetic complications          ROS/MED HX    ENT/Pulmonary:     (+)sleep apnea (sleep study 2020), uses CPAP , . .   (-) tobacco use   Neurologic:     (+)other neuro  corticobasal degeneration   (-) seizures and CVA   Cardiovascular:  - neg cardiovascular ROS   (+) ----. : . . . :. . No previous cardiac testing       METS/Exercise Tolerance:  1 - Eating, dressing   Hematologic:  - neg hematologic  ROS      (-) history of blood clots and History of Transfusion   Musculoskeletal: Comment: Myoclonus  Dystonia  Neck and shoulder pain        GI/Hepatic:         Renal/Genitourinary:  - ROS Renal section negative       Endo:  - neg endo ROS       Psychiatric:        (-) psychiatric history   Infectious Disease:  - neg infectious disease ROS       Malignancy:      - no malignancy   Other:    (+) H/O Chronic Pain,H/O chronic opiod use ,            The complete review of systems is negative other than noted in the HPI or here.        Physical Exam    Please refer to the physical examination documented by the anesthesiologist in the anesthesia record on the day of surgery    Constitutional: Awake, alert, cooperative, no apparent distress, and appears stated age.  Respiratory: Nonlabored breathing.   Neurologic: Awake, alert, oriented to name, place and time. Neuropsychiatric: Calm, cooperative. Normal affect.     Labs: (personally reviewed)  Component      Latest Ref Rng & Units 11/21/2020   Sodium      133 - 144 mmol/L 140   Potassium      3.4 - 5.3 mmol/L 4.2   Chloride      94 - 109 mmol/L 106   Carbon Dioxide      20 - 32 mmol/L 32   Anion Gap      3 - 14 mmol/L 2 (L)   Glucose      70 - 99 mg/dL 98   Urea Nitrogen      7 - 30 mg/dL 16   Creatinine      0.52 - 1.04 mg/dL 0.72   GFR Estimate      >60 mL/min/1.73:m2 >90   GFR Estimate If Black      >60 mL/min/1.73:m2 >90   Calcium      8.5 - 10.1 mg/dL 9.7   WBC      4.0 - 11.0 10e9/L 5.7   RBC Count      3.8 - 5.2 10e12/L 5.01   Hemoglobin      11.7 - 15.7 g/dL 15.4   Hematocrit      35.0 - 47.0 % 45.1   MCV      78 - 100 fl 90   MCH      26.5 - 33.0 pg 30.7   MCHC      31.5 - 36.5 g/dL 34.1   RDW      10.0 - 15.0 % 12.1   Platelet  Count      150 - 450 10e9/L 265   PTT      22 - 37 sec 30   INR      0.86 - 1.14 0.99         Outside records reviewed from: care everywhere    ASSESSMENT and PLAN  Sandra Jara is a 55 year old female scheduled for Replacement of deep brain stimulator generator/battery over left chest wall on 11/24/2020 by Dr. Triana in treatment of tremor, dystonia, myoclonus.  PAC referral for risk assessment and optimization for anesthesia with comorbid conditions of anxiety, DANY, cortical basal degeneration:    Pre-operative considerations:  1.  Cardiac:  Functional status- METS 1.  Pt is wheelchair bound.  Low risk surgery with 0.4% (RCRI #) risk of major adverse cardiac event.  Denies cardiac history.  No cardiopulmonary symptoms.  2.  Pulm:  Airway feasible.  DANY risk: Known DANY, uses CPAP.  Sleep study 8/18/2020  3.  GI:  Risk of PONV score = 2.  If > 2, anti-emetic intervention recommended.  4.  Neuro: h/o cortiobasal degeneration diagnosed 2015 at [place].  Followed by neurology.    --dystonia and myoclonus, receives Botox injections, last 11/5/2020.    VTE risk: 1.8% (FH of VTE)    Patient is optimized and is acceptable candidate for the proposed procedure.  No further diagnostic evaluation is needed.         Video-Visit Details    Type of service:  Video Visit    Patient verbally consented to video service today: YES      Video Call Length: 10 minutes    Originating Location (pt. Location): Home    Distant Location (provider location): home    Mode of Communication:  Video Conference via doximity        Brittani Hines PA-C  Preoperative Assessment Center  [facility]  [facility]  Phone: [phone]  Fax: [phone]

## 2020-11-20 NOTE — ANESTHESIA PREPROCEDURE EVALUATION
Anesthesia Pre-Procedure Evaluation    Patient: Sandra Jara   MRN:     [id number] Gender:   female   Age:    55 year old :      1965        This is a 55-year-old female with past medical history significant for anxiety, DANY, cortical basal degeneration, dystonia, and myoclonus.  She started having symptoms over 5 years ago with shaking in her right hand and loss of control.  She ultimately underwent DBS placement in 2019.  The patient notes that her symptoms have been getting worse over the past several months.  She requires more assistance she is now wheelchair-bound.  She feels that her speech is getting slower and slurred.  She does undergo Botox injections for her symptoms as well.  Recently they have noticed that the generator of her DBS was nearing end-of-life.  There was some uncertainty regarding effectiveness of DBS but since the complete occlusion of the battery they feel that symptoms are deteriorating further.  The above procedure is now planned.     Preoperative Diagnosis: * No surgery found *        LABS:  CBC:   Lab Results   Component Value Date    WBC 6.5 2019    WBC 6.0 2019    HGB 12.4 2019    HGB 15.1 2019    HCT 36.0 2019    HCT 44.0 2019     2019     2019     BMP:   Lab Results   Component Value Date     2019     2019    POTASSIUM 3.6 2019    POTASSIUM 4.4 2019    CHLORIDE 109 2019    CHLORIDE 105 2019    CO2 24 2019    CO2 28 2019    BUN 8 2019    BUN 15 2019    CR 0.58 2019    CR 0.70 2019    GLC 91 2019    GLC 92 2019     COAGS:   Lab Results   Component Value Date    PTT 27 2019    INR 1.01 2019     POC:   Lab Results   Component Value Date    BGM 89 2019     OTHER:   Lab Results   Component Value Date    ELVIRA 7.9 (L) 2019    PHOS 3.4 2019    MAG 1.9 2019    ALBUMIN 3.9 12/15/2014  "   PROTTOTAL 7.2 12/15/2014    ALT 14 12/15/2014    AST 10 12/15/2014    ALKPHOS 52 12/15/2014    BILITOTAL 0.6 12/15/2014    TSH 2.44 12/15/2014        Preop Vitals    BP Readings from Last 3 Encounters:   11/05/20 115/70   01/08/20 129/84   10/16/19 131/73    Pulse Readings from Last 3 Encounters:   11/05/20 87   01/08/20 90   10/16/19 91      Resp Readings from Last 3 Encounters:   07/18/19 16   04/18/19 15   03/01/19 16    SpO2 Readings from Last 3 Encounters:   11/05/20 97%   01/08/20 98%   10/16/19 98%      Temp Readings from Last 1 Encounters:   05/14/20 97.3  F (36.3  C)    Ht Readings from Last 1 Encounters:   07/18/19 1.664 m (5' 5.5\")      Wt Readings from Last 1 Encounters:   11/05/20 68 kg (150 lb)    Estimated body mass index is 24.58 kg/m  as calculated from the following:    Height as of 7/18/19: 1.664 m (5' 5.5\").    Weight as of 11/5/20: 68 kg (150 lb).     LDA:        Past Medical History:   Diagnosis Date     Action induced myoclonus 12/1/2015     Corticobasal syndrome (H) 12/1/2015     CTS (carpal tunnel syndrome) 1/12/2015     Disturbance of skin sensation 1/12/2015     Family history of breast cancer 1/12/2015     Family history of colon cancer 1/12/2015     Family history of Parkinson's disease 12/21/2014    Paternal aunt     History of EMG 12/21/2014    A right upper extremity EMG and nerve conduction study was done on 06/18/2014. It demonstrated some mild changes of right carpal tunnel syndrome but was otherwise normal.       History of MRI of brain and brain stem 12/21/2014    A brain MRI scan done on 06/27/2014 revealed no abnormalities.      History of MRI of cervical spine 12/21/2014    A cervical MRI done on 06/10/2014 revealed some mild to moderate degenerative changes but no significant central canal or foraminal stenosis, and no abnormalities of the spinal cord were noted.       Movement disorder 12/21/2014    I saw your patient, Deepali Contreras on 12/15/2014. She is a " 49-year-old right-handed female here for evaluation of right upper extremity dysfunction and right hand tremor.  She has noted this problem for the last couple of years. She reports she is losing dexterity in her right hand. She has appreciated a tremor of the right hand with actions such as writing or postural maintenance. She has n      Past Surgical History:   Procedure Laterality Date     APPENDECTOMY       IMPLANT DEEP BRAIN STIMULATION GENERATOR / BATTERY Left 3/1/2019    Procedure: Left Deep Brain Stimulator Placement, Phase II, Placement Of Deep Brain Stimulator Generator/Battery Over The Left Chest Wall Latex Free;  Surgeon: Efren Triana MD;  Location: [facility] OR     OPTICAL TRACKING SYSTEM INSERTION DEEP BRAIN STIMULATION Left 2/22/2019    Procedure: Stealth Assisted Left Side Deep Brain Stimulator Placement, Phase I, Placement Of Left Side Deep Brain Stimulator Electrode, Target Left Globus Pallidus Internus With Microelectrode Recording;  Surgeon: Efren Triana MD;  Location: [facility] OR      No Known Allergies     Anesthesia Evaluation     . Pt has had prior anesthetic.     No history of anesthetic complications          ROS/MED HX    ENT/Pulmonary:  - neg pulmonary ROS   (+)sleep apnea (sleep study 8/18/2020), uses CPAP , . .   (-) tobacco use   Neurologic:     (+)other neuro corticobasal degeneration   (-) seizures and CVA   Cardiovascular:  - neg cardiovascular ROS   (+) ----. : . . . :. . No previous cardiac testing       METS/Exercise Tolerance:  1 - Eating, dressing   Hematologic:  - neg hematologic  ROS      (-) history of blood clots and History of Transfusion   Musculoskeletal: Comment: Myoclonus  Dystonia  Neck and shoulder pain        GI/Hepatic:         Renal/Genitourinary:  - ROS Renal section negative       Endo:  - neg endo ROS       Psychiatric:     (+) psychiatric history anxiety      Infectious Disease:  - neg infectious disease ROS       Malignancy:      - no malignancy    Other:    (+) H/O Chronic Pain,H/O chronic opiod use , other significant disability Wheelchair bound                       PHYSICAL EXAM:   Mental Status/Neuro: A/A/O   Airway: Facies: Feasible  Mallampati: I  Mouth/Opening: Full  TM distance: > 6 cm  Neck ROM: Full   Respiratory: Auscultation: CTAB     Resp. Rate: Normal     Resp. Effort: Normal      CV: Rhythm: Regular  Rate: Age appropriate  Heart: Normal Sounds  Edema: None   Comments:      Dental: Normal Dentition                Assessment:   ASA SCORE: 2    H&P: History and physical reviewed and following examination; no interval change.   Smoking Status:  Non-Smoker/Unknown   NPO Status: NPO Appropriate     Plan:   Anes. Type:  MAC   Pre-Medication: None   Induction:  N/a   Airway: Native Airway   Access/Monitoring: PIV   Maintenance: Propofol Sedation     Postop Plan:   Postop Pain: None  Postop Sedation/Airway: Not planned     PONV Management:   Adult Risk Factors: Female, Non-Smoker   Prevention: Ondansetron, Propofol     CONSENT: Direct conversation   Plan and risks discussed with: Patient   Blood Products: Consent Deferred (Minimal Blood Loss)                PAC Discussion and Assessment    ASA Classification: 3  Case is suitable for: [place]  Anesthetic techniques and relevant risks discussed: MAC with GA as backup  Invasive monitoring and risk discussed: No  Types:   Possibility and Risk of blood transfusion discussed: No  NPO instructions given:   Additional anesthetic preparation and risks discussed:   Needs early admission to pre-op area:   Other:     PAC Resident/NP Anesthesia Assessment:  Sandra Jara is a 55 year old female scheduled for Replacement of deep brain stimulator generator/battery over left chest wall on 11/24/2020 by Dr. Triana in treatment of tremor, dystonia, myoclonus.  PAC referral for risk assessment and optimization for anesthesia with comorbid conditions of anxiety, DANY, cortical basal degeneration:    Pre-operative  considerations:  1.  Cardiac:  Functional status- METS 1.  Wheelchair bound.  Low risk surgery with 0.4% (RCRI #) risk of major adverse cardiac event.  Denies cardiac history.  No cardiopulmonary symptoms.  2.  Pulm:  Airway feasible.  DANY risk: Known DANY, uses CPAP.  Sleep study 8/18/2020  3.  GI:  Risk of PONV score = 2.  If > 2, anti-emetic intervention recommended.  4.  Neuro: h/o cortiobasal degeneration diagnosed 2015 at [place].  Followed by neurology.    --dystonia and myoclonus, receives Botox injections, last 11/5/2020.    VTE risk: 1.8% (FH of VTE)    Patient is optimized and is acceptable candidate for the proposed procedure.  No further diagnostic evaluation is needed.       **For further details of assessment, testing, and physical exam please see H and P completed on same date.          Brittani Hines PA-C, [facility]      Reviewed and Signed by PAC Mid-Level Provider/Resident  Mid-Level Provider/Resident: Brittani Hines  Date: 11/20/2020  Time:     Attending Anesthesiologist Anesthesia Assessment:        Anesthesiologist:   Date:   Time:   Pass/Fail:   Disposition:     PAC Pharmacist Assessment:        Pharmacist:   Date:   Time:    Brittani Hines PA-C

## 2020-11-20 NOTE — H&P
Pre-Operative H & P     CC:  Preoperative exam to assess for increased cardiopulmonary risk while undergoing surgery and anesthesia.    Date of Encounter: 11/20/2020  Primary Care Physician:  Ester Barnes  Associated diagnoses: Tremor, dystonia, myoclonus    HPI  Sandraisi Jara is a 55 year old female who presents via video visit for pre-operative H & P in preparation for replacement of DBS generator/battery over left chest wall with Dr. Triana on 11/24/2020 at [facility].  MAC with local anesthesia.    This is a 55-year-old female with past medical history significant for anxiety, DANY, cortical basal degeneration, dystonia, and myoclonus.  She started having symptoms over 5 years ago with shaking in her right hand and loss of control.  She ultimately underwent DBS placement in 2019.  The patient notes that her symptoms have been getting worse over the past several months.  She requires more assistance she is now wheelchair-bound.  She feels that her speech is getting slower and slurred.  She does undergo Botox injections for her symptoms as well.  Recently they have noticed that the generator of her DBS was nearing end-of-life.  There was some uncertainty regarding effectiveness of DBS but since the complete occlusion of the battery they feel that symptoms are deteriorating further.  The above procedure is now planned.     History is obtained from the patient, her , and chart review.    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 It demonstrated some mild changes of right carpal tunnel syndrome but was otherwise normal.       History of MRI of brain and brain stem 12/21/2014    A brain MRI scan done on 06/27/2014 revealed no abnormalities.      History of MRI of cervical spine 12/21/2014    A cervical MRI done on 06/10/2014 revealed some mild to moderate degenerative changes but no significant central canal or foraminal stenosis, and no abnormalities of the spinal cord were noted.       Movement disorder 12/21/2014    I saw your patient, Deepali Contreras on 12/15/2014. She is a 49-year-old right-handed female here for evaluation of right upper extremity dysfunction and right hand tremor.  She has noted this problem for the last couple of years. She reports she is losing dexterity in her right hand. She has appreciated a tremor of the right hand with actions such as writing or postural maintenance. She has n       Past Surgical History  Past Surgical History:   Procedure Laterality Date     APPENDECTOMY       IMPLANT DEEP BRAIN STIMULATION GENERATOR / BATTERY Left 3/1/2019    Procedure: Left Deep Brain Stimulator Placement, Phase II, Placement Of Deep Brain Stimulator Generator/Battery Over The Left Chest Wall Latex Free;  Surgeon: Efren Triana MD;  Location: [facility] OR     OPTICAL TRACKING SYSTEM INSERTION DEEP BRAIN STIMULATION Left 2/22/2019    Procedure: Stealth Assisted Left Side Deep Brain Stimulator Placement, Phase I, Placement Of Left Side Deep Brain Stimulator Electrode, Target Left Globus Pallidus Internus With Microelectrode Recording;  Surgeon: Efren Triana MD;  Location: [facility] OR       Hx of Blood transfusions/reactions: Denies    Hx of abnormal bleeding or anti-platelet use: Denies    Menstrual history: No LMP recorded. Patient is postmenopausal.:     Steroid use in the last year: Denies    Personal or FH with difficulty with Anesthesia: Denies    Prior to Admission  Medications  Current Outpatient Medications   Medication Sig Dispense Refill     botulinum toxin type A (BOTOX) 100 units injection Inject 280 Units into the muscle once Lot # /C3 with Expiration Date:  11/2020       calcium carbonate 500 mg, elemental, (OSCAL 500) 1250 (500 Ca) MG TABS tablet Take by mouth every morning       Cholecalciferol (VITAMIN D) 2000 UNITS tablet Take 2,000 Units by mouth every morning        OnabotulinumtoxinA (BOTOX IJ) Inject 300 Units as directed once [id number] with Expiration Date:  12/2020       tiZANidine (ZANAFLEX) 4 MG tablet Take 1/2-1 tab po q6h prn for muscle spasms and pain (Patient taking differently: as needed Take 1/2-1 tab po q6h prn for muscle spasms and pain) 90 tablet 3     traMADol (ULTRAM) 50 MG tablet Take 1/2 to 1 tab by mouth every 6 hours as needed, up to 3 tabs per day. (Patient taking differently: every 6 hours as needed Take 1/2 to 1 tab by mouth every 6 hours as needed, up to 3 tabs per day.) 90 tablet 0       Allergies  No Known Allergies    Social History  Social History     Socioeconomic History     Marital status:      Spouse name: Not on file     Number of children: 3     Years of education: Not on file     Highest education level: Not on file   Occupational History     Not on file   Social Needs     Financial resource strain: Not on file     Food insecurity     Worry: Not on file     Inability: Not on file     Transportation needs     Medical: Not on file     Non-medical: Not on file   Tobacco Use     Smoking status: Never Smoker     Smokeless tobacco: Never Used   Substance and Sexual Activity     Alcohol use: Yes     Alcohol/week: 2.0 - 3.0 standard drinks     Frequency: 2-4 times a month     Drinks per session: 1 or 2     Drug use: No     Sexual activity: Yes     Partners: Male     Birth control/protection: Male Surgical   Lifestyle     Physical activity     Days per week: Not on file     Minutes per session: Not on file     Stress: Not on  file   Relationships     Social connections     Talks on phone: Not on file     Gets together: Not on file     Attends Denominational service: Not on file     Active member of club or organization: Not on file     Attends meetings of clubs or organizations: Not on file     Relationship status: Not on file     Intimate partner violence     Fear of current or ex partner: Not on file     Emotionally abused: Not on file     Physically abused: Not on file     Forced sexual activity: Not on file   Other Topics Concern     Parent/sibling w/ CABG, MI or angioplasty before 65F 55M? No   Social History Narrative        Izaiah Jara    Lives in [facility]      She does not smoke.  She has a couple of beers to drink on the weekend but otherwise is not a heavy user of alcohol    3 kids: son peña 21; 94, 96 and 98    2 sons    1 daughter    2 are in college and daughter is a sophomore.            FAMILY HISTORY:  Notable for a paternal aunt who is .  She had Parkinson's disease.  Otherwise, there is no other family history of neurologic problems.  There is no family history of dystonia.          90% Korean    Some norweigian                .         Family History  Family History   Problem Relation Age of Onset     Breast Cancer Mother      Cancer - colorectal Mother      Macular Degeneration Mother      Dementia Father      Deep Vein Thrombosis Father      No Known Problems Brother      No Known Problems Sister      No Known Problems Sister      No Known Problems Sister      Neurologic Disorder Paternal Aunt         Parkinson's Disease     Cerebrovascular Disease Brother         stroke at age 25 possibly from pfo     Dementia Paternal Grandmother            Anesthesia Evaluation     . Pt has had prior anesthetic.     No history of anesthetic complications          ROS/MED HX    ENT/Pulmonary:     (+)sleep apnea (sleep study 2020), uses CPAP , . .   (-) tobacco use   Neurologic:     (+)other neuro  corticobasal degeneration   (-) seizures and CVA   Cardiovascular:  - neg cardiovascular ROS   (+) ----. : . . . :. . No previous cardiac testing       METS/Exercise Tolerance:  1 - Eating, dressing   Hematologic:  - neg hematologic  ROS      (-) history of blood clots and History of Transfusion   Musculoskeletal: Comment: Myoclonus  Dystonia  Neck and shoulder pain        GI/Hepatic:         Renal/Genitourinary:  - ROS Renal section negative       Endo:  - neg endo ROS       Psychiatric:        (-) psychiatric history   Infectious Disease:  - neg infectious disease ROS       Malignancy:      - no malignancy   Other:    (+) H/O Chronic Pain,H/O chronic opiod use ,            The complete review of systems is negative other than noted in the HPI or here.        Physical Exam    Please refer to the physical examination documented by the anesthesiologist in the anesthesia record on the day of surgery    Constitutional: Awake, alert, cooperative, no apparent distress, and appears stated age.  Respiratory: Nonlabored breathing.   Neurologic: Awake, alert, oriented to name, place and time. Neuropsychiatric: Calm, cooperative. Normal affect.     Labs: (personally reviewed)  Component      Latest Ref Rng & Units 11/21/2020   Sodium      133 - 144 mmol/L 140   Potassium      3.4 - 5.3 mmol/L 4.2   Chloride      94 - 109 mmol/L 106   Carbon Dioxide      20 - 32 mmol/L 32   Anion Gap      3 - 14 mmol/L 2 (L)   Glucose      70 - 99 mg/dL 98   Urea Nitrogen      7 - 30 mg/dL 16   Creatinine      0.52 - 1.04 mg/dL 0.72   GFR Estimate      >60 mL/min/1.73:m2 >90   GFR Estimate If Black      >60 mL/min/1.73:m2 >90   Calcium      8.5 - 10.1 mg/dL 9.7   WBC      4.0 - 11.0 10e9/L 5.7   RBC Count      3.8 - 5.2 10e12/L 5.01   Hemoglobin      11.7 - 15.7 g/dL 15.4   Hematocrit      35.0 - 47.0 % 45.1   MCV      78 - 100 fl 90   MCH      26.5 - 33.0 pg 30.7   MCHC      31.5 - 36.5 g/dL 34.1   RDW      10.0 - 15.0 % 12.1   Platelet  Count      150 - 450 10e9/L 265   PTT      22 - 37 sec 30   INR      0.86 - 1.14 0.99         Outside records reviewed from: care everywhere    ASSESSMENT and PLAN  Sandra Jara is a 55 year old female scheduled for Replacement of deep brain stimulator generator/battery over left chest wall on 11/24/2020 by Dr. Triana in treatment of tremor, dystonia, myoclonus.  PAC referral for risk assessment and optimization for anesthesia with comorbid conditions of anxiety, DANY, cortical basal degeneration:    Pre-operative considerations:  1.  Cardiac:  Functional status- METS 1.  Pt is wheelchair bound.  Low risk surgery with 0.4% (RCRI #) risk of major adverse cardiac event.  Denies cardiac history.  No cardiopulmonary symptoms.  2.  Pulm:  Airway feasible.  DANY risk: Known DANY, uses CPAP.  Sleep study 8/18/2020  3.  GI:  Risk of PONV score = 2.  If > 2, anti-emetic intervention recommended.  4.  Neuro: h/o cortiobasal degeneration diagnosed 2015 at [place].  Followed by neurology.    --dystonia and myoclonus, receives Botox injections, last 11/5/2020.    VTE risk: 1.8% (FH of VTE)    Patient is optimized and is acceptable candidate for the proposed procedure.  No further diagnostic evaluation is needed.         Video-Visit Details    Type of service:  Video Visit    Patient verbally consented to video service today: YES      Video Call Length: 10 minutes    Originating Location (pt. Location): Home    Distant Location (provider location): home    Mode of Communication:  Video Conference via doximity        Brittani Hines PA-C  Preoperative Assessment Center  [facility]  Phone: [phone]  Fax: [phone]

## 2020-11-20 NOTE — PROGRESS NOTES
"Sandra Jara is a 55 year old female who is being evaluated via a billable video visit.      The patient has been notified of following:     \"This video visit will be conducted via a call between you and your physician/provider. We have found that certain health care needs can be provided without the need for an in-person physical exam.  This service lets us provide the care you need with a video conversation.  If a prescription is necessary we can send it directly to your pharmacy.  If lab work is needed we can place an order for that and you can then stop by our lab to have the test done at a later time.    Video visits are billed at different rates depending on your insurance coverage.  Please reach out to your insurance provider with any questions.    If during the course of the call the physician/provider feels a video visit is not appropriate, you will not be charged for this service.\"    Patient has given verbal consent for Video visit? Yes  How would you like to obtain your AVS? MyChart    Will anyone else be joining your video visit?         N Robert LPN          "

## 2020-11-21 DIAGNOSIS — Z01.818 PREOPERATIVE EVALUATION TO RULE OUT SURGICAL CONTRAINDICATION: ICD-10-CM

## 2020-11-21 DIAGNOSIS — Z11.59 ENCOUNTER FOR SCREENING FOR OTHER VIRAL DISEASES: ICD-10-CM

## 2020-11-21 LAB
ANION GAP SERPL CALCULATED.3IONS-SCNC: 2 MMOL/L (ref 3–14)
APTT PPP: 30 SEC (ref 22–37)
BUN SERPL-MCNC: 16 MG/DL (ref 7–30)
CALCIUM SERPL-MCNC: 9.7 MG/DL (ref 8.5–10.1)
CHLORIDE SERPL-SCNC: 106 MMOL/L (ref 94–109)
CO2 SERPL-SCNC: 32 MMOL/L (ref 20–32)
CREAT SERPL-MCNC: 0.72 MG/DL (ref 0.52–1.04)
ERYTHROCYTE [DISTWIDTH] IN BLOOD BY AUTOMATED COUNT: 12.1 % (ref 10–15)
GFR SERPL CREATININE-BSD FRML MDRD: >90 ML/MIN/{1.73_M2}
GLUCOSE SERPL-MCNC: 98 MG/DL (ref 70–99)
HCT VFR BLD AUTO: 45.1 % (ref 35–47)
HGB BLD-MCNC: 15.4 G/DL (ref 11.7–15.7)
INR PPP: 0.99 (ref 0.86–1.14)
MCH RBC QN AUTO: 30.7 PG (ref 26.5–33)
MCHC RBC AUTO-ENTMCNC: 34.1 G/DL (ref 31.5–36.5)
MCV RBC AUTO: 90 FL (ref 78–100)
PLATELET # BLD AUTO: 265 10E9/L (ref 150–450)
POTASSIUM SERPL-SCNC: 4.2 MMOL/L (ref 3.4–5.3)
RBC # BLD AUTO: 5.01 10E12/L (ref 3.8–5.2)
SODIUM SERPL-SCNC: 140 MMOL/L (ref 133–144)
WBC # BLD AUTO: 5.7 10E9/L (ref 4–11)

## 2020-11-21 PROCEDURE — 36415 COLL VENOUS BLD VENIPUNCTURE: CPT | Performed by: PATHOLOGY

## 2020-11-21 PROCEDURE — 85610 PROTHROMBIN TIME: CPT | Performed by: PATHOLOGY

## 2020-11-21 PROCEDURE — 80048 BASIC METABOLIC PNL TOTAL CA: CPT | Performed by: PATHOLOGY

## 2020-11-21 PROCEDURE — 85730 THROMBOPLASTIN TIME PARTIAL: CPT | Performed by: PATHOLOGY

## 2020-11-21 PROCEDURE — 99000 SPECIMEN HANDLING OFFICE-LAB: CPT | Performed by: PATHOLOGY

## 2020-11-21 PROCEDURE — U0003 INFECTIOUS AGENT DETECTION BY NUCLEIC ACID (DNA OR RNA); SEVERE ACUTE RESPIRATORY SYNDROME CORONAVIRUS 2 (SARS-COV-2) (CORONAVIRUS DISEASE [COVID-19]), AMPLIFIED PROBE TECHNIQUE, MAKING USE OF HIGH THROUGHPUT TECHNOLOGIES AS DESCRIBED BY CMS-2020-01-R: HCPCS | Mod: 90 | Performed by: PATHOLOGY

## 2020-11-21 PROCEDURE — 85027 COMPLETE CBC AUTOMATED: CPT | Performed by: PATHOLOGY

## 2020-11-22 LAB
SARS-COV-2 RNA SPEC QL NAA+PROBE: NOT DETECTED
SPECIMEN SOURCE: NORMAL

## 2020-11-24 ENCOUNTER — ANESTHESIA (OUTPATIENT)
Dept: SURGERY | Facility: CLINIC | Age: 55
End: 2020-11-24
Payer: COMMERCIAL

## 2020-11-24 ENCOUNTER — HOSPITAL ENCOUNTER (OUTPATIENT)
Facility: CLINIC | Age: 55
Discharge: HOME | End: 2020-11-24
Attending: NEUROLOGICAL SURGERY | Admitting: NEUROLOGICAL SURGERY
Payer: COMMERCIAL

## 2020-11-24 ENCOUNTER — DOCUMENTATION ONLY (OUTPATIENT)
Dept: NEUROLOGY | Facility: CLINIC | Age: 55
End: 2020-11-24

## 2020-11-24 VITALS
HEART RATE: 88 BPM | DIASTOLIC BLOOD PRESSURE: 85 MMHG | WEIGHT: 150 LBS | OXYGEN SATURATION: 98 % | SYSTOLIC BLOOD PRESSURE: 121 MMHG | TEMPERATURE: 97.6 F | BODY MASS INDEX: 24.11 KG/M2 | HEIGHT: 66 IN | RESPIRATION RATE: 16 BRPM

## 2020-11-24 DIAGNOSIS — Z45.42 END OF BATTERY LIFE OF DEEP BRAIN STIMULATOR: ICD-10-CM

## 2020-11-24 DIAGNOSIS — G24.9 DYSTONIA: ICD-10-CM

## 2020-11-24 DIAGNOSIS — Z96.89 STATUS POST DEEP BRAIN STIMULATOR PLACEMENT: ICD-10-CM

## 2020-11-24 DIAGNOSIS — R25.1 TREMOR: ICD-10-CM

## 2020-11-24 DIAGNOSIS — G25.3 MYOCLONUS: ICD-10-CM

## 2020-11-24 LAB
GLUCOSE BLDC GLUCOMTR-MCNC: 88 MG/DL (ref 70–99)
GLUCOSE BLDC GLUCOMTR-MCNC: 94 MG/DL (ref 70–99)

## 2020-11-24 PROCEDURE — 250N000011 HC RX IP 250 OP 636: Performed by: NEUROLOGICAL SURGERY

## 2020-11-24 PROCEDURE — 61885 INSRT/REDO NEUROSTIM 1 ARRAY: CPT | Mod: LT | Performed by: NEUROLOGICAL SURGERY

## 2020-11-24 PROCEDURE — 360N000023 HC SURGERY LEVEL 3 EA 15 ADDTL MIN UMMC: Performed by: NEUROLOGICAL SURGERY

## 2020-11-24 PROCEDURE — 360N000022 HC SURGERY LEVEL 3 1ST 30 MIN - UMMC: Performed by: NEUROLOGICAL SURGERY

## 2020-11-24 PROCEDURE — 370N000002 HC ANESTHESIA TECHNICAL FEE, EACH ADDTL 15 MIN: Performed by: NEUROLOGICAL SURGERY

## 2020-11-24 PROCEDURE — 370N000001 HC ANESTHESIA TECHNICAL FEE, 1ST 30 MIN: Performed by: NEUROLOGICAL SURGERY

## 2020-11-24 PROCEDURE — 999N000139 HC STATISTIC PRE-PROCEDURE ASSESSMENT II: Performed by: NEUROLOGICAL SURGERY

## 2020-11-24 PROCEDURE — 999N001017 HC STATISTIC GLUCOSE BY METER IP

## 2020-11-24 PROCEDURE — 250N000011 HC RX IP 250 OP 636: Performed by: NURSE ANESTHETIST, CERTIFIED REGISTERED

## 2020-11-24 PROCEDURE — 250N000009 HC RX 250: Performed by: NEUROLOGICAL SURGERY

## 2020-11-24 PROCEDURE — 761N000007 HC RECOVERY PHASE 2 EACH 15 MINS: Performed by: NEUROLOGICAL SURGERY

## 2020-11-24 PROCEDURE — C1767 GENERATOR, NEURO NON-RECHARG: HCPCS | Performed by: NEUROLOGICAL SURGERY

## 2020-11-24 PROCEDURE — 272N000001 HC OR GENERAL SUPPLY STERILE: Performed by: NEUROLOGICAL SURGERY

## 2020-11-24 PROCEDURE — 258N000003 HC RX IP 258 OP 636: Performed by: NURSE ANESTHETIST, CERTIFIED REGISTERED

## 2020-11-24 DEVICE — GENERATOR DBS SYSTEM INFINITY 5 IPG 6660ANS: Type: IMPLANTABLE DEVICE | Site: CHEST | Status: FUNCTIONAL

## 2020-11-24 RX ORDER — SODIUM CHLORIDE, SODIUM LACTATE, POTASSIUM CHLORIDE, CALCIUM CHLORIDE 600; 310; 30; 20 MG/100ML; MG/100ML; MG/100ML; MG/100ML
INJECTION, SOLUTION INTRAVENOUS CONTINUOUS PRN
Status: DISCONTINUED | OUTPATIENT
Start: 2020-11-24 | End: 2020-11-24

## 2020-11-24 RX ORDER — ONDANSETRON 2 MG/ML
4 INJECTION INTRAMUSCULAR; INTRAVENOUS EVERY 30 MIN PRN
Status: DISCONTINUED | OUTPATIENT
Start: 2020-11-24 | End: 2020-11-24 | Stop reason: HOSPADM

## 2020-11-24 RX ORDER — SODIUM CHLORIDE, SODIUM LACTATE, POTASSIUM CHLORIDE, CALCIUM CHLORIDE 600; 310; 30; 20 MG/100ML; MG/100ML; MG/100ML; MG/100ML
INJECTION, SOLUTION INTRAVENOUS CONTINUOUS
Status: DISCONTINUED | OUTPATIENT
Start: 2020-11-24 | End: 2020-11-24 | Stop reason: HOSPADM

## 2020-11-24 RX ORDER — NALOXONE HYDROCHLORIDE 0.4 MG/ML
0.2 INJECTION, SOLUTION INTRAMUSCULAR; INTRAVENOUS; SUBCUTANEOUS
Status: DISCONTINUED | OUTPATIENT
Start: 2020-11-24 | End: 2020-11-24 | Stop reason: HOSPADM

## 2020-11-24 RX ORDER — HYDROMORPHONE HYDROCHLORIDE 1 MG/ML
.3-.5 INJECTION, SOLUTION INTRAMUSCULAR; INTRAVENOUS; SUBCUTANEOUS EVERY 10 MIN PRN
Status: DISCONTINUED | OUTPATIENT
Start: 2020-11-24 | End: 2020-11-24 | Stop reason: HOSPADM

## 2020-11-24 RX ORDER — NALOXONE HYDROCHLORIDE 0.4 MG/ML
0.4 INJECTION, SOLUTION INTRAMUSCULAR; INTRAVENOUS; SUBCUTANEOUS
Status: DISCONTINUED | OUTPATIENT
Start: 2020-11-24 | End: 2020-11-24 | Stop reason: HOSPADM

## 2020-11-24 RX ORDER — CEPHALEXIN 500 MG/1
500 CAPSULE ORAL 3 TIMES DAILY
Qty: 21 CAPSULE | Refills: 0 | Status: SHIPPED | OUTPATIENT
Start: 2020-11-24 | End: 2020-12-01

## 2020-11-24 RX ORDER — HYDRALAZINE HYDROCHLORIDE 20 MG/ML
2.5-5 INJECTION INTRAMUSCULAR; INTRAVENOUS EVERY 10 MIN PRN
Status: DISCONTINUED | OUTPATIENT
Start: 2020-11-24 | End: 2020-11-24 | Stop reason: HOSPADM

## 2020-11-24 RX ORDER — LORAZEPAM 2 MG/ML
.5-1 INJECTION INTRAMUSCULAR
Status: DISCONTINUED | OUTPATIENT
Start: 2020-11-24 | End: 2020-11-24 | Stop reason: HOSPADM

## 2020-11-24 RX ORDER — CEFAZOLIN SODIUM 2 G/100ML
2 INJECTION, SOLUTION INTRAVENOUS
Status: COMPLETED | OUTPATIENT
Start: 2020-11-24 | End: 2020-11-24

## 2020-11-24 RX ORDER — PROPOFOL 10 MG/ML
INJECTION, EMULSION INTRAVENOUS PRN
Status: DISCONTINUED | OUTPATIENT
Start: 2020-11-24 | End: 2020-11-24

## 2020-11-24 RX ORDER — ONDANSETRON 2 MG/ML
INJECTION INTRAMUSCULAR; INTRAVENOUS PRN
Status: DISCONTINUED | OUTPATIENT
Start: 2020-11-24 | End: 2020-11-24

## 2020-11-24 RX ORDER — FENTANYL CITRATE 50 UG/ML
25-50 INJECTION, SOLUTION INTRAMUSCULAR; INTRAVENOUS
Status: DISCONTINUED | OUTPATIENT
Start: 2020-11-24 | End: 2020-11-24 | Stop reason: HOSPADM

## 2020-11-24 RX ORDER — CEFAZOLIN SODIUM 1 G/3ML
1 INJECTION, POWDER, FOR SOLUTION INTRAMUSCULAR; INTRAVENOUS SEE ADMIN INSTRUCTIONS
Status: DISCONTINUED | OUTPATIENT
Start: 2020-11-24 | End: 2020-11-24 | Stop reason: HOSPADM

## 2020-11-24 RX ORDER — ONDANSETRON 4 MG/1
4 TABLET, ORALLY DISINTEGRATING ORAL EVERY 30 MIN PRN
Status: DISCONTINUED | OUTPATIENT
Start: 2020-11-24 | End: 2020-11-24 | Stop reason: HOSPADM

## 2020-11-24 RX ORDER — ALBUTEROL SULFATE 0.83 MG/ML
2.5 SOLUTION RESPIRATORY (INHALATION) EVERY 4 HOURS PRN
Status: DISCONTINUED | OUTPATIENT
Start: 2020-11-24 | End: 2020-11-24 | Stop reason: HOSPADM

## 2020-11-24 RX ORDER — LABETALOL HYDROCHLORIDE 5 MG/ML
10 INJECTION, SOLUTION INTRAVENOUS
Status: DISCONTINUED | OUTPATIENT
Start: 2020-11-24 | End: 2020-11-24 | Stop reason: HOSPADM

## 2020-11-24 RX ORDER — PROPOFOL 10 MG/ML
INJECTION, EMULSION INTRAVENOUS CONTINUOUS PRN
Status: DISCONTINUED | OUTPATIENT
Start: 2020-11-24 | End: 2020-11-24

## 2020-11-24 RX ORDER — LIDOCAINE 40 MG/G
CREAM TOPICAL
Status: DISCONTINUED | OUTPATIENT
Start: 2020-11-24 | End: 2020-11-24 | Stop reason: HOSPADM

## 2020-11-24 RX ORDER — HYDROMORPHONE HYDROCHLORIDE 1 MG/ML
.3-.5 INJECTION, SOLUTION INTRAMUSCULAR; INTRAVENOUS; SUBCUTANEOUS EVERY 5 MIN PRN
Status: DISCONTINUED | OUTPATIENT
Start: 2020-11-24 | End: 2020-11-24 | Stop reason: HOSPADM

## 2020-11-24 RX ORDER — FENTANYL CITRATE 50 UG/ML
INJECTION, SOLUTION INTRAMUSCULAR; INTRAVENOUS PRN
Status: DISCONTINUED | OUTPATIENT
Start: 2020-11-24 | End: 2020-11-24

## 2020-11-24 RX ORDER — DEXAMETHASONE SODIUM PHOSPHATE 4 MG/ML
INJECTION, SOLUTION INTRA-ARTICULAR; INTRALESIONAL; INTRAMUSCULAR; INTRAVENOUS; SOFT TISSUE PRN
Status: DISCONTINUED | OUTPATIENT
Start: 2020-11-24 | End: 2020-11-24

## 2020-11-24 RX ADMIN — FENTANYL CITRATE 25 MCG: 50 INJECTION, SOLUTION INTRAMUSCULAR; INTRAVENOUS at 10:56

## 2020-11-24 RX ADMIN — MIDAZOLAM 1 MG: 1 INJECTION INTRAMUSCULAR; INTRAVENOUS at 10:22

## 2020-11-24 RX ADMIN — PROPOFOL 20 MG: 10 INJECTION, EMULSION INTRAVENOUS at 10:56

## 2020-11-24 RX ADMIN — SODIUM CHLORIDE, POTASSIUM CHLORIDE, SODIUM LACTATE AND CALCIUM CHLORIDE: 600; 310; 30; 20 INJECTION, SOLUTION INTRAVENOUS at 10:16

## 2020-11-24 RX ADMIN — PROPOFOL 100 MCG/KG/MIN: 10 INJECTION, EMULSION INTRAVENOUS at 10:30

## 2020-11-24 RX ADMIN — ONDANSETRON 4 MG: 2 INJECTION INTRAMUSCULAR; INTRAVENOUS at 10:43

## 2020-11-24 RX ADMIN — DEXAMETHASONE SODIUM PHOSPHATE 4 MG: 4 INJECTION, SOLUTION INTRA-ARTICULAR; INTRALESIONAL; INTRAMUSCULAR; INTRAVENOUS; SOFT TISSUE at 10:47

## 2020-11-24 RX ADMIN — MIDAZOLAM 1 MG: 1 INJECTION INTRAMUSCULAR; INTRAVENOUS at 10:16

## 2020-11-24 RX ADMIN — CEFAZOLIN 2 G: 10 INJECTION, POWDER, FOR SOLUTION INTRAVENOUS at 10:44

## 2020-11-24 ASSESSMENT — MIFFLIN-ST. JEOR: SCORE: 1287.15

## 2020-11-24 NOTE — ANESTHESIA POSTPROCEDURE EVALUATION
Anesthesia POST Procedure Evaluation    Patient: Sandra Jara   MRN:     [id number] Gender:   female   Age:    55 year old :      1965        Preoperative Diagnosis: End of battery life of deep brain stimulator [Z45.42]  Status post deep brain stimulator placement [Z96.89]  Tremor [R25.1]  Dystonia [G24.9]  Myoclonus [G25.3]   Procedure(s):  Replacement of deep brain stimulator generator/battery over left chest wall   Postop Comments: No value filed.     Anesthesia Type: MAC       Disposition: Outpatient   Postop Pain Control: Uneventful            Sign Out: Well controlled pain   PONV: No   Neuro/Psych: Uneventful            Sign Out: Acceptable/Baseline neuro status   Airway/Respiratory: Uneventful            Sign Out: Acceptable/Baseline resp. status   CV/Hemodynamics: Uneventful            Sign Out: Acceptable CV status   Other NRE: NONE   DID A NON-ROUTINE EVENT OCCUR? No         Last Anesthesia Record Vitals:  CR[facility] VITALS  2020 1115 - 2020 1215      2020             NIBP:  109/73    SpO2:  100 %          Last PACU Vitals:  Vitals Value Taken Time   /73 20 1150   Temp 36.4  C (97.6  F) 20 1150   Pulse 85 20 1150   Resp 16 20 1150   SpO2 99 % 20 1150   Temp src     NIBP 109/73 20 1150   Pulse     SpO2 100 % 20 1150   Resp     Temp     Ht Rate     Temp 2           Electronically Signed By: Vlad Chow MD, 2020, 1:08 PM

## 2020-11-24 NOTE — OP NOTE
PATIENT NAME: SOFIA SEAMAN  YOB: 1965   MRN:   [id number]   ACCOUNT:  [id number]      DATE OF PROCEDURE:  11/24/2020    PREOPERATIVE DIAGNOSIS:  1.  Dystonia  2.  Myoclonus  3.  Slow progression corticobasal degeneration  4.  S/p left side deep brain stimulator placement, phase I, placement of left side deep brain stimulator electrode, target left globus pallidus internus, with microelectrode recording on 2/22/2019  5.  S/p deep brain stimulator placement, phase II, placement of deep brain stimulator generator/battery over the left chest wall on 3/1/2019  6.  Depleted deep brain stimulator generator/battery over the left chest wall    POSTOPERATIVE DIAGNOSIS:  1.  Dystonia  2.  Myoclonus  3.  Slow progression corticobasal degeneration  4.  S/p left side deep brain stimulator placement, phase I, placement of left side deep brain stimulator electrode, target left globus pallidus internus, with microelectrode recording on 2/22/2019  5.  S/p deep brain stimulator placement, phase II, placement of deep brain stimulator generator/battery over the left chest wall on 3/1/2019  6.  Depleted deep brain stimulator generator/battery over the left chest wall    PROCEDURES PERFORMED:  1.  Replacement of deep brain stimulator generator/battery, model [employer] Infinity 5, over the left chest wall with connection to one electrode array.  2.  Revision of the left chest wall generator/battery pocket.  3.  Electrical interrogation and analysis of the left side deep brain stimulator system.    ATTENDING SURGEON:  Efren Triana MD, PhD    RESIDENT SURGEONS:  Ron Magdaleno MD    ANESTHESIA:  Monitored anesthesia care    ESTIMATED BLOOD LOSS: 2 mL    COMPLICATIONS: None    FINDINGS:  No sign of infection.  No sign of hardware breakage, damage or erosion.  All impedance values were normal at end of case.  No problems were found.  Therapy turned on at end of case.     EXPLANTS:  [employer] DBS  generator/battery, Infinity 5, REF [id number], S/N [id number]    IMPLANTS:  New Abbott DBS generator/battery, Infinity 5, REF 6660, S/N [id number]    INDICATIONS FOR PROCEDURE:  Ms. Sandra Jara is a 55 year old female who has a diagnosis of corticobasal degeneration, dystonia and myoclonus who underwent DBS workup and ultimately DBS placement back in 2019.  Ms. Abdias Crooks started having her symptoms over 5 years ago with occasional shaking in her right hand and loss of control.  She was initial diagnosis in 2015 at [facility] with corticobasal degeneration (CBD).  She then went to see a neurologist in the [facility] who noticed slower than normal progression for CBD.  It was thought that she may have dystonia.  She had a DATscan with asymmetric-metabolism in the left parietal and occipital lobes, thus thought of having slowly progressing corticobasal degeneration (CBD), per Dr. Hopkins.  She does have right upper extremity dystonia and myoclonus.  She is followed by our neurology team, including Dr. Hopkins, for botulism toxin injections.  Her bolutism injections provided no significant functional improvement so she was referred for DBS evaluation.  Dr. Hopkins's thoughts were left GPi DBS for right upper extremity dystonia.  Patient reported dystonia in her right arm and going down her right leg.  Cramping and shaking were symptoms that came after loss of control in her right arm/hand.  Her goals of DBS surgery was to get rid of the jerking and to get functionality back in her right upper extremity.  Her case was discussed at the Movement Disorder Consensus Group Meeting, and she was considered to be a good DBS candidate.  Recommendation was left GPi DBS, unilateral, Abbott system.  Subsequently, she underwent left side deep brain stimulator placement, phase I, placement of left side deep brain stimulator electrode, target left globus pallidus internus, with microeletrode recording on 2/22/2019 and deep brain  stimulator placement, phase II, placement of deep brain stimulator generator/battery over the left chest wall on 3/1/2019.  Patient tolerated both of the procedure well and there were no complications.  After her phase I surgery, her postoperative CT head was without any concerning features and she was discharged to home on POD#1.  She did have some discomfort at the incision site and connector burial site.  She was also tired/fatigued for a few days.  She also had some balance issues for the first couple of days.  That improved  She also did well after her phase II surgery, which was an outpatient surgery.  Due to her symptoms, Dr. Hopkins activated and programmed her DBS device immediately after her phase II surgery.  Per patient and her , patient has been getting worse for the past several months.  She is requiring more assistance and now she is wheelchair bound.  Her speech is getting slower and she is slurring her speech.  It was noted at the last Botox injection that her generator/battery was nearing the end of its life  Patient's  noticed a yellow warning sign when the patient controller was communicating with the device.  Subsequently, patient's  would no longer connect with the device.  It is completely depleted.  Initially, patient and her  thought that the depleted DBS may be causing her more recent acute symptoms of difficulty speaking and weakness on the right side.  Patient's  called the neurology team and explained that over the weekend, patient has become more lethargic with marked slurred speech.  She was unable to use her right foot and needed much more help moving.  The thought was that she may be having a stroke and neurology team recommended evaluation in the ED.  However, patient's  and patient's caretaker thought that this was not a stroke.  Patient has been stable but continues to be slow with slurred speech.  When she was seen by Dr. Hopkins, the  thought was that the DBS is not that helpful and the plan was to let it run out to see if there is any difference.  Over the weekend, patient declined further.  Even though the belief was that DBS may not be doing much, functioning DBS may have been helping her with the symptoms more than initially thought.  Therefore, the decision was to go ahead with replacement of the generator/battery.  Her generator/battery, Infinity 5, lasted about 1 year and 9 months.  I suggested that she consider Infinity 7, the larger generator/battery, so that it may last longer.  However, patient wished to stay with Infinity 5.  We discussed the surgical procedure for replacing the DBS generator/battery over the left chest wall.  She currently has an Abbott Infinity 5 generator/battery, and this will be maintained during the upcoming procedure.  Risks, benefits, alternative therapies were discussed with the patient, including but not limited to infection and bleeding.  This surgical procedure will be performed under MAC with local anesthetic.  The thinned part of the wound/pocket may be corrected with mobilization of the tissue under the incision.  The pocket may need to be enlarged to minimize the stress on the closure.  Surgical procedure was discussed in detail.  All questions were answered, and the patient and her  expressed understanding.    DESCRIPTION OF PROCEDURE:  After informed consent was verified, she was brought to the operating room where she was placed supine on the operating table.  Monitored anesthetic care was begun.  Her arms were tucked and all pressure points were appropriately padded.  The area over the left chest wall scar was prepped and draped in the usual sterile fashion.  Time out was performed confirming the patient's identity, procedure to be performed, site and side of the surgery and the administration of prophylactic preoperative antibiotic.  Lidocaine 1% with 1:100,000 epinephrine mixed with  Marcaine 1/4% plain, 50:50 mix, was injected in the subcutaneous space at the intended incision site, which is the previously well healed incision.  Total of 10 mL was used.     We used a #10 blade scalpel to make the skin incision, going over the previously well healed scar.  We carried our dissection through the dermal layer until we reached the subcutaneous fat and then the generator/battery capsule.  The monopolar cautery was used to dissect the subcutaneous tissue and to open the capsule, taking great care not to damage the hardware.  We gently opened up the fibrous capsule surrounding the generator/battery to expose it.  Care was taken to open the capsule while lifting the capsule itself away from the generator/battery, taking great care not to come in contact with the metal casing or the wire.  The generator/battery was mobile and we were able to remove it easily, taking care to attend to the location of the wire.  The scar surrounding and anchoring the extension wire was carefully dissected and wire freed.  The extension wire was examined and was found to be without any damage or erosion or defect.  There were no obvious signs of infection.    We then performed blunt and sharp dissection within the pocket after the generator had been removed in order to revise the chest wall pocket for the new generator/battery, so as to reduce the tension on the wound closure. The entire cavity was copiously irrigated with saline and meticulous hemostasis was obtained and the pocket was dried.    The old generator/battery was disconnected from the extension wire at the anterior portal.  There was some initial resistance during the disconnection but ultimately the extension wire was disconnected.  The contacts were inspected to be intact and they were cleaned.  The disconnected extension wire was then connected to the new generator/battery at the anterior portal.  We used a lead insertion tool.  A new plug was placed in the  posterior portal.  These were secured with a screw .  The new generator/battery was placed back into the pocket with the excess extension wire being placed posteriorly and around the periphery of the generator/battery.  It was secured to the new position within the pocket using two 2-0 Ethibond sutures.     The system was tested and interrogated electrically and was found to be working well and the impedance values were noted to be within normal.  No problems were found.  Therapy was turned on.     After this, we turned our attention to closing.  We reapproximated the fibrous capsule of the generator/battery using 3-0 Vicryl sutures in a simple interrupted fashion.  The subcutaneous fat layer was then reapproximated using 3-0 Vicryl sutures in an interrupted fashion to provide padding to the skin for the closure.  The dermal layer was then reapproximated using 3-0 Vicryl sutures in an inverted interrupted fashion.  The skin was then reapproximated using a 4-0 Monocryl suture in a running subcuticular fashion.  The wound was then cleaned with wet and dry sponges followed by ChloraPrep.  Exofin skin glue was then applied.     At the end of the entire operation, the drapes were taken down and the patient was gently reversed from the monitored anesthesia care.  She was then transferred back onto her hospital [facility] for transport to the postanesthesia care unit for ongoing recovery.  At the end of the case, all counts including needle, sponge and instrument counts were correct x 2.  There were no complications.    Dr. Etelvina Triana was present and scrubbed for the entire case and performed the key and critical portions of the case.      ETELVINA TRIANA MD, PHD    As prepared by LEMA OLIVARES MD      NEUROSURGERY ATTENDING ATTESTATION:  Etelvina KELLEY M.D., Ph.D., Neurosurgery Attending, was present and scrubbed for the entire case and performed the key and critical portions of the case.

## 2020-11-24 NOTE — DISCHARGE INSTRUCTIONS
[facility], [place]  Same-Day Surgery   Adult Discharge Orders & Instructions       *Take it easy when you get home.  Remember, same day surgery DOES NOT MEAN SAME DAY RECOVERY!    *Healing is a gradual process and you will need some time to recover - you may be more tired than you realize at first.    *Rest and relax for at least the first 24 hours at home.  You'll feel better and heal faster if you take good care of yourself.    For 24 hours after surgery    1. A responsible adult must stay with you for at least 24 hours after you leave the hospital.   2. Do not drink alcohol, drive, or use heavy equipment for 24 hours. This is because you have had anesthesia medications.  3. Avoid strenuous and risky activities for 24 hours.   4. You may feel lightheaded, if so, sit for a few minutes before standing.  You may need someone to help you get up. Ask for help when climbing stairs.  5. If you have nausea: Drink only clear liquids such as water, juice, soda, or broth. Rest may also help. Be sure to drink enough fluids. Move to a  regular diet as you feel able.  6. You may have a slight fever. Call the doctor if your fever is over 100 F (37.7 C) (taken under the tongue) or lasts longer than 24 hours.  7. You might have a dry mouth, sore throat, muscle aches or trouble sleeping.  These should go away after 24 hours.  8. Do not make important or legal decisions.     Call your doctor for any of the followin.  Signs of infection: fever, growing tenderness at the surgery site, a large amount of drainage or bleeding, severe pain, foul-smelling drainage, redness, swelling. Please call if you experience any of these symptoms.    2. If it has been 8 to 10 hours since surgery and you are still not able to urinate (pass water), please call.    3.  If you have a headache for over 24 hours, please call.    To contact a doctor, call Dr Triana's office at [phone] at the Neurosurgery Clinic from 8  "am till 5 pm, or:    '   [phone] and ask for \"the resident on call for neurosurgery\" (this is the hospital and is answered 24 hours a day)    '   Emergency Department: [facility]: [phone]       (TTY for hearing impaired: [phone])    "

## 2020-11-24 NOTE — ANESTHESIA CARE TRANSFER NOTE
Patient: Sandra Jara    Procedure(s):  Replacement of deep brain stimulator generator/battery over left chest wall    Diagnosis: End of battery life of deep brain stimulator [Z45.42]  Status post deep brain stimulator placement [Z96.89]  Tremor [R25.1]  Dystonia [G24.9]  Myoclonus [G25.3]  Diagnosis Additional Information: No value filed.    Anesthesia Type:   MAC     Note:  Airway :Room Air  Patient transferred to:Phase II  Handoff Report: Identifed the Patient, Identified the Reponsible Provider, Reviewed the pertinent medical history, Discussed the surgical course, Reviewed Intra-OP anesthesia mangement and issues during anesthesia, Set expectations for post-procedure period and Allowed opportunity for questions and acknowledgement of understanding      Vitals: (Last set prior to Anesthesia Care Transfer)    CRNA VITALS  11/24/2020 1115 - 11/24/2020 1151      11/24/2020             NIBP:  109/73    SpO2:  100 %                Electronically Signed By: SAIRA Larson CRNA  November 24, 2020  11:51 AM

## 2020-11-24 NOTE — PROGRESS NOTES
Patient had her Deep Brain Stimulation battery replaced today. Due to the battery being completely discharged, [employer] rep unable to see what her settings were. He emailed this writer and I sent him the last programming settings from the 11/9/20 appointment with Dr. Hopkins:    Received this email back from Mr. Guillen ([facility]):    Edi srinivasan <[email or web address]>  Tue 11/24/2020 11:17 AM  Yes, got it, thanks much. I will give her both these programs along with the amplitude control. Are you able to see which of these two programs she was on when she last left clinic? I ll set her therapy up with that one.     She left her controller at home so it will need to be paired to this new battery at her next appointment. Otherwise, I am happy meet and do it sooner at her convenience if she doesn t have an appointment coming up.      I emailed him back explaining that she is on both programs GPi1 and GPi2.    He sent back this email:    Edi Guillen <[email or web address]>  Tue 11/24/2020 12:37 PM  ?Ok got it, that makes sense why they both say program 5 now. I just gave her these settings so she should be good to go. She is in recovery right now but I verbally confirmed with her that she feels ok after I entered these settings.     I also spoke with her  and he said they hardly ever use her controller and can wait to have it paired next time they re in clinic (if that s ok with you). Thanks so much for your help!

## 2020-11-24 NOTE — INTERVAL H&P NOTE
I have reviewed the virtual H&P that is linked to this encounter. The physical exam performed by anesthesia during this surgical encounter serves as the physical portion of that the virtual H&P. There are no significant changes from that history and physical as noted.

## 2020-11-30 NOTE — TELEPHONE ENCOUNTER
2nd VM re proposed dates and times for DBS workup. I let patient know that I am holding dates and I would like to review them with her to make sure the appointments will work. Left my direct number and requested return call. I will f/u with a [employer] message as well.   
Pt called back in response to [employer] message and VM earlier today. All the dates/times in the embraase message will work. Pt prefers to have the appt with Dr. Triana at noon instead of 10:00 a.m. I will send pt a letter with all pertinent appointment information. No further questions at this time.   
5

## 2020-12-07 ENCOUNTER — TELEPHONE (OUTPATIENT)
Dept: NEUROSURGERY | Facility: CLINIC | Age: 55
End: 2020-12-07

## 2020-12-07 NOTE — TELEPHONE ENCOUNTER
[facility] Call Center    Phone Message    May a detailed message be left on voicemail: yes     Reason for Call: Other: pt's , Izaiah, did not realize this was in clinic appt this morning. Please call Izaiah, pt's phone, to reach out to this family. Pt was supposed to have her post-op appt this morning. Thank you.    Action Taken: Message routed to:  [facility] ([facility]    Travel Screening: Not Applicable                                                                      
Spoke with pt's spouse. They thought today's appt was a video visit. The appointment was a clinic appt, however, so we will reschedule to 9:45 on 12/14.     Pt would like to talk with an [employer] rep about syncing her patient  with the new battery. I messaged Edi Guillen and requested that he reach out to pt.     No further questions at this time.   
Home

## 2020-12-14 ENCOUNTER — HEALTH MAINTENANCE LETTER (OUTPATIENT)
Age: 55
End: 2020-12-14

## 2020-12-14 ENCOUNTER — OFFICE VISIT (OUTPATIENT)
Dept: NEUROSURGERY | Facility: CLINIC | Age: 55
End: 2020-12-14
Payer: COMMERCIAL

## 2020-12-14 VITALS
RESPIRATION RATE: 16 BRPM | OXYGEN SATURATION: 98 % | SYSTOLIC BLOOD PRESSURE: 115 MMHG | HEART RATE: 88 BPM | DIASTOLIC BLOOD PRESSURE: 79 MMHG

## 2020-12-14 DIAGNOSIS — Z45.42 END OF BATTERY LIFE OF DEEP BRAIN STIMULATOR: Primary | ICD-10-CM

## 2020-12-14 DIAGNOSIS — G25.3 MYOCLONUS: ICD-10-CM

## 2020-12-14 DIAGNOSIS — R25.1 TREMOR: ICD-10-CM

## 2020-12-14 DIAGNOSIS — Z96.89 STATUS POST DEEP BRAIN STIMULATOR PLACEMENT: ICD-10-CM

## 2020-12-14 DIAGNOSIS — G24.9 DYSTONIA: ICD-10-CM

## 2020-12-14 PROCEDURE — 99024 POSTOP FOLLOW-UP VISIT: CPT | Performed by: NEUROLOGICAL SURGERY

## 2020-12-14 ASSESSMENT — PAIN SCALES - GENERAL: PAINLEVEL: NO PAIN (0)

## 2020-12-14 NOTE — NURSING NOTE
Chief Complaint   Patient presents with     Surgical Followup     [facility] RETURN 2 WK POST OP       Farooq Torres EMT

## 2020-12-14 NOTE — LETTER
12/14/2020       RE: Sandra Jara  [address]  [facility] [address]     Dear Colleague,    Thank you for referring your patient, Sandra Jara, to the [facility] NEUROSURGERY [facility]IC [place] at [facility]. Please see a copy of my visit note below.    Face less tight/stiff face. More natural looking.    Doing well with that.    Speech is still slurred.    NEUROSURGERY    HISTORY AND PHYSICAL EXAM    Chief Complaint   Patient presents with     Surgical Followup     [facility] RETURN 2 WK POST OP       HISTORY OF PRESENT ILLNESS  Ms. Sandra Jara returns today for her follow-up after the left side DBS generator/battery replacement on 11/24/2020.  Patient and her  report that the patient is doing well and there are no complaints.  She reports no pain.  She denies any fevers, chills, nausea, vomiting, dizziness, weakness, numbness or seizure like activity.  She reports that the incision is looking good and there is no redness, no drainage and no fluid collection.  Overall, she is quite happy with the recent surgery.      Per patient and her , patient's facial features and appearance improved after the surgery.  The face seems less tight and stiff and more natural looking.  It was hard for him to describe but there is an improvement.  Her speech is still slurred, however.      In summary, patient is a 55 year old female who has a diagnosis of corticobasal degeneration, dystonia and myoclonus who underwent DBS workup and ultimately DBS placement back in 2019.  Ms. Abdias Crooks started having her symptoms over 5 years ago with occasional shaking in her right hand and loss of control.  She was initial diagnosis in 2015 at [place] with corticobasal degeneration (CBD).  She then went to see a neurologist in the [facility] who noticed slower than normal progression for CBD.  It was thought that she may have dystonia.  She had a DATscan  with asymmetric-metabolism in the left parietal and occipital lobes, thus thought of having slowly progressing corticobasal degeneration (CBD), per Dr. Hopkins.  She does have right upper extremity dystonia and myoclonus.  She is followed by our neurology team, including Dr. Hopkins, for botulism toxin injections.  Her bolutism injections provided no significant functional improvement so she was referred for DBS evaluation.  Dr. Hopkins's thoughts were left GPi DBS for right upper extremity dystonia.  Patient reported dystonia in her right arm and going down her right leg.  Cramping and shaking were symptoms that came after loss of control in her right arm/hand.  Her goals of DBS surgery was to get rid of the jerking and to get functionality back in her right upper extremity.  Her case was discussed at the Movement Disorder Consensus Group Meeting, and she was considered to be a good DBS candidate.  Recommendation was left GPi DBS, unilateral, Abbott system.  Subsequently, she underwent left side deep brain stimulator placement, phase I, placement of left side deep brain stimulator electrode, target left globus pallidus internus, with microeletrode recording on 2/22/2019 and deep brain stimulator placement, phase II, placement of deep brain stimulator generator/battery over the left chest wall on 3/1/2019.  Patient tolerated both of the procedure well and there were no complications.  After her phase I surgery, her postoperative CT head was without any concerning features and she was discharged to home on POD#1.  She did have some discomfort at the incision site and connector burial site.  She was also tired/fatigued for a few days.  She also had some balance issues for the first couple of days.  That improved  She also did well after her phase II surgery, which was an outpatient surgery.  Due to her symptoms, Dr. Hopkins activated and programmed her DBS device immediately after her phase II surgery.      Per  patient and her , patient has been getting worse for the past several months.  She is requiring more assistance and now she is wheelchair bound.  Her speech is getting slower and she is slurring her speech.  It was noted at the last Botox injection that her generator/battery was nearing the end of its life  Patient's  noticed a yellow warning sign when the patient controller was communicating with the device.  Subsequently, patient's  would no longer connect with the device.  It is completely depleted.  Initially, patient and her  thought that the depleted DBS may be causing her more recent acute symptoms of difficulty speaking and weakness on the right side.  Patient's  called the neurology team and explained that over the weekend, patient has become more lethargic with marked slurred speech.  She was unable to use her right foot and needed much more help moving.  The thought was that she may be having a stroke and neurology team recommended evaluation in the ED.  However, patient's  and patient's caretaker thought that this was not a stroke.  Patient has been stable but continues to be slow with slurred speech.  When she was seen by Dr. Hopkins, the thought was that the DBS is not that helpful and the plan was to let it run out to see if there is any difference.  Over the weekend, patient declined further.  Even though the belief was that DBS may not be doing much, functioning DBS may have been helping her with the symptoms more than initially thought.  Therefore, the decision was to go ahead with replacement of the generator/battery.  Her generator/battery, Infinity 5, lasted about 1 year and 9 months.  I suggested that she consider Infinity 7, the larger generator/battery, so that it may last longer.  However, patient wished to stay with Infinity 5.         Past Medical History:   Diagnosis Date     Action induced myoclonus 12/1/2015     Corticobasal syndrome (H)  12/1/2015     CTS (carpal tunnel syndrome) 1/12/2015     Disturbance of skin sensation 1/12/2015     Family history of breast cancer 1/12/2015     Family history of colon cancer 1/12/2015     Family history of Parkinson's disease 12/21/2014    Paternal aunt     History of EMG 12/21/2014    A right upper extremity EMG and nerve conduction study was done on 06/18/2014. It demonstrated some mild changes of right carpal tunnel syndrome but was otherwise normal.       History of MRI of brain and brain stem 12/21/2014    A brain MRI scan done on 06/27/2014 revealed no abnormalities.      History of MRI of cervical spine 12/21/2014    A cervical MRI done on 06/10/2014 revealed some mild to moderate degenerative changes but no significant central canal or foraminal stenosis, and no abnormalities of the spinal cord were noted.       Movement disorder 12/21/2014    I saw your patient, Deepali Contreras on 12/15/2014. She is a 49-year-old right-handed female here for evaluation of right upper extremity dysfunction and right hand tremor.  She has noted this problem for the last couple of years. She reports she is losing dexterity in her right hand. She has appreciated a tremor of the right hand with actions such as writing or postural maintenance. She has n       Past Surgical History:   Procedure Laterality Date     APPENDECTOMY       IMPLANT DEEP BRAIN STIMULATION GENERATOR / BATTERY Left 3/1/2019    Procedure: Left Deep Brain Stimulator Placement, Phase II, Placement Of Deep Brain Stimulator Generator/Battery Over The Left Chest Wall Latex Free;  Surgeon: Efren Triana MD;  Location: [facility] OR     OPTICAL TRACKING SYSTEM INSERTION DEEP BRAIN STIMULATION Left 2/22/2019    Procedure: Stealth Assisted Left Side Deep Brain Stimulator Placement, Phase I, Placement Of Left Side Deep Brain Stimulator Electrode, Target Left Globus Pallidus Internus With Microelectrode Recording;  Surgeon: Efren Triana MD;   Location: [facility] OR     REPLACE DEEP BRAIN STIMULATION GENERATOR / BATTERY Left 11/24/2020    Procedure: Replacement of deep brain stimulator generator/battery over left chest wall;  Surgeon: Efern Triana MD;  Location: [facility] OR       Social History     Socioeconomic History     Marital status:      Spouse name: Not on file     Number of children: 3     Years of education: Not on file     Highest education level: Not on file   Occupational History     Not on file   Social Needs     Financial resource strain: Not on file     Food insecurity     Worry: Not on file     Inability: Not on file     Transportation needs     Medical: Not on file     Non-medical: Not on file   Tobacco Use     Smoking status: Never Smoker     Smokeless tobacco: Never Used   Substance and Sexual Activity     Alcohol use: Yes     Alcohol/week: 2.0 - 3.0 standard drinks     Frequency: 2-4 times a month     Drinks per session: 1 or 2     Drug use: No     Sexual activity: Yes     Partners: Male     Birth control/protection: Male Surgical   Lifestyle     Physical activity     Days per week: Not on file     Minutes per session: Not on file     Stress: Not on file   Relationships     Social connections     Talks on phone: Not on file     Gets together: Not on file     Attends Bahai service: Not on file     Active member of club or organization: Not on file     Attends meetings of clubs or organizations: Not on file     Relationship status: Not on file     Intimate partner violence     Fear of current or ex partner: Not on file     Emotionally abused: Not on file     Physically abused: Not on file     Forced sexual activity: Not on file   Other Topics Concern     Parent/sibling w/ CABG, MI or angioplasty before 65F 55M? No   Social History Narrative        Izaiah Jara    Lives in [facility]      She does not smoke.  She has a couple of beers to drink on the weekend but otherwise is not a heavy user of alcohol     3 kids: son peña Marcano; 94, 96 and 98    2 sons    1 daughter    2 are in college and daughter is a sophomore.            FAMILY HISTORY:  Notable for a paternal aunt who is .  She had Parkinson's disease.  Otherwise, there is no other family history of neurologic problems.  There is no family history of dystonia.          90% [place]    Some norweigian                .         Family History   Problem Relation Age of Onset     Breast Cancer Mother      Cancer - colorectal Mother      Macular Degeneration Mother      Dementia Father      Deep Vein Thrombosis Father      No Known Problems Brother      No Known Problems Sister      No Known Problems Sister      No Known Problems Sister      Neurologic Disorder Paternal Aunt         Parkinson's Disease     Cerebrovascular Disease Brother         stroke at age 25 possibly from pfo     Dementia Paternal Grandmother        Current Outpatient Medications   Medication     botulinum toxin type A (BOTOX) 100 units injection     calcium carbonate 500 mg, elemental, (OSCAL 500) 1250 (500 Ca) MG TABS tablet     Cholecalciferol (VITAMIN D) 2000 UNITS tablet     OnabotulinumtoxinA (BOTOX IJ)     tiZANidine (ZANAFLEX) 4 MG tablet     traMADol (ULTRAM) 50 MG tablet     No current facility-administered medications for this visit.        No Known Allergies      REVIEW OF SYSTEMS: also per HPI.  General: negative for chills/sweats/fever, difficulty sleeping, recent fatigue. Negative for headache or weight gain/loss.  Eyes: Negative for blurred vision, crossed eyes, double vision, recent eye infections, vision flashes, or vision halos.  Ears/Nose/Mouth/Throat: Negative for bleeding gums, difficulty swallowing, earache, ear discharge, hearing loss, hoarseness, nosebleeds, tinnitus, or sinus problems.  Respiratory:Negative for chronic cough, coughing blood. POSITIVE for night sweats. Negative for shortness of breath, Tuberculosis, or wheezing.  Cardiovascular: Negative for chest  pain, dyspnea at night, heart murmur, palpitations, pacemaker, pacemaker, poor circulation, swollen legs/feet, or varicose veins.  Gastrointestinal: Negative for melena, hematochezia, chronic diarrhea, heartburn, Hepatitis A/B/C, increasing constipation, Liver Disease, nausea, or vomiting.   Genitourinary: POSITIVE for urgency. Negative for Urinary retention, genital discharge, urinary incontinence, or UTI.  Neurological: Negative for syncope, headaches, numbness of arms/legs, tingling in hands/arms/legs, memory problems, or seizures.  Psychological: Negative for anxiety, depression, panic attacks, or restlessness.  Skin: POSITIVE for skin rashes. Negative for chronic skin itching, color changes in hand/feet when cold, poor scarring, non-healing ulcers, unusual moles.  Musculoskeletal: Negative for arthritis, joint swelling in hands/wrists/hips/knees/joints, muscle tenderness in arms/legs, or osteoporosis.  Endocrine: Negative for excessive thirst/hunger, intolerance for warm rooms, loss of libido, multiple broken bones, rapid weight gain/loss, galactorrhea, or thyroid issues.  Hematologic/Lymphatic: POSITIVE for easy skin bruising. Negative for significant fatigue, prolonged bleeding, tender glands/lymph nodes.  Allergies: Negative for asthma or hay fever.      PHYSICAL EXAM  /79   Pulse 88   Resp 16   SpO2 98%     General: Awake, alert, oriented. Well nourished, well developed, she is not in any acute distress.  In a wheelchair.  HEENT: Head normocephalic, atraumatic. No carotid bruit. Neck supple. Good range of motion. No palpable thyroid mass.  Extremity: Warm with no clubbing or cyanosis. No lower extremity edema.    Incisions: Left chest wall incision is healing well.  No redness.  No drainage.  No fluid collection.    Neurological  Awake, alert and oriented to date, time, place and person.  Speech is slurred.   Face symmetric.    Motor: full strength throughout.  Sensation: intact to light touch and  pinpoint.      ASSESSMENT   55 year old female  1.  Dystonia  2.  Myoclonus  3.  Slow progression corticobasal degeneration  4.  S/p left side deep brain stimulator placement, phase I, placement of left side deep brain stimulator electrode, target left globus pallidus internus, with microelectrode recording on 2/22/2019  5.  S/p deep brain stimulator placement, phase II, placement of deep brain stimulator generator/battery over the left chest wall on 3/1/2019  6.  S/p replacement of deep brain stimulator generator/battery over the left chest wall on 11/24/2020    Patient is recovering well from the recent DBS generator/battery replacement surgery.  Her incision is healing well with no signs of infection.  She is doing well overall.  She has some improvement in her facial stiffness but her slurred speech is still bad.    We briefly discussed that her generator/battery lasted 1 year and 9 months and that the next replacement would be spaced out similarly.      PLAN:  1.  Follow up with neurosurgery as needed.    Again, thank you for allowing me to participate in the care of your patient.      Sincerely,    Efren Triana MD

## 2020-12-14 NOTE — PROGRESS NOTES
Face less tight/stiff face. More natural looking.    Doing well with that.    Speech is still slurred.    NEUROSURGERY    HISTORY AND PHYSICAL EXAM    Chief Complaint   Patient presents with     Surgical Followup     [facility] RETURN 2 WK POST OP       HISTORY OF PRESENT ILLNESS  Ms. Sandra Jara returns today for her follow-up after the left side DBS generator/battery replacement on 11/24/2020.  Patient and her  report that the patient is doing well and there are no complaints.  She reports no pain.  She denies any fevers, chills, nausea, vomiting, dizziness, weakness, numbness or seizure like activity.  She reports that the incision is looking good and there is no redness, no drainage and no fluid collection.  Overall, she is quite happy with the recent surgery.      Per patient and her , patient's facial features and appearance improved after the surgery.  The face seems less tight and stiff and more natural looking.  It was hard for him to describe but there is an improvement.  Her speech is still slurred, however.      In summary, patient is a 55 year old female who has a diagnosis of corticobasal degeneration, dystonia and myoclonus who underwent DBS workup and ultimately DBS placement back in 2019.  Ms. Abdias Crooks started having her symptoms over 5 years ago with occasional shaking in her right hand and loss of control.  She was initial diagnosis in 2015 at [facility] with corticobasal degeneration (CBD).  She then went to see a neurologist in the [facility] who noticed slower than normal progression for CBD.  It was thought that she may have dystonia.  She had a DATscan with asymmetric-metabolism in the left parietal and occipital lobes, thus thought of having slowly progressing corticobasal degeneration (CBD), per Dr. Hopkins.  She does have right upper extremity dystonia and myoclonus.  She is followed by our neurology team, including Dr. Hopkins, for botulism toxin injections.  Her  bolutism injections provided no significant functional improvement so she was referred for DBS evaluation.  Dr. Hopkins's thoughts were left GPi DBS for right upper extremity dystonia.  Patient reported dystonia in her right arm and going down her right leg.  Cramping and shaking were symptoms that came after loss of control in her right arm/hand.  Her goals of DBS surgery was to get rid of the jerking and to get functionality back in her right upper extremity.  Her case was discussed at the Movement Disorder Consensus Group Meeting, and she was considered to be a good DBS candidate.  Recommendation was left GPi DBS, unilateral, Abbott system.  Subsequently, she underwent left side deep brain stimulator placement, phase I, placement of left side deep brain stimulator electrode, target left globus pallidus internus, with microeletrode recording on 2/22/2019 and deep brain stimulator placement, phase II, placement of deep brain stimulator generator/battery over the left chest wall on 3/1/2019.  Patient tolerated both of the procedure well and there were no complications.  After her phase I surgery, her postoperative CT head was without any concerning features and she was discharged to home on POD#1.  She did have some discomfort at the incision site and connector burial site.  She was also tired/fatigued for a few days.  She also had some balance issues for the first couple of days.  That improved  She also did well after her phase II surgery, which was an outpatient surgery.  Due to her symptoms, Dr. Hopkins activated and programmed her DBS device immediately after her phase II surgery.      Per patient and her , patient has been getting worse for the past several months.  She is requiring more assistance and now she is wheelchair bound.  Her speech is getting slower and she is slurring her speech.  It was noted at the last Botox injection that her generator/battery was nearing the end of its life  Patient's   noticed a yellow warning sign when the patient controller was communicating with the device.  Subsequently, patient's  would no longer connect with the device.  It is completely depleted.  Initially, patient and her  thought that the depleted DBS may be causing her more recent acute symptoms of difficulty speaking and weakness on the right side.  Patient's  called the neurology team and explained that over the weekend, patient has become more lethargic with marked slurred speech.  She was unable to use her right foot and needed much more help moving.  The thought was that she may be having a stroke and neurology team recommended evaluation in the ED.  However, patient's  and patient's caretaker thought that this was not a stroke.  Patient has been stable but continues to be slow with slurred speech.  When she was seen by Dr. Hopkins, the thought was that the DBS is not that helpful and the plan was to let it run out to see if there is any difference.  Over the weekend, patient declined further.  Even though the belief was that DBS may not be doing much, functioning DBS may have been helping her with the symptoms more than initially thought.  Therefore, the decision was to go ahead with replacement of the generator/battery.  Her generator/battery, Infinity 5, lasted about 1 year and 9 months.  I suggested that she consider Infinity 7, the larger generator/battery, so that it may last longer.  However, patient wished to stay with Infinity 5.         Past Medical History:   Diagnosis Date     Action induced myoclonus 12/1/2015     Corticobasal syndrome (H) 12/1/2015     CTS (carpal tunnel syndrome) 1/12/2015     Disturbance of skin sensation 1/12/2015     Family history of breast cancer 1/12/2015     Family history of colon cancer 1/12/2015     Family history of Parkinson's disease 12/21/2014    Paternal aunt     History of EMG 12/21/2014    A right upper extremity EMG and nerve  conduction study was done on 06/18/2014. It demonstrated some mild changes of right carpal tunnel syndrome but was otherwise normal.       History of MRI of brain and brain stem 12/21/2014    A brain MRI scan done on 06/27/2014 revealed no abnormalities.      History of MRI of cervical spine 12/21/2014    A cervical MRI done on 06/10/2014 revealed some mild to moderate degenerative changes but no significant central canal or foraminal stenosis, and no abnormalities of the spinal cord were noted.       Movement disorder 12/21/2014    I saw your patient, Deepali Contreras on 12/15/2014. She is a 49-year-old right-handed female here for evaluation of right upper extremity dysfunction and right hand tremor.  She has noted this problem for the last couple of years. She reports she is losing dexterity in her right hand. She has appreciated a tremor of the right hand with actions such as writing or postural maintenance. She has n       Past Surgical History:   Procedure Laterality Date     APPENDECTOMY       IMPLANT DEEP BRAIN STIMULATION GENERATOR / BATTERY Left 3/1/2019    Procedure: Left Deep Brain Stimulator Placement, Phase II, Placement Of Deep Brain Stimulator Generator/Battery Over The Left Chest Wall Latex Free;  Surgeon: Efren Triana MD;  Location: [facility] OR     OPTICAL TRACKING SYSTEM INSERTION DEEP BRAIN STIMULATION Left 2/22/2019    Procedure: Stealth Assisted Left Side Deep Brain Stimulator Placement, Phase I, Placement Of Left Side Deep Brain Stimulator Electrode, Target Left Globus Pallidus Internus With Microelectrode Recording;  Surgeon: Efren Triana MD;  Location: [facility] OR     REPLACE DEEP BRAIN STIMULATION GENERATOR / BATTERY Left 11/24/2020    Procedure: Replacement of deep brain stimulator generator/battery over left chest wall;  Surgeon: Efren Triana MD;  Location: [facility] OR       Social History     Socioeconomic History     Marital status:      Spouse name: Not on  file     Number of children: 3     Years of education: Not on file     Highest education level: Not on file   Occupational History     Not on file   Social Needs     Financial resource strain: Not on file     Food insecurity     Worry: Not on file     Inability: Not on file     Transportation needs     Medical: Not on file     Non-medical: Not on file   Tobacco Use     Smoking status: Never Smoker     Smokeless tobacco: Never Used   Substance and Sexual Activity     Alcohol use: Yes     Alcohol/week: 2.0 - 3.0 standard drinks     Frequency: 2-4 times a month     Drinks per session: 1 or 2     Drug use: No     Sexual activity: Yes     Partners: Male     Birth control/protection: Male Surgical   Lifestyle     Physical activity     Days per week: Not on file     Minutes per session: Not on file     Stress: Not on file   Relationships     Social connections     Talks on phone: Not on file     Gets together: Not on file     Attends Latter-day service: Not on file     Active member of club or organization: Not on file     Attends meetings of clubs or organizations: Not on file     Relationship status: Not on file     Intimate partner violence     Fear of current or ex partner: Not on file     Emotionally abused: Not on file     Physically abused: Not on file     Forced sexual activity: Not on file   Other Topics Concern     Parent/sibling w/ CABG, MI or angioplasty before 65F 55M? No   Social History Narrative        Izaiah Jara    Lives in [facility]      She does not smoke.  She has a couple of beers to drink on the weekend but otherwise is not a heavy user of alcohol    3 kids: son peña 21; 94, 96 and 98    2 sons    1 daughter    2 are in college and daughter is a sophomore.            FAMILY HISTORY:  Notable for a paternal aunt who is .  She had Parkinson's disease.  Otherwise, there is no other family history of neurologic problems.  There is no family history of dystonia.           90% Omani    Some norweigian                .         Family History   Problem Relation Age of Onset     Breast Cancer Mother      Cancer - colorectal Mother      Macular Degeneration Mother      Dementia Father      Deep Vein Thrombosis Father      No Known Problems Brother      No Known Problems Sister      No Known Problems Sister      No Known Problems Sister      Neurologic Disorder Paternal Aunt         Parkinson's Disease     Cerebrovascular Disease Brother         stroke at age 25 possibly from pfo     Dementia Paternal Grandmother        Current Outpatient Medications   Medication     botulinum toxin type A (BOTOX) 100 units injection     calcium carbonate 500 mg, elemental, (OSCAL 500) 1250 (500 Ca) MG TABS tablet     Cholecalciferol (VITAMIN D) 2000 UNITS tablet     OnabotulinumtoxinA (BOTOX IJ)     tiZANidine (ZANAFLEX) 4 MG tablet     traMADol (ULTRAM) 50 MG tablet     No current facility-administered medications for this visit.        No Known Allergies      REVIEW OF SYSTEMS: also per HPI.  General: negative for chills/sweats/fever, difficulty sleeping, recent fatigue. Negative for headache or weight gain/loss.  Eyes: Negative for blurred vision, crossed eyes, double vision, recent eye infections, vision flashes, or vision halos.  Ears/Nose/Mouth/Throat: Negative for bleeding gums, difficulty swallowing, earache, ear discharge, hearing loss, hoarseness, nosebleeds, tinnitus, or sinus problems.  Respiratory:Negative for chronic cough, coughing blood. POSITIVE for night sweats. Negative for shortness of breath, Tuberculosis, or wheezing.  Cardiovascular: Negative for chest pain, dyspnea at night, heart murmur, palpitations, pacemaker, pacemaker, poor circulation, swollen legs/feet, or varicose veins.  Gastrointestinal: Negative for melena, hematochezia, chronic diarrhea, heartburn, Hepatitis A/B/C, increasing constipation, Liver Disease, nausea, or vomiting.   Genitourinary: POSITIVE for  urgency. Negative for Urinary retention, genital discharge, urinary incontinence, or UTI.  Neurological: Negative for syncope, headaches, numbness of arms/legs, tingling in hands/arms/legs, memory problems, or seizures.  Psychological: Negative for anxiety, depression, panic attacks, or restlessness.  Skin: POSITIVE for skin rashes. Negative for chronic skin itching, color changes in hand/feet when cold, poor scarring, non-healing ulcers, unusual moles.  Musculoskeletal: Negative for arthritis, joint swelling in hands/wrists/hips/knees/joints, muscle tenderness in arms/legs, or osteoporosis.  Endocrine: Negative for excessive thirst/hunger, intolerance for warm rooms, loss of libido, multiple broken bones, rapid weight gain/loss, galactorrhea, or thyroid issues.  Hematologic/Lymphatic: POSITIVE for easy skin bruising. Negative for significant fatigue, prolonged bleeding, tender glands/lymph nodes.  Allergies: Negative for asthma or hay fever.      PHYSICAL EXAM  /79   Pulse 88   Resp 16   SpO2 98%     General: Awake, alert, oriented. Well nourished, well developed, she is not in any acute distress.  In a wheelchair.  HEENT: Head normocephalic, atraumatic. No carotid bruit. Neck supple. Good range of motion. No palpable thyroid mass.  Extremity: Warm with no clubbing or cyanosis. No lower extremity edema.    Incisions: Left chest wall incision is healing well.  No redness.  No drainage.  No fluid collection.    Neurological  Awake, alert and oriented to date, time, place and person.  Speech is slurred.   Face symmetric.    Motor: full strength throughout.  Sensation: intact to light touch and pinpoint.      ASSESSMENT   55 year old female  1.  Dystonia  2.  Myoclonus  3.  Slow progression corticobasal degeneration  4.  S/p left side deep brain stimulator placement, phase I, placement of left side deep brain stimulator electrode, target left globus pallidus internus, with microelectrode recording on  2/22/2019  5.  S/p deep brain stimulator placement, phase II, placement of deep brain stimulator generator/battery over the left chest wall on 3/1/2019  6.  S/p replacement of deep brain stimulator generator/battery over the left chest wall on 11/24/2020    Patient is recovering well from the recent DBS generator/battery replacement surgery.  Her incision is healing well with no signs of infection.  She is doing well overall.  She has some improvement in her facial stiffness but her slurred speech is still bad.    We briefly discussed that her generator/battery lasted 1 year and 9 months and that the next replacement would be spaced out similarly.      PLAN:  1.  Follow up with neurosurgery as needed.

## 2021-01-01 ENCOUNTER — DOCUMENTATION ONLY (OUTPATIENT)
Dept: NEUROLOGY | Facility: CLINIC | Age: 56
End: 2021-01-01

## 2021-01-01 ENCOUNTER — TRANSFERRED RECORDS (OUTPATIENT)
Dept: HEALTH INFORMATION MANAGEMENT | Facility: CLINIC | Age: 56
End: 2021-01-01

## 2021-01-01 ENCOUNTER — HEALTH MAINTENANCE LETTER (OUTPATIENT)
Age: 56
End: 2021-01-01

## 2021-01-01 ENCOUNTER — OFFICE VISIT (OUTPATIENT)
Dept: NEUROLOGY | Facility: CLINIC | Age: 56
End: 2021-01-01
Payer: COMMERCIAL

## 2021-01-01 VITALS
HEART RATE: 100 BPM | OXYGEN SATURATION: 97 % | RESPIRATION RATE: 16 BRPM | DIASTOLIC BLOOD PRESSURE: 72 MMHG | SYSTOLIC BLOOD PRESSURE: 121 MMHG

## 2021-01-01 DIAGNOSIS — G25.3 MYOCLONUS: ICD-10-CM

## 2021-01-01 DIAGNOSIS — G31.85 CORTICOBASAL DEGENERATION (H): ICD-10-CM

## 2021-01-01 DIAGNOSIS — G24.9 DYSTONIA: Primary | ICD-10-CM

## 2021-01-01 PROCEDURE — 64644 CHEMODENERV 1 EXTREM 5/> MUS: CPT | Mod: GC | Performed by: PSYCHIATRY & NEUROLOGY

## 2021-01-01 PROCEDURE — 99213 OFFICE O/P EST LOW 20 MIN: CPT | Mod: 25 | Performed by: PSYCHIATRY & NEUROLOGY

## 2021-01-01 PROCEDURE — 64612 DESTROY NERVE FACE MUSCLE: CPT | Mod: GC | Performed by: PSYCHIATRY & NEUROLOGY

## 2021-01-01 PROCEDURE — 64643 CHEMODENERV 1 EXTREM 1-4 EA: CPT | Mod: GC | Performed by: PSYCHIATRY & NEUROLOGY

## 2021-01-01 RX ORDER — GABAPENTIN 300 MG/1
300 CAPSULE ORAL AT BEDTIME
Qty: 30 CAPSULE | Refills: 11 | Status: SHIPPED | OUTPATIENT
Start: 2021-01-01

## 2021-01-13 ENCOUNTER — DOCUMENTATION ONLY (OUTPATIENT)
Dept: CARE COORDINATION | Facility: CLINIC | Age: 56
End: 2021-01-13

## 2021-01-27 NOTE — PROGRESS NOTES
Department of Neurology  Movement Disorders Division   DBS Follow-up Note  Botulinum Injection Note    Patient: Sandra Jara  MRN: [id number]   : 1965   Date of Visit: 2021    Diagnosis: CBS  DBS Target(s): L GPi   Date(s) of DBS Lead Placement:     Date(s) of IPG Placement:  Device: Abbott    Chief Complaint:  Sandra Jara is a 55 year old female who returns to clinic for follow up of CBS status post left GPi DBS and botulinum toxin injections of the RUE and RLE for the treatment of dystonia.     Became acutely lethargic and dysarthric and the battery was found to be .   Didn't notice a dramatic change with the new battery.  The lethargy improved but the dysarthria continues to worsen.    Noticed pain relief with the injections in the RLE.  No change in the turning in.  Today she feels that her neck pain is most bothersome.    Response to Last Injection: 2020  Onset of effect:  1 week  Benefit from last injections: Some relief in the neck and RUE.  Wearing off: She noticed wearing off a few weeks ago.  Side effects: She reports none.    Additional interval history: none      Review of Systems:  Other than that noted at the end of this note, the remainder of 12 systems reviewed were negative.    Medications:  Current Outpatient Medications   Medication Sig Dispense Refill     Cholecalciferol (VITAMIN D) 2000 UNITS tablet Take 2,000 Units by mouth every morning        tiZANidine (ZANAFLEX) 4 MG tablet Take 1/2-1 tab po q6h prn for muscle spasms and pain (Patient taking differently: as needed Take 1/2-1 tab po q6h prn for muscle spasms and pain) 90 tablet 3     botulinum toxin type A (BOTOX) 100 units injection Inject 280 Units into the muscle once Lot # /C3 with Expiration Date:  2020       calcium carbonate 500 mg, elemental, (OSCAL 500) 1250 (500 Ca) MG TABS tablet Take by mouth every morning       OnabotulinumtoxinA (BOTOX IJ) Inject 300 Units as directed once  [id number] with Expiration Date:  12/2020       traMADol (ULTRAM) 50 MG tablet Take 1/2 to 1 tab by mouth every 6 hours as needed, up to 3 tabs per day. (Patient not taking: Reported on 1/28/2021) 90 tablet 0        Relevant Medications   Tizanidine 4mg PRN            Tizanidine - takes about 2 0.5 tabs per week    Allergies: has No Known Allergies.    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 It demonstrated some mild changes of right carpal tunnel syndrome but was otherwise normal.       History of MRI of brain and brain stem 12/21/2014    A brain MRI scan done on 06/27/2014 revealed no abnormalities.      History of MRI of cervical spine 12/21/2014    A cervical MRI done on 06/10/2014 revealed some mild to moderate degenerative changes but no significant central canal or foraminal stenosis, and no abnormalities of the spinal cord were noted.       Movement disorder 12/21/2014    I saw your patient, Deepali Contreras on 12/15/2014. She is a 49-year-old right-handed female here for evaluation of right upper extremity dysfunction and right hand tremor.  She has noted this problem for the last couple of years. She reports she is losing dexterity in her right hand. She has appreciated a tremor of the right hand with actions such as writing or postural maintenance. She has n       Past Surgical History:  Past Surgical History:   Procedure Laterality Date     APPENDECTOMY       IMPLANT DEEP BRAIN STIMULATION GENERATOR / BATTERY Left 3/1/2019    Procedure: Left Deep  Brain Stimulator Placement, Phase II, Placement Of Deep Brain Stimulator Generator/Battery Over The Left Chest Wall Latex Free;  Surgeon: Efren Triana MD;  Location: [facility] OR     OPTICAL TRACKING SYSTEM INSERTION DEEP BRAIN STIMULATION Left 2/22/2019    Procedure: Stealth Assisted Left Side Deep Brain Stimulator Placement, Phase I, Placement Of Left Side Deep Brain Stimulator Electrode, Target Left Globus Pallidus Internus With Microelectrode Recording;  Surgeon: Efren Triana MD;  Location: [facility] OR     REPLACE DEEP BRAIN STIMULATION GENERATOR / BATTERY Left 11/24/2020    Procedure: Replacement of deep brain stimulator generator/battery over left chest wall;  Surgeon: Efren Triana MD;  Location: [facility] OR       Social History:  Social History     Socioeconomic History     Marital status:      Spouse name: Not on file     Number of children: 3     Years of education: Not on file     Highest education level: Not on file   Occupational History     Not on file   Social Needs     Financial resource strain: Not on file     Food insecurity     Worry: Not on file     Inability: Not on file     Transportation needs     Medical: Not on file     Non-medical: Not on file   Tobacco Use     Smoking status: Never Smoker     Smokeless tobacco: Never Used   Substance and Sexual Activity     Alcohol use: Yes     Alcohol/week: 2.0 - 3.0 standard drinks     Frequency: 2-4 times a month     Drinks per session: 1 or 2     Drug use: No     Sexual activity: Yes     Partners: Male     Birth control/protection: Male Surgical   Lifestyle     Physical activity     Days per week: Not on file     Minutes per session: Not on file     Stress: Not on file   Relationships     Social connections     Talks on phone: Not on file     Gets together: Not on file     Attends Yarsanism service: Not on file     Active member of club or organization: Not on file     Attends meetings of clubs or organizations: Not on file      Relationship status: Not on file     Intimate partner violence     Fear of current or ex partner: Not on file     Emotionally abused: Not on file     Physically abused: Not on file     Forced sexual activity: Not on file   Other Topics Concern     Parent/sibling w/ CABG, MI or angioplasty before 65F 55M? No   Social History Narrative        Izaiah Jara    Lives in [facility]      She does not smoke.  She has a couple of beers to drink on the weekend but otherwise is not a heavy user of alcohol    3 kids: son peña 21; 94, 96 and 98    2 sons    1 daughter    2 are in college and daughter is a sophomore.            FAMILY HISTORY:  Notable for a paternal aunt who is .  She had Parkinson's disease.  Otherwise, there is no other family history of neurologic problems.  There is no family history of dystonia.          90% [place]    Some norweigian                .         Family History:  Family History   Problem Relation Age of Onset     Breast Cancer Mother      Cancer - colorectal Mother      Macular Degeneration Mother      Dementia Father      Deep Vein Thrombosis Father      No Known Problems Brother      No Known Problems Sister      No Known Problems Sister      No Known Problems Sister      Neurologic Disorder Paternal Aunt         Parkinson's Disease     Cerebrovascular Disease Brother         stroke at age 25 possibly from pfo     Dementia Paternal Grandmother        Physical Exam:  The patient's  blood pressure is 122/75 and her pulse is 80. Her respiration is 16 and oxygen saturation is 98%.      Neurological Examination:   Moderate to severe dysarthria, spontaneous myoclonic jerks of RUE, stimulus induced myoclonic jerks of LUE, wheelchair bound, dystonia of RUE resulting in clenched fist with alternating flexion/extension of wrist, inversion of right foot, hypertrophy of right tibialis anterior, maceration of palm of right hand      Procedure: DBS Interrogation &  Programming    Lead(s):     Left   DBS Target GPi      DBS Lead Type    Abbott 0.5mm spacing    Lead Implant Date   19      IPG(s):    1   IPG Infinity 5   IPG Implant Date 2020   Location    L chest   Battery (V) 2.91V     Full Impedence/Currents Check: No issues identified.  Program impedance: 1. 887 Ohms; 2. 1062 Ohms    Initial Settings:  Left Brain Program 1       GPi1 GPi2   Contacts C+;3(ABC)-4- C+;2(ABC)-   Amplitude (mA) 4.15 3.5   Pulse Width ( s) 150 90   Frequency (Hz) 125 125     Initial program selected: Program 1    Final Settings:  Left Brain Program 1       GPi1 GPi2   Contacts C+;3(ABC)-4- C+;2(ABC)-   Amplitude (mA) 4.15 [4-5.4 by 0.05] 3.5 [3.5-4.5]   Pulse Width ( s) 150 90   Frequency (Hz) 125 125       Final Program selected:  Program 1         BOTULINUM NEUROTOXIN INJECTION PROCEDURES:    VERIFICATION OF PATIENT IDENTIFICATION AND PROCEDURE     Initials   Patient Name acc   Patient  acc   Procedure Verified by: [facility]     Above assessments performed by:  Resident/Fellow         China Chen MD          The attending provider was present for the entire procedure documented below.      Tiffany Hopkins MD      INDICATION/S FOR PROCEDURE/S:  Sandra Jara is a 55 year old year old patient with dystonia affecting the  head, neck and shoulder girdle musculature, right upper extremity and right lower extremity secondary to a diagnosis of CBS with associated  pain, tremor, spasms, loss of joint motion, loss of volitional motor control and difficulty with activities of daily living.     Her baseline symptoms have been recalcitrant to oral medications and conservative therapy.  She is here today for an injection of Botox.      GOAL OF PROCEDURE:  The goal of this procedure is to increase active range of motion, improve volitional motor control, decrease pain  and enhance functional independence associated with dystonic movements.    TOTAL DOSE ADMINISTERED:  Dose  Administered:  585 units Botox    Diluent Used:  Preservative Free Normal Saline  Total Volume of Diluent Used:  6 ml  Lot # [id number] with Expiration Date:  9/2023  NDC #: Botox 200u (7656-9330-36)    CONSENT:  The risks, benefits, and treatment options were discussed with Sandra Jara and she agreed to proceed.      Written consent was obtained by acc.     EQUIPMENT USED:  Needle-37mm stimulating/recording  EMG/NCS Machine    SKIN PREPARATION:  Skin preparation was performed using an alcohol wipe.    GUIDANCE DESCRIPTION:  Electro-myographic guidance was necessary throughout the procedure to accurately identify all areas of dystonic muscles while avoiding injection of non-dystonic muscles, neighboring nerves and nearby vascular structures.     AREA/MUSCLE INJECTED:      Visual display of locations injections are scanned into the chart under MEDIA tab.    Muscles Injected Units Injected Number of Injections   Right splenius capitus 45 2   Right trapezius 65 3   Right SCM 35 2   Right cervical paraspinals 20 1   Right biceps 40 2   Right deltoid 20 1   Right brachioradialis 25 1   Right ECR 15 1   Right ECu 15 1   Right FCU 20 1   Right tibialis anterior 140 7   Right flexor digitorum longus 40 1   Right thoracic paraspinals 55 3   Left trapzius 50 3        Total Units Injected: 585    Unavoidable Waste: 15    Total Units Billed 600      The patient tolerated the injections without difficulty.      Impression/Recommendation:  Sandra Jara is a 55 year old female with CBS status post left GPi DBS and botulinum toxin injections of the neck, RUE and RLE for the treatment of dystonia (cervical dystonia, right arm, and right leg dystonia).  Today we did repeat botulinum toxin injections.  Will also order home health care to address her dysarthria, right palm wound, and explore assistive devices for her right hand and communication.    Ms. Jara is home-bound due to her corticobasal syndrome.  She is  non-ambulatory and leaving home causes much hardship.    - Follow-up in 3 months' time to consider repeat injections  - OT, SLP, and wound RN in home referrals      China Chen MD  Movement Disorders Fellow    Neurology Attending Attestation:     I, Tiffany Hopkins MD, personally saw this patient with our Movement Disorders Fellow and agree with the fellow's findings and plan of care as documented in the movement disorder fellow's note. I personally performed salient aspects of the history and neurological examination.     I personally reviewed the vital signs, medications, and labs. I personally viewed the imaging, and agree with the interpretation documented by the fellow.    I personally performed or supervised all procedures.    55minutes spent on the date of the encounter doing chart review, history and exam, documentation and further activities as noted above    Additional time spent for separate DBS programming: analyzed without reprogramming.     Tiffany Hopkins MD    of Neurology

## 2021-01-28 ENCOUNTER — OFFICE VISIT (OUTPATIENT)
Dept: NEUROLOGY | Facility: CLINIC | Age: 56
End: 2021-01-28
Payer: COMMERCIAL

## 2021-01-28 VITALS
DIASTOLIC BLOOD PRESSURE: 75 MMHG | SYSTOLIC BLOOD PRESSURE: 122 MMHG | RESPIRATION RATE: 16 BRPM | OXYGEN SATURATION: 98 % | HEART RATE: 80 BPM

## 2021-01-28 DIAGNOSIS — G24.3 CERVICAL DYSTONIA: ICD-10-CM

## 2021-01-28 DIAGNOSIS — G24.8 ACQUIRED TORSION DYSTONIA: Primary | ICD-10-CM

## 2021-01-28 DIAGNOSIS — G31.85 CORTICOBASAL SYNDROME (H): ICD-10-CM

## 2021-01-28 DIAGNOSIS — G31.85 CORTICOBASAL DEGENERATION (H): Primary | ICD-10-CM

## 2021-01-28 DIAGNOSIS — G24.1 DYSTONIA, TORSION, FRAGMENTS OF: ICD-10-CM

## 2021-01-28 PROCEDURE — 95874 GUIDE NERV DESTR NEEDLE EMG: CPT | Performed by: PSYCHIATRY & NEUROLOGY

## 2021-01-28 PROCEDURE — 64644 CHEMODENERV 1 EXTREM 5/> MUS: CPT | Performed by: PSYCHIATRY & NEUROLOGY

## 2021-01-28 PROCEDURE — 95970 ALYS NPGT W/O PRGRMG: CPT | Performed by: PSYCHIATRY & NEUROLOGY

## 2021-01-28 PROCEDURE — 64616 CHEMODENERV MUSC NECK DYSTON: CPT | Mod: 50 | Performed by: PSYCHIATRY & NEUROLOGY

## 2021-01-28 PROCEDURE — 64643 CHEMODENERV 1 EXTREM 1-4 EA: CPT | Performed by: PSYCHIATRY & NEUROLOGY

## 2021-01-28 NOTE — LETTER
2021       RE: Sandra Jara  [address]  [place] [address]     Dear Colleague,    Thank you for referring your patient, Sandra Jara, to the [facility] [place] [facility]. Please see a copy of my visit note below.    Department of Neurology  Movement Disorders Division   DBS Follow-up Note  Botulinum Injection Note    Patient: Sandra Jara  MRN: [id number]   : 1965   Date of Visit: 2021    Diagnosis: CBS  DBS Target(s): L GPi   Date(s) of DBS Lead Placement:     Date(s) of IPG Placement:  Device: Abbott    Chief Complaint:  Sandra Jara is a 55 year old female who returns to clinic for follow up of CBS status post left GPi DBS and botulinum toxin injections of the RUE and RLE for the treatment of dystonia.     Became acutely lethargic and dysarthric and the battery was found to be .   Didn't notice a dramatic change with the new battery.  The lethargy improved but the dysarthria continues to worsen.    Noticed pain relief with the injections in the RLE.  No change in the turning in.  Today she feels that her neck pain is most bothersome.    Response to Last Injection: 2020  Onset of effect:  1 week  Benefit from last injections: Some relief in the neck and RUE.  Wearing off: She noticed wearing off a few weeks ago.  Side effects: She reports none.    Additional interval history: none      Review of Systems:  Other than that noted at the end of this note, the remainder of 12 systems reviewed were negative.    Medications:  Current Outpatient Medications   Medication Sig Dispense Refill     Cholecalciferol (VITAMIN D) 2000 UNITS tablet Take 2,000 Units by mouth every morning        tiZANidine (ZANAFLEX) 4 MG tablet Take 1/2-1 tab po q6h prn for muscle spasms and pain (Patient taking differently: as needed Take 1/2-1 tab po q6h prn for muscle spasms and pain)  90 tablet 3     botulinum toxin type A (BOTOX) 100 units injection Inject 280 Units into the muscle once Lot # /[id number] with Expiration Date:  11/2020       calcium carbonate 500 mg, elemental, (OSCAL 500) 1250 (500 Ca) MG TABS tablet Take by mouth every morning       OnabotulinumtoxinA (BOTOX IJ) Inject 300 Units as directed once [id number] with Expiration Date:  12/2020       traMADol (ULTRAM) 50 MG tablet Take 1/2 to 1 tab by mouth every 6 hours as needed, up to 3 tabs per day. (Patient not taking: Reported on 1/28/2021) 90 tablet 0        Relevant Medications   Tizanidine 4mg PRN            Tizanidine - takes about 2 0.5 tabs per week    Allergies: has No Known Allergies.    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 It demonstrated some mild changes of right carpal tunnel syndrome but was otherwise normal.       History of MRI of brain and brain stem 12/21/2014    A brain MRI scan done on 06/27/2014 revealed no abnormalities.      History of MRI of cervical spine 12/21/2014    A cervical MRI done on 06/10/2014 revealed some mild to moderate degenerative changes but no significant central canal or foraminal stenosis, and no abnormalities of the spinal cord were noted.       Movement disorder 12/21/2014    I saw your patient, Deepali Contreras on 12/15/2014. She is a 49-year-old right-handed female here for evaluation of right upper extremity dysfunction and right hand tremor.  She has noted this problem for the last couple of years. She reports she is losing  dexterity in her right hand. She has appreciated a tremor of the right hand with actions such as writing or postural maintenance. She has n       Past Surgical History:  Past Surgical History:   Procedure Laterality Date     APPENDECTOMY       IMPLANT DEEP BRAIN STIMULATION GENERATOR / BATTERY Left 3/1/2019    Procedure: Left Deep Brain Stimulator Placement, Phase II, Placement Of Deep Brain Stimulator Generator/Battery Over The Left Chest Wall Latex Free;  Surgeon: Efren Triana MD;  Location: [facility] OR     OPTICAL TRACKING SYSTEM INSERTION DEEP BRAIN STIMULATION Left 2/22/2019    Procedure: Stealth Assisted Left Side Deep Brain Stimulator Placement, Phase I, Placement Of Left Side Deep Brain Stimulator Electrode, Target Left Globus Pallidus Internus With Microelectrode Recording;  Surgeon: Efren Triana MD;  Location: [facility] OR     REPLACE DEEP BRAIN STIMULATION GENERATOR / BATTERY Left 11/24/2020    Procedure: Replacement of deep brain stimulator generator/battery over left chest wall;  Surgeon: Efren Triana MD;  Location: [facility] OR       Social History:  Social History     Socioeconomic History     Marital status:      Spouse name: Not on file     Number of children: 3     Years of education: Not on file     Highest education level: Not on file   Occupational History     Not on file   Social Needs     Financial resource strain: Not on file     Food insecurity     Worry: Not on file     Inability: Not on file     Transportation needs     Medical: Not on file     Non-medical: Not on file   Tobacco Use     Smoking status: Never Smoker     Smokeless tobacco: Never Used   Substance and Sexual Activity     Alcohol use: Yes     Alcohol/week: 2.0 - 3.0 standard drinks     Frequency: 2-4 times a month     Drinks per session: 1 or 2     Drug use: No     Sexual activity: Yes     Partners: Male     Birth control/protection: Male Surgical   Lifestyle     Physical activity     Days per week: Not on  file     Minutes per session: Not on file     Stress: Not on file   Relationships     Social connections     Talks on phone: Not on file     Gets together: Not on file     Attends Zoroastrian service: Not on file     Active member of club or organization: Not on file     Attends meetings of clubs or organizations: Not on file     Relationship status: Not on file     Intimate partner violence     Fear of current or ex partner: Not on file     Emotionally abused: Not on file     Physically abused: Not on file     Forced sexual activity: Not on file   Other Topics Concern     Parent/sibling w/ CABG, MI or angioplasty before 65F 55M? No   Social History Narrative        Izaiah Jara    Lives in [facility]      She does not smoke.  She has a couple of beers to drink on the weekend but otherwise is not a heavy user of alcohol    3 kids: son peña 21; 94, 96 and 98    2 sons    1 daughter    2 are in college and daughter is a sophomore.            FAMILY HISTORY:  Notable for a paternal aunt who is .  She had Parkinson's disease.  Otherwise, there is no other family history of neurologic problems.  There is no family history of dystonia.          90% Kazakh    Some norweigian                .         Family History:  Family History   Problem Relation Age of Onset     Breast Cancer Mother      Cancer - colorectal Mother      Macular Degeneration Mother      Dementia Father      Deep Vein Thrombosis Father      No Known Problems Brother      No Known Problems Sister      No Known Problems Sister      No Known Problems Sister      Neurologic Disorder Paternal Aunt         Parkinson's Disease     Cerebrovascular Disease Brother         stroke at age 25 possibly from pfo     Dementia Paternal Grandmother        Physical Exam:  The patient's  blood pressure is 122/75 and her pulse is 80. Her respiration is 16 and oxygen saturation is 98%.      Neurological Examination:   Moderate to severe  dysarthria, spontaneous myoclonic jerks of RUE, stimulus induced myoclonic jerks of LUE, wheelchair bound, dystonia of RUE resulting in clenched fist with alternating flexion/extension of wrist, inversion of right foot, hypertrophy of right tibialis anterior, maceration of palm of right hand      Procedure: DBS Interrogation & Programming    Lead(s):     Left   DBS Target GPi      DBS Lead Type    Abbott 0.5mm spacing    Lead Implant Date   19      IPG(s):    1   IPG Infinity 5   IPG Implant Date 2020   Location    L chest   Battery (V) 2.91V     Full Impedence/Currents Check: No issues identified.  Program impedance: 1. 887 Ohms; 2. 1062 Ohms    Initial Settings:  Left Brain Program 1       GPi1 GPi2   Contacts C+;3(ABC)-4- C+;2(ABC)-   Amplitude (mA) 4.15 3.5   Pulse Width ( s) 150 90   Frequency (Hz) 125 125     Initial program selected: Program 1    Final Settings:  Left Brain Program 1       GPi1 GPi2   Contacts C+;3(ABC)-4- C+;2(ABC)-   Amplitude (mA) 4.15 [4-5.4 by 0.05] 3.5 [3.5-4.5]   Pulse Width ( s) 150 90   Frequency (Hz) 125 125       Final Program selected:  Program 1         BOTULINUM NEUROTOXIN INJECTION PROCEDURES:    VERIFICATION OF PATIENT IDENTIFICATION AND PROCEDURE     Initials   Patient Name acc   Patient  acc   Procedure Verified by: [facility]     Above assessments performed by:  Resident/Fellow         China Chen MD          The attending provider was present for the entire procedure documented below.      Tiffany Hopkins MD      INDICATION/S FOR PROCEDURE/S:  Sandra Jara is a 55 year old year old patient with dystonia affecting the  head, neck and shoulder girdle musculature, right upper extremity and right lower extremity secondary to a diagnosis of CBS with associated  pain, tremor, spasms, loss of joint motion, loss of volitional motor control and difficulty with activities of daily living.     Her baseline symptoms have been recalcitrant to oral medications  and conservative therapy.  She is here today for an injection of Botox.      GOAL OF PROCEDURE:  The goal of this procedure is to increase active range of motion, improve volitional motor control, decrease pain  and enhance functional independence associated with dystonic movements.    TOTAL DOSE ADMINISTERED:  Dose Administered:  585 units Botox    Diluent Used:  Preservative Free Normal Saline  Total Volume of Diluent Used:  6 ml  Lot # [id number] with Expiration Date:  9/2023  NDC #: Botox 200u (3589-4525-56)    CONSENT:  The risks, benefits, and treatment options were discussed with Sandra Jara and she agreed to proceed.      Written consent was obtained by acc.     EQUIPMENT USED:  Needle-37mm stimulating/recording  EMG/NCS Machine    SKIN PREPARATION:  Skin preparation was performed using an alcohol wipe.    GUIDANCE DESCRIPTION:  Electro-myographic guidance was necessary throughout the procedure to accurately identify all areas of dystonic muscles while avoiding injection of non-dystonic muscles, neighboring nerves and nearby vascular structures.     AREA/MUSCLE INJECTED:      Visual display of locations injections are scanned into the chart under MEDIA tab.    Muscles Injected Units Injected Number of Injections   Right splenius capitus 45 2   Right trapezius 65 3   Right SCM 35 2   Right cervical paraspinals 20 1   Right biceps 40 2   Right deltoid 20 1   Right brachioradialis 25 1   Right ECR 15 1   Right ECu 15 1   Right FCU 20 1   Right tibialis anterior 140 7   Right flexor digitorum longus 40 1   Right thoracic paraspinals 55 3   Left trapzius 50 3        Total Units Injected: 585    Unavoidable Waste: 15    Total Units Billed 600      The patient tolerated the injections without difficulty.      Impression/Recommendation:  Sandra Jara is a 55 year old female with CBS status post left GPi DBS and botulinum toxin injections of the neck, RUE and RLE for the treatment of dystonia  (cervical dystonia, right arm, and right leg dystonia).  Today we did repeat botulinum toxin injections.  Will also order home health care to address her dysarthria, right palm wound, and explore assistive devices for her right hand and communication.    Ms. Jara is home-bound due to her corticobasal syndrome.  She is non-ambulatory and leaving home causes much hardship.    - Follow-up in 3 months' time to consider repeat injections  - OT, SLP, and wound RN in home referrals      China Chen MD  Movement Disorders Fellow    Neurology Attending Attestation:     I, Tiffany Hopkins MD, personally saw this patient with our Movement Disorders Fellow and agree with the fellow's findings and plan of care as documented in the movement disorder fellow's note. I personally performed salient aspects of the history and neurological examination.     I personally reviewed the vital signs, medications, and labs. I personally viewed the imaging, and agree with the interpretation documented by the fellow.    I personally performed or supervised all procedures.    55minutes spent on the date of the encounter doing chart review, history and exam, documentation and further activities as noted above    Additional time spent for separate DBS programming: analyzed without reprogramming.     Tiffany Hopkins MD    of Neurology             Again, thank you for allowing me to participate in the care of your patient.      Sincerely,    Tiffany Hopkins MD

## 2021-02-01 DIAGNOSIS — G31.85 CORTICOBASAL DEGENERATION (H): Primary | ICD-10-CM

## 2021-02-23 ENCOUNTER — TRANSFERRED RECORDS (OUTPATIENT)
Dept: HEALTH INFORMATION MANAGEMENT | Facility: CLINIC | Age: 56
End: 2021-02-23

## 2021-03-01 ENCOUNTER — TELEPHONE (OUTPATIENT)
Dept: NEUROLOGY | Facility: CLINIC | Age: 56
End: 2021-03-01

## 2021-03-01 NOTE — TELEPHONE ENCOUNTER
Received records from [facility]  Records Date of service: 2/25/21  Copy has been sent to scanning and emailed to provider

## 2021-04-18 ENCOUNTER — HEALTH MAINTENANCE LETTER (OUTPATIENT)
Age: 56
End: 2021-04-18

## 2021-04-29 ENCOUNTER — OFFICE VISIT (OUTPATIENT)
Dept: NEUROLOGY | Facility: CLINIC | Age: 56
End: 2021-04-29
Payer: COMMERCIAL

## 2021-04-29 VITALS
OXYGEN SATURATION: 99 % | HEART RATE: 94 BPM | DIASTOLIC BLOOD PRESSURE: 86 MMHG | SYSTOLIC BLOOD PRESSURE: 121 MMHG | RESPIRATION RATE: 16 BRPM

## 2021-04-29 DIAGNOSIS — G24.3 CERVICAL DYSTONIA: ICD-10-CM

## 2021-04-29 DIAGNOSIS — G31.85 CORTICOBASAL DEGENERATION (H): ICD-10-CM

## 2021-04-29 DIAGNOSIS — G24.8 ACQUIRED TORSION DYSTONIA: Primary | ICD-10-CM

## 2021-04-29 DIAGNOSIS — G24.9 DYSTONIA: ICD-10-CM

## 2021-04-29 PROCEDURE — 95874 GUIDE NERV DESTR NEEDLE EMG: CPT | Performed by: PSYCHIATRY & NEUROLOGY

## 2021-04-29 PROCEDURE — 64644 CHEMODENERV 1 EXTREM 5/> MUS: CPT | Mod: GC | Performed by: PSYCHIATRY & NEUROLOGY

## 2021-04-29 PROCEDURE — 64616 CHEMODENERV MUSC NECK DYSTON: CPT | Mod: 50 | Performed by: PSYCHIATRY & NEUROLOGY

## 2021-04-29 PROCEDURE — 95970 ALYS NPGT W/O PRGRMG: CPT | Performed by: PSYCHIATRY & NEUROLOGY

## 2021-04-29 ASSESSMENT — PAIN SCALES - GENERAL: PAINLEVEL: MODERATE PAIN (5)

## 2021-04-29 NOTE — PROGRESS NOTES
Department of Neurology  Movement Disorders Division   DBS Follow-up Note  Botulinum Injection Note     Patient: Sandra Jara  MRN: [id number]   : 1965   Date of Visit: 2021     Diagnosis: CBS  DBS Target(s): L GPi   Date(s) of DBS Lead Placement:     Date(s) of IPG Placement:  Device: Abbott     Chief Complaint:  Sandra Jara is a 55 year old female who returns to clinic for follow up of CBS status post left GPi DBS and botulinum toxin injections of the RUE and RLE for the treatment of dystonia.      Response to Last Injection: 2021    Onset of effect: after few days    Benefit from last injections: resolution of pain    Wearing off: She noticed wearing off about 1.5 weeks ago with return of neck and back pain    Side effects: denies     Additional interval history:         Review of Systems:  Other than that noted at the end of this note, the remainder of 12 systems reviewed were negative.    Medications:  Current Outpatient Medications   Medication Sig Dispense Refill     calcium carbonate 500 mg, elemental, (OSCAL 500) 1250 (500 Ca) MG TABS tablet Take by mouth every morning       Cholecalciferol (VITAMIN D) 2000 UNITS tablet Take 2,000 Units by mouth every morning        tiZANidine (ZANAFLEX) 4 MG tablet Take 1/2-1 tab po q6h prn for muscle spasms and pain (Patient taking differently: as needed Take 1/2-1 tab po q6h prn for muscle spasms and pain) 90 tablet 3     traMADol (ULTRAM) 50 MG tablet Take 1/2 to 1 tab by mouth every 6 hours as needed, up to 3 tabs per day. (Patient not taking: Reported on 2021) 90 tablet 0         Physical Exam:  Vitals: /86   Pulse 94   Resp 16   SpO2 99%      Neurological Examination:   Stimulus induced myoclonus, left thumb held flexed in palm, severe dysarthria, wheelchair bound, increased tone in LUE, RUE with arm adduction and clenched fist with extension of wrist, slight right inversion and plantar flexion, paraspinal and  trapezius hypertrophy        Procedure: DBS Interrogation & Programming     Lead(s):     Left   DBS Target GPi   DBS Lead Type    Infinity 0.5   Lead Implant Date   19      IPG(s):    1   IPG Infinity 5   IPG Implant Date 2020   Location    L chest   Battery (V) 2.91V (2.91V)      Full Impedence/Currents Check: No issues identified.  Program impedance: 1. 912 Ohms; 2. 987 Ohms     Initial Settings:  Left Brain Program 1       GPi1 GPi2   Contacts C+;3(ABC)-4- C+;2(ABC)-   Amplitude (mA) 4.15 [4.1-5.4 by 0.05] 3.5 [3.5-4.5]   Pulse Width ( s) 150 90   Frequency (Hz) 125 125      Initial program selected: Program 1     Final Settings:  Left Brain Program 1       GPi1 GPi2   Contacts C+;3(ABC)-4- C+;2(ABC)-   Amplitude (mA) 4.15 [4.15-5.4 by 0.05] 3.5 [3.0-4.5 by 0.10]   Pulse Width ( s) 150 90   Frequency (Hz) 125 125         Final Program selected:  Program 1           BOTULINUM NEUROTOXIN INJECTION PROCEDURES:     VERIFICATION OF PATIENT IDENTIFICATION AND PROCEDURE       Initials   Patient Name acc   Patient  acc   Procedure Verified by: [facility]      Above assessments performed by:  Resident/Fellow         China Chen MD          The attending provider was present for the entire procedure documented below.        Tiffany Hopkins MD        INDICATION/S FOR PROCEDURE/S:  Sandra Jara is a 55 year old year old patient with dystonia affecting the  head, neck and shoulder girdle musculature, right upper extremity and right lower extremity secondary to a diagnosis of CBS with associated  pain, tremor, spasms, loss of joint motion, loss of volitional motor control and difficulty with activities of daily living.      Her baseline symptoms have been recalcitrant to oral medications and conservative therapy.  She is here today for an injection of Botox.       GOAL OF PROCEDURE:  The goal of this procedure is to increase active range of motion, improve volitional motor control, decrease pain  and  enhance functional independence associated with dystonic movements.     TOTAL DOSE ADMINISTERED:  Dose Administered:  580 units Botox    Diluent Used:  Preservative Free Normal Saline  Total Volume of Diluent Used:  6 ml  Lot # [id number] with Expiration Date:  12/2023  NDC #: Botox 200u (1225-8228-82)     CONSENT:  The risks, benefits, and treatment options were discussed with Sandra Jara and she agreed to proceed.      Written consent was obtained by acc.      EQUIPMENT USED:  Needle-37mm stimulating/recording  EMG/NCS Machine     SKIN PREPARATION:  Skin preparation was performed using an alcohol wipe.     GUIDANCE DESCRIPTION:  Electro-myographic guidance was necessary throughout the procedure to accurately identify all areas of dystonic muscles while avoiding injection of non-dystonic muscles, neighboring nerves and nearby vascular structures.      AREA/MUSCLE INJECTED:    Visual display of locations injections are scanned into the chart under MEDIA tab.     Muscles Injected Units Injected Number of Injections   Right splenius capitus 45 2   Right trapezius 55 3   Right SCM 30 2   Right cervical paraspinals 25 2   Right biceps 50 2   Right brachialis 25 1   Right brachioradialis 25 1   Right levator scapulae 15 1   Right pronator teres 15 1   Left longissmus 30 1   Right tibialis anterior 115 7   Right flexor digitorum longus 30 1   Left splenius capitis 50 2   Left trapezius 50 2   Left opponens pollicis 10 1   Left flexor pollicis 10 1           Total Units Injected: 580     Unavoidable Waste: 20     Total Units Billed 600        The patient tolerated the injections without difficulty.        Impression/Recommendation:  Sandra Jara is a 55 year old female with CBS status post left GPi DBS and botulinum toxin injections of the neck, RUE and RLE for the treatment of dystonia (cervical dystonia, right arm, and right leg dystonia).  Today we did repeat botulinum toxin injections..     -  Follow-up in 3 months' time.  Plan to increase dosage for repeat injections          China Chen MD  Movement Disorders Fellow    Neurology Attending Attestation:     I, Tiffany Hopkins MD, personally saw this patient with our Movement Disorders Fellow and agree with the fellow's findings and plan of care as documented in the movement disorder fellow's note. I personally performed salient aspects of the history and neurological examination.     I personally reviewed the vital signs, medications, and labs. I personally viewed the imaging, and agree with the interpretation documented by the fellow.    I personally performed or supervised all procedures.    33 minutes spent on the date of the encounter doing chart review, history and exam, documentation and further activities as noted above    Additional time spent for separate DBS programming: Analyzed without reprogramming    Tiffany Hopkins MD    of Neurology

## 2021-04-29 NOTE — LETTER
2021       RE: Sandra Jara  [address]  [place] [address]     Dear Colleague,    Thank you for referring your patient, Sandra Jara, to the [facility] NEUROLOGY CLINIC [place] at [facility]. Please see a copy of my visit note below.    Department of Neurology  Movement Disorders Division   DBS Follow-up Note  Botulinum Injection Note     Patient: Sandar Jara  MRN: [id number]   : 1965   Date of Visit: 2021     Diagnosis: CBS  DBS Target(s): L GPi   Date(s) of DBS Lead Placement:     Date(s) of IPG Placement:  Device: Abbott     Chief Complaint:  Sandra Jara is a 55 year old female who returns to clinic for follow up of CBS status post left GPi DBS and botulinum toxin injections of the RUE and RLE for the treatment of dystonia.      Response to Last Injection: 2021    Onset of effect: after few days    Benefit from last injections: resolution of pain    Wearing off: She noticed wearing off about 1.5 weeks ago with return of neck and back pain    Side effects: denies     Additional interval history:         Review of Systems:  Other than that noted at the end of this note, the remainder of 12 systems reviewed were negative.    Medications:  Current Outpatient Medications   Medication Sig Dispense Refill     calcium carbonate 500 mg, elemental, (OSCAL 500) 1250 (500 Ca) MG TABS tablet Take by mouth every morning       Cholecalciferol (VITAMIN D) 2000 UNITS tablet Take 2,000 Units by mouth every morning        tiZANidine (ZANAFLEX) 4 MG tablet Take 1/2-1 tab po q6h prn for muscle spasms and pain (Patient taking differently: as needed Take 1/2-1 tab po q6h prn for muscle spasms and pain) 90 tablet 3     traMADol (ULTRAM) 50 MG tablet Take 1/2 to 1 tab by mouth every 6 hours as needed, up to 3 tabs per day. (Patient not taking: Reported on 2021) 90 tablet 0         Physical  Exam:  Vitals: /86   Pulse 94   Resp 16   SpO2 99%      Neurological Examination:   Stimulus induced myoclonus, left thumb held flexed in palm, severe dysarthria, wheelchair bound, increased tone in LUE, RUE with arm adduction and clenched fist with extension of wrist, slight right inversion and plantar flexion, paraspinal and trapezius hypertrophy        Procedure: DBS Interrogation & Programming     Lead(s):     Left   DBS Target GPi   DBS Lead Type    Infinity 0.5   Lead Implant Date   19      IPG(s):    1   IPG Infinity 5   IPG Implant Date 2020   Location    L chest   Battery (V) 2.91V (2.91V)      Full Impedence/Currents Check: No issues identified.  Program impedance: 1. 912 Ohms; 2. 987 Ohms     Initial Settings:  Left Brain Program 1       GPi1 GPi2   Contacts C+;3(ABC)-4- C+;2(ABC)-   Amplitude (mA) 4.15 [4.1-5.4 by 0.05] 3.5 [3.5-4.5]   Pulse Width ( s) 150 90   Frequency (Hz) 125 125      Initial program selected: Program 1     Final Settings:  Left Brain Program 1       GPi1 GPi2   Contacts C+;3(ABC)-4- C+;2(ABC)-   Amplitude (mA) 4.15 [4.15-5.4 by 0.05] 3.5 [3.0-4.5 by 0.10]   Pulse Width ( s) 150 90   Frequency (Hz) 125 125         Final Program selected:  Program 1           BOTULINUM NEUROTOXIN INJECTION PROCEDURES:     VERIFICATION OF PATIENT IDENTIFICATION AND PROCEDURE       Initials   Patient Name [facility]   Patient  [facility]   Procedure Verified by: [facility]      Above assessments performed by:  Resident/Fellow         China Chen MD          The attending provider was present for the entire procedure documented below.        Tiffany Hopkins MD        INDICATION/S FOR PROCEDURE/S:  Sandra Jara is a 55 year old year old patient with dystonia affecting the  head, neck and shoulder girdle musculature, right upper extremity and right lower extremity secondary to a diagnosis of CBS with associated  pain, tremor, spasms, loss of joint motion, loss of volitional motor  control and difficulty with activities of daily living.      Her baseline symptoms have been recalcitrant to oral medications and conservative therapy.  She is here today for an injection of Botox.       GOAL OF PROCEDURE:  The goal of this procedure is to increase active range of motion, improve volitional motor control, decrease pain  and enhance functional independence associated with dystonic movements.     TOTAL DOSE ADMINISTERED:  Dose Administered:  580 units Botox    Diluent Used:  Preservative Free Normal Saline  Total Volume of Diluent Used:  6 ml  Lot # [id number] with Expiration Date:  12/2023  NDC #: Botox 200u (8209-0547-29)     CONSENT:  The risks, benefits, and treatment options were discussed with Sandra Jara and she agreed to proceed.      Written consent was obtained by acc.      EQUIPMENT USED:  Needle-37mm stimulating/recording  EMG/NCS Machine     SKIN PREPARATION:  Skin preparation was performed using an alcohol wipe.     GUIDANCE DESCRIPTION:  Electro-myographic guidance was necessary throughout the procedure to accurately identify all areas of dystonic muscles while avoiding injection of non-dystonic muscles, neighboring nerves and nearby vascular structures.      AREA/MUSCLE INJECTED:    Visual display of locations injections are scanned into the chart under MEDIA tab.     Muscles Injected Units Injected Number of Injections   Right splenius capitus 45 2   Right trapezius 55 3   Right SCM 30 2   Right cervical paraspinals 25 2   Right biceps 50 2   Right brachialis 25 1   Right brachioradialis 25 1   Right levator scapulae 15 1   Right pronator teres 15 1   Left longissmus 30 1   Right tibialis anterior 115 7   Right flexor digitorum longus 30 1   Left splenius capitis 50 2   Left trapezius 50 2   Left opponens pollicis 10 1   Left flexor pollicis 10 1           Total Units Injected: 580     Unavoidable Waste: 20     Total Units Billed 600        The patient tolerated the  injections without difficulty.        Impression/Recommendation:  Sandra Jara is a 55 year old female with CBS status post left GPi DBS and botulinum toxin injections of the neck, RUE and RLE for the treatment of dystonia (cervical dystonia, right arm, and right leg dystonia).  Today we did repeat botulinum toxin injections..     - Follow-up in 3 months' time.  Plan to increase dosage for repeat injections          China Chen MD  Movement Disorders Fellow    Neurology Attending Attestation:     I, Tiffany Hopkins MD, personally saw this patient with our Movement Disorders Fellow and agree with the fellow's findings and plan of care as documented in the movement disorder fellow's note. I personally performed salient aspects of the history and neurological examination.     I personally reviewed the vital signs, medications, and labs. I personally viewed the imaging, and agree with the interpretation documented by the fellow.    I personally performed or supervised all procedures.    33 minutes spent on the date of the encounter doing chart review, history and exam, documentation and further activities as noted above    Additional time spent for separate DBS programming: Analyzed without reprogramming    Tiffany Hopkins MD    of Neurology

## 2021-05-12 DIAGNOSIS — G24.3 CERVICAL DYSTONIA: ICD-10-CM

## 2021-05-12 DIAGNOSIS — G24.9 DYSTONIA: Primary | ICD-10-CM

## 2021-05-12 DIAGNOSIS — G24.8 ACQUIRED TORSION DYSTONIA: ICD-10-CM

## 2021-05-12 DIAGNOSIS — G24.1 TORSION DYSTONIA: ICD-10-CM

## 2021-05-12 DIAGNOSIS — G24.8 DYSTONIA OF EXTREMITY: ICD-10-CM

## 2021-06-22 ENCOUNTER — TRANSFERRED RECORDS (OUTPATIENT)
Dept: HEALTH INFORMATION MANAGEMENT | Facility: CLINIC | Age: 56
End: 2021-06-22

## 2021-07-29 ENCOUNTER — OFFICE VISIT (OUTPATIENT)
Dept: NEUROLOGY | Facility: CLINIC | Age: 56
End: 2021-07-29
Payer: COMMERCIAL

## 2021-07-29 VITALS
OXYGEN SATURATION: 99 % | SYSTOLIC BLOOD PRESSURE: 130 MMHG | DIASTOLIC BLOOD PRESSURE: 79 MMHG | HEART RATE: 108 BPM | RESPIRATION RATE: 16 BRPM

## 2021-07-29 DIAGNOSIS — G24.3 CERVICAL DYSTONIA: Primary | ICD-10-CM

## 2021-07-29 DIAGNOSIS — G24.9 DYSTONIA: ICD-10-CM

## 2021-07-29 DIAGNOSIS — G24.8 ACQUIRED TORSION DYSTONIA: ICD-10-CM

## 2021-07-29 DIAGNOSIS — G31.85 CORTICOBASAL DEGENERATION (H): ICD-10-CM

## 2021-07-29 PROCEDURE — 64644 CHEMODENERV 1 EXTREM 5/> MUS: CPT | Performed by: PSYCHIATRY & NEUROLOGY

## 2021-07-29 PROCEDURE — 95970 ALYS NPGT W/O PRGRMG: CPT | Performed by: PSYCHIATRY & NEUROLOGY

## 2021-07-29 PROCEDURE — 64616 CHEMODENERV MUSC NECK DYSTON: CPT | Mod: 50 | Performed by: PSYCHIATRY & NEUROLOGY

## 2021-07-29 PROCEDURE — 95874 GUIDE NERV DESTR NEEDLE EMG: CPT | Performed by: PSYCHIATRY & NEUROLOGY

## 2021-07-29 PROCEDURE — 64643 CHEMODENERV 1 EXTREM 1-4 EA: CPT | Performed by: PSYCHIATRY & NEUROLOGY

## 2021-07-29 ASSESSMENT — PAIN SCALES - GENERAL: PAINLEVEL: NO PAIN (1)

## 2021-07-29 NOTE — LETTER
2021       RE: Sandra Jara  [address]  [place] [address]     Dear Colleague,    Thank you for referring your patient, Sandra Jara, to the [facility] NEUROLOGY CLINIC [place] at [facility]. Please see a copy of my visit note below.    Department of Neurology  Movement Disorders Division   DBS Follow-up Note  Botulinum Injection Note     Patient: Sandra Jara  MRN: [id number]   : 1965   Date of Visit: 2021     Diagnosis: CBS  DBS Target(s): L GPi   Date(s) of DBS Lead Placement:     Date(s) of IPG Placement:  Device: Abbott     Chief Complaint:  Sandra Jara is a 56 year old female who returns to clinic for follow up of CBS status post left GPi DBS and botulinum toxin injections of the RUE and RLE for the treatment of dystonia.    History of Present Illness:  Sandra Jara continues to have benefit from injections with reduction in pain and spasms. However, her disease continues to progress.      Response to Last Injection:        Benefit from last injections:  Improvement in neck pain and some loosening in the arms.     Side effects: She reports significant neck weakness requiring a neck brace. Wearing off about     Additional interval history: She has continued to have significant progression of disease. She is no longer able to speak.       Current Outpatient Medications   Medication Sig Dispense Refill     calcium carbonate 500 mg, elemental, (OSCAL 500) 1250 (500 Ca) MG TABS tablet Take by mouth every morning       Cholecalciferol (VITAMIN D) 2000 UNITS tablet Take 2,000 Units by mouth every morning        tiZANidine (ZANAFLEX) 4 MG tablet Take 1/2-1 tab po q6h prn for muscle spasms and pain (Patient taking differently: as needed Take 1/2-1 tab po q6h prn for muscle spasms and pain) 90 tablet 3     traMADol (ULTRAM) 50 MG tablet Take 1/2 to 1 tab by mouth every 6 hours as  needed, up to 3 tabs per day. (Patient not taking: Reported on 1/28/2021) 90 tablet 0       Allergies: She has No Known Allergies.    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 It demonstrated some mild changes of right carpal tunnel syndrome but was otherwise normal.       History of MRI of brain and brain stem 12/21/2014    A brain MRI scan done on 06/27/2014 revealed no abnormalities.      History of MRI of cervical spine 12/21/2014    A cervical MRI done on 06/10/2014 revealed some mild to moderate degenerative changes but no significant central canal or foraminal stenosis, and no abnormalities of the spinal cord were noted.       Movement disorder 12/21/2014    I saw your patient, Deepali Contreras on 12/15/2014. She is a 49-year-old right-handed female here for evaluation of right upper extremity dysfunction and right hand tremor.  She has noted this problem for the last couple of years. She reports she is losing dexterity in her right hand. She has appreciated a tremor of the right hand with actions such as writing or postural maintenance. She has n       Past Surgical History:   Procedure Laterality Date     APPENDECTOMY       IMPLANT DEEP BRAIN STIMULATION GENERATOR / BATTERY Left 3/1/2019    Procedure: Left Deep Brain Stimulator Placement, Phase II, Placement Of Deep Brain Stimulator Generator/Battery Over The Left Chest Wall Latex Free;  Surgeon: Efren Triana MD;  Location: [facility] OR     OPTICAL TRACKING SYSTEM INSERTION DEEP BRAIN STIMULATION Left 2/22/2019     Procedure: Stealth Assisted Left Side Deep Brain Stimulator Placement, Phase I, Placement Of Left Side Deep Brain Stimulator Electrode, Target Left Globus Pallidus Internus With Microelectrode Recording;  Surgeon: Efren Triana MD;  Location: [facility] OR     REPLACE DEEP BRAIN STIMULATION GENERATOR / BATTERY Left 2020    Procedure: Replacement of deep brain stimulator generator/battery over left chest wall;  Surgeon: Efren Triana MD;  Location: [facility] OR       Social History     Socioeconomic History     Marital status:      Spouse name: Not on file     Number of children: 3     Years of education: Not on file     Highest education level: Not on file   Occupational History     Not on file   Tobacco Use     Smoking status: Never Smoker     Smokeless tobacco: Never Used   Substance and Sexual Activity     Alcohol use: Yes     Alcohol/week: 2.0 - 3.0 standard drinks     Drug use: No     Sexual activity: Yes     Partners: Male     Birth control/protection: Male Surgical   Other Topics Concern     Parent/sibling w/ CABG, MI or angioplasty before 65F 55M? No   Social History Narrative        Izaiah Jara    L[facility] in [facility]      She does not smoke.  She has a couple of beers to drink on the weekend but otherwise is not a heavy user of alcohol    3 kids: son peña 21; 94, 96 and 98    2 sons    1 daughter    2 are in college and daughter is a sophomore.            FAMILY HISTORY:  Notable for a paternal aunt who is .  She had Parkinson's disease.  Otherwise, there is no other family history of neurologic problems.  There is no family history of dystonia.          90% Georgian    Some norweigian                .       Social Determinants of Health     Financial Resource Strain:      Difficulty of Paying Living Expenses:    Food Insecurity:      Worried About Running Out of Food in the Last Year:      Ran Out of Food in the Last Year:    Transportation Needs:      Lack  of Transportation (Medical):      Lack of Transportation (Non-Medical):    Physical Activity:      Days of Exercise per Week:      Minutes of Exercise per Session:    Stress:      Feeling of Stress :    Social Connections:      Frequency of Communication with Friends and Family:      Frequency of Social Gatherings with Friends and Family:      Attends [employer] Services:      Active Member of Clubs or Organizations:      Attends Club or Organization Meetings:      Marital Status:    Intimate Partner Violence:      Fear of Current or Ex-Partner:      Emotionally Abused:      Physically Abused:      Sexually Abused:        Family History   Problem Relation Age of Onset     Breast Cancer Mother      Cancer - colorectal Mother      Macular Degeneration Mother      Dementia Father      Deep Vein Thrombosis Father      No Known Problems Brother      No Known Problems Sister      No Known Problems Sister      No Known Problems Sister      Neurologic Disorder Paternal Aunt         Parkinson's Disease     Cerebrovascular Disease Brother         stroke at age 25 possibly from pfo     Dementia Paternal Grandmother        Physical Examination:  Vital Signs:   blood pressure is 130/79 and her pulse is 108. Her respiration is 16 and oxygen saturation is 99%.   She is alert, is able to response emotionally to  and provider, express wishes for injections, but is no longer able to speak.   Wheelchair bound. Prominent dystonia in both arms, rigid legs with dystonia.    BOTULINUM NEUROTOXIN INJECTION PROCEDURES:    VERIFICATION OF PATIENT IDENTIFICATION AND PROCEDURE     Initials   Patient Name LES   Patient  LES   Procedure Verified by: LES       INDICATION/S FOR PROCEDURE/S:  Sandra Jara is a 56 year old year old patient with dystonia affecting the  head, neck and shoulder girdle musculature, right upper extremity, left upper extremity and right lower extremity secondary to a diagnosis of corticobasal  degeneration with associated  pain, spasms, loss of joint motion and loss of volitional motor control.     Her baseline symptoms have been recalcitrant to oral medications and conservative therapy.  She is here today for an injection of Botox.      GOAL OF PROCEDURE:  The goal of this procedure is to increase active range of motion, improve volitional motor control and decrease pain  associated with dystonic movements and spasticity.    TOTAL DOSE ADMINISTERED:  Dose Administered:  590 units Botox    Diluent Used:  Preservative Free Normal Saline  Total Volume of Diluent Used:  6 ml  Lot # [id number] with Expiration Date:  4/30/2024  NDC #: Botox 200u (0732-7767-64) x 3 vials    CONSENT:  The risks, benefits, and treatment options were discussed with Sandra Jara and she agreed to proceed.       EQUIPMENT USED:  Needle-37mm stimulating/recording  EMG/NCS Machine    SKIN PREPARATION:  Skin preparation was performed using an alcohol wipe.    GUIDANCE DESCRIPTION:  Electro-myographic guidance was necessary throughout the procedure to accurately identify all areas of dystonic muscles while avoiding injection of non-dystonic muscles, neighboring nerves and nearby vascular structures.     AREA/MUSCLE INJECTED:      Visual display of locations injections are scanned into the chart under MEDIA tab.       Muscles Injected Units Injected Number of Injections   Right splenius capitus 0 (45) 0   Right trapezius 25 (55) 2   Right SCM 0 (35) 0   Right cervical paraspinals 0 (25) 0   Right biceps 50 (50) 2   Right brachialis 25 (25) 1   Right brachioradialis 25 (25) 1   Right levator scapulae 0 (15) 0   Right pronator teres 55 (15) 2   Right deltoid 45 (0) 2   Right tibialis anterior 100 (115) 4   Right flexor digitorum longus 0 (30) 0   Right extensor carpi radialis 20 (0) 1   Right Extensor carpi ulnaris 20 (0) 1   Right Flexor carpi ulnaris 20 (0) 1   Right flexor digitorum superficialis 40 (0) 2   Right triceps 50 (0)  2   Right masseter 30 (0) 1   Left masseter 30 (0) 1   Left splenius capitis 0 (50) 0   Left trapezius 0 (50) 2   Left opponens pollicis 5 (10) 1   Left flexor pollicis brevis 10 (10) 1   Left  flexor digitorum superficialis 40 (0) 2   Left longissmus 0 (30) 0                Total Units Injected: 590     Unavoidable Waste: 10     Total Units Billed 600          The patient tolerated the injections without difficulty.     Procedure: DBS Interrogation & Programming     Lead(s):     Left   DBS Target GPi   DBS Lead Type    Infinity 0.5   Lead Implant Date   2/22/19      IPG(s):    1   IPG Infinity 5   IPG Implant Date 11/24/2020   Location    L chest   Battery (V) 2.90V (2.91V)      Full Impedence/Currents Check: Normal     Initial and Final Settings:  Left Brain Program 1       GPi1 GPi2   Contacts C+;3(ABC)-4- C+;2(ABC)-   Amplitude (mA) 4.15 [4.1-5.4 by 0.05] 3.5 [3.5-4.5]   Pulse Width ( s) 150 90   Frequency (Hz) 125 125        Final Program selected:  Program 1: No changes were made.    Assessment:    Sandra Jara is a 56 year old female with CBS status post left GPi DBS and botulinum toxin injections of the neck, RUE and RLE for the treatment of dystonia (cervical dystonia, right arm, and right leg dystonia).  Today we did repeat botulinum toxin injections.    Plan  I recommend you call [facility] to request an Occupational therapy home safety evaluation    Another service to consider exploring for the future would be palliative care    Please try to assess and write down what benefits you have noted from these injections in 4-6 weeks.      55 minutes spent on the date of the encounter doing chart review, history and exam, documentation and further activities as noted above    Additional time spent for separate DBS programming:Analyzed without reprogramming      Tiffany Hopkins MD    of Neurology       Again, thank you for allowing me to participate in the care of your patient.       Sincerely,    Tiffany Hopkins MD

## 2021-07-29 NOTE — PROGRESS NOTES
Department of Neurology  Movement Disorders Division   DBS Follow-up Note  Botulinum Injection Note     Patient: Sandra Jara  MRN: [id number]   : 1965   Date of Visit: 2021     Diagnosis: CBS  DBS Target(s): L GPi   Date(s) of DBS Lead Placement:     Date(s) of IPG Placement:  Device: Abbott     Chief Complaint:  Sandra Jara is a 56 year old female who returns to clinic for follow up of CBS status post left GPi DBS and botulinum toxin injections of the RUE and RLE for the treatment of dystonia.    History of Present Illness:  Sandra Jara continues to have benefit from injections with reduction in pain and spasms. However, her disease continues to progress.      Response to Last Injection:        Benefit from last injections:  Improvement in neck pain and some loosening in the arms.     Side effects: She reports significant neck weakness requiring a neck brace. Wearing off about     Additional interval history: She has continued to have significant progression of disease. She is no longer able to speak.       Current Outpatient Medications   Medication Sig Dispense Refill     calcium carbonate 500 mg, elemental, (OSCAL 500) 1250 (500 Ca) MG TABS tablet Take by mouth every morning       Cholecalciferol (VITAMIN D) 2000 UNITS tablet Take 2,000 Units by mouth every morning        tiZANidine (ZANAFLEX) 4 MG tablet Take 1/2-1 tab po q6h prn for muscle spasms and pain (Patient taking differently: as needed Take 1/2-1 tab po q6h prn for muscle spasms and pain) 90 tablet 3     traMADol (ULTRAM) 50 MG tablet Take 1/2 to 1 tab by mouth every 6 hours as needed, up to 3 tabs per day. (Patient not taking: Reported on 2021) 90 tablet 0       Allergies: She has No Known Allergies.    Past Medical History:   Diagnosis Date     Action induced myoclonus 2015     Corticobasal syndrome (H) 2015     CTS (carpal tunnel syndrome) 2015     Disturbance of skin sensation  1/12/2015     Family history of breast cancer 1/12/2015     Family history of colon cancer 1/12/2015     Family history of Parkinson's disease 12/21/2014    Paternal aunt     History of EMG 12/21/2014    A right upper extremity EMG and nerve conduction study was done on 06/18/2014. It demonstrated some mild changes of right carpal tunnel syndrome but was otherwise normal.       History of MRI of brain and brain stem 12/21/2014    A brain MRI scan done on 06/27/2014 revealed no abnormalities.      History of MRI of cervical spine 12/21/2014    A cervical MRI done on 06/10/2014 revealed some mild to moderate degenerative changes but no significant central canal or foraminal stenosis, and no abnormalities of the spinal cord were noted.       Movement disorder 12/21/2014    I saw your patient, Deepali Contreras on 12/15/2014. She is a 49-year-old right-handed female here for evaluation of right upper extremity dysfunction and right hand tremor.  She has noted this problem for the last couple of years. She reports she is losing dexterity in her right hand. She has appreciated a tremor of the right hand with actions such as writing or postural maintenance. She has n       Past Surgical History:   Procedure Laterality Date     APPENDECTOMY       IMPLANT DEEP BRAIN STIMULATION GENERATOR / BATTERY Left 3/1/2019    Procedure: Left Deep Brain Stimulator Placement, Phase II, Placement Of Deep Brain Stimulator Generator/Battery Over The Left Chest Wall Latex Free;  Surgeon: Efren Triana MD;  Location: [facility] OR     OPTICAL TRACKING SYSTEM INSERTION DEEP BRAIN STIMULATION Left 2/22/2019    Procedure: Stealth Assisted Left Side Deep Brain Stimulator Placement, Phase I, Placement Of Left Side Deep Brain Stimulator Electrode, Target Left Globus Pallidus Internus With Microelectrode Recording;  Surgeon: Efren Triana MD;  Location: [facility] OR     REPLACE DEEP BRAIN STIMULATION GENERATOR / BATTERY Left 11/24/2020     Procedure: Replacement of deep brain stimulator generator/battery over left chest wall;  Surgeon: Efren Triana MD;  Location:  OR       Social History     Socioeconomic History     Marital status:      Spouse name: Not on file     Number of children: 3     Years of education: Not on file     Highest education level: Not on file   Occupational History     Not on file   Tobacco Use     Smoking status: Never Smoker     Smokeless tobacco: Never Used   Substance and Sexual Activity     Alcohol use: Yes     Alcohol/week: 2.0 - 3.0 standard drinks     Drug use: No     Sexual activity: Yes     Partners: Male     Birth control/protection: Male Surgical   Other Topics Concern     Parent/sibling w/ CABG, MI or angioplasty before 65F 55M? No   Social History Narrative        Izaiah Jara    Lives in [facility]      She does not smoke.  She has a couple of beers to drink on the weekend but otherwise is not a heavy user of alcohol    3 kids: son peña 21; 94, 96 and 98    2 sons    1 daughter    2 are in college and daughter is a sophomore.            FAMILY HISTORY:  Notable for a paternal aunt who is .  She had Parkinson's disease.  Otherwise, there is no other family history of neurologic problems.  There is no family history of dystonia.          90% Turkish    Some norweigian                .       Social Determinants of Health     Financial Resource Strain:      Difficulty of Paying Living Expenses:    Food Insecurity:      Worried About Running Out of Food in the Last Year:      Ran Out of Food in the Last Year:    Transportation Needs:      Lack of Transportation (Medical):      Lack of Transportation (Non-Medical):    Physical Activity:      Days of Exercise per Week:      Minutes of Exercise per Session:    Stress:      Feeling of Stress :    Social Connections:      Frequency of Communication with Friends and Family:      Frequency of Social Gatherings with Friends and  Family:      Attends Congregational Services:      Active Member of Clubs or Organizations:      Attends Club or Organization Meetings:      Marital Status:    Intimate Partner Violence:      Fear of Current or Ex-Partner:      Emotionally Abused:      Physically Abused:      Sexually Abused:        Family History   Problem Relation Age of Onset     Breast Cancer Mother      Cancer - colorectal Mother      Macular Degeneration Mother      Dementia Father      Deep Vein Thrombosis Father      No Known Problems Brother      No Known Problems Sister      No Known Problems Sister      No Known Problems Sister      Neurologic Disorder Paternal Aunt         Parkinson's Disease     Cerebrovascular Disease Brother         stroke at age 25 possibly from pfo     Dementia Paternal Grandmother        Physical Examination:  Vital Signs:   blood pressure is 130/79 and her pulse is 108. Her respiration is 16 and oxygen saturation is 99%.   She is alert, is able to response emotionally to  and provider, express wishes for injections, but is no longer able to speak.   Wheelchair bound. Prominent dystonia in both arms, rigid legs with dystonia.    BOTULINUM NEUROTOXIN INJECTION PROCEDURES:    VERIFICATION OF PATIENT IDENTIFICATION AND PROCEDURE     Initials   Patient Name LES   Patient  LES   Procedure Verified by: LES       INDICATION/S FOR PROCEDURE/S:  Sandra Jara is a 56 year old year old patient with dystonia affecting the  head, neck and shoulder girdle musculature, right upper extremity, left upper extremity and right lower extremity secondary to a diagnosis of corticobasal degeneration with associated  pain, spasms, loss of joint motion and loss of volitional motor control.     Her baseline symptoms have been recalcitrant to oral medications and conservative therapy.  She is here today for an injection of Botox.      GOAL OF PROCEDURE:  The goal of this procedure is to increase active range of motion,  improve volitional motor control and decrease pain  associated with dystonic movements and spasticity.    TOTAL DOSE ADMINISTERED:  Dose Administered:  590 units Botox    Diluent Used:  Preservative Free Normal Saline  Total Volume of Diluent Used:  6 ml  Lot # [id number] with Expiration Date:  4/30/2024  NDC #: Botox 200u (0111-3134-41) x 3 vials    CONSENT:  The risks, benefits, and treatment options were discussed with Sandra Jara and she agreed to proceed.       EQUIPMENT USED:  Needle-37mm stimulating/recording  EMG/NCS Machine    SKIN PREPARATION:  Skin preparation was performed using an alcohol wipe.    GUIDANCE DESCRIPTION:  Electro-myographic guidance was necessary throughout the procedure to accurately identify all areas of dystonic muscles while avoiding injection of non-dystonic muscles, neighboring nerves and nearby vascular structures.     AREA/MUSCLE INJECTED:      Visual display of locations injections are scanned into the chart under MEDIA tab.       Muscles Injected Units Injected Number of Injections   Right splenius capitus 0 (45) 0   Right trapezius 25 (55) 2   Right SCM 0 (35) 0   Right cervical paraspinals 0 (25) 0   Right biceps 50 (50) 2   Right brachialis 25 (25) 1   Right brachioradialis 25 (25) 1   Right levator scapulae 0 (15) 0   Right pronator teres 55 (15) 2   Right deltoid 45 (0) 2   Right tibialis anterior 100 (115) 4   Right flexor digitorum longus 0 (30) 0   Right extensor carpi radialis 20 (0) 1   Right Extensor carpi ulnaris 20 (0) 1   Right Flexor carpi ulnaris 20 (0) 1   Right flexor digitorum superficialis 40 (0) 2   Right triceps 50 (0) 2   Right masseter 30 (0) 1   Left masseter 30 (0) 1   Left splenius capitis 0 (50) 0   Left trapezius 0 (50) 2   Left opponens pollicis 5 (10) 1   Left flexor pollicis brevis 10 (10) 1   Left  flexor digitorum superficialis 40 (0) 2   Left longissmus 0 (30) 0                Total Units Injected: 590     Unavoidable Waste: 10      Total Units Billed 600          The patient tolerated the injections without difficulty.     Procedure: DBS Interrogation & Programming     Lead(s):     Left   DBS Target GPi   DBS Lead Type    Infinity 0.5   Lead Implant Date   2/22/19      IPG(s):    1   IPG Infinity 5   IPG Implant Date 11/24/2020   Location    L chest   Battery (V) 2.90V (2.91V)      Full Impedence/Currents Check: Normal     Initial and Final Settings:  Left Brain Program 1       GPi1 GPi2   Contacts C+;3(ABC)-4- C+;2(ABC)-   Amplitude (mA) 4.15 [4.1-5.4 by 0.05] 3.5 [3.5-4.5]   Pulse Width ( s) 150 90   Frequency (Hz) 125 125        Final Program selected:  Program 1: No changes were made.      Assessment:    Sandra Jara is a 56 year old female with CBS status post left GPi DBS and botulinum toxin injections of the neck, RUE and RLE for the treatment of dystonia (cervical dystonia, right arm, and right leg dystonia).  Today we did repeat botulinum toxin injections.    Plan  I recommend you call [facility] to request an Occupational therapy home safety evaluation    Another service to consider exploring for the future would be palliative care    Please try to assess and write down what benefits you have noted from these injections in 4-6 weeks.        55 minutes spent on the date of the encounter doing chart review, history and exam, documentation and further activities as noted above    Additional time spent for separate DBS programming:Analyzed without reprogramming      Tiffany Hopkins MD    of Neurology

## 2021-07-29 NOTE — PATIENT INSTRUCTIONS
I recommend you call [facility] to request an Occupational therapy home safety evaluation    Another service to consider exploring for the future would be palliative care    Please try to assess and write down what benefits you have noted from these injections in 4-6 weeks.

## 2021-10-26 NOTE — PROGRESS NOTES
Received office note from [facility]   Records Date of service: 10/25/21  Copy has been sent to scanning and encounter routed to specialty nurse for review.

## 2021-11-02 NOTE — PROGRESS NOTES
Department of Neurology  Movement Disorders Division   DBS Follow-up Note  Botulinum Injection Note     Patient: Sandra Jara  MRN: [id number]   : 1965   Date of Visit: 11/3/2021     Diagnosis: CBS  DBS Target(s): L GPi   Date(s) of DBS Lead Placement:     Date(s) of IPG Placement:  Device: Abbott     Chief Complaint:  Sandra Jara is a 56 year old female who returns to clinic for follow up of CBS status post left GPi DBS and botulinum toxin injections of the RUE and RLE for the treatment of dystonia.    History of Present Illness:      Response to Last Injection:  2021    Benefit from last injections: has helped the neck/shoulder pain    Still having right ankle/foot pain    Additional interval history: working with palliative care      Current Outpatient Medications   Medication Sig Dispense Refill     gabapentin (NEURONTIN) 300 MG capsule Take 1 capsule (300 mg) by mouth At Bedtime 30 capsule 11     calcium carbonate 500 mg, elemental, (OSCAL 500) 1250 (500 Ca) MG TABS tablet Take by mouth every morning       Cholecalciferol (VITAMIN D) 2000 UNITS tablet Take 2,000 Units by mouth every morning        tiZANidine (ZANAFLEX) 4 MG tablet Take 1/2-1 tab po q6h prn for muscle spasms and pain (Patient taking differently: as needed Take 1/2-1 tab po q6h prn for muscle spasms and pain) 90 tablet 3     traMADol (ULTRAM) 50 MG tablet Take 1/2 to 1 tab by mouth every 6 hours as needed, up to 3 tabs per day. (Patient not taking: Reported on 2021) 90 tablet 0       Allergies: She has No Known Allergies.    Past Medical History:   Diagnosis Date     Action induced myoclonus 2015     Corticobasal syndrome (H) 2015     CTS (carpal tunnel syndrome) 2015     Disturbance of skin sensation 2015     Family history of breast cancer 2015     Family history of colon cancer 2015     Family history of Parkinson's disease 2014    Paternal aunt     History of EMG  12/21/2014    A right upper extremity EMG and nerve conduction study was done on 06/18/2014. It demonstrated some mild changes of right carpal tunnel syndrome but was otherwise normal.       History of MRI of brain and brain stem 12/21/2014    A brain MRI scan done on 06/27/2014 revealed no abnormalities.      History of MRI of cervical spine 12/21/2014    A cervical MRI done on 06/10/2014 revealed some mild to moderate degenerative changes but no significant central canal or foraminal stenosis, and no abnormalities of the spinal cord were noted.       Movement disorder 12/21/2014    I saw your patient, Deepali Contreras on 12/15/2014. She is a 49-year-old right-handed female here for evaluation of right upper extremity dysfunction and right hand tremor.  She has noted this problem for the last couple of years. She reports she is losing dexterity in her right hand. She has appreciated a tremor of the right hand with actions such as writing or postural maintenance. She has n       Past Surgical History:   Procedure Laterality Date     APPENDECTOMY       IMPLANT DEEP BRAIN STIMULATION GENERATOR / BATTERY Left 3/1/2019    Procedure: Left Deep Brain Stimulator Placement, Phase II, Placement Of Deep Brain Stimulator Generator/Battery Over The Left Chest Wall Latex Free;  Surgeon: Efren Triana MD;  Location: [facility] OR     OPTICAL TRACKING SYSTEM INSERTION DEEP BRAIN STIMULATION Left 2/22/2019    Procedure: Stealth Assisted Left Side Deep Brain Stimulator Placement, Phase I, Placement Of Left Side Deep Brain Stimulator Electrode, Target Left Globus Pallidus Internus With Microelectrode Recording;  Surgeon: Efren Triana MD;  Location: [facility] OR     REPLACE DEEP BRAIN STIMULATION GENERATOR / BATTERY Left 11/24/2020    Procedure: Replacement of deep brain stimulator generator/battery over left chest wall;  Surgeon: Efren Triana MD;  Location: [facility] OR       Social History     Socioeconomic History      Marital status:      Spouse name: Not on file     Number of children: 3     Years of education: Not on file     Highest education level: Not on file   Occupational History     Not on file   Tobacco Use     Smoking status: Never Smoker     Smokeless tobacco: Never Used   Substance and Sexual Activity     Alcohol use: Yes     Alcohol/week: 2.0 - 3.0 standard drinks     Drug use: No     Sexual activity: Yes     Partners: Male     Birth control/protection: Male Surgical   Other Topics Concern     Parent/sibling w/ CABG, MI or angioplasty before 65F 55M? No   Social History Narrative        Izaiah Jara    Lives in [facility]      She does not smoke.  She has a couple of beers to drink on the weekend but otherwise is not a heavy user of alcohol    3 kids: son peña 21; 94, 96 and 98    2 sons    1 daughter    2 are in college and daughter is a sophomore.            FAMILY HISTORY:  Notable for a paternal aunt who is .  She had Parkinson's disease.  Otherwise, there is no other family history of neurologic problems.  There is no family history of dystonia.          90% Kazakh    Some norweigian                .       Social Determinants of Health     Financial Resource Strain:      Difficulty of Paying Living Expenses:    Food Insecurity:      Worried About Running Out of Food in the Last Year:      Ran Out of Food in the Last Year:    Transportation Needs:      Lack of Transportation (Medical):      Lack of Transportation (Non-Medical):    Physical Activity:      Days of Exercise per Week:      Minutes of Exercise per Session:    Stress:      Feeling of Stress :    Social Connections:      Frequency of Communication with Friends and Family:      Frequency of Social Gatherings with Friends and Family:      Attends Adventism Services:      Active Member of Clubs or Organizations:      Attends Club or Organization Meetings:      Marital Status:    Intimate Partner Violence:      Fear  of Current or Ex-Partner:      Emotionally Abused:      Physically Abused:      Sexually Abused:        Family History   Problem Relation Age of Onset     Breast Cancer Mother      Cancer - colorectal Mother      Macular Degeneration Mother      Dementia Father      Deep Vein Thrombosis Father      No Known Problems Brother      No Known Problems Sister      No Known Problems Sister      No Known Problems Sister      Neurologic Disorder Paternal Aunt         Parkinson's Disease     Cerebrovascular Disease Brother         stroke at age 25 possibly from pfo     Dementia Paternal Grandmother        Physical Examination:  Vital Signs:   blood pressure is 121/72 and her pulse is 100. Her respiration is 16 and oxygen saturation is 97%.       BOTULINUM NEUROTOXIN INJECTION PROCEDURES:    VERIFICATION OF PATIENT IDENTIFICATION AND PROCEDURE     Initials   Patient Name ACC   Patient  acc   Procedure Verified by: [facility]       INDICATION/S FOR PROCEDURE/S:  Sandra Jara is a 56 year old year old patient with dystonia affecting the  head, neck and shoulder girdle musculature, right upper extremity, left upper extremity and right lower extremity secondary to a diagnosis of corticobasal degeneration with associated  pain, spasms, loss of joint motion and loss of volitional motor control.     Her baseline symptoms have been recalcitrant to oral medications and conservative therapy.  She is here today for an injection of Botox.      GOAL OF PROCEDURE:  The goal of this procedure is to increase active range of motion, improve volitional motor control and decrease pain  associated with dystonic movements and spasticity.    TOTAL DOSE ADMINISTERED:  Dose Administered:  600 units Botox    Diluent Used:  Preservative Free Normal Saline  Total Volume of Diluent Used:  6 ml  NDC #: Botox 100u ([address]-69)    CONSENT:  The risks, benefits, and treatment options were discussed with Sandra Jara and she agreed to  proceed.       EQUIPMENT USED:  Needle-37mm stimulating/recording  EMG/NCS Machine    SKIN PREPARATION:  Skin preparation was performed using an alcohol wipe.    GUIDANCE DESCRIPTION:  Electro-myographic guidance was necessary throughout the procedure to accurately identify all areas of dystonic muscles while avoiding injection of non-dystonic muscles, neighboring nerves and nearby vascular structures.     AREA/MUSCLE INJECTED:      Visual display of locations injections are scanned into the chart under MEDIA tab.       Muscles Injected Units Injected Number of Injections   Right trapezius 25 (25) 2   Right biceps 40 (50) 2   Right brachialis 25 (25) 1   Right brachioradialis 15 (25) 1   Right pronator teres 50 (55) 2   Right deltoid 40 (45) 2   Right tibialis anterior 100 (100) 5   Right flexor digitorum longus 20 (0) 1   Right flexor digitorum brevis 60 (0) 1   Right extensor carpi radialis 20 (20) 1   Right extensor carpi ulnaris 20 (20) 1   Right flexor carpi ulnaris 0 (20) 0   Right flexor digitorum superficialis 40 (40) 2   Right triceps 25 (50) 1   Right masseter 0 (30) 0   Left masseter 0 (30) 0   Left opponens pollicis 0 (5) 0   Left flexor pollicis brevis 10 (10) 1   Left  flexor digitorum superficialis 40 (40) 2   Left biceps 25 (0) 1   Left brachioradialis 45 (0) 2           Total Units Injected: 600     Unavoidable Waste: 0     Total Units Billed 600          The patient tolerated the injections without difficulty.       Assessment:    Sandra Jara is a 56 year old female with CBS status post left GPi DBS and botulinum toxin injections of the neck, RUE and RLE for the treatment of dystonia (cervical dystonia, right arm, and right leg dystonia).  Today we did repeat botulinum toxin injections.    Plan  Return in 3 months to consider repeat injections  Trial of gabapentin at bedtime, starting at 300mg, for pain    Neurology Attending Attestation:     ITiffany MD, personally saw this  patient with our Movement Disorders Fellow and agree with the fellow's findings and plan of care as documented in the movement disorder fellow's note. I personally performed salient aspects of the history and neurological examination.     I personally reviewed the vital signs, medications, and labs. I personally viewed the imaging, and agree with the interpretation documented by the fellow.    I personally performed or supervised all procedures.    25 minutes spent on the date of the encounter doing chart review, history and exam, documentation and further activities as noted above      Tiffany Hopkins MD    of Neurology

## 2021-11-04 NOTE — LETTER
2021       RE: Sandra Jara  [address]  [place] [address]     Dear Colleague,    Thank you for referring your patient, Sandra Jara, to the [facility] [place] at [facility]. Please see a copy of my visit note below.    Department of Neurology  Movement Disorders Division   DBS Follow-up Note  Botulinum Injection Note     Patient: Sandra Jara  MRN: [id number]   : 1965   Date of Visit: 11/3/2021     Diagnosis: CBS  DBS Target(s): L GPi   Date(s) of DBS Lead Placement:     Date(s) of IPG Placement:  Device: Abbott     Chief Complaint:  Sandra Jara is a 56 year old female who returns to clinic for follow up of CBS status post left GPi DBS and botulinum toxin injections of the RUE and RLE for the treatment of dystonia.    History of Present Illness:      Response to Last Injection:  2021    Benefit from last injections: has helped the neck/shoulder pain    Still having right ankle/foot pain    Additional interval history: working with palliative care      Current Outpatient Medications   Medication Sig Dispense Refill     gabapentin (NEURONTIN) 300 MG capsule Take 1 capsule (300 mg) by mouth At Bedtime 30 capsule 11     calcium carbonate 500 mg, elemental, (OSCAL 500) 1250 (500 Ca) MG TABS tablet Take by mouth every morning       Cholecalciferol (VITAMIN D) 2000 UNITS tablet Take 2,000 Units by mouth every morning        tiZANidine (ZANAFLEX) 4 MG tablet Take 1/2-1 tab po q6h prn for muscle spasms and pain (Patient taking differently: as needed Take 1/2-1 tab po q6h prn for muscle spasms and pain) 90 tablet 3     traMADol (ULTRAM) 50 MG tablet Take 1/2 to 1 tab by mouth every 6 hours as needed, up to 3 tabs per day. (Patient not taking: Reported on 2021) 90 tablet 0       Allergies: She has No Known Allergies.    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 It demonstrated some mild changes of right carpal tunnel syndrome but was otherwise normal.       History of MRI of brain and brain stem 12/21/2014    A brain MRI scan done on 06/27/2014 revealed no abnormalities.      History of MRI of cervical spine 12/21/2014    A cervical MRI done on 06/10/2014 revealed some mild to moderate degenerative changes but no significant central canal or foraminal stenosis, and no abnormalities of the spinal cord were noted.       Movement disorder 12/21/2014    I saw your patient, Deepali Contreras on 12/15/2014. She is a 49-year-old right-handed female here for evaluation of right upper extremity dysfunction and right hand tremor.  She has noted this problem for the last couple of years. She reports she is losing dexterity in her right hand. She has appreciated a tremor of the right hand with actions such as writing or postural maintenance. She has n       Past Surgical History:   Procedure Laterality Date     APPENDECTOMY       IMPLANT DEEP BRAIN STIMULATION GENERATOR / BATTERY Left 3/1/2019    Procedure: Left Deep Brain Stimulator Placement, Phase II, Placement Of Deep Brain Stimulator Generator/Battery Over The Left Chest Wall Latex Free;  Surgeon: Efren Triana MD;  Location: [facility] OR     OPTICAL TRACKING SYSTEM INSERTION DEEP BRAIN STIMULATION Left 2/22/2019    Procedure: Stealth Assisted Left Side Deep Brain Stimulator Placement, Phase I, Placement Of Left Side Deep Brain Stimulator Electrode, Target Left Globus Pallidus Internus With  Microelectrode Recording;  Surgeon: Efren Triana MD;  Location: [facility] OR     REPLACE DEEP BRAIN STIMULATION GENERATOR / BATTERY Left 2020    Procedure: Replacement of deep brain stimulator generator/battery over left chest wall;  Surgeon: Efren Triana MD;  Location: [facility] OR       Social History     Socioeconomic History     Marital status:      Spouse name: Not on file     Number of children: 3     Years of education: Not on file     Highest education level: Not on file   Occupational History     Not on file   Tobacco Use     Smoking status: Never Smoker     Smokeless tobacco: Never Used   Substance and Sexual Activity     Alcohol use: Yes     Alcohol/week: 2.0 - 3.0 standard drinks     Drug use: No     Sexual activity: Yes     Partners: Male     Birth control/protection: Male Surgical   Other Topics Concern     Parent/sibling w/ CABG, MI or angioplasty before 65F 55M? No   Social History Narrative        Izaiah Jara    Lives in [facility]      She does not smoke.  She has a couple of beers to drink on the weekend but otherwise is not a heavy user of alcohol    3 kids: son peña 21; 94, 96 and 98    2 sons    1 daughter    2 are in college and daughter is a sophomore.            FAMILY HISTORY:  Notable for a paternal aunt who is .  She had Parkinson's disease.  Otherwise, there is no other family history of neurologic problems.  There is no family history of dystonia.          90% Croatian    Some norweigian                .       Social Determinants of Health     Financial Resource Strain:      Difficulty of Paying Living Expenses:    Food Insecurity:      Worried About Running Out of Food in the Last Year:      Ran Out of Food in the Last Year:    Transportation Needs:      Lack of Transportation (Medical):      Lack of Transportation (Non-Medical):    Physical Activity:      Days of Exercise per Week:      Minutes of Exercise per Session:    Stress:       Feeling of Stress :    Social Connections:      Frequency of Communication with Friends and Family:      Frequency of Social Gatherings with Friends and Family:      Attends Mu-ism Services:      Active Member of Clubs or Organizations:      Attends Club or Organization Meetings:      Marital Status:    Intimate Partner Violence:      Fear of Current or Ex-Partner:      Emotionally Abused:      Physically Abused:      Sexually Abused:        Family History   Problem Relation Age of Onset     Breast Cancer Mother      Cancer - colorectal Mother      Macular Degeneration Mother      Dementia Father      Deep Vein Thrombosis Father      No Known Problems Brother      No Known Problems Sister      No Known Problems Sister      No Known Problems Sister      Neurologic Disorder Paternal Aunt         Parkinson's Disease     Cerebrovascular Disease Brother         stroke at age 25 possibly from pfo     Dementia Paternal Grandmother        Physical Examination:  Vital Signs:   blood pressure is 121/72 and her pulse is 100. Her respiration is 16 and oxygen saturation is 97%.       BOTULINUM NEUROTOXIN INJECTION PROCEDURES:    VERIFICATION OF PATIENT IDENTIFICATION AND PROCEDURE     Initials   Patient Name ACC   Patient  acc   Procedure Verified by: [facility]       INDICATION/S FOR PROCEDURE/S:  Sandra Jara is a 56 year old year old patient with dystonia affecting the  head, neck and shoulder girdle musculature, right upper extremity, left upper extremity and right lower extremity secondary to a diagnosis of corticobasal degeneration with associated  pain, spasms, loss of joint motion and loss of volitional motor control.     Her baseline symptoms have been recalcitrant to oral medications and conservative therapy.  She is here today for an injection of Botox.      GOAL OF PROCEDURE:  The goal of this procedure is to increase active range of motion, improve volitional motor control and decrease pain   associated with dystonic movements and spasticity.    TOTAL DOSE ADMINISTERED:  Dose Administered:  600 units Botox    Diluent Used:  Preservative Free Normal Saline  Total Volume of Diluent Used:  6 ml  NDC #: Botox 100u ([address]-59)    CONSENT:  The risks, benefits, and treatment options were discussed with Sandra Jara and she agreed to proceed.       EQUIPMENT USED:  Needle-37mm stimulating/recording  EMG/NCS Machine    SKIN PREPARATION:  Skin preparation was performed using an alcohol wipe.    GUIDANCE DESCRIPTION:  Electro-myographic guidance was necessary throughout the procedure to accurately identify all areas of dystonic muscles while avoiding injection of non-dystonic muscles, neighboring nerves and nearby vascular structures.     AREA/MUSCLE INJECTED:      Visual display of locations injections are scanned into the chart under MEDIA tab.       Muscles Injected Units Injected Number of Injections   Right trapezius 25 (25) 2   Right biceps 40 (50) 2   Right brachialis 25 (25) 1   Right brachioradialis 15 (25) 1   Right pronator teres 50 (55) 2   Right deltoid 40 (45) 2   Right tibialis anterior 100 (100) 5   Right flexor digitorum longus 20 (0) 1   Right flexor digitorum brevis 60 (0) 1   Right extensor carpi radialis 20 (20) 1   Right extensor carpi ulnaris 20 (20) 1   Right flexor carpi ulnaris 0 (20) 0   Right flexor digitorum superficialis 40 (40) 2   Right triceps 25 (50) 1   Right masseter 0 (30) 0   Left masseter 0 (30) 0   Left opponens pollicis 0 (5) 0   Left flexor pollicis brevis 10 (10) 1   Left  flexor digitorum superficialis 40 (40) 2   Left biceps 25 (0) 1   Left brachioradialis 45 (0) 2           Total Units Injected: 600     Unavoidable Waste: 0     Total Units Billed 600          The patient tolerated the injections without difficulty.       Assessment:    Sandra Jara is a 56 year old female with CBS status post left GPi DBS and botulinum toxin injections of  the neck, RUE and RLE for the treatment of dystonia (cervical dystonia, right arm, and right leg dystonia).  Today we did repeat botulinum toxin injections.    Plan  Return in 3 months to consider repeat injections  Trial of gabapentin at bedtime, starting at 300mg, for pain    Neurology Attending Attestation:     I, Tiffany Hopkins MD, personally saw this patient with our Movement Disorders Fellow and agree with the fellow's findings and plan of care as documented in the movement disorder fellow's note. I personally performed salient aspects of the history and neurological examination.     I personally reviewed the vital signs, medications, and labs. I personally viewed the imaging, and agree with the interpretation documented by the fellow.    I personally performed or supervised all procedures.    25 minutes spent on the date of the encounter doing chart review, history and exam, documentation and further activities as noted above      Tiffany Hopkins MD    of Neurology         Again, thank you for allowing me to participate in the care of your patient.      Sincerely,    Tiffany Hopkins MD

## 2021-11-04 NOTE — NURSING NOTE
Chief Complaint   Patient presents with     RECHECK     [facility] RETURN BOTOX - 3 mo. f/u     Wesley Tobar

## 2022-01-01 ENCOUNTER — DOCUMENTATION ONLY (OUTPATIENT)
Dept: NEUROLOGY | Facility: CLINIC | Age: 57
End: 2022-01-01
Payer: COMMERCIAL

## 2022-01-01 ENCOUNTER — TELEPHONE (OUTPATIENT)
Dept: PHYSICAL MEDICINE AND REHAB | Facility: CLINIC | Age: 57
End: 2022-01-01

## 2022-01-01 ENCOUNTER — TELEPHONE (OUTPATIENT)
Dept: NEUROLOGY | Facility: CLINIC | Age: 57
End: 2022-01-01

## 2022-01-01 ENCOUNTER — OFFICE VISIT (OUTPATIENT)
Dept: NEUROLOGY | Facility: CLINIC | Age: 57
End: 2022-01-01
Payer: MEDICARE

## 2022-01-01 ENCOUNTER — HEALTH MAINTENANCE LETTER (OUTPATIENT)
Age: 57
End: 2022-01-01

## 2022-01-01 ENCOUNTER — DOCUMENTATION ONLY (OUTPATIENT)
Dept: NEUROLOGY | Facility: CLINIC | Age: 57
End: 2022-01-01

## 2022-01-01 ENCOUNTER — OFFICE VISIT (OUTPATIENT)
Dept: NEUROLOGY | Facility: CLINIC | Age: 57
End: 2022-01-01
Payer: COMMERCIAL

## 2022-01-01 VITALS
DIASTOLIC BLOOD PRESSURE: 94 MMHG | SYSTOLIC BLOOD PRESSURE: 135 MMHG | OXYGEN SATURATION: 94 % | HEART RATE: 89 BPM | RESPIRATION RATE: 16 BRPM

## 2022-01-01 VITALS — SYSTOLIC BLOOD PRESSURE: 144 MMHG | HEART RATE: 122 BPM | OXYGEN SATURATION: 90 % | DIASTOLIC BLOOD PRESSURE: 110 MMHG

## 2022-01-01 VITALS
SYSTOLIC BLOOD PRESSURE: 121 MMHG | DIASTOLIC BLOOD PRESSURE: 77 MMHG | RESPIRATION RATE: 16 BRPM | OXYGEN SATURATION: 97 % | HEART RATE: 101 BPM

## 2022-01-01 DIAGNOSIS — G31.85 CORTICOBASAL DEGENERATION (H): Primary | ICD-10-CM

## 2022-01-01 DIAGNOSIS — G24.9 DYSTONIA: ICD-10-CM

## 2022-01-01 DIAGNOSIS — G24.8 DYSTONIA OF EXTREMITY: ICD-10-CM

## 2022-01-01 DIAGNOSIS — G24.3 CERVICAL DYSTONIA: ICD-10-CM

## 2022-01-01 DIAGNOSIS — G24.8 ACQUIRED TORSION DYSTONIA: ICD-10-CM

## 2022-01-01 DIAGNOSIS — G24.9 DYSTONIA: Primary | ICD-10-CM

## 2022-01-01 DIAGNOSIS — G24.1 DYSTONIA, TORSION, FRAGMENTS OF: ICD-10-CM

## 2022-01-01 DIAGNOSIS — G31.85 CORTICOBASAL SYNDROME (H): Primary | ICD-10-CM

## 2022-01-01 DIAGNOSIS — Z96.89 S/P DEEP BRAIN STIMULATOR PLACEMENT: ICD-10-CM

## 2022-01-01 PROCEDURE — 64644 CHEMODENERV 1 EXTREM 5/> MUS: CPT | Mod: GW | Performed by: STUDENT IN AN ORGANIZED HEALTH CARE EDUCATION/TRAINING PROGRAM

## 2022-01-01 PROCEDURE — 64644 CHEMODENERV 1 EXTREM 5/> MUS: CPT | Performed by: STUDENT IN AN ORGANIZED HEALTH CARE EDUCATION/TRAINING PROGRAM

## 2022-01-01 PROCEDURE — 64643 CHEMODENERV 1 EXTREM 1-4 EA: CPT | Performed by: STUDENT IN AN ORGANIZED HEALTH CARE EDUCATION/TRAINING PROGRAM

## 2022-01-01 PROCEDURE — 99207 PR SATISFY VISIT NUMBER: CPT | Performed by: STUDENT IN AN ORGANIZED HEALTH CARE EDUCATION/TRAINING PROGRAM

## 2022-01-01 PROCEDURE — 95874 GUIDE NERV DESTR NEEDLE EMG: CPT | Mod: GW | Performed by: STUDENT IN AN ORGANIZED HEALTH CARE EDUCATION/TRAINING PROGRAM

## 2022-01-01 PROCEDURE — 64646 CHEMODENERV TRUNK MUSC 1-5: CPT | Performed by: STUDENT IN AN ORGANIZED HEALTH CARE EDUCATION/TRAINING PROGRAM

## 2022-01-01 PROCEDURE — 64643 CHEMODENERV 1 EXTREM 1-4 EA: CPT | Mod: GC | Performed by: STUDENT IN AN ORGANIZED HEALTH CARE EDUCATION/TRAINING PROGRAM

## 2022-01-01 PROCEDURE — 99207 PR NEEDLE EMG GUIDE W CHEMODENERVATION: CPT

## 2022-01-01 PROCEDURE — 95874 GUIDE NERV DESTR NEEDLE EMG: CPT | Performed by: STUDENT IN AN ORGANIZED HEALTH CARE EDUCATION/TRAINING PROGRAM

## 2022-01-01 PROCEDURE — 99215 OFFICE O/P EST HI 40 MIN: CPT | Mod: 25 | Performed by: STUDENT IN AN ORGANIZED HEALTH CARE EDUCATION/TRAINING PROGRAM

## 2022-01-01 PROCEDURE — 64644 CHEMODENERV 1 EXTREM 5/> MUS: CPT | Mod: GC | Performed by: STUDENT IN AN ORGANIZED HEALTH CARE EDUCATION/TRAINING PROGRAM

## 2022-01-01 PROCEDURE — 95970 ALYS NPGT W/O PRGRMG: CPT | Performed by: STUDENT IN AN ORGANIZED HEALTH CARE EDUCATION/TRAINING PROGRAM

## 2022-01-01 PROCEDURE — 99207 PR NO BILLABLE SERVICE THIS VISIT: CPT | Mod: GC | Performed by: STUDENT IN AN ORGANIZED HEALTH CARE EDUCATION/TRAINING PROGRAM

## 2022-01-01 PROCEDURE — 64646 CHEMODENERV TRUNK MUSC 1-5: CPT | Mod: GW | Performed by: STUDENT IN AN ORGANIZED HEALTH CARE EDUCATION/TRAINING PROGRAM

## 2022-01-01 PROCEDURE — 64643 CHEMODENERV 1 EXTREM 1-4 EA: CPT | Mod: GW | Performed by: STUDENT IN AN ORGANIZED HEALTH CARE EDUCATION/TRAINING PROGRAM

## 2022-01-01 PROCEDURE — 64612 DESTROY NERVE FACE MUSCLE: CPT | Mod: 50 | Performed by: STUDENT IN AN ORGANIZED HEALTH CARE EDUCATION/TRAINING PROGRAM

## 2022-01-01 PROCEDURE — 95874 GUIDE NERV DESTR NEEDLE EMG: CPT | Mod: GC | Performed by: STUDENT IN AN ORGANIZED HEALTH CARE EDUCATION/TRAINING PROGRAM

## 2022-02-02 NOTE — PROGRESS NOTES
Received physican statement, form was completed and waiting for provider signature     Received form back signed by provider and e-mailed to patient  [email or web address],per patient , e-mail was given by , copy was also mailed to patient home

## 2022-02-08 NOTE — PROGRESS NOTES
Department of Neurology  Movement Disorders Division   DBS Follow-up Note  Botulinum Injection Note     Patient: Sandra Jara  MRN: [id number]   : 1965   Date of Visit: 2/10/2022     Diagnosis: CBS  DBS Target(s): L GPi   Date(s) of DBS Lead Placement:   Date(s) of IPG Placement:  Device: Abbott     Chief Complaint:  Sandra Jara is a 56 year old female who returns to clinic for follow up of CBS status post left GPi DBS and botulinum toxin injections of the RUE and RLE for the treatment of dystonia.    History of Present Illness:      Response to Last Injection:  2021    Benefit from last injections: excellent pain control    Side effects: none    Additional interval history: working with palliative care, having difficulty eating and losing weight    Current Outpatient Medications   Medication Sig Dispense Refill     calcium carbonate 500 mg, elemental, (OSCAL 500) 1250 (500 Ca) MG TABS tablet Take by mouth every morning       Cholecalciferol (VITAMIN D) 2000 UNITS tablet Take 2,000 Units by mouth every morning        gabapentin (NEURONTIN) 300 MG capsule Take 1 capsule (300 mg) by mouth At Bedtime 30 capsule 11     tiZANidine (ZANAFLEX) 4 MG tablet Take 1/2-1 tab po q6h prn for muscle spasms and pain (Patient taking differently: as needed Take 1/2-1 tab po q6h prn for muscle spasms and pain) 90 tablet 3     traMADol (ULTRAM) 50 MG tablet Take 1/2 to 1 tab by mouth every 6 hours as needed, up to 3 tabs per day. (Patient not taking: Reported on 2021) 90 tablet 0       Allergies: She has No Known Allergies.    Past Medical History:   Diagnosis Date     Action induced myoclonus 2015     Corticobasal syndrome (H) 2015     CTS (carpal tunnel syndrome) 2015     Disturbance of skin sensation 2015     Family history of breast cancer 2015     Family history of colon cancer 2015     Family history of Parkinson's disease 2014    Paternal aunt      History of EMG 12/21/2014    A right upper extremity EMG and nerve conduction study was done on 06/18/2014. It demonstrated some mild changes of right carpal tunnel syndrome but was otherwise normal.       History of MRI of brain and brain stem 12/21/2014    A brain MRI scan done on 06/27/2014 revealed no abnormalities.      History of MRI of cervical spine 12/21/2014    A cervical MRI done on 06/10/2014 revealed some mild to moderate degenerative changes but no significant central canal or foraminal stenosis, and no abnormalities of the spinal cord were noted.       Movement disorder 12/21/2014    I saw your patient, Deepali Contreras on 12/15/2014. She is a 49-year-old right-handed female here for evaluation of right upper extremity dysfunction and right hand tremor.  She has noted this problem for the last couple of years. She reports she is losing dexterity in her right hand. She has appreciated a tremor of the right hand with actions such as writing or postural maintenance. She has n       Past Surgical History:   Procedure Laterality Date     APPENDECTOMY       IMPLANT DEEP BRAIN STIMULATION GENERATOR / BATTERY Left 3/1/2019    Procedure: Left Deep Brain Stimulator Placement, Phase II, Placement Of Deep Brain Stimulator Generator/Battery Over The Left Chest Wall Latex Free;  Surgeon: Efren Triana MD;  Location: [facility] OR     OPTICAL TRACKING SYSTEM INSERTION DEEP BRAIN STIMULATION Left 2/22/2019    Procedure: Stealth Assisted Left Side Deep Brain Stimulator Placement, Phase I, Placement Of Left Side Deep Brain Stimulator Electrode, Target Left Globus Pallidus Internus With Microelectrode Recording;  Surgeon: Efren Triana MD;  Location: [facility] OR     REPLACE DEEP BRAIN STIMULATION GENERATOR / BATTERY Left 11/24/2020    Procedure: Replacement of deep brain stimulator generator/battery over left chest wall;  Surgeon: Efren Triana MD;  Location: [facility] OR       Social History      Socioeconomic History     Marital status:      Spouse name: Not on file     Number of children: 3     Years of education: Not on file     Highest education level: Not on file   Occupational History     Not on file   Tobacco Use     Smoking status: Never Smoker     Smokeless tobacco: Never Used   Substance and Sexual Activity     Alcohol use: Yes     Alcohol/week: 2.0 - 3.0 standard drinks     Drug use: No     Sexual activity: Yes     Partners: Male     Birth control/protection: Male Surgical   Other Topics Concern     Parent/sibling w/ CABG, MI or angioplasty before 65F 55M? No   Social History Narrative        Izaiah Jara    Lives in [facility]      She does not smoke.  She has a couple of beers to drink on the weekend but otherwise is not a heavy user of alcohol    3 kids: son peña 21; 94, 96 and 98    2 sons    1 daughter    2 are in college and daughter is a sophomore.            FAMILY HISTORY:  Notable for a paternal aunt who is .  She had Parkinson's disease.  Otherwise, there is no other family history of neurologic problems.  There is no family history of dystonia.          90% Kiswahili    Some norweigian                .       Social Determinants of Health     Financial Resource Strain: Not on file   Food Insecurity: Not on file   Transportation Needs: Not on file   Physical Activity: Not on file   Stress: Not on file   Social Connections: Not on file   Intimate Partner Violence: Not on file   Housing Stability: Not on file       Family History   Problem Relation Age of Onset     Breast Cancer Mother      Cancer - colorectal Mother      Macular Degeneration Mother      Dementia Father      Deep Vein Thrombosis Father      No Known Problems Brother      No Known Problems Sister      No Known Problems Sister      No Known Problems Sister      Neurologic Disorder Paternal Aunt         Parkinson's Disease     Cerebrovascular Disease Brother         stroke at age 25  possibly from pfo     Dementia Paternal Grandmother        Physical Examination:  Vital Signs:   blood pressure is 121/77 and her pulse is 101. Her respiration is 16 and oxygen saturation is 97%.       BOTULINUM NEUROTOXIN INJECTION PROCEDURES:    VERIFICATION OF PATIENT IDENTIFICATION AND PROCEDURE     Initials   Patient Name [facility]   Patient  [facility]   Procedure Verified by: [facility]       INDICATION/S FOR PROCEDURE/S:  Sandra Jara is a 56 year old year old patient with dystonia affecting the  head, neck and shoulder girdle musculature, right upper extremity, left upper extremity and right lower extremity secondary to a diagnosis of corticobasal degeneration with associated  pain, spasms, loss of joint motion and loss of volitional motor control.     Her baseline symptoms have been recalcitrant to oral medications and conservative therapy.  She is here today for an injection of Botox.      GOAL OF PROCEDURE:  The goal of this procedure is to increase active range of motion, improve volitional motor control and decrease pain  associated with dystonic movements and spasticity.    TOTAL DOSE ADMINISTERED:  Dose Administered: 590 units Botox    Diluent Used:  Preservative Free Normal Saline  Total Volume of Diluent Used: 6 ml  NDC #: Botox 100u ([id number])    CONSENT:  The risks, benefits, and treatment options were discussed with Sandra Jara and she agreed to proceed.       EQUIPMENT USED:  Needle-37mm stimulating/recording  EMG/NCS Machine    SKIN PREPARATION:  Skin preparation was performed using an alcohol wipe.    GUIDANCE DESCRIPTION:  Electro-myographic guidance was necessary throughout the procedure to accurately identify all areas of dystonic muscles while avoiding injection of non-dystonic muscles, neighboring nerves and nearby vascular structures.     AREA/MUSCLE INJECTED:      Visual display of locations injections are scanned into the chart under MEDIA tab.    Muscles Injected Units  Injected Number of Injections   Right trapezius 25 (25) 2   Right biceps 40 (40) 2   Right brachialis 15 (25) 1   Right brachioradialis 15 (25) 1   Right pronator teres 30 (50) 1   Right deltoid 30 (40) 2   Right tibialis anterior 120 (100) 4   Right flexor digitorum longus 20 (20) 1   Right flexor digitorum brevis 60 (60) 1   Right extensor carpi radialis 20 (20) 1   Right extensor carpi ulnaris 10 (20) 1   Right posterior tibialis 40 (0) 1   Right flexor digitorum superficialis 40 (40) 2   Right triceps 25 (25) 1        Left flexor pollicis brevis 10 (10) 1   Left  flexor digitorum superficialis 35 (40) 2   Left biceps, medial head 25 (25) 1   Left brachioradialis 30 (45) 1           Total Units Injected: 590     Unavoidable Waste: 10     Total Units Billed 600        The patient tolerated the injections without difficulty.      Assessment:  Sandra Jara is a 56 year old female with CBS status post left GPi DBS and botulinum toxin injections of the neck, RUE and RLE for the treatment of dystonia (cervical dystonia, right arm, and right leg dystonia).  Today we did repeat botulinum toxin injections.    Plan  Return in 3 months with Dr. China Chen to consider repeat injections.      Neurology Attending Attestation:     I, Tiffany Hopkins MD, personally saw this patient with our Movement Disorders Fellow and agree with the fellow's findings and plan of care as documented in the movement disorder fellow's note. I personally performed salient aspects of the history and neurological examination.     I personally reviewed the vital signs, medications, and labs. I personally viewed the imaging, and agree with the interpretation documented by the fellow.    I personally performed or supervised all procedures.    50 minutes spent on the date of the encounter doing chart review, history and exam, documentation and further activities as noted above        Tiffany Hopkins MD    of  Neurology

## 2022-02-10 NOTE — NURSING NOTE
Chief Complaint   Patient presents with     RECHECK     [facility] RETURN BOTOX - 3mo. f/u     Wesley Tobar

## 2022-02-10 NOTE — LETTER
2/10/2022       RE: Sandra Jara  [address]  [place] [address]     Dear Colleague,    Thank you for referring your patient, Sandra Jara, to the [facility] [place] at [facility]. Please see a copy of my visit note below.    Department of Neurology  Movement Disorders Division   DBS Follow-up Note  Botulinum Injection Note     Patient: Sandra Jara  MRN: [id number]   : 1965   Date of Visit: 2/10/2022     Diagnosis: CBS  DBS Target(s): L GPi   Date(s) of DBS Lead Placement:   Date(s) of IPG Placement:  Device: Abbott     Chief Complaint:  Sandra Jara is a 56 year old female who returns to clinic for follow up of CBS status post left GPi DBS and botulinum toxin injections of the RUE and RLE for the treatment of dystonia.    History of Present Illness:      Response to Last Injection:  2021    Benefit from last injections: excellent pain control    Side effects: none    Additional interval history: working with palliative care, having difficulty eating and losing weight    Current Outpatient Medications   Medication Sig Dispense Refill     calcium carbonate 500 mg, elemental, (OSCAL 500) 1250 (500 Ca) MG TABS tablet Take by mouth every morning       Cholecalciferol (VITAMIN D) 2000 UNITS tablet Take 2,000 Units by mouth every morning        gabapentin (NEURONTIN) 300 MG capsule Take 1 capsule (300 mg) by mouth At Bedtime 30 capsule 11     tiZANidine (ZANAFLEX) 4 MG tablet Take 1/2-1 tab po q6h prn for muscle spasms and pain (Patient taking differently: as needed Take 1/2-1 tab po q6h prn for muscle spasms and pain) 90 tablet 3     traMADol (ULTRAM) 50 MG tablet Take 1/2 to 1 tab by mouth every 6 hours as needed, up to 3 tabs per day. (Patient not taking: Reported on 2021) 90 tablet 0       Allergies: She has No Known Allergies.    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 It demonstrated some mild changes of right carpal tunnel syndrome but was otherwise normal.       History of MRI of brain and brain stem 12/21/2014    A brain MRI scan done on 06/27/2014 revealed no abnormalities.      History of MRI of cervical spine 12/21/2014    A cervical MRI done on 06/10/2014 revealed some mild to moderate degenerative changes but no significant central canal or foraminal stenosis, and no abnormalities of the spinal cord were noted.       Movement disorder 12/21/2014    I saw your patient, Deepali Contreras on 12/15/2014. She is a 49-year-old right-handed female here for evaluation of right upper extremity dysfunction and right hand tremor.  She has noted this problem for the last couple of years. She reports she is losing dexterity in her right hand. She has appreciated a tremor of the right hand with actions such as writing or postural maintenance. She has n       Past Surgical History:   Procedure Laterality Date     APPENDECTOMY       IMPLANT DEEP BRAIN STIMULATION GENERATOR / BATTERY Left 3/1/2019    Procedure: Left Deep Brain Stimulator Placement, Phase II, Placement Of Deep Brain Stimulator Generator/Battery Over The Left Chest Wall Latex Free;  Surgeon: Efren Triana MD;  Location: [facility] OR     OPTICAL TRACKING SYSTEM INSERTION DEEP BRAIN STIMULATION Left 2/22/2019    Procedure: Stealth Assisted Left Side Deep Brain Stimulator Placement, Phase I, Placement Of Left Side Deep Brain Stimulator Electrode, Target Left Globus Pallidus Internus With  Microelectrode Recording;  Surgeon: Efren Triana MD;  Location: [facility] OR     REPLACE DEEP BRAIN STIMULATION GENERATOR / BATTERY Left 2020    Procedure: Replacement of deep brain stimulator generator/battery over left chest wall;  Surgeon: Efren Triana MD;  Location: [facility] OR       Social History     Socioeconomic History     Marital status:      Spouse name: Not on file     Number of children: 3     Years of education: Not on file     Highest education level: Not on file   Occupational History     Not on file   Tobacco Use     Smoking status: Never Smoker     Smokeless tobacco: Never Used   Substance and Sexual Activity     Alcohol use: Yes     Alcohol/week: 2.0 - 3.0 standard drinks     Drug use: No     Sexual activity: Yes     Partners: Male     Birth control/protection: Male Surgical   Other Topics Concern     Parent/sibling w/ CABG, MI or angioplasty before 65F 55M? No   Social History Narrative        Izaiah Jara    Lives in [facility]      She does not smoke.  She has a couple of beers to drink on the weekend but otherwise is not a heavy user of alcohol    3 kids: son peña 21; 94, 96 and 98    2 sons    1 daughter    2 are in college and daughter is a sophomore.            FAMILY HISTORY:  Notable for a paternal aunt who is .  She had Parkinson's disease.  Otherwise, there is no other family history of neurologic problems.  There is no family history of dystonia.          90% Lao    Some norweigian                .       Social Determinants of Health     Financial Resource Strain: Not on file   Food Insecurity: Not on file   Transportation Needs: Not on file   Physical Activity: Not on file   Stress: Not on file   Social Connections: Not on file   Intimate Partner Violence: Not on file   Housing Stability: Not on file       Family History   Problem Relation Age of Onset     Breast Cancer Mother      Cancer - colorectal Mother      Macular  Degeneration Mother      Dementia Father      Deep Vein Thrombosis Father      No Known Problems Brother      No Known Problems Sister      No Known Problems Sister      No Known Problems Sister      Neurologic Disorder Paternal Aunt         Parkinson's Disease     Cerebrovascular Disease Brother         stroke at age 25 possibly from pfo     Dementia Paternal Grandmother      Physical Examination:  Vital Signs:   blood pressure is 121/77 and her pulse is 101. Her respiration is 16 and oxygen saturation is 97%.     BOTULINUM NEUROTOXIN INJECTION PROCEDURES:    VERIFICATION OF PATIENT IDENTIFICATION AND PROCEDURE     Initials   Patient Name ACC   Patient  acc   Procedure Verified by: [facility]       INDICATION/S FOR PROCEDURE/S:  Sandra Jara is a 56 year old year old patient with dystonia affecting the  head, neck and shoulder girdle musculature, right upper extremity, left upper extremity and right lower extremity secondary to a diagnosis of corticobasal degeneration with associated  pain, spasms, loss of joint motion and loss of volitional motor control.     Her baseline symptoms have been recalcitrant to oral medications and conservative therapy.  She is here today for an injection of Botox.      GOAL OF PROCEDURE:  The goal of this procedure is to increase active range of motion, improve volitional motor control and decrease pain  associated with dystonic movements and spasticity.    TOTAL DOSE ADMINISTERED:  Dose Administered: 590 units Botox    Diluent Used:  Preservative Free Normal Saline  Total Volume of Diluent Used: 6 ml  NDC #: Botox 100u ([address]-57)    CONSENT:  The risks, benefits, and treatment options were discussed with Sandra Jara and she agreed to proceed.       EQUIPMENT USED:  Needle-37mm stimulating/recording  EMG/NCS Machine    SKIN PREPARATION:  Skin preparation was performed using an alcohol wipe.    GUIDANCE DESCRIPTION:  Electro-myographic guidance was necessary  throughout the procedure to accurately identify all areas of dystonic muscles while avoiding injection of non-dystonic muscles, neighboring nerves and nearby vascular structures.     AREA/MUSCLE INJECTED:      Visual display of locations injections are scanned into the chart under MEDIA tab.    Muscles Injected Units Injected Number of Injections   Right trapezius 25 (25) 2   Right biceps 40 (40) 2   Right brachialis 15 (25) 1   Right brachioradialis 15 (25) 1   Right pronator teres 30 (50) 1   Right deltoid 30 (40) 2   Right tibialis anterior 120 (100) 4   Right flexor digitorum longus 20 (20) 1   Right flexor digitorum brevis 60 (60) 1   Right extensor carpi radialis 20 (20) 1   Right extensor carpi ulnaris 10 (20) 1   Right posterior tibialis 40 (0) 1   Right flexor digitorum superficialis 40 (40) 2   Right triceps 25 (25) 1        Left flexor pollicis brevis 10 (10) 1   Left  flexor digitorum superficialis 35 (40) 2   Left biceps, medial head 25 (25) 1   Left brachioradialis 30 (45) 1           Total Units Injected: 590     Unavoidable Waste: 10     Total Units Billed 600        The patient tolerated the injections without difficulty.    Assessment:  Sandra Jara is a 56 year old female with CBS status post left GPi DBS and botulinum toxin injections of the neck, RUE and RLE for the treatment of dystonia (cervical dystonia, right arm, and right leg dystonia).  Today we did repeat botulinum toxin injections.    Plan  Return in 3 months with Dr. China Chen to consider repeat injections.      Neurology Attending Attestation:     I, Tiffany Hopkins MD, personally saw this patient with our Movement Disorders Fellow and agree with the fellow's findings and plan of care as documented in the movement disorder fellow's note. I personally performed salient aspects of the history and neurological examination.     I personally reviewed the vital signs, medications, and labs. I personally viewed the imaging,  and agree with the interpretation documented by the fellow.    I personally performed or supervised all procedures.    50 minutes spent on the date of the encounter doing chart review, history and exam, documentation and further activities as noted above      Tiffany Hopkins MD    of Neurology

## 2022-04-27 NOTE — TELEPHONE ENCOUNTER
Called and spoke with [employer], questions was answered, Sandra was called and is aware that we have spoken with [employer].   
[facility] Call Center    Phone Message    May a detailed message be left on voicemail: no     Reason for Call: Other: Cheryl with [employer] calling about Claim # [id number] - Please call: [phone]     Action Taken: Message routed to:  [facility] Neurology    Travel Screening: Not Applicable                                                                        
Petty stated Amanuel called to ask some follow-up questions on the form that was sent back to them, this info is needed for her Life policy claim. She stated Sandra did give them authorization to call and discuss her health information with us. I asked her to fax this authorization to [phone], otherwise we cannot share her protected health information.  
Abdoul Lam)  Cardiovascular Disease; Nuclear Cardiology  [address] [place] [address]  Phone: [phone]  Fax: [phone]  Follow Up Time:

## 2022-05-16 NOTE — PROGRESS NOTES
Department of Neurology  Movement Disorders Division   DBS Follow-up Note  Botulinum Injection Note     Patient: Sandra Jara  MRN: [id number]   : 1965   Date of Visit: 2022     Diagnosis: CBS  DBS Target(s): L GPi   Date(s) of DBS Lead Placement: 2019  Date(s) of IPG Placement: 2020  Device: Abbott     Chief Complaint:  Sandra Jara is a 56 year old female who returns to clinic for follow up of CBS status post left GPi DBS and botulinum toxin injections of the RUE and RLE for the treatment of dystonia.    History of Present Illness:    Response to Last Injection:  2/10/2022    Benefit from last injections: sleeping through the night    Wearing off: can't tell    Side effects: denies    Having trouble chewing because her jaw is clamped down.  Sleeping on a wedge because she was aspirating at night.  Not having as many bowel movements and he is questioning whether she is having trouble pushing it out.      Battery 2.74V  L1 4.15mA/150/125  L2 3.50mA0/125      Current Outpatient Medications   Medication Sig Dispense Refill     calcium carbonate 500 mg, elemental, (OSCAL 500) 1250 (500 Ca) MG TABS tablet Take by mouth every morning (Patient not taking: Reported on 2022)       Cholecalciferol (VITAMIN D) 2000 UNITS tablet Take 2,000 Units by mouth every morning  (Patient not taking: Reported on 2022)       gabapentin (NEURONTIN) 300 MG capsule Take 1 capsule (300 mg) by mouth At Bedtime (Patient not taking: Reported on 2022) 30 capsule 11     tiZANidine (ZANAFLEX) 4 MG tablet Take 1/2-1 tab po q6h prn for muscle spasms and pain (Patient not taking: Reported on 2022) 90 tablet 3     traMADol (ULTRAM) 50 MG tablet Take 1/2 to 1 tab by mouth every 6 hours as needed, up to 3 tabs per day. (Patient not taking: No sig reported) 90 tablet 0       Allergies: She has No Known Allergies.    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 It demonstrated some mild changes of right carpal tunnel syndrome but was otherwise normal.       History of MRI of brain and brain stem 12/21/2014    A brain MRI scan done on 06/27/2014 revealed no abnormalities.      History of MRI of cervical spine 12/21/2014    A cervical MRI done on 06/10/2014 revealed some mild to moderate degenerative changes but no significant central canal or foraminal stenosis, and no abnormalities of the spinal cord were noted.       Movement disorder 12/21/2014    I saw your patient, Deepali Contreras on 12/15/2014. She is a 49-year-old right-handed female here for evaluation of right upper extremity dysfunction and right hand tremor.  She has noted this problem for the last couple of years. She reports she is losing dexterity in her right hand. She has appreciated a tremor of the right hand with actions such as writing or postural maintenance. She has n       Past Surgical History:   Procedure Laterality Date     APPENDECTOMY       IMPLANT DEEP BRAIN STIMULATION GENERATOR / BATTERY Left 3/1/2019    Procedure: Left Deep Brain Stimulator Placement, Phase II, Placement Of Deep Brain Stimulator Generator/Battery Over The Left Chest Wall Latex Free;  Surgeon: Efren Triana MD;  Location: [facility] OR     OPTICAL TRACKING SYSTEM INSERTION DEEP BRAIN STIMULATION Left 2/22/2019    Procedure: Stealth Assisted Left Side Deep Brain Stimulator Placement, Phase I, Placement Of Left Side Deep Brain Stimulator Electrode, Target Left Globus Pallidus Internus With Microelectrode Recording;   Surgeon: Efren Triana MD;  Location: [facility] OR     REPLACE DEEP BRAIN STIMULATION GENERATOR / BATTERY Left 2020    Procedure: Replacement of deep brain stimulator generator/battery over left chest wall;  Surgeon: Efren Triana MD;  Location: [facility] OR       Social History     Socioeconomic History     Marital status:      Spouse name: Not on file     Number of children: 3     Years of education: Not on file     Highest education level: Not on file   Occupational History     Not on file   Tobacco Use     Smoking status: Never Smoker     Smokeless tobacco: Never Used   Substance and Sexual Activity     Alcohol use: Yes     Alcohol/week: 2.0 - 3.0 standard drinks     Drug use: No     Sexual activity: Yes     Partners: Male     Birth control/protection: Male Surgical   Other Topics Concern     Parent/sibling w/ CABG, MI or angioplasty before 65F 55M? No   Social History Narrative        Izaiah Jara    Lives in [facility]      She does not smoke.  She has a couple of beers to drink on the weekend but otherwise is not a heavy user of alcohol    3 kids: son peña 21; 94, 96 and 98    2 sons    1 daughter    2 are in college and daughter is a sophomore.            FAMILY HISTORY:  Notable for a paternal aunt who is .  She had Parkinson's disease.  Otherwise, there is no other family history of neurologic problems.  There is no family history of dystonia.          90% Chilean    Some norweigian                .       Social Determinants of Health     Financial Resource Strain: Not on file   Food Insecurity: Not on file   Transportation Needs: Not on file   Physical Activity: Not on file   Stress: Not on file   Social Connections: Not on file   Intimate Partner Violence: Not on file   Housing Stability: Not on file       Family History   Problem Relation Age of Onset     Breast Cancer Mother      Cancer - colorectal Mother      Macular Degeneration Mother      Dementia  Father      Deep Vein Thrombosis Father      No Known Problems Brother      No Known Problems Sister      No Known Problems Sister      No Known Problems Sister      Neurologic Disorder Paternal Aunt         Parkinson's Disease     Cerebrovascular Disease Brother         stroke at age 25 possibly from pfo     Dementia Paternal Grandmother        Physical Examination:  Vital Signs:   blood pressure is 135/94 (abnormal) and her pulse is 89. Her respiration is 16 and oxygen saturation is 94%.   Anarthric, severe generalized dystonia with spontaneous and stimulus induced myoclonus      BOTULINUM NEUROTOXIN INJECTION PROCEDURES:    VERIFICATION OF PATIENT IDENTIFICATION AND PROCEDURE     Initials   Patient Name [facility]   Patient  acc   Procedure Verified by: [facility]       INDICATION/S FOR PROCEDURE/S:  Sandra Jara is a 56 year old year old patient with dystonia affecting the  head, neck and shoulder girdle musculature, right upper extremity, left upper extremity and right lower extremity secondary to a diagnosis of corticobasal degeneration with associated  pain, spasms, loss of joint motion and loss of volitional motor control.     Her baseline symptoms have been recalcitrant to oral medications and conservative therapy.  She is here today for an injection of Botox.      GOAL OF PROCEDURE:  The goal of this procedure is to increase active range of motion, improve volitional motor control and decrease pain  associated with dystonic movements and spasticity.    TOTAL DOSE ADMINISTERED:  Dose Administered: 730 units Botox    Diluent Used:  Preservative Free Normal Saline  Total Volume of Diluent Used: 8 ml  NDC #: Botox 100u ([address]-46)    CONSENT:  The risks, benefits, and treatment options were discussed with Sandra Jara and she agreed to proceed.       EQUIPMENT USED:  Needle-37mm stimulating/recording  EMG/NCS Machine    SKIN PREPARATION:  Skin preparation was performed using an alcohol  wipe.    GUIDANCE DESCRIPTION:  Electro-myographic guidance was necessary throughout the procedure to accurately identify all areas of dystonic muscles while avoiding injection of non-dystonic muscles, neighboring nerves and nearby vascular structures.     AREA/MUSCLE INJECTED:      Visual display of locations injections are scanned into the chart under MEDIA tab.    Muscles Injected Units Injected Number of Injections   Right trapezius 35 (25) 2   Right biceps 40 (40) 2   Right brachialis 25 (25) 1   Right brachioradialis 15 (15) 1   Right pronator teres 30 (30) 1   Right deltoid 30 (30) 2   Right tibialis anterior 120 (120) 4   Right flexor digitorum longus 20 (20) 1   Right flexor digitorum brevis 60 (60) 1   Right extensor carpi radialis 20 (20) 1   Right extensor carpi ulnaris 10 (10) 1   Right posterior tibialis 40 (40) 1   Right flexor digitorum superficialis 40 (40) 2   Right triceps 25 (25) 1   Right masseter 15 (0) 1   Right temporalis 15 (0) 3        Left flexor pollicis brevis 10 (10) 1   Left  flexor digitorum superficialis 35 (35) 2   Left biceps, medial head 25 (25) 1   Left brachioradialis 30 (30) 1   Left masseter 15 (0) 1   Left temporalis 15 (0) 3   Left trapezius 60 (0) 4           Total Units Injected: 730     Unavoidable Waste: 70     Total Units Billed 800        The patient tolerated the injections without difficulty.      Procedure: DBS Interrogation & Programming   Lead(s):     Left   DBS Target GPi   DBS Lead Type    Infinity 0.5   Lead Implant Date 2/22/2019      IPG(s):    1   IPG Infinity 5   IPG Implant Date 11/24/2020   Location    L chest   Battery (V) 2.74V      Full Impedence/Currents Check: Normal     Left Brain Program 1       GPi1 GPi2   Contacts C+;3(ABC)-4- C+;2(ABC)-   Amplitude (mA) 4.15 3.5   Pulse Width ( s) 150 90   Frequency (Hz) 125 125         Assessment:  Sandra Jara is a 56 year old female with CBS status post left GPi DBS and botulinum toxin injections of  the neck, RUE and RLE for the treatment of dystonia (cervical dystonia, right arm, and right leg dystonia).  Today we did repeat botulinum toxin injections.    We discussed that her IPG is in need of replacement.  Her  would like to wait for it to  to see if they notice any changes rather than plan for scheduled replacement.      Plan  Return in 3 months to consider repeat injections.  No changes to her DBS programming.

## 2022-08-29 NOTE — TELEPHONE ENCOUNTER
Spoke to CL Matute in PM&R. They are not able to get Sandra seen sooner in their clinic for injections.

## 2022-08-29 NOTE — TELEPHONE ENCOUNTER
[facility] Call Center    Phone Message    May a detailed message be left on voicemail: yes     Reason for Call: Symptoms or Concerns     If patient has red-flag symptoms, warm transfer to triage line    Current symptom or concern: cramping ain arms & legs    Symptoms have been present for:  1 month(s)    Has patient previously been seen for this? Yes    By : Dr. Worley    Date: 08/29/22    Are there any new or worsening symptoms? Yes: Pt's  Izaiah called concerned of the serve cramping that Pt is having.  Pt is biting through her lip when she's having the uncontrolled cramping/muscle tighting.     Please call Izaiah back at [phone] to discuss further.        Action Taken: Message routed to:  [facility] Neurology    Travel Screening: Not Applicable

## 2022-08-29 NOTE — TELEPHONE ENCOUNTER
Situation:  Sandra Jara is a 57 year old female who receives support for CBS. Sandra's spouse calls today with concerns for cramping/dystonia.     Background:  LGPI with Abbott infinity 5  2022 - Last Botox injection with Dr. Chen  Deep Brain Stimulation battery: 2.74    Patient is taking:  Medications AM HS   Morphine (liquid) 5 mg 1 1   Lorazepam (liquid) 2 mg PRN DAILY    Gabapentin 300 mg  1   -Lorazepam 2 mg as needed once daily (does not take it daily). Has not noticed it affecting the spasms.  -Medications are given through a syringe (unable to swallow pills)    Assessment:  The past few weeks they have noted more spasms/tightness in Sandra's arms and neck. She is nonverbal. When they see it happen it is followed by her biting her lip and tears streaming down her face. Her bottom lip is very raw and bloody from this.They do not notice the morphine or lorazepam affecting the spasms. Her Deep Brain Stimulation battery  around , they saw no ill effects from the battery dieing. She is past due for Botox, the Botox really helps with the spasms and contractions.    Plan/recommendation:  1. Botox appointment rescheduled to  at 2:30 PM with Dr. Chen. I will see if we can get her seen sooner by another provider (discussed with PM&R, waiting to hear on availability). If not I will ask if there are medication adjustments that the provider may recommend for her situation

## 2022-08-30 NOTE — TELEPHONE ENCOUNTER
One time order for botox entered.  Previously authorized fro Neurology department    Will be seen in PMR clinic on 9/1/22    Staff message sent to [employer] to review JIMMY Naylor RN on 8/30/2022 at 11:43 AM

## 2022-08-30 NOTE — TELEPHONE ENCOUNTER
Communications from [employer] that pt is no longer on [employer] insurance, termed 5/31/22.  She has Medicare only and the recent meds were refilled under a hospice care plan.    Unable to get insurance authorization for botox appt 9/1.  Medicare follows FDA guidelines for up to 400 units q 3 months, however, Hospice status could preempt Deepali from coming to clinic for botox.    Working with Neurology and waiting for  to return call to [employer] to get more information.    Juju Naylor RN on 8/30/2022 at 2:41 PM

## 2022-08-30 NOTE — TELEPHONE ENCOUNTER
Spoke to Micheline ALVA RN in PM&R. They had a cancellation on 9/1 at 9:20 AM with Dr. Hahn and can fit Sandra in for injections. This works for Dena.

## 2022-08-31 NOTE — TELEPHONE ENCOUNTER
"Spoke with Izaiah, pt's , he has excellent understanding of Hospice and the financial piece of what would be covered. (i.e. no antibiotics to reverse infection, no elective treatments or surgery).  The [facility] is meeting to discuss if they would allow (pay for) Botox because Deepali's severely strong muscle tone in the facial and neck muscles is causing her teeth to clench and bite through her skin.  She is bleeding from the skin tears this clenching is causing.  It does not seem likely however that the cost of Botox will be authorized by [facility], and the appointment for tomorrow in PM & R is being cancelled due to there is not a financial plan in place to cover Botox.    Izaiah has been in discussion with [facility] Hospice office in [facility] that is it possible to \"dis-enroll\" Deepali from Hospice to allow her to be seen in out pt for the Botox, then re-enroll in Hospice after the Botox is given.  His contact's name is Asha.    Deepali is currently booked to see Dr Worley for Botox on 9/13 and Izaiah will target that date for the dis-enrollment period.  The dates of dis-enrollment need to match the appointment without exception.    Writer also explained that having only straight Medicare coverage, Botox is allowed following the FDA Guidelines of maximum of 400 units per treatment q 12 weeks.  (Previous coverage under their [employer] plan up to 1,000 units per treatment was authorized in an individualized POC).    Izaiah wants the focus of the next treatment to be the upper body, shoulders, neck and facial muscles because that is causing the severe pain, preventing sleep and causing the open skin.  Dr Worley will have to reenter orders for the total amount of 400 units of Botox for the 9/13 treatment to avoid asking family to sign a waiver accepting responsibility for OOP payment for Botox Tx over 400 units.    PLAN:  1. Cancel appt with Dr Hahn for 9/1/22 (done)  2. Keep appointment with Dr" Meir on 9/13/22  3. Izaiah will continue to communicate with [facility] on dis-enrollment / re-enrollment process.  4. Neurology Department to coordinate with new Botox orders planned for 9/13 using straight medicare / FDA guidelines and confirm with Izaiah pillai the status of Hospice services.    Juju Naylor RN on 8/31/2022 at 11:31 AM

## 2022-09-01 NOTE — TELEPHONE ENCOUNTER
[facility] Call Center    Phone Message    May a detailed message be left on voicemail: yes     Reason for Call: Other: pt  calling to speak to Willa regarding pt botox appt. please call back when available.     Action Taken: Other: PMR    Travel Screening: Not Applicable

## 2022-09-01 NOTE — TELEPHONE ENCOUNTER
"Hospice said they will cover Botox injections \"as long as it is specified by the administering physicians that this is specifically labeled as a non-cosmetic intervention.\" They will cover up to 400 units per medicare's guidelines. Verified the 9/13 11 AM appointment with him.  "

## 2022-09-12 NOTE — TELEPHONE ENCOUNTER
Izaiah reports Sandra has lost a lot of weight. Her bones, spine - are very prevalent. He is not sure there is much to stick the needle into. Sandra is being fed via syringe oral route but is not getting enough nutrition given her swallowing difficulties and inability to open her mouth. Informed him of Dr. Morris advisement and that we will focus on the more painful area's and assess her at the visit.

## 2022-09-12 NOTE — TELEPHONE ENCOUNTER
[facility] Call Center    Phone Message    May a detailed message be left on voicemail: yes     Reason for Call: Other: Patient's , Izaiah, calling to ask Dr. Chen if the botox should be cancelled for tomorrow as patient's body fat is down to nothing and in hospice are.  Please call Izaiah back.     Action Taken: Message routed to:  [facility] ([facility]    Travel Screening: Not Applicable

## 2022-09-12 NOTE — TELEPHONE ENCOUNTER
Weight loss could increase her sensitivity to the Botox injections. However, as we are already significantly decreasing the dosage, this should balance out.  I will also take her muscle mass into consideration tomorrow.

## 2022-09-13 NOTE — LETTER
2022       RE: Sandra Jara  [address]  [place] [address]     Dear Colleague,    Thank you for referring your patient, Sandra Jara, to the [facility] [place] at [facility]. Please see a copy of my visit note below.    Department of Neurology  Movement Disorders Division   DBS Follow-up Note  Botulinum Injection Note     Patient: Sandra Jara  MRN: [id number]   : 1965   Date of Visit: 2022     Diagnosis: CBS  DBS Target(s): L GPi   Date(s) of DBS Lead Placement: 2019  Date(s) of IPG Placement: 2020  Device: Abbott     Chief Complaint:  Sandra Jara is a 56 year old female who returns to clinic for follow up of CBS status post left GPi DBS and botulinum toxin injections of the RUE and RLE for the treatment of dystonia.    History of Present Illness:    Response to Last Injection:  2022    Benefit from last injections: improved pain control    Wearing off: yes    Side effects: denies    Additional interval history: She is now on home hospice.  It is very difficult to feed her and she has severe dysphagia.  Her DBS IPG has .          Current Outpatient Medications   Medication Sig Dispense Refill     calcium carbonate 500 mg, elemental, (OSCAL 500) 1250 (500 Ca) MG TABS tablet Take by mouth every morning (Patient not taking: Reported on 2022)       Cholecalciferol (VITAMIN D) 2000 UNITS tablet Take 2,000 Units by mouth every morning  (Patient not taking: Reported on 2022)       gabapentin (NEURONTIN) 300 MG capsule Take 1 capsule (300 mg) by mouth At Bedtime (Patient not taking: Reported on 2022) 30 capsule 11     tiZANidine (ZANAFLEX) 4 MG tablet Take 1/2-1 tab po q6h prn for muscle spasms and pain (Patient not taking: Reported on 2022) 90 tablet 3     traMADol (ULTRAM) 50 MG tablet Take 1/2 to 1 tab by mouth every 6 hours as  needed, up to 3 tabs per day. (Patient not taking: No sig reported) 90 tablet 0       Allergies: She has No Known Allergies.    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 It demonstrated some mild changes of right carpal tunnel syndrome but was otherwise normal.       History of MRI of brain and brain stem 12/21/2014    A brain MRI scan done on 06/27/2014 revealed no abnormalities.      History of MRI of cervical spine 12/21/2014    A cervical MRI done on 06/10/2014 revealed some mild to moderate degenerative changes but no significant central canal or foraminal stenosis, and no abnormalities of the spinal cord were noted.       Movement disorder 12/21/2014    I saw your patient, Deepali Contreras on 12/15/2014. She is a 49-year-old right-handed female here for evaluation of right upper extremity dysfunction and right hand tremor.  She has noted this problem for the last couple of years. She reports she is losing dexterity in her right hand. She has appreciated a tremor of the right hand with actions such as writing or postural maintenance. She has n       Past Surgical History:   Procedure Laterality Date     APPENDECTOMY       IMPLANT DEEP BRAIN STIMULATION GENERATOR / BATTERY Left 3/1/2019    Procedure: Left Deep Brain Stimulator Placement, Phase II, Placement Of Deep Brain Stimulator Generator/Battery Over The Left Chest Wall Latex Free;  Surgeon: Efren Triana MD;  Location: [facility] OR     OPTICAL TRACKING SYSTEM INSERTION DEEP BRAIN STIMULATION Left 2/22/2019    Procedure:  Stealth Assisted Left Side Deep Brain Stimulator Placement, Phase I, Placement Of Left Side Deep Brain Stimulator Electrode, Target Left Globus Pallidus Internus With Microelectrode Recording;  Surgeon: Efren Triana MD;  Location: [facility] OR     REPLACE DEEP BRAIN STIMULATION GENERATOR / BATTERY Left 2020    Procedure: Replacement of deep brain stimulator generator/battery over left chest wall;  Surgeon: Efren Triana MD;  Location: [facility] OR       Social History     Socioeconomic History     Marital status:      Spouse name: Not on file     Number of children: 3     Years of education: Not on file     Highest education level: Not on file   Occupational History     Not on file   Tobacco Use     Smoking status: Never Smoker     Smokeless tobacco: Never Used   Substance and Sexual Activity     Alcohol use: Yes     Alcohol/week: 2.0 - 3.0 standard drinks     Drug use: No     Sexual activity: Yes     Partners: Male     Birth control/protection: Male Surgical   Other Topics Concern     Parent/sibling w/ CABG, MI or angioplasty before 65F 55M? No   Social History Narrative        Izaiah Jara    Lives in [facility]      She does not smoke.  She has a couple of beers to drink on the weekend but otherwise is not a heavy user of alcohol    3 kids: son peña 21; 94, 96 and 98    2 sons    1 daughter    2 are in college and daughter is a sophomore.            FAMILY HISTORY:  Notable for a paternal aunt who is .  She had Parkinson's disease.  Otherwise, there is no other family history of neurologic problems.  There is no family history of dystonia.          90% [place] Islander    Some norweigian                .       Social Determinants of Health     Financial Resource Strain: Not on file   Food Insecurity: Not on file   Transportation Needs: Not on file   Physical Activity: Not on file   Stress: Not on file   Social Connections: Not on file   Intimate Partner Violence: Not on  file   Housing Stability: Not on file       Family History   Problem Relation Age of Onset     Breast Cancer Mother      Cancer - colorectal Mother      Macular Degeneration Mother      Dementia Father      Deep Vein Thrombosis Father      No Known Problems Brother      No Known Problems Sister      No Known Problems Sister      No Known Problems Sister      Neurologic Disorder Paternal Aunt         Parkinson's Disease     Cerebrovascular Disease Brother         stroke at age 25 possibly from pfo     Dementia Paternal Grandmother        Physical Examination:  Vital Signs:   blood pressure is 144/110 (abnormal) and her pulse is 122 (abnormal). Her oxygen saturation is 90%.   Anarthric, severe generalized dystonia, minimal myoclonus; LUE extended at elbow and wrist with fist; RUE flexed at elbow and fist; right foot in plantar flexion and inversion; anterocollis; severe muscle wasting      BOTULINUM NEUROTOXIN INJECTION PROCEDURES:    VERIFICATION OF PATIENT IDENTIFICATION AND PROCEDURE     Initials   Patient Name [facility]   Patient  [facility]   Procedure Verified by: [facility]       INDICATION/S FOR PROCEDURE/S:  Sandra Jara is a 56 year old year old patient with dystonia affecting the  head, neck and shoulder girdle musculature, right upper extremity, left upper extremity and right lower extremity secondary to a diagnosis of corticobasal degeneration with associated  pain, spasms, loss of joint motion and loss of volitional motor control.     Her baseline symptoms have been recalcitrant to oral medications and conservative therapy.  She is here today for an injection of Botox.      GOAL OF PROCEDURE:  The goal of this procedure is to increase active range of motion, improve volitional motor control and decrease pain  associated with dystonic movements and spasticity.    TOTAL DOSE ADMINISTERED:  Dose Administered: 195 units Botox    Diluent Used:  Preservative Free Normal Saline  Total Volume of Diluent Used: 2  ml  NDC #: Botox 100u ([address]-87)    CONSENT:  The risks, benefits, and treatment options were discussed with Sandra Jara's  and he agreed to proceed.   We discussed the increased risk for iatrogenic botulism or dysphagia given her profound muscle loss.    EQUIPMENT USED:  Needle-37mm stimulating/recording  EMG/NCS Machine    SKIN PREPARATION:  Skin preparation was performed using an alcohol wipe.    GUIDANCE DESCRIPTION:  Electro-myographic guidance was necessary throughout the procedure to accurately identify all areas of dystonic muscles while avoiding injection of non-dystonic muscles, neighboring nerves and nearby vascular structures.     AREA/MUSCLE INJECTED:    Visual display of locations injections are scanned into the chart under MEDIA tab.    Muscles Injected Units Injected Number of Injections   Right trapezius 10 (35) 1   Right biceps 20 (40) 1   Right brachialis 0 (25) 0   Right brachioradialis 10 (15) 1   Right pronator teres 15 (30) 1   Right deltoid 0 (30) 0   Right tibialis anterior 0 (120) 0   Right flexor digitorum longus 0 (20) 0   Right flexor digitorum brevis 40 (60) 1   Right gastrocnemius 20 (0) 1   Right extensor carpi radialis 10 (20) 1   Right extensor carpi ulnaris 5(10) 1   Right posterior tibialis 0 (40) 0   Right flexor digitorum superficialis 0 (40) 0   Right triceps 10 (25) 1   Right masseter  0 (15) 0   Right temporalis 0 (15) 0        Left flexor pollicis brevis 10 (10) 1   Left  flexor digitorum superficialis 10 (35) 1   Left biceps 15 (25) 1   Left brachioradialis 0 (30) 0   Left masseter 0 (15) 0   Left temporalis 0 (15) 0   Left trapezius 20 (60) 2           Total Units Injected: 195     Unavoidable Waste: 5     Total Units Billed 200        The patient tolerated the injections without difficulty.    Assessment:  Sandra Jara is a 56 year old female with CBS status post left GPi DBS (IPG now ) and botulinum toxin injections of  the neck, BUE, and RLE for the treatment of dystonia.  Today we did repeat botulinum toxin injections.  The decision was made to proceed with significantly reduced Botox dose due to her muscle wasting for palliative pain control and to reduce the risk of iatrogenic botulism and dysphagia.      China Chen MD

## 2022-09-13 NOTE — PROGRESS NOTES
Received FMLA form from [employer], from will be completed, signed by provider, and fax or e-mail, which ever is on the form

## 2023-12-12 NOTE — TELEPHONE ENCOUNTER
Patient should have refills left at the pharmacy.  
Rx Authorization:    Requested Medication/ Dose: Tizanidine (Zanaflex) 4MG Tabs    Date last refill ordered: 5/14/20    Quantity ordered: 90 tabs    # refills: 3    Date of last clinic visit with ordering provider: 5/14/20    Date of next clinic visit with ordering provider: 8/6/20    All pertinent protocol data (lab date/result):     Include pertinent information from patients message:     
Patient
Chart(s)/Patient
Statement Selected

## 2024-07-18 NOTE — ANESTHESIA CARE TRANSFER NOTE
Patient: Sandra Jara    Procedure(s):  Stealth Assisted Left Side Deep Brain Stimulator Placement, Phase I, Placement Of Left Side Deep Brain Stimulator Electrode, Target Left Globus Pallidus Internus With Microelectrode Recording    Diagnosis: Tremor  Diagnosis Additional Information: No value filed.    Anesthesia Type:   No value filed.     Note:  Airway :Room Air  Patient transferred to:PACU  Comments: Pt's VSS, A/O x3 resting comfortably post procedure, care continued by RN. Handoff Report: Identifed the Patient, Identified the Reponsible Provider, Reviewed the pertinent medical history, Discussed the surgical course, Reviewed Intra-OP anesthesia mangement and issues during anesthesia, Set expectations for post-procedure period and Allowed opportunity for questions and acknowledgement of understanding      Vitals: (Last set prior to Anesthesia Care Transfer)    CRNA VITALS  2/22/2019 1507 - 2/22/2019 1554      2/22/2019             NIBP:  122/77    Pulse:  81    SpO2:  100 %    EKG:  Sinus rhythm                Electronically Signed By: SAIRA Botello CRNA  February 22, 2019  3:54 PM  
Is This A New Presentation, Or A Follow-Up?: Rash

## (undated) DEVICE — DRSG PRIMAPORE 02X3" 7133

## (undated) DEVICE — SU VICRYL 0 UR-6 27" J603H

## (undated) DEVICE — SUTURE BOOTS 051003PBX

## (undated) DEVICE — NDL BLUNT 18GA 1" W/O FILTER 305181

## (undated) DEVICE — LINEN TOWEL PACK X30 5481

## (undated) DEVICE — PATIENT CONTROLLER DBS

## (undated) DEVICE — Device

## (undated) DEVICE — DECANTER VIAL 2006S

## (undated) DEVICE — LINEN TOWEL PACK X6 WHITE 5487

## (undated) DEVICE — ESU GROUND PAD ADULT W/CORD E7507

## (undated) DEVICE — LINEN TOWEL PACK X5 5464

## (undated) DEVICE — GLOVE PROTEXIS BLUE W/NEU-THERA 8.0  2D73EB80

## (undated) DEVICE — LABEL MEDICATION SYSTEM 3303-P

## (undated) DEVICE — PREP POVIDONE IODINE SOLUTION 10% 4OZ

## (undated) DEVICE — SOL NACL 0.9% IRRIG 1000ML BOTTLE 2F7124

## (undated) DEVICE — CABLE MULTI-LEAD TRIAL 3014ANS

## (undated) DEVICE — DRAPE IOBAN ISOLATION VERTICAL 320X21CM 6617

## (undated) DEVICE — ESU ELEC BLADE 2.75" COATED/INSULATED E1455

## (undated) DEVICE — WIPES FOLEY CARE SURESTEP PROVON DFC100

## (undated) DEVICE — DRSG GAUZE 4X4" TRAY 6939

## (undated) DEVICE — PREP POVIDONE IODINE SCRUB 7.5% 4OZ APL82212

## (undated) DEVICE — HEADRING POINTS STEREOTACTIC DHRSL

## (undated) DEVICE — GLOVE PROTEXIS MICRO 7.5  2D73PM75

## (undated) DEVICE — SU MONOCRYL 4-0 PS-2 27" UND Y426H

## (undated) DEVICE — SYR 10ML FINGER CONTROL W/O NDL 309695

## (undated) DEVICE — PREP CHLORAPREP 26ML TINTED ORANGE  260815

## (undated) DEVICE — COVER CAMERA VIDEO LASER 9X96" 04-CC227

## (undated) DEVICE — PACK NEURO MINOR UMMC SNE32MNMU4

## (undated) DEVICE — BLADE CLIPPER SGL USE 9680

## (undated) DEVICE — DRAPE SHEET REV FOLD 3/4 9349

## (undated) DEVICE — PACK SET-UP STD 9102

## (undated) DEVICE — ESU GROUND PAD ADULT REM W/15' CORD E7507DB

## (undated) DEVICE — SU VICRYL 0 CT-1 27" UND J260H

## (undated) DEVICE — SU ETHIBOND 2-0 SHDA 30" X563H

## (undated) DEVICE — PREP CHLORAPREP CLEAR 3ML 260400

## (undated) DEVICE — SU VICRYL 3-0 SH 8X18" UND J864D

## (undated) DEVICE — DRAPE U SPLIT 74X120" 29440

## (undated) DEVICE — CATH TRAY FOLEY SURESTEP 16FR W/TMP PRB STLK LATEX A319416AM

## (undated) DEVICE — SOL WATER IRRIG 1000ML BOTTLE 2F7114

## (undated) DEVICE — INFINITY PATIENT MANUAL AND MAGNET

## (undated) DEVICE — SU SILK 2-0 TIE 12X30" A305H

## (undated) DEVICE — PACK GOWN 3/PK DISP XL SBA32GPFCB

## (undated) DEVICE — SU DERMABOND ADVANCED .7ML DNX12

## (undated) DEVICE — ESU PENCIL W/ROCKER SWITCH BLADE HOLSTER E2350HDB

## (undated) DEVICE — DRAPE C-ARM W/STRAPS 42X72" 07-CA104

## (undated) DEVICE — SUCTION MANIFOLD DORNOCH ULTRA CART UL-CL500

## (undated) DEVICE — SYR 10ML LL W/O NDL 302995

## (undated) DEVICE — COMB STERILE 7" PLASTIC 14-1200

## (undated) DEVICE — SPONGE COTTONOID 1/2X1/2" 20-04S

## (undated) DEVICE — TUBE GUIDE ALPHA OMEGA SYSTEM STR-007721-00

## (undated) DEVICE — NDL 25GA 2"  8881200441

## (undated) DEVICE — ADH SKIN CLOSURE PREMIERPRO EXOFIN 1.0ML 3470

## (undated) DEVICE — PREP SKIN SCRUB TRAY 4461A

## (undated) DEVICE — DRSG STERI STRIP 1/2X4" R1547

## (undated) DEVICE — JELLY LUBRICATING SURGILUBE 2OZ TUBE

## (undated) DEVICE — SPONGE SURGIFOAM 100 1974

## (undated) DEVICE — DRAPE LAP PEDS DISP 29492

## (undated) DEVICE — PAD CHUX UNDERPAD 23X24" 7136

## (undated) RX ORDER — CEFAZOLIN SODIUM 1 G/3ML
INJECTION, POWDER, FOR SOLUTION INTRAMUSCULAR; INTRAVENOUS
Status: DISPENSED
Start: 2019-02-22

## (undated) RX ORDER — LIDOCAINE HYDROCHLORIDE 20 MG/ML
INJECTION, SOLUTION EPIDURAL; INFILTRATION; INTRACAUDAL; PERINEURAL
Status: DISPENSED
Start: 2019-02-22

## (undated) RX ORDER — PHENYLEPHRINE HCL IN 0.9% NACL 1 MG/10 ML
SYRINGE (ML) INTRAVENOUS
Status: DISPENSED
Start: 2019-03-01

## (undated) RX ORDER — LIDOCAINE HYDROCHLORIDE AND EPINEPHRINE 10; 10 MG/ML; UG/ML
INJECTION, SOLUTION INFILTRATION; PERINEURAL
Status: DISPENSED
Start: 2020-11-24

## (undated) RX ORDER — ACETAMINOPHEN 325 MG/1
TABLET ORAL
Status: DISPENSED
Start: 2019-02-22

## (undated) RX ORDER — CEFAZOLIN SODIUM 2 G/100ML
INJECTION, SOLUTION INTRAVENOUS
Status: DISPENSED
Start: 2020-11-24

## (undated) RX ORDER — SODIUM CHLORIDE 9 MG/ML
INJECTION, SOLUTION INTRAVENOUS
Status: DISPENSED
Start: 2019-02-22

## (undated) RX ORDER — BACITRACIN 500 [USP'U]/G
OINTMENT OPHTHALMIC
Status: DISPENSED
Start: 2019-02-22

## (undated) RX ORDER — PROPOFOL 10 MG/ML
INJECTION, EMULSION INTRAVENOUS
Status: DISPENSED
Start: 2019-03-01

## (undated) RX ORDER — LIDOCAINE HYDROCHLORIDE AND EPINEPHRINE 10; 10 MG/ML; UG/ML
INJECTION, SOLUTION INFILTRATION; PERINEURAL
Status: DISPENSED
Start: 2019-03-01

## (undated) RX ORDER — PROPOFOL 10 MG/ML
INJECTION, EMULSION INTRAVENOUS
Status: DISPENSED
Start: 2019-02-22

## (undated) RX ORDER — LIDOCAINE HYDROCHLORIDE 20 MG/ML
INJECTION, SOLUTION EPIDURAL; INFILTRATION; INTRACAUDAL; PERINEURAL
Status: DISPENSED
Start: 2019-03-01

## (undated) RX ORDER — BUPIVACAINE HYDROCHLORIDE 2.5 MG/ML
INJECTION, SOLUTION EPIDURAL; INFILTRATION; INTRACAUDAL
Status: DISPENSED
Start: 2020-11-24

## (undated) RX ORDER — BUPIVACAINE HYDROCHLORIDE 5 MG/ML
INJECTION, SOLUTION EPIDURAL; INTRACAUDAL
Status: DISPENSED
Start: 2019-02-22

## (undated) RX ORDER — CEFAZOLIN SODIUM 2 G/100ML
INJECTION, SOLUTION INTRAVENOUS
Status: DISPENSED
Start: 2019-02-22

## (undated) RX ORDER — BACITRACIN 50000 [IU]/1
INJECTION, POWDER, FOR SOLUTION INTRAMUSCULAR
Status: DISPENSED
Start: 2019-02-22

## (undated) RX ORDER — ONDANSETRON 2 MG/ML
INJECTION INTRAMUSCULAR; INTRAVENOUS
Status: DISPENSED
Start: 2019-03-01

## (undated) RX ORDER — BUPIVACAINE HYDROCHLORIDE 2.5 MG/ML
INJECTION, SOLUTION EPIDURAL; INFILTRATION; INTRACAUDAL
Status: DISPENSED
Start: 2019-03-01

## (undated) RX ORDER — HYDROMORPHONE HYDROCHLORIDE 1 MG/ML
INJECTION, SOLUTION INTRAMUSCULAR; INTRAVENOUS; SUBCUTANEOUS
Status: DISPENSED
Start: 2019-02-22

## (undated) RX ORDER — DEXAMETHASONE SODIUM PHOSPHATE 4 MG/ML
INJECTION, SOLUTION INTRA-ARTICULAR; INTRALESIONAL; INTRAMUSCULAR; INTRAVENOUS; SOFT TISSUE
Status: DISPENSED
Start: 2019-03-01

## (undated) RX ORDER — FENTANYL CITRATE 50 UG/ML
INJECTION, SOLUTION INTRAMUSCULAR; INTRAVENOUS
Status: DISPENSED
Start: 2020-11-24

## (undated) RX ORDER — ESMOLOL HYDROCHLORIDE 10 MG/ML
INJECTION INTRAVENOUS
Status: DISPENSED
Start: 2019-02-22

## (undated) RX ORDER — LIDOCAINE HYDROCHLORIDE AND EPINEPHRINE 10; 10 MG/ML; UG/ML
INJECTION, SOLUTION INFILTRATION; PERINEURAL
Status: DISPENSED
Start: 2019-02-22

## (undated) RX ORDER — FENTANYL CITRATE 50 UG/ML
INJECTION, SOLUTION INTRAMUSCULAR; INTRAVENOUS
Status: DISPENSED
Start: 2019-03-01

## (undated) RX ORDER — BACITRACIN 50000 [IU]/1
INJECTION, POWDER, FOR SOLUTION INTRAMUSCULAR
Status: DISPENSED
Start: 2019-03-01

## (undated) RX ORDER — CEFAZOLIN SODIUM 2 G/100ML
INJECTION, SOLUTION INTRAVENOUS
Status: DISPENSED
Start: 2019-03-01